# Patient Record
Sex: FEMALE | Race: WHITE | NOT HISPANIC OR LATINO | Employment: OTHER | URBAN - METROPOLITAN AREA
[De-identification: names, ages, dates, MRNs, and addresses within clinical notes are randomized per-mention and may not be internally consistent; named-entity substitution may affect disease eponyms.]

---

## 2017-04-11 ENCOUNTER — ALLSCRIPTS OFFICE VISIT (OUTPATIENT)
Dept: OTHER | Facility: OTHER | Age: 62
End: 2017-04-11

## 2017-04-11 DIAGNOSIS — R94.6 ABNORMAL RESULTS OF THYROID FUNCTION STUDIES: ICD-10-CM

## 2017-06-19 ENCOUNTER — DOCTOR'S OFFICE (OUTPATIENT)
Dept: URBAN - METROPOLITAN AREA CLINIC 136 | Facility: CLINIC | Age: 62
Setting detail: OPHTHALMOLOGY
End: 2017-06-19

## 2017-06-19 DIAGNOSIS — H43.812: ICD-10-CM

## 2017-06-19 DIAGNOSIS — H25.13: ICD-10-CM

## 2017-06-19 DIAGNOSIS — H52.223: ICD-10-CM

## 2017-06-19 DIAGNOSIS — H35.3132: ICD-10-CM

## 2017-06-19 DIAGNOSIS — H52.4: ICD-10-CM

## 2017-06-19 DIAGNOSIS — H52.13: ICD-10-CM

## 2017-06-19 PROCEDURE — SELFPAYVIS VISION VISIT SELF PAY: Performed by: OPTOMETRIST

## 2017-06-19 ASSESSMENT — REFRACTION_AUTOREFRACTION
OD_AXIS: 167
OS_AXIS: 001
OD_CYLINDER: -0.75
OS_SPHERE: -2.50
OS_CYLINDER: -1.75
OD_SPHERE: -2.50

## 2017-06-19 ASSESSMENT — KERATOMETRY
OD_AXISANGLE_DEGREES: 97
OS_AXISANGLE_DEGREES: 79
OS_K1POWER_DIOPTERS: 41.75
OD_K2POWER_DIOPTERS: 44.75
OS_K2POWER_DIOPTERS: 44.00
OD_K1POWER_DIOPTERS: 42.50

## 2017-06-19 ASSESSMENT — REFRACTION_OUTSIDERX
OD_VA1: 20/
OS_ADD: +2.50
OD_SPHERE: +2.50
OS_SPHERE: +2.50
OS_SPHERE: -1.50
OD_VA2: 20/
OD_CYLINDER: -1.75
OU_VA: 20/
OS_VA1: 20/
OU_VA: 20/20
OD_VA2: 20/20
OD_VA1: 20/20
OD_SPHERE: -1.50
OS_VA3: 20/
OD_CYLINDER: SPH
OS_VA2: 20/
OD_VA3: 20/
OS_VA1: 20/20
OS_CYLINDER: -1.75
OS_AXIS: 170
OD_ADD: +2.50
OS_VA2: 20/20
OS_VA3: 20/
OS_CYLINDER: SPH
OD_VA3: 20/
OD_AXIS: 005

## 2017-06-19 ASSESSMENT — REFRACTION_CURRENTRX
OD_SPHERE: -2.00
OD_CYLINDER: -1.50
OD_VPRISM_DIRECTION: SV
OD_OVR_VA: 20/
OS_ADD: +2.50
OD_VPRISM_DIRECTION: BF
OS_OVR_VA: 20/
OS_VPRISM_DIRECTION: BF
OD_ADD: +2.50
OS_AXIS: 170
OS_CYLINDER: -2.25
OS_VPRISM_DIRECTION: SV
OD_OVR_VA: 20/
OD_OVR_VA: 20/
OS_SPHERE: +2.50
OS_OVR_VA: 20/
OD_SPHERE: +2.50
OS_OVR_VA: 20/
OS_SPHERE: -2.75
OD_AXIS: 180

## 2017-06-19 ASSESSMENT — REFRACTION_MANIFEST
OS_VA3: 20/
OD_VA2: 20/
OD_VA1: 20/
OS_VA2: 20/
OD_VA3: 20/
OS_VA1: 20/
OU_VA: 20/

## 2017-06-19 ASSESSMENT — CONFRONTATIONAL VISUAL FIELD TEST (CVF)
OS_FINDINGS: FULL
OD_FINDINGS: FULL

## 2017-06-19 ASSESSMENT — SPHEQUIV_DERIVED
OS_SPHEQUIV: -3.375
OD_SPHEQUIV: -2.875

## 2017-06-19 ASSESSMENT — VISUAL ACUITY
OD_BCVA: 20/30-2
OS_BCVA: 20/20

## 2017-06-19 ASSESSMENT — LID POSITION - DERMATOCHALASIS
OS_DERMATOCHALASIS: LUL T
OD_DERMATOCHALASIS: RUL T

## 2017-06-19 ASSESSMENT — AXIALLENGTH_DERIVED
OS_AL: 25.2446
OD_AL: 24.7173

## 2017-07-11 DIAGNOSIS — K21.9 GASTRO-ESOPHAGEAL REFLUX DISEASE WITHOUT ESOPHAGITIS: ICD-10-CM

## 2017-07-11 DIAGNOSIS — E78.5 HYPERLIPIDEMIA: ICD-10-CM

## 2017-07-11 DIAGNOSIS — E11.9 TYPE 2 DIABETES MELLITUS WITHOUT COMPLICATIONS (HCC): ICD-10-CM

## 2017-07-11 DIAGNOSIS — R94.6 ABNORMAL RESULTS OF THYROID FUNCTION STUDIES: ICD-10-CM

## 2017-07-11 DIAGNOSIS — I10 ESSENTIAL (PRIMARY) HYPERTENSION: ICD-10-CM

## 2017-07-11 DIAGNOSIS — R73.9 HYPERGLYCEMIA: ICD-10-CM

## 2017-08-14 ENCOUNTER — ALLSCRIPTS OFFICE VISIT (OUTPATIENT)
Dept: OTHER | Facility: OTHER | Age: 62
End: 2017-08-14

## 2018-01-12 ENCOUNTER — ALLSCRIPTS OFFICE VISIT (OUTPATIENT)
Dept: OTHER | Facility: OTHER | Age: 63
End: 2018-01-12

## 2018-01-13 VITALS
BODY MASS INDEX: 31.47 KG/M2 | HEART RATE: 64 BPM | HEIGHT: 67 IN | DIASTOLIC BLOOD PRESSURE: 80 MMHG | SYSTOLIC BLOOD PRESSURE: 130 MMHG | WEIGHT: 200.5 LBS

## 2018-01-13 VITALS
BODY MASS INDEX: 31.11 KG/M2 | HEIGHT: 67 IN | DIASTOLIC BLOOD PRESSURE: 64 MMHG | SYSTOLIC BLOOD PRESSURE: 126 MMHG | WEIGHT: 198.25 LBS | HEART RATE: 60 BPM

## 2018-01-15 ENCOUNTER — OPTICAL OFFICE (OUTPATIENT)
Dept: URBAN - METROPOLITAN AREA CLINIC 143 | Facility: CLINIC | Age: 63
Setting detail: OPHTHALMOLOGY
End: 2018-01-15

## 2018-01-15 ENCOUNTER — DOCTOR'S OFFICE (OUTPATIENT)
Dept: URBAN - METROPOLITAN AREA CLINIC 136 | Facility: CLINIC | Age: 63
Setting detail: OPHTHALMOLOGY
End: 2018-01-15
Payer: COMMERCIAL

## 2018-01-15 DIAGNOSIS — H35.3132: ICD-10-CM

## 2018-01-15 DIAGNOSIS — H52.223: ICD-10-CM

## 2018-01-15 PROCEDURE — 92134 CPTRZ OPH DX IMG PST SGM RTA: CPT | Performed by: OPTOMETRIST

## 2018-01-15 PROCEDURE — 99213 OFFICE O/P EST LOW 20 MIN: CPT | Performed by: OPTOMETRIST

## 2018-01-15 PROCEDURE — S0500 DISPOS CONT LENS: HCPCS | Performed by: OPTOMETRIST

## 2018-01-15 ASSESSMENT — REFRACTION_CURRENTRX
OS_VPRISM_DIRECTION: SV
OD_ADD: +2.50
OD_SPHERE: -2.00
OD_OVR_VA: 20/
OS_OVR_VA: 20/
OD_OVR_VA: 20/
OD_VPRISM_DIRECTION: BF
OD_SPHERE: +2.50
OS_SPHERE: -2.75
OD_CYLINDER: -1.50
OS_SPHERE: +2.50
OS_OVR_VA: 20/
OS_AXIS: 170
OS_OVR_VA: 20/
OD_AXIS: 180
OS_ADD: +2.50
OS_CYLINDER: -2.25
OD_VPRISM_DIRECTION: SV
OS_VPRISM_DIRECTION: BF
OD_OVR_VA: 20/

## 2018-01-15 ASSESSMENT — REFRACTION_OUTSIDERX
OU_VA: 20/
OD_SPHERE: -1.50
OS_VA2: 20/20
OD_VA2: 20/
OS_AXIS: 170
OD_SPHERE: +2.50
OS_CYLINDER: -1.75
OS_SPHERE: -1.50
OD_AXIS: 005
OD_CYLINDER: SPH
OU_VA: 20/20
OD_ADD: +2.50
OS_CYLINDER: SPH
OS_VA1: 20/
OS_VA1: 20/20
OD_VA1: 20/20
OD_CYLINDER: -1.75
OS_VA3: 20/
OD_VA1: 20/
OS_SPHERE: +2.50
OS_VA2: 20/
OS_ADD: +2.50
OD_VA3: 20/
OD_VA3: 20/
OD_VA2: 20/20
OS_VA3: 20/

## 2018-01-15 ASSESSMENT — REFRACTION_AUTOREFRACTION
OD_AXIS: 148
OS_CYLINDER: -2.25
OS_SPHERE: +0.50
OD_CYLINDER: -1.75
OD_SPHERE: +0.75

## 2018-01-15 ASSESSMENT — CONFRONTATIONAL VISUAL FIELD TEST (CVF)
OS_FINDINGS: FULL
OD_FINDINGS: FULL

## 2018-01-15 ASSESSMENT — REFRACTION_MANIFEST
OS_VA3: 20/
OS_VA1: 20/
OU_VA: 20/
OS_VA2: 20/
OD_VA3: 20/
OD_VA1: 20/
OD_VA2: 20/

## 2018-01-15 ASSESSMENT — VISUAL ACUITY
OS_BCVA: 20/25-2
OD_BCVA: 20/25-1

## 2018-01-15 ASSESSMENT — AXIALLENGTH_DERIVED
OS_AL: 24.0747
OD_AL: 23.5947

## 2018-01-15 ASSESSMENT — KERATOMETRY
OD_K1POWER_DIOPTERS: 42.50
OD_K2POWER_DIOPTERS: 44.75
OS_K2POWER_DIOPTERS: 44.00
OS_AXISANGLE_DEGREES: 79
OS_K1POWER_DIOPTERS: 41.75
OD_AXISANGLE_DEGREES: 97

## 2018-01-15 ASSESSMENT — SPHEQUIV_DERIVED
OD_SPHEQUIV: -0.125
OS_SPHEQUIV: -0.625

## 2018-01-15 ASSESSMENT — LID POSITION - DERMATOCHALASIS
OS_DERMATOCHALASIS: LUL T
OD_DERMATOCHALASIS: RUL T

## 2018-01-15 NOTE — PROGRESS NOTES
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  She has been in the practice for the past 16 years  She has not had any recent health concerns or signs of infection  She denies cough, fevers, or persistent diarrhea  Review of Systems    Constitutional: no fever, not feeling poorly, no chills and not feeling tired  Eyes: no eyesight problems and no purulent discharge from the eyes  ENT: no nosebleeds and no nasal discharge  Cardiovascular: no chest pain and no palpitations  Respiratory: no shortness of breath, no cough, no wheezing and no shortness of breath during exertion  Gastrointestinal: no abdominal pain, no nausea, no constipation and no diarrhea  Musculoskeletal: no arthralgias and no myalgias  Neurological: no headache  Psychiatric: no anxiety and no depression  Hematologic/Lymphatic: no swollen glands and no swollen glands in the neck  Active Problems    1  Allergic rhinitis (477 9) (J30 9)   2  Colon cancer screening (V76 51) (Z12 11)   3  Diverticulitis of colon (562 11) (K57 32)   4  Diverticulosis (562 10) (K57 90)   5  Encounter for other preprocedural examination (V72 83) (Z01 818)   6  Function kidney decreased (593 9) (N28 9)   7  GERD (gastroesophageal reflux disease) (530 81) (K21 9)   8  Hyperglycemia (790 29) (R73 9)   9  Hyperlipidemia (272 4) (E78 5)   10  Hypertension (401 9) (I10)   11   Melanoma in situ (172 9) (D03 9)   12  Menopause (627 2) (Z78 0)   13  Multiple nevi (216 9) (D22 9)   14  Need for prophylactic vaccination and inoculation against influenza (V04 81) (Z23)   15  Need for Zostavax administration (V04 89) (Z23)   16  Screening for osteoporosis (V82 81) (Z13 820)   17  Tinea corporis (110 5) (B35 4)   18  Visit for pre-operative examination (V72 84) (Z01 818)    Family History  Mother    · Family history of Alzheimer Disease   · Family history of Anxiety (Symptom)   · Family history of Diabetes Mellitus (V18 0)  Father    · Family history of Cancer  Sister    · Family history of Diabetes Mellitus (V18 0)   · Family history of Hypertension (V17 49)   · Family history of Stroke Syndrome (V17 1)    Social History    · Never a smoker    Current Meds   1  Allegra Allergy TABS Recorded   2  Aspirin 81 MG TABS; Take 1 tablet daily Recorded   3  Clotrimazole-Betamethasone 1-0 05 % External Cream; APPLY  AND RUB  IN A THIN   FILM TO AFFECTED AREAS TWICE DAILY  (AM AND PM); Therapy: 27MNR0923 to (Last Rx:02Tkx1186)  Requested for: 26Cai9502 Ordered   4  DiaSense Multivitamin TABS Recorded   5  Fenofibrate 145 MG Oral Tablet; Take 1 tablet daily; Therapy: 55XKT8595 to (Evaluate:25Jun2017)  Requested for: 68WTQ5198; Last   Rx:76Hpe2163 Ordered   6  Fiber (Guar Gum) Oral Tablet Chewable; Take as directed Recorded   7  Fluticasone Propionate 50 MCG/ACT Nasal Suspension; USE 2 SPRAYS IN EACH   NOSTRIL DAILY; Therapy: 20Qxg6881 to (Last Rx:15Gaq1416)  Requested for: 93CEH3294 Ordered   8  Lisinopril 10 MG Oral Tablet; Take 1 tablet daily; Therapy: 28ODD5447 to (Evaluate:27Dvl1398)  Requested for: 02Mzp4248; Last   Rx:00Eyf5132 Ordered   9  Omeprazole 20 MG Oral Capsule Delayed Release; TAKE 1 CAPSULE Daily; Therapy: 13UFO7126 to (Last Rx:96Mso2631)  Requested for: 57FZS8689 Ordered    Allergies    1   No Known Drug Allergies    Vitals   Recorded: 88JHL3562 49:12JC   Systolic 026, LUE, Sitting   Diastolic 60, LUE, Sitting   Heart Rate 72, L Radial   Pulse Quality Bnd   Height 5 ft 7 in   Weight 207 lb 12 96 oz   BMI Calculated 32 55   BSA Calculated 2 05     Physical Exam    Constitutional   General appearance: No acute distress, well appearing and well nourished  Head and Face   Head and face: Normal     Palpation of the face and sinuses: No sinus tenderness  Eyes   Conjunctiva and lids: No swelling, erythema or discharge  Pupils and irises: Equal, round, reactive to light  Ophthalmoscopic examination: Normal fundi and optic discs  Ears, Nose, Mouth, and Throat   Otoscopic examination: Tympanic membranes translucent with normal light reflex  Canals patent without erythema  Hearing: Normal     Nasal mucosa, septum, and turbinates: Normal without edema or erythema  Lips, teeth, and gums: Normal, good dentition  Neck   Neck: Supple, symmetric, trachea midline, no masses  Thyroid: Normal, no thyromegaly  Pulmonary   Respiratory effort: No increased work of breathing or signs of respiratory distress  Auscultation of lungs: Clear to auscultation  Cardiovascular   Auscultation of heart: Normal rate and rhythm, normal S1 and S2, no murmurs  Carotid pulses: 2+ bilaterally  Examination of extremities for edema and/or varicosities: Normal     Abdomen   Abdomen: Non-tender, no masses  Liver and spleen: No hepatomegaly or splenomegaly  Lymphatic   Palpation of lymph nodes in neck: No lymphadenopathy  Palpation of lymph nodes in axillae: No lymphadenopathy  Musculoskeletal   Gait and station: Normal     Digits and nails: Normal without clubbing or cyanosis  Joints, bones, and muscles: Normal     Range of motion: Normal     Stability: Normal     Muscle strength/tone: Normal     Neurologic   Reflexes: 2+ and symmetric  Sensation: No sensory loss      Psychiatric   Judgment and insight: Normal     Orientation to person, place, and time: Normal     Recent and remote memory: Intact      Mood and affect: Normal        Signatures   Electronically signed by : Marilin Snow, [facility]; Sep 26 2016  7:28PM EST                       (Author)    Electronically signed by : Sina Dennis DO; Sep 27 2016  7:03AM EST

## 2018-01-16 NOTE — PROGRESS NOTES
Assessment   1  Sinusitis, acute (461 9) (J01 90)   2  Diabetes (250 00) (E11 9)    Plan   Sinusitis, acute    · Azithromycin 250 MG Oral Tablet; TAKE 2 TABLETS ON DAY 1 THEN TAKE 1    TABLET A DAY FOR 4 DAYS   · Promethazine-Codeine 6 25-10 MG/5ML Oral Syrup; TAKE 10 ML Bedtime    Discussion/Summary      1  Acute sinusitis-recommended Zithromax Z-Tomás as directed  Patient also given promethazine with codeine cough syrup, 2 tsp at bedtime as needed for cough  Patient was cautioned of drowsiness with this medication  4 oz with no refills given  The patient was also advised to use Flonase, 2 sprays in each nostril daily  They may use over-the-counter NSAIDs such as ibuprofen as necessary for pressure  Follow-up in the next 4-5 days if not improved  Type 2 diabetes-stable with last hemoglobin A1c of 6 0 on metformin therapy  Continue regular follow-up  Chief Complaint   C/o-congestion, cough, bodyaches x 5 days  [facility] [place]      History of Present Illness   HPI: This is a 77-year-old female who presents to the office with 5 days of worsening sinus congestion, head pressure in her left maxillary area, and increasing cough  Her cough has been mostly dry and she does not feel that she has much congestion in her chest but has been feeling very fatigued and run down  She has not had any measurable fevers  She is concerned because she wants to go to the hospital to visit her  grandson in the next few days and needs to get better  Review of Systems        Constitutional: feeling poorly-- and-- feeling tired, but-- no fever-- and-- no chills  ENT: sore throat-- and-- nasal discharge, but-- no earache  Cardiovascular: the heart rate was not slow,-- no chest pain-- and-- no palpitations  Respiratory: cough, but-- no shortness of breath  Gastrointestinal: no abdominal pain,-- no nausea-- and-- no diarrhea  Musculoskeletal: no arthralgias-- and-- no myalgias  Neurological: headache  Active Problems   1  Allergic rhinitis (477 9) (J30 9)   2  Colon cancer screening (V76 51) (Z12 11)   3  Cough (786 2) (R05)   4  Diabetes (250 00) (E11 9)   5  Diverticulitis of colon (562 11) (K57 32)   6  Diverticulosis (562 10) (K57 90)   7  Elevated TSH (794 5) (R94 6)   8  Encounter for other preprocedural examination (V72 83) (Z01 818)   9  GERD without esophagitis (530 81) (K21 9)   10  Hyperglycemia (790 29) (R73 9)   11  Hyperlipidemia (272 4) (E78 5)   12  Hypertension (401 9) (I10)   13  Melanoma in situ (172 9) (D03 9)   14  Menopause (627 2) (Z78 0)   15  Multiple nevi (216 9) (D22 9)   16  Need for pneumococcal vaccination (V03 82) (Z23)   17  Need for prophylactic vaccination and inoculation against influenza (V04 81) (Z23)   18  Need for Zostavax administration (V04 89) (Z23)   19  Screening for osteoporosis (V82 81) (Z13 820)   20  Tinea corporis (110 5) (B35 4)   21  Upper respiratory infection (465 9) (J06 9)   22  Visit for pre-operative examination (V72 84) (D89 189)    Past Medical History   1  History of malignant neoplasm of skin (V10 83) (K98 604)  Active Problems And Past Medical History Reviewed: The active problems and past medical history were reviewed and updated today  Family History   Mother    1  Family history of Alzheimer Disease   2  Family history of Anxiety (Symptom)   3  Family history of Diabetes Mellitus (V18 0)  Father    4  Family history of Cancer  Sister    5  Family history of Diabetes Mellitus (V18 0)   6  Family history of Hypertension (V17 49)   7  Family history of Stroke Syndrome (V17 1)    Social History    · Employed   ·    · Never a smoker   · No secondhand smoke exposure (V49 89) (Z78 9)    Current Meds    1  Allegra Allergy TABS Recorded   2  Aspirin 81 MG TABS; Take 1 tablet daily Recorded   3  Clotrimazole-Betamethasone 1-0 05 % External Cream; APPLY  AND RUB  IN A THIN     FILM TO AFFECTED AREAS TWICE DAILY  (AM AND PM);      Therapy: 79CBN9778 to (Last Rx:44Rih1845)  Requested for: 78Psa6546 Ordered   4  DiaSense Multivitamin TABS Recorded   5  Fenofibrate 145 MG Oral Tablet; Take 1 tablet daily; Therapy: 18BNM8290 to (Evaluate:15Jun2018)  Requested for: 20Jun2017; Last     Rx:20Jun2017 Ordered   6  Fiber (Guar Gum) CHEW; Take as directed Recorded   7  Fluticasone Propionate 50 MCG/ACT Nasal Suspension; USE 2 SPRAYS IN EACH     NOSTRIL DAILY; Therapy: 05Zix9022 to (Last Rx:43Byc7666)  Requested for: 21EWE6682 Ordered   8  Lisinopril 10 MG Oral Tablet; Take 1 tablet daily; Therapy: 03TLM2931 to (Evaluate:06Jun2018)  Requested for: 69GTA5729; Last     Rx:62Dzy4421 Ordered   9  MetFORMIN HCl - 500 MG Oral Tablet; TAKE 1 TABLET DAILY AS DIRECTED; Therapy: 48Cki9220 to (Evaluate:06Apr2018)  Requested for: 09Yxn3171; Last     Rx:88Vtv1748 Ordered   10  Omeprazole 20 MG Oral Capsule Delayed Release; TAKE 1 CAPSULE Daily; Therapy: 38OLA2924 to (Last Jewels Hernández)  Requested for: 20Jun2017 Ordered   11  PreserVision AREDS Oral Capsule; Therapy: 58EDA1592 to Recorded     The medication list was reviewed and updated today  Allergies   1  No Known Drug Allergies    Vitals    Recorded: 57SRO6732 01:35PM   Heart Rate 68, L Radial   Pulse Quality Normal, L Radial   Respiration Quality Normal   Respiration 16   Systolic 872, LUE, Sitting   Diastolic 74, LUE, Sitting   Height 5 ft 7 in   Weight 202 lb    BMI Calculated 31 64   BSA Calculated 2 03     Physical Exam        Constitutional      General appearance: No acute distress, well appearing and well nourished  Eyes      Conjunctiva and lids: No swelling, erythema or discharge  Pupils and irises: Equal, round and reactive to light  Ears, Nose, Mouth, and Throat      External inspection of ears and nose: Normal        Otoscopic examination: Abnormal  -- Left tympanic membrane is dull with erythema        Nasal mucosa, septum, and turbinates: Abnormal  -- Turbinates are erythematous and edematous bilaterally  Oropharynx: Abnormal  -- Postnasal drip in the posterior pharynx present  Pulmonary      Respiratory effort: No increased work of breathing or signs of respiratory distress  Auscultation of lungs: Clear to auscultation  Cardiovascular      Auscultation of heart: Normal rate and rhythm, normal S1 and S2, without murmurs  Examination of extremities for edema and/or varicosities: Normal        Abdomen      Abdomen: Non-tender, no masses  Liver and spleen: No hepatomegaly or splenomegaly  Musculoskeletal      Digits and nails: Normal without clubbing or cyanosis  Inspection/palpation of joints, bones, and muscles: Normal        Neurologic      Reflexes: 2+ and symmetric  Sensation: No sensory loss  Psychiatric      Orientation to person, place, and time: Normal        Mood and affect: Normal           Signatures    Electronically signed by : GAVIOTA Mancera; Jan 12 2018  1:49PM EST                       (Author)     Electronically signed by :  SHAWN Gonzales ; Chicho 15 2018  8:34AM EST

## 2018-01-22 VITALS
DIASTOLIC BLOOD PRESSURE: 74 MMHG | BODY MASS INDEX: 31.71 KG/M2 | HEART RATE: 68 BPM | HEIGHT: 67 IN | SYSTOLIC BLOOD PRESSURE: 130 MMHG | RESPIRATION RATE: 16 BRPM | WEIGHT: 202 LBS

## 2018-02-14 DIAGNOSIS — J30.9 ALLERGIC RHINITIS: ICD-10-CM

## 2018-02-14 DIAGNOSIS — E78.5 HYPERLIPIDEMIA: ICD-10-CM

## 2018-02-14 DIAGNOSIS — D03.9 MELANOMA IN SITU (HCC): ICD-10-CM

## 2018-02-14 DIAGNOSIS — E11.9 TYPE 2 DIABETES MELLITUS WITHOUT COMPLICATIONS (HCC): ICD-10-CM

## 2018-02-14 DIAGNOSIS — R94.6 ABNORMAL RESULTS OF THYROID FUNCTION STUDIES: ICD-10-CM

## 2018-02-14 DIAGNOSIS — I10 ESSENTIAL (PRIMARY) HYPERTENSION: ICD-10-CM

## 2018-02-14 DIAGNOSIS — K21.9 GASTRO-ESOPHAGEAL REFLUX DISEASE WITHOUT ESOPHAGITIS: ICD-10-CM

## 2018-03-22 PROBLEM — E11.9 DIABETES (HCC): Status: ACTIVE | Noted: 2017-04-11

## 2018-03-22 RX ORDER — LISINOPRIL 10 MG/1
1 TABLET ORAL DAILY
COMMUNITY
Start: 2012-01-18 | End: 2018-06-04 | Stop reason: SDUPTHER

## 2018-03-22 RX ORDER — OMEPRAZOLE 20 MG/1
1 CAPSULE, DELAYED RELEASE ORAL DAILY
COMMUNITY
Start: 2013-12-26 | End: 2018-06-04 | Stop reason: SDUPTHER

## 2018-03-22 RX ORDER — FENOFIBRATE 145 MG/1
1 TABLET, COATED ORAL DAILY
COMMUNITY
Start: 2011-02-08 | End: 2018-06-04 | Stop reason: SDUPTHER

## 2018-03-26 DIAGNOSIS — E11.9 TYPE 2 DIABETES MELLITUS WITHOUT COMPLICATION, WITHOUT LONG-TERM CURRENT USE OF INSULIN (HCC): Primary | ICD-10-CM

## 2018-03-29 LAB — HBA1C MFR BLD HPLC: 6.1 %

## 2018-03-30 ENCOUNTER — OFFICE VISIT (OUTPATIENT)
Dept: FAMILY MEDICINE CLINIC | Facility: CLINIC | Age: 63
End: 2018-03-30
Payer: COMMERCIAL

## 2018-03-30 VITALS
HEART RATE: 80 BPM | HEIGHT: 67 IN | WEIGHT: 195 LBS | BODY MASS INDEX: 30.61 KG/M2 | DIASTOLIC BLOOD PRESSURE: 76 MMHG | SYSTOLIC BLOOD PRESSURE: 120 MMHG

## 2018-03-30 DIAGNOSIS — Z12.11 SCREEN FOR COLON CANCER: ICD-10-CM

## 2018-03-30 DIAGNOSIS — I10 ESSENTIAL HYPERTENSION: Primary | ICD-10-CM

## 2018-03-30 DIAGNOSIS — E78.2 MIXED HYPERLIPIDEMIA: ICD-10-CM

## 2018-03-30 DIAGNOSIS — R73.9 HYPERGLYCEMIA: ICD-10-CM

## 2018-03-30 DIAGNOSIS — E11.8 TYPE 2 DIABETES MELLITUS WITH COMPLICATION, WITHOUT LONG-TERM CURRENT USE OF INSULIN (HCC): ICD-10-CM

## 2018-03-30 PROCEDURE — 99214 OFFICE O/P EST MOD 30 MIN: CPT | Performed by: FAMILY MEDICINE

## 2018-03-30 RX ORDER — FLUTICASONE PROPIONATE 50 MCG
1 SPRAY, SUSPENSION (ML) NASAL 2 TIMES DAILY PRN
COMMUNITY
Start: 2010-04-06

## 2018-03-30 NOTE — PROGRESS NOTES
Assessment/Plan:    Diabetes ([facility] [address] )  Diabetes is under good control with the hemoglobin A1c 6 1% and her glucose 116  She is on metformin 500 mg 1 daily  Hypertension  Her blood pressure is stable at 120/76 and she takes lisinopril 10 mg 1 daily  Hyperlipidemia  Her triglycerides are elevated at 222 and she does take fenofibrate 145 mg daily  Diagnoses and all orders for this visit:    Essential hypertension  -     CBC and differential; Future  -     Comprehensive metabolic panel; Future  -     HEMOGLOBIN A1C W/ EAG ESTIMATION; Future  -     Lipid Panel with Direct LDL reflex; Future  -     Microalbumin / creatinine urine ratio; Future  -     TSH, 3rd generation; Future    Hyperglycemia  -     CBC and differential; Future  -     Comprehensive metabolic panel; Future  -     HEMOGLOBIN A1C W/ EAG ESTIMATION; Future  -     Lipid Panel with Direct LDL reflex; Future  -     Microalbumin / creatinine urine ratio; Future  -     TSH, 3rd generation; Future    Mixed hyperlipidemia  -     CBC and differential; Future  -     Comprehensive metabolic panel; Future  -     HEMOGLOBIN A1C W/ EAG ESTIMATION; Future  -     Lipid Panel with Direct LDL reflex; Future  -     Microalbumin / creatinine urine ratio; Future  -     TSH, 3rd generation; Future    Type 2 diabetes mellitus with complication, without long-term current use of insulin (HCC)  -     CBC and differential; Future  -     Comprehensive metabolic panel; Future  -     HEMOGLOBIN A1C W/ EAG ESTIMATION; Future  -     Lipid Panel with Direct LDL reflex; Future  -     Microalbumin / creatinine urine ratio; Future  -     TSH, 3rd generation; Future    Screen for colon cancer  -     Occult Bloood,Fecal Immunochemical; Future    Other orders  -     fluticasone (FLONASE) 50 mcg/act nasal spray;   -     Calcium Carb-Ergocalciferol 250-125 MG-UNIT TABS; Take 1 tablet by mouth          Subjective:      Patient ID: Shefali Humphrey is a 58 y o  female      CC:  Follow up DM 2, TSH, GERD, hyperlipidemia, HTN  Review BW results  -[facility]    HPI:  This is a 42-year-old female who comes in for her regular 6 month appointment  However, she has not been seen for a while and she did get her blood work done before she came in  Her blood pressure today was 120/76 and her weight is 195 lb  Reviewing her labs her hemoglobin A1c was 6 1%, glucose was 116, triglycerides were mildly elevated at 222 because she is not watching her diet and is eating more pasta than previously  Her HDL is low at 34 but her LDL is 67  Procedures    The following portions of the patient's history were reviewed and updated as appropriate: allergies, current medications, past family history, past medical history, past social history, past surgical history and problem list     Review of Systems   Constitutional: Negative  HENT: Negative  Eyes: Negative  Respiratory: Negative  Cardiovascular: Negative  Gastrointestinal: Negative  Endocrine: Negative  Genitourinary: Negative  Musculoskeletal: Negative  Skin: Negative  Allergic/Immunologic: Negative  Neurological: Negative  Hematological: Negative  Psychiatric/Behavioral: Negative  Vitals:    03/30/18 0746   BP: 120/76   BP Location: Left arm   Patient Position: Sitting   Cuff Size: Large   Pulse: 80   Weight: 88 5 kg (195 lb)   Height: 5' 7" (1 702 m)   Objective:      /76 (BP Location: Left arm, Patient Position: Sitting, Cuff Size: Large)   Pulse 80   Ht 5' 7" (1 702 m)   Wt 88 5 kg (195 lb)   BMI 30 54 kg/m²          Physical Exam   Constitutional: She is oriented to person, place, and time  She appears well-developed and well-nourished  HENT:   Head: Normocephalic  Right Ear: External ear normal    Left Ear: External ear normal    Mouth/Throat: Oropharynx is clear and moist    Eyes: Conjunctivae and EOM are normal  Pupils are equal, round, and reactive to light  Neck: Normal range of motion   Neck supple  Cardiovascular: Normal rate, regular rhythm and normal heart sounds  Pulses are no weak pulses  Pulses:       Dorsalis pedis pulses are 2+ on the right side, and 2+ on the left side  Posterior tibial pulses are 2+ on the right side, and 2+ on the left side  Pulmonary/Chest: Effort normal and breath sounds normal    Abdominal: Soft  Bowel sounds are normal    Musculoskeletal: Normal range of motion  Feet:   Right Foot:   Skin Integrity: Negative for ulcer, skin breakdown, erythema, warmth, callus or dry skin  Left Foot:   Skin Integrity: Negative for ulcer, skin breakdown, erythema, warmth, callus or dry skin  Neurological: She is alert and oriented to person, place, and time  Skin: Skin is warm  Psychiatric: She has a normal mood and affect  Her behavior is normal  Judgment and thought content normal    Nursing note and vitals reviewed  Patient's shoes and socks removed  Right Foot/Ankle   Right Foot Inspection  Skin Exam: skin normal and skin intact no dry skin, no warmth, no callus, no erythema, no maceration, no abnormal color, no pre-ulcer, no ulcer and no callus                          Toe Exam: ROM and strength within normal limits  Sensory   Vibration: intact  Proprioception: intact   Monofilament testing: intact  Vascular    The right DP pulse is 2+  The right PT pulse is 2+  Left Foot/Ankle  Left Foot Inspection  Skin Exam: skin normal and skin intactno dry skin, no warmth, no erythema, no maceration, normal color, no pre-ulcer, no ulcer and no callus                         Toe Exam: ROM and strength within normal limits                   Sensory   Vibration: intact  Proprioception: intact  Monofilament: intact  Vascular    The left DP pulse is 2+  The left PT pulse is 2+  Assign Risk Category:  No deformity present; No loss of protective sensation;  No weak pulses       Risk: 0

## 2018-03-30 NOTE — ASSESSMENT & PLAN NOTE
Diabetes is under good control with the hemoglobin A1c 6 1% and her glucose 116  She is on metformin 500 mg 1 daily

## 2018-04-26 ENCOUNTER — OFFICE VISIT (OUTPATIENT)
Dept: FAMILY MEDICINE CLINIC | Facility: CLINIC | Age: 63
End: 2018-04-26
Payer: COMMERCIAL

## 2018-04-26 VITALS
BODY MASS INDEX: 30.92 KG/M2 | HEIGHT: 67 IN | DIASTOLIC BLOOD PRESSURE: 74 MMHG | SYSTOLIC BLOOD PRESSURE: 132 MMHG | TEMPERATURE: 101.3 F | WEIGHT: 197 LBS

## 2018-04-26 DIAGNOSIS — J11.1 INFLUENZA: Primary | ICD-10-CM

## 2018-04-26 PROCEDURE — 99214 OFFICE O/P EST MOD 30 MIN: CPT | Performed by: PHYSICIAN ASSISTANT

## 2018-04-26 RX ORDER — OSELTAMIVIR PHOSPHATE 75 MG/1
75 CAPSULE ORAL EVERY 12 HOURS SCHEDULED
Qty: 10 CAPSULE | Refills: 0 | Status: SHIPPED | OUTPATIENT
Start: 2018-04-26 | End: 2018-05-01

## 2018-04-26 NOTE — PROGRESS NOTES
Assessment/Plan:  Patient Instructions   1  Influenza-patient with high fever, chills, body aches, cough-and flu exposure  Recommend starting Tamiflu 75 mg twice daily for 5 days since she is still within the 1st 48 hr window of symptoms  Advised her that she is contagious and should avoid contact with others  Encourage fluids and NSAIDs as necessary  Follow-up in the next 4-5 days if not better or sooner if symptoms would worsen  No problem-specific Assessment & Plan notes found for this encounter  Diagnoses and all orders for this visit:    Influenza  -     oseltamivir (TAMIFLU) 75 mg capsule; Take 1 capsule (75 mg total) by mouth every 12 (twelve) hours for 5 days          Subjective:   c/o eyes burn, left ear ache, coughing, blowing nose, ribs and back hurt, achy, chilled  Onset Tues  Grandchildren have the flu   mjs     Patient ID: Maurilio Lobo is a 58 y o  female  HPI:  This is a 72-year-old female who presents to the office with onset of significant fevers, chills, body aches, cough, and generalized fatigue for the past 48 hr   Symptoms did come on rather abruptly  She has been around 2 sick contacts with influenza  She did not have a vaccine this year  She has been using some NSAIDs as needed for fever with little benefit  The following portions of the patient's history were reviewed and updated as appropriate: allergies, current medications, past family history, past medical history, past social history, past surgical history and problem list     Review of Systems   Constitutional: Positive for activity change, fatigue and fever  HENT: Positive for congestion, postnasal drip, rhinorrhea and sore throat  Negative for ear pain and sinus pressure  Respiratory: Positive for cough  Negative for chest tightness, shortness of breath and wheezing  Cardiovascular: Negative for palpitations  Gastrointestinal: Negative for diarrhea and nausea     Musculoskeletal: Positive for arthralgias and myalgias  Skin: Negative for rash  Neurological: Negative for dizziness and numbness  All other systems reviewed and are negative  Objective:      /74   Temp (!) 101 3 °F (38 5 °C)   Ht 5' 7" (1 702 m)   Wt 89 4 kg (197 lb)   BMI 30 85 kg/m²          Physical Exam   Constitutional: She is oriented to person, place, and time  She appears well-developed and well-nourished  No distress  HENT:   Head: Normocephalic and atraumatic  Mouth/Throat: No oropharyngeal exudate  Turbinates are erythematous and edematous bilaterally  Post nasal drip of the posterior pharynx noted  Eyes: Pupils are equal, round, and reactive to light  Right eye exhibits no discharge  Left eye exhibits no discharge  Neck: Neck supple  Cardiovascular: Normal rate, regular rhythm and normal heart sounds  Exam reveals no friction rub  No murmur heard  Pulmonary/Chest: Effort normal and breath sounds normal  No respiratory distress  She has no wheezes  She has no rales  Musculoskeletal: Normal range of motion  She exhibits no edema  Lymphadenopathy:     She has cervical adenopathy  Neurological: She is alert and oriented to person, place, and time  Skin: Skin is warm and dry  No erythema  Psychiatric: She has a normal mood and affect  Her behavior is normal    Nursing note and vitals reviewed

## 2018-04-26 NOTE — PATIENT INSTRUCTIONS
1   Influenza-patient with high fever, chills, body aches, cough-and flu exposure  Recommend starting Tamiflu 75 mg twice daily for 5 days since she is still within the 1st 48 hr window of symptoms  Advised her that she is contagious and should avoid contact with others  Encourage fluids and NSAIDs as necessary  Follow-up in the next 4-5 days if not better or sooner if symptoms would worsen

## 2018-06-04 DIAGNOSIS — K21.9 GASTROESOPHAGEAL REFLUX DISEASE, ESOPHAGITIS PRESENCE NOT SPECIFIED: Primary | ICD-10-CM

## 2018-06-04 DIAGNOSIS — I10 HYPERTENSION, UNSPECIFIED TYPE: ICD-10-CM

## 2018-06-04 DIAGNOSIS — E78.5 HYPERLIPIDEMIA, UNSPECIFIED HYPERLIPIDEMIA TYPE: ICD-10-CM

## 2018-06-04 RX ORDER — OMEPRAZOLE 20 MG/1
20 CAPSULE, DELAYED RELEASE ORAL DAILY
Qty: 90 CAPSULE | Refills: 1 | Status: SHIPPED | OUTPATIENT
Start: 2018-06-04 | End: 2018-06-20 | Stop reason: CLARIF

## 2018-06-04 RX ORDER — FENOFIBRATE 145 MG/1
145 TABLET, COATED ORAL DAILY
Qty: 90 TABLET | Refills: 1 | Status: SHIPPED | OUTPATIENT
Start: 2018-06-04 | End: 2018-06-20 | Stop reason: CLARIF

## 2018-06-04 RX ORDER — LISINOPRIL 10 MG/1
10 TABLET ORAL DAILY
Qty: 90 TABLET | Refills: 1 | Status: SHIPPED | OUTPATIENT
Start: 2018-06-04 | End: 2018-08-17 | Stop reason: SDUPTHER

## 2018-06-14 ENCOUNTER — TELEPHONE (OUTPATIENT)
Dept: FAMILY MEDICINE CLINIC | Facility: CLINIC | Age: 63
End: 2018-06-14

## 2018-06-14 NOTE — TELEPHONE ENCOUNTER
Libra Giordano for her Omeprazole 20 mg and Rx for Fenofibrate need to have P A  As per [facility] the alternative for the Omeprazole 20 mg would be Pantoprazole and for the Fenofibrate it would be Pravachol if you would choose any of these Please let me know what you decide

## 2018-06-18 NOTE — TELEPHONE ENCOUNTER
Pt called back but there was no one available at the time to speak to her  Please call back   Thank you

## 2018-06-20 ENCOUNTER — TRANSCRIBE ORDERS (OUTPATIENT)
Dept: FAMILY MEDICINE CLINIC | Facility: CLINIC | Age: 63
End: 2018-06-20

## 2018-06-20 DIAGNOSIS — B07.8 OTHER VIRAL WARTS: ICD-10-CM

## 2018-06-20 DIAGNOSIS — K21.9 GASTROESOPHAGEAL REFLUX DISEASE WITHOUT ESOPHAGITIS: Primary | ICD-10-CM

## 2018-06-20 DIAGNOSIS — Z12.11 ENCOUNTER FOR SCREENING FOR MALIGNANT NEOPLASM OF COLON: Primary | ICD-10-CM

## 2018-06-20 DIAGNOSIS — Z85.820 PERSONAL HISTORY OF MALIGNANT MELANOMA OF SKIN: Primary | ICD-10-CM

## 2018-06-20 DIAGNOSIS — E78.2 MIXED HYPERLIPIDEMIA: ICD-10-CM

## 2018-06-20 DIAGNOSIS — L57.8 OTHER SKIN CHANGES DUE TO CHRONIC EXPOSURE TO NONIONIZING RADIATION: ICD-10-CM

## 2018-06-20 RX ORDER — PANTOPRAZOLE SODIUM 20 MG/1
20 TABLET, DELAYED RELEASE ORAL DAILY
Qty: 90 TABLET | Refills: 3 | Status: SHIPPED | OUTPATIENT
Start: 2018-06-20 | End: 2019-06-26 | Stop reason: SDUPTHER

## 2018-06-20 RX ORDER — PRAVASTATIN SODIUM 20 MG
20 TABLET ORAL DAILY
Qty: 90 TABLET | Refills: 3 | Status: SHIPPED | OUTPATIENT
Start: 2018-06-20 | End: 2019-06-26 | Stop reason: SDUPTHER

## 2018-06-25 ENCOUNTER — OFFICE VISIT (OUTPATIENT)
Dept: URGENT CARE | Facility: MEDICAL CENTER | Age: 63
End: 2018-06-25
Payer: COMMERCIAL

## 2018-06-25 VITALS
BODY MASS INDEX: 30.92 KG/M2 | TEMPERATURE: 97 F | HEART RATE: 69 BPM | HEIGHT: 67 IN | RESPIRATION RATE: 16 BRPM | WEIGHT: 197 LBS | OXYGEN SATURATION: 97 % | DIASTOLIC BLOOD PRESSURE: 74 MMHG | SYSTOLIC BLOOD PRESSURE: 138 MMHG

## 2018-06-25 DIAGNOSIS — N30.00 ACUTE CYSTITIS WITHOUT HEMATURIA: ICD-10-CM

## 2018-06-25 DIAGNOSIS — M54.9 ACUTE BACK PAIN, UNSPECIFIED BACK LOCATION, UNSPECIFIED BACK PAIN LATERALITY: Primary | ICD-10-CM

## 2018-06-25 DIAGNOSIS — E11.9 TYPE 2 DIABETES MELLITUS WITHOUT COMPLICATION, WITHOUT LONG-TERM CURRENT USE OF INSULIN (HCC): ICD-10-CM

## 2018-06-25 LAB
SL AMB  POCT GLUCOSE, UA: NEGATIVE
SL AMB LEUKOCYTE ESTERASE,UA: ABNORMAL
SL AMB POCT BILIRUBIN,UA: NEGATIVE
SL AMB POCT BLOOD,UA: ABNORMAL
SL AMB POCT CLARITY,UA: CLEAR
SL AMB POCT COLOR,UA: YELLOW
SL AMB POCT KETONES,UA: NEGATIVE
SL AMB POCT NITRITE,UA: NEGATIVE
SL AMB POCT PH,UA: 6
SL AMB POCT SPECIFIC GRAVITY,UA: 1.03
SL AMB POCT URINE PROTEIN: ABNORMAL
SL AMB POCT UROBILINOGEN: 0.2

## 2018-06-25 PROCEDURE — 81002 URINALYSIS NONAUTO W/O SCOPE: CPT | Performed by: NURSE PRACTITIONER

## 2018-06-25 PROCEDURE — 87086 URINE CULTURE/COLONY COUNT: CPT | Performed by: NURSE PRACTITIONER

## 2018-06-25 PROCEDURE — 99214 OFFICE O/P EST MOD 30 MIN: CPT | Performed by: NURSE PRACTITIONER

## 2018-06-25 RX ORDER — PHENAZOPYRIDINE HYDROCHLORIDE 100 MG/1
100 TABLET, FILM COATED ORAL 3 TIMES DAILY PRN
Qty: 10 TABLET | Refills: 0 | Status: SHIPPED | OUTPATIENT
Start: 2018-06-25 | End: 2018-10-03 | Stop reason: ALTCHOICE

## 2018-06-25 RX ORDER — NITROFURANTOIN 25; 75 MG/1; MG/1
100 CAPSULE ORAL 2 TIMES DAILY
Qty: 14 CAPSULE | Refills: 0 | Status: SHIPPED | OUTPATIENT
Start: 2018-06-25 | End: 2018-06-27 | Stop reason: SINTOL

## 2018-06-25 NOTE — PATIENT INSTRUCTIONS
Maintain good hygiene  Encourage fluids and rest    May utilize Tylenol and Ibuprofen for discomfort  Complete course of antibiotics as prescribed  Be cautious pyridium will stain clothing and may change color of urine to orange  Follow up with PCP if symptoms persist      Dysuria, Ambulatory Care   GENERAL INFORMATION:   Dysuria  is trouble urinating, or pain, burning, or discomfort when you urinate  Dysuria is usually a symptom of another problem, such as a blockage or urinary tract infection  Common symptoms include the following:   · Fever     · Cloudy, bad smelling urine     · Urge to urinate often but urinating little     · Back, side, or abdominal pain     · Blood in your urine     · Discharge that smells bad     · Itching  Seek immediate care for the following symptoms:   · Severe back, side, or abdominal pain    · A fever and shaking chills    · Vomiting several times in a row  Treatment for dysuria  may include medicines to treat a bacterial infection or help decrease bladder spasms  Manage your dysuria:   · Drink liquids as directed  Ask how much liquid to drink each day and which liquids are best for you  You may need to drink more liquid than usual to flush bacteria out of your body  · A sitz bath  may help decrease your pain  Healthcare providers may give you a portable sitz bath  This is a small tub that fits in the toilet  Fill the sitz bath or a bathtub with 4 to 6 inches of warm water  Sit in the warm water for 20 minutes 2 to 3 times a day  Follow up with your healthcare provider as directed:  Write down your questions so you remember to ask them during your visits  CARE AGREEMENT:   You have the right to help plan your care  Learn about your health condition and how it may be treated  Discuss treatment options with your caregivers to decide what care you want to receive  You always have the right to refuse treatment  The above information is an  only   It is not intended as medical advice for individual conditions or treatments  Talk to your doctor, nurse or pharmacist before following any medical regimen to see if it is safe and effective for you  © 2014 [address] is for End User's use only and may not be sold, redistributed or otherwise used for commercial purposes  All illustrations and images included in CareNotes® are the copyrighted property of A D [employer] M [employer]  or Alexys Whittington

## 2018-06-25 NOTE — PROGRESS NOTES
[facility]        NAME: Adin Rodríguez is a 58 y o  female  : 1955    MRN: [id number]  DATE: 2018  TIME: 8:09 AM    Assessment and Plan   Acute back pain, unspecified back location, unspecified back pain laterality [M54 9]  1  Acute back pain, unspecified back location, unspecified back pain laterality  POCT urine dip   2  Acute cystitis without hematuria  nitrofurantoin (MACROBID) 100 mg capsule    phenazopyridine (PYRIDIUM) 100 mg tablet         Patient Instructions   In office urinalysis + trace leuks  Unable to send urine for culture as patient was not able to supply enough  Maintain good hygiene  Encourage fluids and rest    May utilize Tylenol and Ibuprofen for discomfort  Complete course of antibiotics as prescribed  Be cautious pyridium will stain clothing and may change color of urine to orange  Follow up with PCP if symptoms persist    Proceed to  ER if symptoms worsen  Chief Complaint     Chief Complaint   Patient presents with    Back Pain     Patient reports back pain that started at 0500 this morning  History of Present Illness       Patient presents with flank pain and lower back pain which started around 0500 this am  Also noted to radiate to the front  + nausea, + chills, has felt warm  Denies vomiting, diarrhea, constipation  Denies urinary symptoms  Has taken advil 600 mg with no improvement  No ice or application  Denies numbness/tingling  Applied tiger balm with no improvement  Review of Systems   Review of Systems   Constitutional: Positive for chills  Negative for fever  Respiratory: Negative  Cardiovascular: Negative  Gastrointestinal: Positive for abdominal pain and nausea  Negative for blood in stool, constipation and diarrhea  Genitourinary: Positive for flank pain and pelvic pain   Negative for decreased urine volume, difficulty urinating, dysuria, frequency, hematuria, urgency, vaginal bleeding, vaginal discharge and vaginal pain  Musculoskeletal: Positive for back pain  Skin: Negative for rash  Current Medications       Current Outpatient Prescriptions:     aspirin 81 MG tablet, Take 1 tablet by mouth daily, Disp: , Rfl:     Calcium Carb-Ergocalciferol 250-125 MG-UNIT TABS, Take 1 tablet by mouth, Disp: , Rfl:     fluticasone (FLONASE) 50 mcg/act nasal spray, , Disp: , Rfl:     lisinopril (ZESTRIL) 10 mg tablet, Take 1 tablet (10 mg total) by mouth daily, Disp: 90 tablet, Rfl: 1    metFORMIN (GLUCOPHAGE) 500 mg tablet, TAKE 1 TABLET DAILY AS     DIRECTED, Disp: 90 tablet, Rfl: 0    pantoprazole (PROTONIX) 20 mg tablet, Take 1 tablet (20 mg total) by mouth daily, Disp: 90 tablet, Rfl: 3    pravastatin (PRAVACHOL) 20 mg tablet, Take 1 tablet (20 mg total) by mouth daily, Disp: 90 tablet, Rfl: 3    nitrofurantoin (MACROBID) 100 mg capsule, Take 1 capsule (100 mg total) by mouth 2 (two) times a day for 7 days, Disp: 14 capsule, Rfl: 0    phenazopyridine (PYRIDIUM) 100 mg tablet, Take 1 tablet (100 mg total) by mouth 3 (three) times a day as needed for bladder spasms, Disp: 10 tablet, Rfl: 0    Current Allergies     Allergies as of 06/25/2018    (No Known Allergies)            The following portions of the patient's history were reviewed and updated as appropriate: allergies, current medications, past family history, past medical history, past social history, past surgical history and problem list      Past Medical History:   Diagnosis Date    Malignant neoplasm of skin     last assessed 04/11/2017       No past surgical history on file  Family History   Problem Relation Age of Onset    Alzheimer's disease Mother     Anxiety disorder Mother     Diabetes Mother     Cancer Father     Diabetes Sister     Hypertension Sister     Stroke Sister          Medications have been verified          Objective   /74   Pulse 69   Temp (!) 97 °F (36 1 °C)   Resp 16   Ht 5' 7" (1 702 m)   Wt 89 4 kg (197 lb)   SpO2 97%   BMI 30 85 kg/m²        Physical Exam     Physical Exam   Constitutional: She is oriented to person, place, and time  She appears well-developed and well-nourished  No distress  HENT:   Head: Normocephalic and atraumatic  Eyes: Conjunctivae are normal  Pupils are equal, round, and reactive to light  Cardiovascular: Normal rate, regular rhythm, normal heart sounds and intact distal pulses  No murmur heard  Pulmonary/Chest: Effort normal and breath sounds normal    Abdominal: Soft  Normal appearance and bowel sounds are normal  She exhibits no distension  There is no hepatosplenomegaly  There is tenderness in the suprapubic area  There is CVA tenderness (Slight B/L CVA tenderness as well as general lumbar spine tenderness  )  There is no rebound and no guarding  Musculoskeletal: Normal range of motion  She exhibits no edema  Neurological: She is alert and oriented to person, place, and time  Skin: Skin is warm and dry  No rash noted  She is not diaphoretic  Psychiatric: She has a normal mood and affect  Nursing note and vitals reviewed

## 2018-06-26 LAB — BACTERIA UR CULT: NORMAL

## 2018-06-27 ENCOUNTER — OFFICE VISIT (OUTPATIENT)
Dept: FAMILY MEDICINE CLINIC | Facility: CLINIC | Age: 63
End: 2018-06-27
Payer: COMMERCIAL

## 2018-06-27 VITALS
WEIGHT: 196 LBS | DIASTOLIC BLOOD PRESSURE: 62 MMHG | HEIGHT: 67 IN | BODY MASS INDEX: 30.76 KG/M2 | SYSTOLIC BLOOD PRESSURE: 130 MMHG | HEART RATE: 88 BPM | TEMPERATURE: 99.3 F

## 2018-06-27 DIAGNOSIS — I10 ESSENTIAL HYPERTENSION: ICD-10-CM

## 2018-06-27 DIAGNOSIS — E11.8 TYPE 2 DIABETES MELLITUS WITH COMPLICATION, WITHOUT LONG-TERM CURRENT USE OF INSULIN (HCC): ICD-10-CM

## 2018-06-27 DIAGNOSIS — R10.10 PAIN OF UPPER ABDOMEN: Primary | ICD-10-CM

## 2018-06-27 DIAGNOSIS — E78.2 MIXED HYPERLIPIDEMIA: ICD-10-CM

## 2018-06-27 PROCEDURE — 3078F DIAST BP <80 MM HG: CPT | Performed by: PHYSICIAN ASSISTANT

## 2018-06-27 PROCEDURE — 3075F SYST BP GE 130 - 139MM HG: CPT | Performed by: PHYSICIAN ASSISTANT

## 2018-06-27 PROCEDURE — 99214 OFFICE O/P EST MOD 30 MIN: CPT | Performed by: PHYSICIAN ASSISTANT

## 2018-06-27 RX ORDER — CEFUROXIME AXETIL 250 MG/1
250 TABLET ORAL EVERY 12 HOURS SCHEDULED
Qty: 14 TABLET | Refills: 0 | Status: SHIPPED | OUTPATIENT
Start: 2018-06-27 | End: 2018-07-04

## 2018-06-27 NOTE — PROGRESS NOTES
Assessment/Plan:  Patient Instructions   1  Epigastric abdominal discomfort-patient also with pain in the right upper quadrant  She does have a history of known gallstones as a 5 years ago  Would recommend ultrasound of the abdomen to rule out choledocholithiasis or common bile duct stone  Also rule out pancreatic inflammation  Pain at this point is moderate 5/10 and decreased oral intake is advised  There is a possibility this is secondary to her Macrobid as well which will be discontinued and she will be placed on low-dose Ceftin 250 mg twice daily which should cover urinary tract infection as well  2   UTI-patient recently treated with Macrobid at urgent care center  We will stop this medication because she does not seem to be improving with urinary symptoms and may be having some abdominal discomfort associated with it  Urinalysis was unable to be obtained  3   Type 2 diabetes-patient advised to continue monitoring glucose closely  4   Hypertension-presently stable with lisinopril  5   Hyperlipidemia-stable with of GERD-patient stable with Protonix  Continues follow-up with GI  Diagnoses and all orders for this visit:    Pain of upper abdomen  -     US abdomen complete; Future  -     cefuroxime (CEFTIN) 250 mg tablet; Take 1 tablet (250 mg total) by mouth every 12 (twelve) hours for 7 days    Type 2 diabetes mellitus with complication, without long-term current use of insulin (HCC)    Essential hypertension    Mixed hyperlipidemia          Subjective:   C/o upper abdominal pain since yesterday  She is wondering if it is from the antibiotic she started Monday  Also questioned if it could be from gallstones  She also has some confusion with meds  She brought in bottles  mjs     Patient ID: Mika Contreras is a 58 y o  female      HPI:  This is a 41-year-old female who presents to the office with concerns over epigastric abdominal despite comfort as well as right upper quadrant discomfort which has been persistent since yesterday  She states the pain is more bothersome when she is pressing on the area or bending forward and putting increased pressure on her abdomen otherwise it does not seem to bother her at rest   She states she has not had much appetite  She was seen in the urgent care setting 2 days ago and prescribed Macrobid for a urinary tract infection  She states she is still having some urinary discomfort and is wondering if the Macrobid could be upsetting her stomach  She does have a history of GERD and continues to follow up with GI as well as take Protonix regularly for this  She states that she does have a known history of gallstones about 5 years ago but they have not been causing any problem previously  The following portions of the patient's history were reviewed and updated as appropriate: allergies, current medications, past family history, past medical history, past social history, past surgical history and problem list     Review of Systems   Constitutional: Negative for chills and fever  HENT: Negative for congestion  Respiratory: Negative for chest tightness and shortness of breath  Cardiovascular: Negative for chest pain and palpitations  Gastrointestinal: Positive for abdominal pain  Negative for blood in stool, diarrhea, nausea and vomiting  Skin: Negative for rash  Neurological: Negative for dizziness  Objective:      /62   Pulse 88   Temp 99 3 °F (37 4 °C)   Ht 5' 7" (1 702 m)   Wt 88 9 kg (196 lb)   BMI 30 70 kg/m²          Physical Exam   Constitutional: She is oriented to person, place, and time  She appears well-developed and well-nourished  No distress  HENT:   Head: Normocephalic and atraumatic  Eyes: Conjunctivae are normal  Pupils are equal, round, and reactive to light  Cardiovascular: Normal rate, normal heart sounds and intact distal pulses  No murmur heard    Pulmonary/Chest: Effort normal  No respiratory distress  She has no wheezes  Abdominal:   Epigastric tenderness to deep palpation present  No rebound, guarding, or rigidity  There is positive Mancilla sign in the right upper quadrant  Bowel sounds are normoactive x4  Musculoskeletal: Normal range of motion  Neurological: She is alert and oriented to person, place, and time

## 2018-06-27 NOTE — PATIENT INSTRUCTIONS
1   Epigastric abdominal discomfort-patient also with pain in the right upper quadrant  She does have a history of known gallstones as a 5 years ago  Would recommend ultrasound of the abdomen to rule out choledocholithiasis or common bile duct stone  Also rule out pancreatic inflammation  Pain at this point is moderate 5/10 and decreased oral intake is advised  There is a possibility this is secondary to her Macrobid as well which will be discontinued and she will be placed on low-dose Ceftin 250 mg twice daily which should cover urinary tract infection as well  2   UTI-patient recently treated with Macrobid at urgent care center  We will stop this medication because she does not seem to be improving with urinary symptoms and may be having some abdominal discomfort associated with it  Urinalysis was unable to be obtained  3   Type 2 diabetes-patient advised to continue monitoring glucose closely  4   Hypertension-presently stable with lisinopril  5   Hyperlipidemia-stable with of GERD-patient stable with Protonix    Continues follow-up with GI

## 2018-06-29 ENCOUNTER — TRANSCRIBE ORDERS (OUTPATIENT)
Dept: FAMILY MEDICINE CLINIC | Facility: CLINIC | Age: 63
End: 2018-06-29

## 2018-06-29 DIAGNOSIS — H25.13 AGE-RELATED NUCLEAR CATARACT OF BOTH EYES: Primary | ICD-10-CM

## 2018-07-01 ENCOUNTER — OFFICE VISIT (OUTPATIENT)
Dept: URGENT CARE | Facility: MEDICAL CENTER | Age: 63
End: 2018-07-01
Payer: COMMERCIAL

## 2018-07-01 ENCOUNTER — APPOINTMENT (EMERGENCY)
Dept: CT IMAGING | Facility: HOSPITAL | Age: 63
DRG: 419 | End: 2018-07-01
Payer: COMMERCIAL

## 2018-07-01 ENCOUNTER — HOSPITAL ENCOUNTER (INPATIENT)
Facility: HOSPITAL | Age: 63
LOS: 2 days | Discharge: HOME | DRG: 419 | End: 2018-07-03
Attending: EMERGENCY MEDICINE | Admitting: SURGERY
Payer: COMMERCIAL

## 2018-07-01 ENCOUNTER — APPOINTMENT (INPATIENT)
Dept: RADIOLOGY | Facility: HOSPITAL | Age: 63
DRG: 419 | End: 2018-07-01
Payer: COMMERCIAL

## 2018-07-01 VITALS
RESPIRATION RATE: 20 BRPM | DIASTOLIC BLOOD PRESSURE: 70 MMHG | HEART RATE: 76 BPM | TEMPERATURE: 97 F | SYSTOLIC BLOOD PRESSURE: 122 MMHG | OXYGEN SATURATION: 98 %

## 2018-07-01 DIAGNOSIS — R10.84 GENERALIZED ABDOMINAL PAIN: Primary | ICD-10-CM

## 2018-07-01 DIAGNOSIS — K80.63 CALCULUS OF GALLBLADDER AND BILE DUCT WITH ACUTE CHOLECYSTITIS, WITH OBSTRUCTION: ICD-10-CM

## 2018-07-01 DIAGNOSIS — K81.0 ACUTE CHOLECYSTITIS: Primary | ICD-10-CM

## 2018-07-01 LAB
ALBUMIN SERPL BCP-MCNC: 2.7 G/DL (ref 3.5–5)
ALP SERPL-CCNC: 271 U/L (ref 46–116)
ALT SERPL W P-5'-P-CCNC: 140 U/L (ref 12–78)
ANION GAP SERPL CALCULATED.3IONS-SCNC: 8 MMOL/L (ref 4–13)
AST SERPL W P-5'-P-CCNC: 181 U/L (ref 5–45)
BACTERIA UR QL AUTO: ABNORMAL /HPF
BASOPHILS # BLD MANUAL: 0 THOUSAND/UL (ref 0–0.1)
BASOPHILS NFR MAR MANUAL: 0 % (ref 0–1)
BILIRUB SERPL-MCNC: 2.03 MG/DL (ref 0.2–1)
BILIRUB UR QL STRIP: ABNORMAL
BUN SERPL-MCNC: 24 MG/DL (ref 5–25)
CALCIUM SERPL-MCNC: 10.1 MG/DL (ref 8.3–10.1)
CAOX CRY URNS QL MICRO: ABNORMAL /HPF
CHLORIDE SERPL-SCNC: 100 MMOL/L (ref 100–108)
CLARITY UR: ABNORMAL
CO2 SERPL-SCNC: 29 MMOL/L (ref 21–32)
COLOR UR: ABNORMAL
COLOR, POC: NORMAL
CREAT SERPL-MCNC: 1.07 MG/DL (ref 0.6–1.3)
EOSINOPHIL # BLD MANUAL: 0.21 THOUSAND/UL (ref 0–0.4)
EOSINOPHIL NFR BLD MANUAL: 2 % (ref 0–6)
ERYTHROCYTE [DISTWIDTH] IN BLOOD BY AUTOMATED COUNT: 14.2 % (ref 11.6–15.1)
GFR SERPL CREATININE-BSD FRML MDRD: 56 ML/MIN/1.73SQ M
GLUCOSE SERPL-MCNC: 165 MG/DL (ref 65–140)
GLUCOSE SERPL-MCNC: 171 MG/DL (ref 65–140)
GLUCOSE UR STRIP-MCNC: NEGATIVE MG/DL
HCT VFR BLD AUTO: 35.3 % (ref 34.8–46.1)
HGB BLD-MCNC: 11.9 G/DL (ref 11.5–15.4)
HGB UR QL STRIP.AUTO: ABNORMAL
KETONES UR STRIP-MCNC: ABNORMAL MG/DL
LEUKOCYTE ESTERASE UR QL STRIP: NEGATIVE
LIPASE SERPL-CCNC: 205 U/L (ref 73–393)
LYMPHOCYTES # BLD AUTO: 1.15 THOUSAND/UL (ref 0.6–4.47)
LYMPHOCYTES # BLD AUTO: 11 % (ref 14–44)
MACROCYTES BLD QL AUTO: PRESENT
MCH RBC QN AUTO: 29.8 PG (ref 26.8–34.3)
MCHC RBC AUTO-ENTMCNC: 33.7 G/DL (ref 31.4–37.4)
MCV RBC AUTO: 88 FL (ref 82–98)
MONOCYTES # BLD AUTO: 1.04 THOUSAND/UL (ref 0–1.22)
MONOCYTES NFR BLD: 10 % (ref 4–12)
MUCOUS THREADS UR QL AUTO: ABNORMAL
NEUTROPHILS # BLD MANUAL: 8.02 THOUSAND/UL (ref 1.85–7.62)
NEUTS BAND NFR BLD MANUAL: 4 % (ref 0–8)
NEUTS SEG NFR BLD AUTO: 73 % (ref 43–75)
NITRITE UR QL STRIP: NEGATIVE
NON-SQ EPI CELLS URNS QL MICRO: ABNORMAL /HPF
NRBC BLD AUTO-RTO: 0 /100 WBCS
PH UR STRIP.AUTO: 5.5 [PH] (ref 4.5–8)
PLATELET # BLD AUTO: 334 THOUSANDS/UL (ref 149–390)
PLATELET BLD QL SMEAR: ADEQUATE
PMV BLD AUTO: 10.3 FL (ref 8.9–12.7)
POTASSIUM SERPL-SCNC: 3.8 MMOL/L (ref 3.5–5.3)
PROT SERPL-MCNC: 7.8 G/DL (ref 6.4–8.2)
PROT UR STRIP-MCNC: ABNORMAL MG/DL
RBC # BLD AUTO: 4 MILLION/UL (ref 3.81–5.12)
RBC #/AREA URNS AUTO: ABNORMAL /HPF
SODIUM SERPL-SCNC: 137 MMOL/L (ref 136–145)
SP GR UR STRIP.AUTO: 1.02 (ref 1–1.03)
TOTAL CELLS COUNTED SPEC: 100
UROBILINOGEN UR QL STRIP.AUTO: 2 E.U./DL
WBC # BLD AUTO: 10.42 THOUSAND/UL (ref 4.31–10.16)
WBC #/AREA URNS AUTO: ABNORMAL /HPF

## 2018-07-01 PROCEDURE — 99213 OFFICE O/P EST LOW 20 MIN: CPT | Performed by: PHYSICIAN ASSISTANT

## 2018-07-01 PROCEDURE — 85007 BL SMEAR W/DIFF WBC COUNT: CPT | Performed by: EMERGENCY MEDICINE

## 2018-07-01 PROCEDURE — 80053 COMPREHEN METABOLIC PANEL: CPT | Performed by: EMERGENCY MEDICINE

## 2018-07-01 PROCEDURE — 74177 CT ABD & PELVIS W/CONTRAST: CPT

## 2018-07-01 PROCEDURE — 83690 ASSAY OF LIPASE: CPT | Performed by: EMERGENCY MEDICINE

## 2018-07-01 PROCEDURE — 82948 REAGENT STRIP/BLOOD GLUCOSE: CPT

## 2018-07-01 PROCEDURE — 96361 HYDRATE IV INFUSION ADD-ON: CPT

## 2018-07-01 PROCEDURE — 85027 COMPLETE CBC AUTOMATED: CPT | Performed by: EMERGENCY MEDICINE

## 2018-07-01 PROCEDURE — 99285 EMERGENCY DEPT VISIT HI MDM: CPT

## 2018-07-01 PROCEDURE — 81001 URINALYSIS AUTO W/SCOPE: CPT

## 2018-07-01 PROCEDURE — 96374 THER/PROPH/DIAG INJ IV PUSH: CPT

## 2018-07-01 PROCEDURE — 36415 COLL VENOUS BLD VENIPUNCTURE: CPT | Performed by: EMERGENCY MEDICINE

## 2018-07-01 RX ORDER — ONDANSETRON 2 MG/ML
4 INJECTION INTRAMUSCULAR; INTRAVENOUS EVERY 6 HOURS PRN
Status: DISCONTINUED | OUTPATIENT
Start: 2018-07-01 | End: 2018-07-03 | Stop reason: HOSPADM

## 2018-07-01 RX ORDER — MORPHINE SULFATE 2 MG/ML
2 INJECTION, SOLUTION INTRAMUSCULAR; INTRAVENOUS
Status: DISCONTINUED | OUTPATIENT
Start: 2018-07-01 | End: 2018-07-03 | Stop reason: HOSPADM

## 2018-07-01 RX ORDER — HEPARIN SODIUM 5000 [USP'U]/ML
5000 INJECTION, SOLUTION INTRAVENOUS; SUBCUTANEOUS EVERY 12 HOURS SCHEDULED
Status: DISCONTINUED | OUTPATIENT
Start: 2018-07-01 | End: 2018-07-01 | Stop reason: CLARIF

## 2018-07-01 RX ORDER — ALBUTEROL SULFATE 2.5 MG/3ML
2.5 SOLUTION RESPIRATORY (INHALATION) EVERY 6 HOURS PRN
Status: DISCONTINUED | OUTPATIENT
Start: 2018-07-01 | End: 2018-07-03 | Stop reason: HOSPADM

## 2018-07-01 RX ORDER — KETOROLAC TROMETHAMINE 30 MG/ML
15 INJECTION, SOLUTION INTRAMUSCULAR; INTRAVENOUS ONCE
Status: COMPLETED | OUTPATIENT
Start: 2018-07-01 | End: 2018-07-01

## 2018-07-01 RX ORDER — ACETAMINOPHEN 160 MG/5ML
650 SUSPENSION, ORAL (FINAL DOSE FORM) ORAL EVERY 4 HOURS PRN
Status: DISCONTINUED | OUTPATIENT
Start: 2018-07-01 | End: 2018-07-03 | Stop reason: HOSPADM

## 2018-07-01 RX ORDER — DEXTROSE, SODIUM CHLORIDE, AND POTASSIUM CHLORIDE 5; .45; .15 G/100ML; G/100ML; G/100ML
150 INJECTION INTRAVENOUS CONTINUOUS
Status: DISCONTINUED | OUTPATIENT
Start: 2018-07-01 | End: 2018-07-03

## 2018-07-01 RX ORDER — OXYCODONE HCL 5 MG/5 ML
5 SOLUTION, ORAL ORAL EVERY 4 HOURS PRN
Status: DISCONTINUED | OUTPATIENT
Start: 2018-07-01 | End: 2018-07-03 | Stop reason: HOSPADM

## 2018-07-01 RX ORDER — DOCUSATE SODIUM 100 MG/1
100 CAPSULE, LIQUID FILLED ORAL 2 TIMES DAILY
Status: DISCONTINUED | OUTPATIENT
Start: 2018-07-02 | End: 2018-07-03 | Stop reason: HOSPADM

## 2018-07-01 RX ORDER — PANTOPRAZOLE SODIUM 40 MG/1
40 INJECTION, POWDER, FOR SOLUTION INTRAVENOUS
Status: DISCONTINUED | OUTPATIENT
Start: 2018-07-02 | End: 2018-07-03 | Stop reason: HOSPADM

## 2018-07-01 RX ADMIN — CEFAZOLIN SODIUM 2000 MG: 10 INJECTION, POWDER, FOR SOLUTION INTRAVENOUS at 20:46

## 2018-07-01 RX ADMIN — POTASSIUM CHLORIDE, DEXTROSE MONOHYDRATE AND SODIUM CHLORIDE 150 ML/HR: 150; 5; 450 INJECTION, SOLUTION INTRAVENOUS at 20:46

## 2018-07-01 RX ADMIN — PIPERACILLIN SODIUM,TAZOBACTAM SODIUM 3.38 G: 3; .375 INJECTION, POWDER, FOR SOLUTION INTRAVENOUS at 21:20

## 2018-07-01 RX ADMIN — OXYCODONE HYDROCHLORIDE 5 MG: 5 SOLUTION ORAL at 20:10

## 2018-07-01 RX ADMIN — SODIUM CHLORIDE 1000 ML: 0.9 INJECTION, SOLUTION INTRAVENOUS at 16:52

## 2018-07-01 RX ADMIN — KETOROLAC TROMETHAMINE 15 MG: 30 INJECTION, SOLUTION INTRAMUSCULAR at 16:50

## 2018-07-01 RX ADMIN — IOHEXOL 100 ML: 350 INJECTION, SOLUTION INTRAVENOUS at 17:33

## 2018-07-01 NOTE — ED PROVIDER NOTES
History  Chief Complaint   Patient presents with    Flank Pain     Flank pain and abdominal pain that comes and goes  Was told that she has gallstones  Denies n/v/d  Having b/l flank pain x 6 days  Pain is intermittent and changes locations  Has appt for RUQ US in 2 days  History provided by:  Patient   used: No    Flank Pain   Pain location:  L flank and R flank  Pain quality: sharp    Duration:  6 days  Timing:  Intermittent  Chronicity:  New  Context: previous surgery ("Reconstructive colon surgery")    Relieved by:  Nothing  Worsened by:  Coughing  Ineffective treatments:  None tried  Associated symptoms: no chills, no constipation, no cough, no diarrhea, no dysuria, no fever, no hematuria, no nausea, no vaginal bleeding, no vaginal discharge and no vomiting        Prior to Admission Medications   Prescriptions Last Dose Informant Patient Reported? Taking?    Calcium Carb-Ergocalciferol 250-125 MG-UNIT TABS  Self Yes Yes   Sig: Take 1 tablet by mouth   aspirin 81 MG tablet  Self Yes Yes   Sig: Take 1 tablet by mouth daily   cefuroxime (CEFTIN) 250 mg tablet   No Yes   Sig: Take 1 tablet (250 mg total) by mouth every 12 (twelve) hours for 7 days   fluticasone (FLONASE) 50 mcg/act nasal spray  Self Yes Yes   lisinopril (ZESTRIL) 10 mg tablet  Self No Yes   Sig: Take 1 tablet (10 mg total) by mouth daily   metFORMIN (GLUCOPHAGE) 500 mg tablet  Self No Yes   Sig: Take 1 tablet (500 mg total) by mouth daily   pantoprazole (PROTONIX) 20 mg tablet  Self No Yes   Sig: Take 1 tablet (20 mg total) by mouth daily   phenazopyridine (PYRIDIUM) 100 mg tablet  Self No Yes   Sig: Take 1 tablet (100 mg total) by mouth 3 (three) times a day as needed for bladder spasms   pravastatin (PRAVACHOL) 20 mg tablet  Self No Yes   Sig: Take 1 tablet (20 mg total) by mouth daily      Facility-Administered Medications: None       Past Medical History:   Diagnosis Date    [facility]     Hypertension     Malignant neoplasm of skin     last assessed 04/11/2017       History reviewed  No pertinent surgical history  Family History   Problem Relation Age of Onset    Alzheimer's disease Mother     Anxiety disorder Mother     Diabetes Mother     Cancer Father     Diabetes Sister     Hypertension Sister     Stroke Sister      I have reviewed and agree with the history as documented  Social History   Substance Use Topics    Smoking status: Never Smoker    Smokeless tobacco: Never Used    Alcohol use Yes      Comment: occasional        Review of Systems   Constitutional: Negative for appetite change, chills and fever  HENT: Negative for congestion and rhinorrhea  Respiratory: Negative for cough  Gastrointestinal: Negative for abdominal distention, abdominal pain, blood in stool, constipation, diarrhea, nausea and vomiting  Genitourinary: Positive for flank pain  Negative for dysuria, frequency, hematuria, urgency, vaginal bleeding and vaginal discharge  Musculoskeletal: Negative for back pain  All other systems reviewed and are negative  Physical Exam  Physical Exam   Constitutional: She is oriented to person, place, and time  She appears well-developed and well-nourished  No distress  HENT:   Head: Normocephalic  Mouth/Throat: Oropharynx is clear and moist and mucous membranes are normal    Neck: Normal range of motion  Neck supple  Cardiovascular: Normal rate, regular rhythm, normal heart sounds and intact distal pulses  Exam reveals no gallop and no friction rub  No murmur heard  Pulmonary/Chest: Effort normal and breath sounds normal  No respiratory distress  She has no wheezes  She has no rales  Abdominal: Soft  Normal appearance and bowel sounds are normal  She exhibits no distension and no mass  There is no hepatosplenomegaly  There is tenderness in the right upper quadrant and left upper quadrant   There is no rigidity, no rebound, no guarding, no CVA tenderness, no tenderness at McBurney's point and negative Mancilla's sign  Lymphadenopathy:     She has no cervical adenopathy  Neurological: She is alert and oriented to person, place, and time  Skin: Skin is warm, dry and intact  No rash noted  No pallor  Nursing note and vitals reviewed        Vital Signs  ED Triage Vitals [07/01/18 1600]   Temperature Pulse Respirations Blood Pressure SpO2   98 °F (36 7 °C) 73 16 150/69 97 %      Temp Source Heart Rate Source Patient Position - Orthostatic VS BP Location FiO2 (%)   Temporal Monitor Sitting Right arm --      Pain Score       Worst Possible Pain           Vitals:    07/01/18 1600 07/01/18 1803   BP: 150/69 137/58   Pulse: 73 69   Patient Position - Orthostatic VS: Sitting Lying       Visual Acuity      ED Medications  Medications   piperacillin-tazobactam (ZOSYN) 3 375 g in sodium chloride 0 9 % 50 mL IVPB (not administered)   dextrose 5 % and sodium chloride 0 45 % with KCl 20 mEq/L infusion (not administered)   ondansetron (ZOFRAN) injection 4 mg (not administered)   albuterol inhalation solution 2 5 mg (not administered)   naloxone (NARCAN) 0 04 mg/mL syringe 0 04 mg (not administered)   docusate sodium (COLACE) capsule 100 mg (not administered)   oxyCODONE (ROXICODONE) oral solution 5 mg (not administered)   HYDROmorphone (DILAUDID) injection 0 5 mg (not administered)   acetaminophen (TYLENOL) oral suspension 650 mg (not administered)   morphine injection 2 mg (not administered)   pantoprazole (PROTONIX) injection 40 mg (not administered)   ceFAZolin (ANCEF) 2,000 mg in sodium chloride 0 9 % 50 mL IVPB (not administered)   sodium chloride 0 9 % bolus 1,000 mL (0 mL Intravenous Stopped 7/1/18 1755)   ketorolac (TORADOL) injection 15 mg (15 mg Intravenous Given 7/1/18 1650)   iohexol (OMNIPAQUE) 350 MG/ML injection (MULTI-DOSE) 100 mL (100 mL Intravenous Given 7/1/18 1733)       Diagnostic Studies  Results Reviewed     Procedure Component Value Units Date/Time    Platelet count [[id number]]     Lab Status:  No result Specimen:  Blood     Urine Microscopic [[id number]]  (Abnormal) Collected:  07/01/18 1641    Lab Status:  Final result Specimen:  Urine from Urine, Clean Catch Updated:  07/01/18 1741     RBC, UA 1-2 (A) /hpf      WBC, UA 2-4 (A) /hpf      Epithelial Cells Occasional /hpf      Bacteria, UA Occasional /hpf      Ca Oxalate Ailyn, UA Moderate (A) /hpf      MUCOUS THREADS Occasional (A)    CBC and differential [[id number]]  (Abnormal) Collected:  07/01/18 1653    Lab Status:  Final result Specimen:  Blood from Arm, Right Updated:  07/01/18 1726     WBC 10 42 (H) Thousand/uL      RBC 4 00 Million/uL      Hemoglobin 11 9 g/dL      Hematocrit 35 3 %      MCV 88 fL      MCH 29 8 pg      MCHC 33 7 g/dL      RDW 14 2 %      MPV 10 3 fL      Platelets 391 Thousands/uL      nRBC 0 /100 WBCs     Narrative: This is an appended report  These results have been appended to a previously verified report  Comprehensive metabolic panel [[id number]]  (Abnormal) Collected:  07/01/18 1653    Lab Status:  Final result Specimen:  Blood from Arm, Right Updated:  07/01/18 1710     Sodium 137 mmol/L      Potassium 3 8 mmol/L      Chloride 100 mmol/L      CO2 29 mmol/L      Anion Gap 8 mmol/L      BUN 24 mg/dL      Creatinine 1 07 mg/dL      Glucose 165 (H) mg/dL      Calcium 10 1 mg/dL       (H) U/L       (H) U/L      Alkaline Phosphatase 271 (H) U/L      Total Protein 7 8 g/dL      Albumin 2 7 (L) g/dL      Total Bilirubin 2 03 (H) mg/dL      eGFR 56 ml/min/1 73sq m     Narrative:         National Kidney Disease Education Program recommendations are as follows:  GFR calculation is accurate only with a steady state creatinine  Chronic Kidney disease less than 60 ml/min/1 73 sq  meters  Kidney failure less than 15 ml/min/1 73 sq  meters      Lipase [[id number]]  (Normal) Collected:  07/01/18 1653    Lab Status:  Final result Specimen:  Blood from Arm, Right Updated: 07/01/18 1710     Lipase 205 u/L     POCT urinalysis dipstick [[id number]]  (Normal) Resulted:  07/01/18 1639    Lab Status:  Final result Specimen:  Urine Updated:  07/01/18 1639     Color, UA Beatrice    ED Urine Macroscopic [[id number]]  (Abnormal) Collected:  07/01/18 1641    Lab Status:  Final result Specimen:  Urine Updated:  07/01/18 1637     Color, UA Beatrice     Clarity, UA Slightly Cloudy     pH, UA 5 5     Leukocytes, UA Negative     Nitrite, UA Negative     Protein, UA 30 (1+) (A) mg/dl      Glucose, UA Negative mg/dl      Ketones, UA Trace (A) mg/dl      Urobilinogen, UA 2 0 (A) E U /dl      Bilirubin, UA Interference- unable to analyze (A)     Blood, UA Moderate (A)     Specific Pitman, UA 1 025    Narrative:       CLINITEK RESULT                 CT abdomen pelvis with contrast   Final Result by Claudine Berry MD (07/01 1743)   1  Cholelithiasis and choledocholithiasis with associated inflammatory change, gallbladder wall thickening and intrahepatic biliary ductal dilatation all consistent with acute cholecystitis  No abscess  2   Diverticular disease without diverticulitis  3   Fatty liver               I personally discussed this study with [id number] Solorzano on 7/1/2018 at 5:40 PM                Workstation performed: [id number]         FL < 1 hour    (Results Pending)              Procedures  CriticalCare Time  Performed by: Nelly Belle by: Emani Sanchez     Critical care provider statement:     Critical care time (minutes):  30    Critical care time was exclusive of:  Separately billable procedures and treating other patients and teaching time    Critical care was time spent personally by me on the following activities:  Blood draw for specimens, obtaining history from patient or surrogate, development of treatment plan with patient or surrogate, discussions with consultants, evaluation of patient's response to treatment, examination of patient, ordering and performing treatments and interventions, ordering and review of laboratory studies, ordering and review of radiographic studies and re-evaluation of patient's condition    I assumed direction of critical care for this patient from another provider in my specialty: no    Comments:      Acute cholecystitis           Phone Contacts  ED Phone Contact    ED Course                               MDM  Number of Diagnoses or Management Options  Diagnosis management comments: Abd/back pain - Will check CBC as marker of infection, CMP to r/o hepatitis/biliary disease, lipase to r/o pancreatitis, urine to r/o UTI, CT A/P to r/o intra-abdominal pathology  Amount and/or Complexity of Data Reviewed  Clinical lab tests: ordered and reviewed  Tests in the radiology section of CPT®: ordered and reviewed  Tests in the medicine section of CPT®: reviewed and ordered      CritCare Time    Disposition  Final diagnoses:   Acute cholecystitis     Time reflects when diagnosis was documented in both MDM as applicable and the Disposition within this note     Time User Action Codes Description Comment    7/1/2018  5:59 PM Viktoriya Ornelas [K81 0] Acute cholecystitis     7/1/2018  6:50 PM Satnam Mckeon [K80 63] Calculus of gallbladder and bile duct with acute cholecystitis, with obstruction       ED Disposition     ED Disposition Condition Comment    Admit  Case was discussed with Dr Johny Parker and the patient's admission status was agreed to be Admission Status: inpatient status to the service of Dr Mcclain Rater          Follow-up Information    None         Current Discharge Medication List      CONTINUE these medications which have NOT CHANGED    Details   aspirin 81 MG tablet Take 1 tablet by mouth daily      Calcium Carb-Ergocalciferol 250-125 MG-UNIT TABS Take 1 tablet by mouth      cefuroxime (CEFTIN) 250 mg tablet Take 1 tablet (250 mg total) by mouth every 12 (twelve) hours for 7 days  Qty: 14 tablet, Refills: 0    Associated Diagnoses: Pain of upper abdomen      fluticasone (FLONASE) 50 mcg/act nasal spray       lisinopril (ZESTRIL) 10 mg tablet Take 1 tablet (10 mg total) by mouth daily  Qty: 90 tablet, Refills: 1    Associated Diagnoses: Hypertension, unspecified type      metFORMIN (GLUCOPHAGE) 500 mg tablet Take 1 tablet (500 mg total) by mouth daily  Qty: 90 tablet, Refills: 3    Comments: Pt needs OV with labs before next refill  Associated Diagnoses: Type 2 diabetes mellitus without complication, without long-term current use of insulin (HCC)      pantoprazole (PROTONIX) 20 mg tablet Take 1 tablet (20 mg total) by mouth daily  Qty: 90 tablet, Refills: 3    Associated Diagnoses: Gastroesophageal reflux disease without esophagitis      phenazopyridine (PYRIDIUM) 100 mg tablet Take 1 tablet (100 mg total) by mouth 3 (three) times a day as needed for bladder spasms  Qty: 10 tablet, Refills: 0    Associated Diagnoses: Acute cystitis without hematuria      pravastatin (PRAVACHOL) 20 mg tablet Take 1 tablet (20 mg total) by mouth daily  Qty: 90 tablet, Refills: 3    Associated Diagnoses: Mixed hyperlipidemia           No discharge procedures on file      ED Provider  Electronically Signed by           Chandni Mejia, DO  07/01/18 1854

## 2018-07-01 NOTE — PROGRESS NOTES
[facility]        NAME: Rob Cyr is a 58 y o  female  : 1955    MRN: [id number]  DATE: 2018  TIME: 3:39 PM    Assessment and Plan   Generalized abdominal pain [R10 84]  1  Generalized abdominal pain           Patient Instructions   I explained to the patient that we do not do ultrasounds or scans here and we are unable to do any blood work  To emergency room now for further evaluation  Chief Complaint     Chief Complaint   Patient presents with    Abdominal Pain     Pt with complaints of mid back pain that radiates around to upper abdominal area for 6 days  Seen here  and by PCP   Has Ultrasound scheduled for 7/3 of abdomen  Pt states pain improved yesterday but worsened again this am after eating breakfast  Denies nausea or vomiting  Describes pain as constant and rates 9/10   Back Pain         History of Present Illness       Patient who has been seen twice over the last several days for flank and upper abdominal pain  She is scheduled for an ultrasound, but states she has had increasing pain in the right upper quadrant towards the back a 9/10  She states she can't take the pain  Abdominal Pain     Back Pain   Associated symptoms include abdominal pain  Review of Systems   Review of Systems   Gastrointestinal: Positive for abdominal pain  Musculoskeletal: Positive for back pain  All other systems reviewed and are negative          Current Medications       Current Outpatient Prescriptions:     aspirin 81 MG tablet, Take 1 tablet by mouth daily, Disp: , Rfl:     Calcium Carb-Ergocalciferol 250-125 MG-UNIT TABS, Take 1 tablet by mouth, Disp: , Rfl:     cefuroxime (CEFTIN) 250 mg tablet, Take 1 tablet (250 mg total) by mouth every 12 (twelve) hours for 7 days, Disp: 14 tablet, Rfl: 0    fluticasone (FLONASE) 50 mcg/act nasal spray, , Disp: , Rfl:     lisinopril (ZESTRIL) 10 mg tablet, Take 1 tablet (10 mg total) by mouth daily, Disp: 90 tablet, Rfl: 1    metFORMIN (GLUCOPHAGE) 500 mg tablet, Take 1 tablet (500 mg total) by mouth daily, Disp: 90 tablet, Rfl: 3    pantoprazole (PROTONIX) 20 mg tablet, Take 1 tablet (20 mg total) by mouth daily, Disp: 90 tablet, Rfl: 3    phenazopyridine (PYRIDIUM) 100 mg tablet, Take 1 tablet (100 mg total) by mouth 3 (three) times a day as needed for bladder spasms, Disp: 10 tablet, Rfl: 0    pravastatin (PRAVACHOL) 20 mg tablet, Take 1 tablet (20 mg total) by mouth daily, Disp: 90 tablet, Rfl: 3    Current Allergies     Allergies as of 07/01/2018    (No Known Allergies)            The following portions of the patient's history were reviewed and updated as appropriate: allergies, current medications, past family history, past medical history, past social history, past surgical history and problem list      Past Medical History:   Diagnosis Date    Malignant neoplasm of skin     last assessed 04/11/2017       No past surgical history on file  Family History   Problem Relation Age of Onset    Alzheimer's disease Mother     Anxiety disorder Mother     Diabetes Mother     Cancer Father     Diabetes Sister     Hypertension Sister     Stroke Sister          Medications have been verified  Objective   /70 (BP Location: Left arm, Patient Position: Sitting, Cuff Size: Standard)   Pulse 76   Temp (!) 97 °F (36 1 °C) (Tympanic)   Resp 20   SpO2 98%        Physical Exam     Physical Exam   Constitutional: She appears well-developed and well-nourished  Cardiovascular: Normal rate  Pulmonary/Chest: Effort normal    Abdominal: Soft  Nursing note and vitals reviewed

## 2018-07-01 NOTE — H&P
H&P Exam - General Surgery   Tor Chua 58 y o  female MRN: [id number]  Unit/Bed#: ED 29 Encounter: [id number]    Assessment/Plan     Assessment:  Pleasant 31-year-old female past medical history hypertension, diabetes diverticulitis, stage I melanoma presenting to the [place] [address] ER signs and symptoms of acute cholecystitis confirmed radiographically by CT scan  1   Acute calculous cholecystitis with choledocholithiasis and obstruction, present on admission   -CT from 07/01/2018 reviewed   -last oral intake 07/01/2018 14:00   -admission, IV antibiotics, laparoscopic cholecystectomy tomorrow 07/02/2018   -GI consulted for possible ERCP postop pending cholangiogram findings and repeat blood work    2  Choledocholithiasis with obstruction, present on admission   -elevated LFTs with bilirubin of 2   -patient clinically stable without signs or symptoms of acute cholangitis   -GI consulted, intraoperative cholangiogram planned for tomorrow morning    Plan:  The nature of the patient's condition was explained unclear simple terms  Options for management were discussed including definitive treatment with laparoscopic cholecystectomy and cholangiogram   Details of surgery and risks related to anesthesia, bleeding, infection, postoperative bile leak, common bile duct injury were discussed  Questions answered  Patient agreeable to surgery  In light of the patient's last oral intake concerns from anesthesia the procedure will be planned for the morning of July 2, 2018  Gastroenterology consulted in light of the choledocholithiasis  History of Present Illness   HPI:  Tor Chua is a 58 y o  female who presents with acute abdominal pain  Initial onset of a abdominal pain approximately 1 week ago  Was evaluated previously and treated as urinary tract infection  With recurrence symptoms patient was seen and evaluated urgent care earlier today and was sent to the ER for further evaluation    Pain currently described as a moderate aching in the upper abdomen and back  No associated nausea, vomiting, bloating, cramping, diarrhea, constipation  Mild associated indigestion  No fever, chest pain, difficulty breathing  Associated chills and night sweats last night  Last oral intake about 2 o'clock this afternoon  Patient with known history of gallstones for about 5 years with no prior symptoms  Review of Systems   Constitutional: Positive for chills  Negative for fever  Respiratory: Negative for shortness of breath  Cardiovascular: Negative for chest pain  Gastrointestinal: Positive for abdominal pain  Negative for abdominal distention, constipation, diarrhea, nausea and vomiting  Genitourinary: Negative for dysuria  Neurological: Negative for dizziness  Psychiatric/Behavioral: Negative for confusion  Historical Information   Past Medical History:   Diagnosis Date    Diabetes mellitus ([facility] [address] )     Hypertension     Malignant neoplasm of skin     last assessed 04/11/2017     History reviewed  No pertinent surgical history  Social History   History   Alcohol Use    Yes     Comment: occasional     History   Drug Use No     History   Smoking Status    Never Smoker   Smokeless Tobacco    Never Used     Family History:   Family History   Problem Relation Age of Onset    Alzheimer's disease Mother     Anxiety disorder Mother     Diabetes Mother     Cancer Father     Diabetes Sister     Hypertension Sister     Stroke Sister        Meds/Allergies   PTA meds:   Prior to Admission Medications   Prescriptions Last Dose Informant Patient Reported? Taking?    Calcium Carb-Ergocalciferol 250-125 MG-UNIT TABS  Self Yes Yes   Sig: Take 1 tablet by mouth   aspirin 81 MG tablet  Self Yes Yes   Sig: Take 1 tablet by mouth daily   cefuroxime (CEFTIN) 250 mg tablet   No Yes   Sig: Take 1 tablet (250 mg total) by mouth every 12 (twelve) hours for 7 days   fluticasone (FLONASE) 50 mcg/act nasal spray Self Yes Yes   lisinopril (ZESTRIL) 10 mg tablet  Self No Yes   Sig: Take 1 tablet (10 mg total) by mouth daily   metFORMIN (GLUCOPHAGE) 500 mg tablet  Self No Yes   Sig: Take 1 tablet (500 mg total) by mouth daily   pantoprazole (PROTONIX) 20 mg tablet  Self No Yes   Sig: Take 1 tablet (20 mg total) by mouth daily   phenazopyridine (PYRIDIUM) 100 mg tablet  Self No Yes   Sig: Take 1 tablet (100 mg total) by mouth 3 (three) times a day as needed for bladder spasms   pravastatin (PRAVACHOL) 20 mg tablet  Self No Yes   Sig: Take 1 tablet (20 mg total) by mouth daily      Facility-Administered Medications: None     No Known Allergies    Objective   First Vitals:   Blood Pressure: 150/69 (07/01/18 1600)  Pulse: 73 (07/01/18 1600)  Temperature: 98 °F (36 7 °C) (07/01/18 1600)  Temp Source: Temporal (07/01/18 1600)  Respirations: 16 (07/01/18 1600)  Weight - Scale: 88 kg (194 lb 0 1 oz) (07/01/18 1600)  SpO2: 97 % (07/01/18 1600)    Current Vitals:   Blood Pressure: 137/58 (07/01/18 1803)  Pulse: 69 (07/01/18 1803)  Temperature: 98 °F (36 7 °C) (07/01/18 1600)  Temp Source: Temporal (07/01/18 1600)  Respirations: 16 (07/01/18 1803)  Weight - Scale: 88 kg (194 lb 0 1 oz) (07/01/18 1600)  SpO2: 97 % (07/01/18 1803)      Intake/Output Summary (Last 24 hours) at 07/01/18 1901  Last data filed at 07/01/18 1755   Gross per 24 hour   Intake             1000 ml   Output                0 ml   Net             1000 ml       Invasive Devices     Peripheral Intravenous Line            Peripheral IV 07/01/18 Right Antecubital less than 1 day                Physical Exam   Constitutional: She is oriented to person, place, and time  She appears well-developed and well-nourished  No distress  HENT:   Head: Normocephalic and atraumatic  Eyes: Conjunctivae are normal  Pupils are equal, round, and reactive to light  Neck: Normal range of motion  Neck supple  Cardiovascular: Normal rate, regular rhythm and normal heart sounds      No murmur heard  Pulmonary/Chest: Effort normal and breath sounds normal  No respiratory distress  Abdominal: Soft  Bowel sounds are normal  She exhibits no distension and no mass  There is tenderness in the right upper quadrant and epigastric area  There is no rebound, no guarding and no CVA tenderness  No hernia  Mild to moderate TTP  No peritoneal signs  Musculoskeletal: Normal range of motion  Neurological: She is alert and oriented to person, place, and time  Skin: Skin is warm and dry  She is not diaphoretic  No pallor  Psychiatric: She has a normal mood and affect  Her behavior is normal        Lab Results:   I have personally reviewed pertinent lab results  , CBC:   Lab Results   Component Value Date    WBC 10 42 (H) 07/01/2018    HGB 11 9 07/01/2018    HCT 35 3 07/01/2018    MCV 88 07/01/2018     07/01/2018    MCH 29 8 07/01/2018    MCHC 33 7 07/01/2018    RDW 14 2 07/01/2018    MPV 10 3 07/01/2018    NRBC 0 07/01/2018   , CMP:   Lab Results   Component Value Date     07/01/2018    K 3 8 07/01/2018     07/01/2018    CO2 29 07/01/2018    ANIONGAP 8 07/01/2018    BUN 24 07/01/2018    CREATININE 1 07 07/01/2018    GLUCOSE 165 (H) 07/01/2018    CALCIUM 10 1 07/01/2018     (H) 07/01/2018     (H) 07/01/2018    ALKPHOS 271 (H) 07/01/2018    PROT 7 8 07/01/2018    BILITOT 2 03 (H) 07/01/2018    EGFR 56 07/01/2018   , Coagulation: No results found for: PT, INR, APTT, Urinalysis:   Lab Results   Component Value Date    COLORU Beatrice 07/01/2018    CLARITYU Slightly Cloudy 07/01/2018    SPECGRAV 1 025 07/01/2018    PHUR 5 5 07/01/2018    LEUKOCYTESUR Negative 07/01/2018    NITRITE Negative 07/01/2018    PROTEINUA 30 (1+) (A) 07/01/2018    GLUCOSEU Negative 07/01/2018    KETONESU Trace (A) 07/01/2018    BILIRUBINUR Interference- unable to analyze (A) 07/01/2018    BLOODU Moderate (A) 07/01/2018     Imaging: I have personally reviewed pertinent reports     and I have personally reviewed pertinent films in PACS  EKG, Pathology, and Other Studies: I have personally reviewed pertinent reports  Code Status: Level 1 - Full Code  Advance Directive and Living Will:      Power of :    POLST:      Counseling / Coordination of Care  Total floor / unit time spent today 25 minutes  Greater than 50% of total time was spent with the patient and / or family counseling and / or coordination of care  A description of the counseling / coordination of care: patient education, treatment planning

## 2018-07-01 NOTE — ED NOTES
Pt with pain off and on states alternates between lower abd b/l and flank b/l      Milton Braxton RN  07/01/18 7142

## 2018-07-02 ENCOUNTER — ANESTHESIA EVENT (INPATIENT)
Dept: PERIOP | Facility: HOSPITAL | Age: 63
DRG: 419 | End: 2018-07-02
Payer: COMMERCIAL

## 2018-07-02 ENCOUNTER — ANESTHESIA (INPATIENT)
Dept: PERIOP | Facility: HOSPITAL | Age: 63
DRG: 419 | End: 2018-07-02
Payer: COMMERCIAL

## 2018-07-02 ENCOUNTER — APPOINTMENT (INPATIENT)
Dept: RADIOLOGY | Facility: HOSPITAL | Age: 63
DRG: 419 | End: 2018-07-02
Payer: COMMERCIAL

## 2018-07-02 ENCOUNTER — TELEPHONE (OUTPATIENT)
Dept: FAMILY MEDICINE CLINIC | Facility: CLINIC | Age: 63
End: 2018-07-02

## 2018-07-02 LAB
ALBUMIN SERPL BCP-MCNC: 2.2 G/DL (ref 3.5–5)
ALBUMIN SERPL BCP-MCNC: 2.4 G/DL (ref 3.5–5)
ALP SERPL-CCNC: 264 U/L (ref 46–116)
ALP SERPL-CCNC: 300 U/L (ref 46–116)
ALT SERPL W P-5'-P-CCNC: 184 U/L (ref 12–78)
ALT SERPL W P-5'-P-CCNC: 262 U/L (ref 12–78)
ANION GAP SERPL CALCULATED.3IONS-SCNC: 10 MMOL/L (ref 4–13)
ANION GAP SERPL CALCULATED.3IONS-SCNC: 8 MMOL/L (ref 4–13)
AST SERPL W P-5'-P-CCNC: 194 U/L (ref 5–45)
AST SERPL W P-5'-P-CCNC: 255 U/L (ref 5–45)
BASOPHILS # BLD MANUAL: 0.08 THOUSAND/UL (ref 0–0.1)
BASOPHILS NFR MAR MANUAL: 1 % (ref 0–1)
BILIRUB DIRECT SERPL-MCNC: 3.48 MG/DL (ref 0–0.2)
BILIRUB SERPL-MCNC: 2.79 MG/DL (ref 0.2–1)
BILIRUB SERPL-MCNC: 3.89 MG/DL (ref 0.2–1)
BUN SERPL-MCNC: 16 MG/DL (ref 5–25)
BUN SERPL-MCNC: 18 MG/DL (ref 5–25)
CALCIUM SERPL-MCNC: 8.8 MG/DL (ref 8.3–10.1)
CALCIUM SERPL-MCNC: 9.3 MG/DL (ref 8.3–10.1)
CHLORIDE SERPL-SCNC: 104 MMOL/L (ref 100–108)
CHLORIDE SERPL-SCNC: 105 MMOL/L (ref 100–108)
CO2 SERPL-SCNC: 24 MMOL/L (ref 21–32)
CO2 SERPL-SCNC: 24 MMOL/L (ref 21–32)
CREAT SERPL-MCNC: 0.85 MG/DL (ref 0.6–1.3)
CREAT SERPL-MCNC: 0.93 MG/DL (ref 0.6–1.3)
EOSINOPHIL # BLD MANUAL: 0.08 THOUSAND/UL (ref 0–0.4)
EOSINOPHIL NFR BLD MANUAL: 1 % (ref 0–6)
ERYTHROCYTE [DISTWIDTH] IN BLOOD BY AUTOMATED COUNT: 14.3 % (ref 11.6–15.1)
GFR SERPL CREATININE-BSD FRML MDRD: 66 ML/MIN/1.73SQ M
GFR SERPL CREATININE-BSD FRML MDRD: 74 ML/MIN/1.73SQ M
GLUCOSE SERPL-MCNC: 145 MG/DL (ref 65–140)
GLUCOSE SERPL-MCNC: 147 MG/DL (ref 65–140)
GLUCOSE SERPL-MCNC: 149 MG/DL (ref 65–140)
GLUCOSE SERPL-MCNC: 192 MG/DL (ref 65–140)
GLUCOSE SERPL-MCNC: 203 MG/DL (ref 65–140)
HCT VFR BLD AUTO: 32.1 % (ref 34.8–46.1)
HGB BLD-MCNC: 10.4 G/DL (ref 11.5–15.4)
LYMPHOCYTES # BLD AUTO: 1.58 THOUSAND/UL (ref 0.6–4.47)
LYMPHOCYTES # BLD AUTO: 19 % (ref 14–44)
MCH RBC QN AUTO: 28.8 PG (ref 26.8–34.3)
MCHC RBC AUTO-ENTMCNC: 32.4 G/DL (ref 31.4–37.4)
MCV RBC AUTO: 89 FL (ref 82–98)
METAMYELOCYTES NFR BLD MANUAL: 2 % (ref 0–1)
MONOCYTES # BLD AUTO: 0.33 THOUSAND/UL (ref 0–1.22)
MONOCYTES NFR BLD: 4 % (ref 4–12)
NEUTROPHILS # BLD MANUAL: 6.09 THOUSAND/UL (ref 1.85–7.62)
NEUTS SEG NFR BLD AUTO: 73 % (ref 43–75)
NRBC BLD AUTO-RTO: 0 /100 WBCS
PLATELET # BLD AUTO: 294 THOUSANDS/UL (ref 149–390)
PLATELET BLD QL SMEAR: ADEQUATE
PMV BLD AUTO: 10.7 FL (ref 8.9–12.7)
POLYCHROMASIA BLD QL SMEAR: PRESENT
POTASSIUM SERPL-SCNC: 3.9 MMOL/L (ref 3.5–5.3)
POTASSIUM SERPL-SCNC: 4.5 MMOL/L (ref 3.5–5.3)
PROT SERPL-MCNC: 6.3 G/DL (ref 6.4–8.2)
PROT SERPL-MCNC: 6.8 G/DL (ref 6.4–8.2)
RBC # BLD AUTO: 3.61 MILLION/UL (ref 3.81–5.12)
RBC MORPH BLD: PRESENT
SODIUM SERPL-SCNC: 136 MMOL/L (ref 136–145)
SODIUM SERPL-SCNC: 139 MMOL/L (ref 136–145)
TOTAL CELLS COUNTED SPEC: 100
WBC # BLD AUTO: 8.34 THOUSAND/UL (ref 4.31–10.16)

## 2018-07-02 PROCEDURE — 74300 X-RAY BILE DUCTS/PANCREAS: CPT

## 2018-07-02 PROCEDURE — 82948 REAGENT STRIP/BLOOD GLUCOSE: CPT

## 2018-07-02 PROCEDURE — 80053 COMPREHEN METABOLIC PANEL: CPT | Performed by: PHYSICIAN ASSISTANT

## 2018-07-02 PROCEDURE — 85007 BL SMEAR W/DIFF WBC COUNT: CPT | Performed by: PHYSICIAN ASSISTANT

## 2018-07-02 PROCEDURE — 0FT44ZZ RESECTION OF GALLBLADDER, PERCUTANEOUS ENDOSCOPIC APPROACH: ICD-10-PCS | Performed by: SURGERY

## 2018-07-02 PROCEDURE — BF13YZZ FLUOROSCOPY OF GALLBLADDER AND BILE DUCTS USING OTHER CONTRAST: ICD-10-PCS | Performed by: SURGERY

## 2018-07-02 PROCEDURE — 47563 LAPARO CHOLECYSTECTOMY/GRAPH: CPT | Performed by: PHYSICIAN ASSISTANT

## 2018-07-02 PROCEDURE — C9113 INJ PANTOPRAZOLE SODIUM, VIA: HCPCS | Performed by: PHYSICIAN ASSISTANT

## 2018-07-02 PROCEDURE — 80076 HEPATIC FUNCTION PANEL: CPT | Performed by: PHYSICIAN ASSISTANT

## 2018-07-02 PROCEDURE — 99232 SBSQ HOSP IP/OBS MODERATE 35: CPT | Performed by: SURGERY

## 2018-07-02 PROCEDURE — 80048 BASIC METABOLIC PNL TOTAL CA: CPT | Performed by: PHYSICIAN ASSISTANT

## 2018-07-02 PROCEDURE — 47563 LAPARO CHOLECYSTECTOMY/GRAPH: CPT | Performed by: SURGERY

## 2018-07-02 PROCEDURE — 85027 COMPLETE CBC AUTOMATED: CPT | Performed by: PHYSICIAN ASSISTANT

## 2018-07-02 PROCEDURE — 88304 TISSUE EXAM BY PATHOLOGIST: CPT | Performed by: PATHOLOGY

## 2018-07-02 RX ORDER — FENTANYL CITRATE/PF 50 MCG/ML
50 SYRINGE (ML) INJECTION
Status: DISCONTINUED | OUTPATIENT
Start: 2018-07-02 | End: 2018-07-02 | Stop reason: HOSPADM

## 2018-07-02 RX ORDER — SODIUM CHLORIDE 9 MG/ML
125 INJECTION, SOLUTION INTRAVENOUS CONTINUOUS
Status: DISCONTINUED | OUTPATIENT
Start: 2018-07-02 | End: 2018-07-03

## 2018-07-02 RX ORDER — HYDROMORPHONE HYDROCHLORIDE 2 MG/ML
INJECTION, SOLUTION INTRAMUSCULAR; INTRAVENOUS; SUBCUTANEOUS AS NEEDED
Status: DISCONTINUED | OUTPATIENT
Start: 2018-07-02 | End: 2018-07-02 | Stop reason: SURG

## 2018-07-02 RX ORDER — PROPOFOL 10 MG/ML
INJECTION, EMULSION INTRAVENOUS AS NEEDED
Status: DISCONTINUED | OUTPATIENT
Start: 2018-07-02 | End: 2018-07-02 | Stop reason: SURG

## 2018-07-02 RX ORDER — ONDANSETRON 2 MG/ML
4 INJECTION INTRAMUSCULAR; INTRAVENOUS ONCE AS NEEDED
Status: DISCONTINUED | OUTPATIENT
Start: 2018-07-02 | End: 2018-07-02 | Stop reason: HOSPADM

## 2018-07-02 RX ORDER — MAGNESIUM HYDROXIDE 1200 MG/15ML
LIQUID ORAL AS NEEDED
Status: DISCONTINUED | OUTPATIENT
Start: 2018-07-02 | End: 2018-07-02 | Stop reason: HOSPADM

## 2018-07-02 RX ORDER — ONDANSETRON 2 MG/ML
INJECTION INTRAMUSCULAR; INTRAVENOUS AS NEEDED
Status: DISCONTINUED | OUTPATIENT
Start: 2018-07-02 | End: 2018-07-02 | Stop reason: SURG

## 2018-07-02 RX ORDER — FENTANYL CITRATE 50 UG/ML
INJECTION, SOLUTION INTRAMUSCULAR; INTRAVENOUS AS NEEDED
Status: DISCONTINUED | OUTPATIENT
Start: 2018-07-02 | End: 2018-07-02 | Stop reason: SURG

## 2018-07-02 RX ORDER — MIDAZOLAM HYDROCHLORIDE 1 MG/ML
INJECTION INTRAMUSCULAR; INTRAVENOUS AS NEEDED
Status: DISCONTINUED | OUTPATIENT
Start: 2018-07-02 | End: 2018-07-02 | Stop reason: SURG

## 2018-07-02 RX ORDER — DEXAMETHASONE SODIUM PHOSPHATE 4 MG/ML
INJECTION, SOLUTION INTRA-ARTICULAR; INTRALESIONAL; INTRAMUSCULAR; INTRAVENOUS; SOFT TISSUE AS NEEDED
Status: DISCONTINUED | OUTPATIENT
Start: 2018-07-02 | End: 2018-07-02 | Stop reason: SURG

## 2018-07-02 RX ORDER — MEPERIDINE HYDROCHLORIDE 50 MG/ML
12.5 INJECTION INTRAMUSCULAR; INTRAVENOUS; SUBCUTANEOUS ONCE AS NEEDED
Status: DISCONTINUED | OUTPATIENT
Start: 2018-07-02 | End: 2018-07-02 | Stop reason: HOSPADM

## 2018-07-02 RX ORDER — GLYCOPYRROLATE 0.2 MG/ML
INJECTION INTRAMUSCULAR; INTRAVENOUS AS NEEDED
Status: DISCONTINUED | OUTPATIENT
Start: 2018-07-02 | End: 2018-07-02 | Stop reason: SURG

## 2018-07-02 RX ORDER — ROCURONIUM BROMIDE 10 MG/ML
INJECTION, SOLUTION INTRAVENOUS AS NEEDED
Status: DISCONTINUED | OUTPATIENT
Start: 2018-07-02 | End: 2018-07-02 | Stop reason: SURG

## 2018-07-02 RX ORDER — BUPIVACAINE HYDROCHLORIDE AND EPINEPHRINE 5; 5 MG/ML; UG/ML
INJECTION, SOLUTION EPIDURAL; INTRACAUDAL; PERINEURAL AS NEEDED
Status: DISCONTINUED | OUTPATIENT
Start: 2018-07-02 | End: 2018-07-02 | Stop reason: HOSPADM

## 2018-07-02 RX ADMIN — ROCURONIUM BROMIDE 40 MG: 10 INJECTION INTRAVENOUS at 09:14

## 2018-07-02 RX ADMIN — FENTANYL CITRATE 50 MCG: 50 INJECTION, SOLUTION INTRAMUSCULAR; INTRAVENOUS at 09:58

## 2018-07-02 RX ADMIN — SODIUM CHLORIDE 125 ML/HR: 0.9 INJECTION, SOLUTION INTRAVENOUS at 09:00

## 2018-07-02 RX ADMIN — FENTANYL CITRATE 50 MCG: 50 INJECTION, SOLUTION INTRAMUSCULAR; INTRAVENOUS at 09:49

## 2018-07-02 RX ADMIN — SODIUM CHLORIDE: 0.9 INJECTION, SOLUTION INTRAVENOUS at 10:32

## 2018-07-02 RX ADMIN — ROCURONIUM BROMIDE 10 MG: 10 INJECTION INTRAVENOUS at 09:41

## 2018-07-02 RX ADMIN — NEOSTIGMINE METHYLSULFATE 2.5 MG: 1 INJECTION, SOLUTION INTRAMUSCULAR; INTRAVENOUS; SUBCUTANEOUS at 11:17

## 2018-07-02 RX ADMIN — MORPHINE SULFATE 2 MG: 2 INJECTION, SOLUTION INTRAMUSCULAR; INTRAVENOUS at 19:27

## 2018-07-02 RX ADMIN — PIPERACILLIN SODIUM,TAZOBACTAM SODIUM 3.38 G: 3; .375 INJECTION, POWDER, FOR SOLUTION INTRAVENOUS at 03:34

## 2018-07-02 RX ADMIN — DEXAMETHASONE SODIUM PHOSPHATE 4 MG: 4 INJECTION, SOLUTION INTRAMUSCULAR; INTRAVENOUS at 09:19

## 2018-07-02 RX ADMIN — INSULIN LISPRO 1 UNITS: 100 INJECTION, SOLUTION INTRAVENOUS; SUBCUTANEOUS at 17:19

## 2018-07-02 RX ADMIN — PROPOFOL 150 MG: 10 INJECTION, EMULSION INTRAVENOUS at 09:14

## 2018-07-02 RX ADMIN — PANTOPRAZOLE SODIUM 40 MG: 40 INJECTION, POWDER, FOR SOLUTION INTRAVENOUS at 08:16

## 2018-07-02 RX ADMIN — PIPERACILLIN SODIUM,TAZOBACTAM SODIUM 3.38 G: 3; .375 INJECTION, POWDER, FOR SOLUTION INTRAVENOUS at 21:20

## 2018-07-02 RX ADMIN — INSULIN LISPRO 1 UNITS: 100 INJECTION, SOLUTION INTRAVENOUS; SUBCUTANEOUS at 14:29

## 2018-07-02 RX ADMIN — GLYCOPYRROLATE 0.4 MG: 0.2 INJECTION, SOLUTION INTRAMUSCULAR; INTRAVENOUS at 11:17

## 2018-07-02 RX ADMIN — PIPERACILLIN SODIUM,TAZOBACTAM SODIUM 3.38 G: 3; .375 INJECTION, POWDER, FOR SOLUTION INTRAVENOUS at 16:27

## 2018-07-02 RX ADMIN — LIDOCAINE HYDROCHLORIDE 50 MG: 20 INJECTION, SOLUTION INTRAVENOUS at 09:14

## 2018-07-02 RX ADMIN — MORPHINE SULFATE 2 MG: 2 INJECTION, SOLUTION INTRAMUSCULAR; INTRAVENOUS at 12:53

## 2018-07-02 RX ADMIN — PIPERACILLIN SODIUM,TAZOBACTAM SODIUM 3.38 G: 3; .375 INJECTION, POWDER, FOR SOLUTION INTRAVENOUS at 09:20

## 2018-07-02 RX ADMIN — PIPERACILLIN SODIUM,TAZOBACTAM SODIUM 3.38 G: 3; .375 INJECTION, POWDER, FOR SOLUTION INTRAVENOUS at 08:33

## 2018-07-02 RX ADMIN — ONDANSETRON HYDROCHLORIDE 4 MG: 2 INJECTION, SOLUTION INTRAVENOUS at 09:04

## 2018-07-02 RX ADMIN — HYDROMORPHONE HYDROCHLORIDE 0.5 MG: 2 INJECTION, SOLUTION INTRAMUSCULAR; INTRAVENOUS; SUBCUTANEOUS at 10:38

## 2018-07-02 RX ADMIN — POTASSIUM CHLORIDE, DEXTROSE MONOHYDRATE AND SODIUM CHLORIDE 150 ML/HR: 150; 5; 450 INJECTION, SOLUTION INTRAVENOUS at 07:08

## 2018-07-02 RX ADMIN — FENTANYL CITRATE 100 MCG: 50 INJECTION, SOLUTION INTRAMUSCULAR; INTRAVENOUS at 09:14

## 2018-07-02 RX ADMIN — MIDAZOLAM 2 MG: 1 INJECTION INTRAMUSCULAR; INTRAVENOUS at 09:04

## 2018-07-02 RX ADMIN — ROCURONIUM BROMIDE 5 MG: 10 INJECTION INTRAVENOUS at 10:39

## 2018-07-02 RX ADMIN — DOCUSATE SODIUM 100 MG: 100 CAPSULE, LIQUID FILLED ORAL at 17:19

## 2018-07-02 RX ADMIN — SODIUM CHLORIDE 125 ML/HR: 0.9 INJECTION, SOLUTION INTRAVENOUS at 17:43

## 2018-07-02 NOTE — OP NOTE
OPERATIVE REPORT  PATIENT NAME: Leida Stephen    :  1955  MRN: [id number]  Pt Location: [facility] OR ROOM 02    SURGERY DATE: 2018    Surgeon(s) and Role:     * Colten Bass PA-C - Assisting     * Ebonie Yoon MD - Primary    Preop Diagnosis:  Acute cholecystitis  Post-Op Diagnosis Codes:     * Acute cholecystitis [K81 0]    Procedure(s) (LRB):  CHOLECYSTECTOMY LAPAROSCOPIC W/IOC (N/A)  CHOLANGIOGRAM (N/A)    Specimen(s):  ID Type Source Tests Collected by Time Destination   1 : GALLBLADDER with stones Tissue Gallbladder TISSUE EXAM Ebonie Yoon MD 2018 [id number]        Estimated Blood Loss:   50 mL    Drains:  [REMOVED] NG/OG/Enteral Tube Orogastric Center mouth (Removed)   Number of days: 0       Anesthesia Type:   General    Operative Indications:  Acute cholecystitis  Elevated LFTs    Operative Findings:  Significant inflammation and edema of the gallbladder wall with early necrosis of the posterior wall with multiple stones  Cholangiogram no obvious filling defect    Complications:   None    Procedure and Technique:    Procedure Details   The patient was seen again in the Holding Room  The risks, benefits, complications, treatment options, and expected outcomes were discussed with the patient  The possibilities of reaction to medication, pulmonary aspiration, perforation of viscus, bleeding, recurrent infection, finding a normal gallbladder, the need for additional procedures, failure to diagnose a condition, the possible need to convert to an open procedure, and creating a complication requiring transfusion or operation were discussed with the patient  The patient and/or family concurred with the proposed plan, giving informed consent  The site of surgery properly noted/marked   The patient was taken to Operating Room, identified as Leida Stephen and the procedure verified as Laparoscopic Cholecystectomy with Possible Intraoperative Cholangiogram  A Time Out was held and the above information confirmed  Prior to the induction of general anesthesia, antibiotic prophylaxis was administered  General endotracheal anesthesia was then administered and tolerated well  After the induction, the abdomen was prepped in the usual sterile fashion  The patient was positioned in the supine position  With the patient in a head-up position, an 11 mm trocar was placed in the umbilical area and three 5 mm trocars placed in the right abdomen, under direct vision  There was no evidence of intra-abdominal injury or bleeding  All skin incisions were infiltrated with a local anesthetic agent before making the incision and placing the trocars  The gallbladder was identified, the fundus grasped and retracted cephalad  Adhesions were lysed bluntly and with the electrocautery where indicated, taking care not to injure any adjacent organs or viscus  The infundibulum was grasped and retracted laterally, exposing the peritoneum overlying the triangle of Calot  The peritoneum was removed anteriorly and posteriorly to the gallbladder, with special attention to the backside of the gallbladder dissection  This allowed for freeing up the gallbladder  The critical view of the triangle Calot was carried out, dissecting out the cystic duct and cystic artery as the only two tubular structures leading to the gallbladder  Once these were clearly identified, the back wall of the gallbladder was lifted away from the cystic plate to expose the posterior aspect of this dissection, ensuring that there were no posterior structures leading into the liver  An incision was made in the cystic duct and the cholangiogram catheter introduced  The catheter was secured using an endoclip  The study showed no stones in the common bile duct and free flow of dye into the duodenum, and good visualization of the distal and proximal biliary tree  The catheter was then removed       The cystic duct was then doubly ligated with surgical clips and/or Endoloop suture on the patient side and singly clipped on the gallbladder side and divided  The cystic artery was re-identified and ligated with clips and divided as well  The gallbladder was dissected from the liver bed in retrograde fashion with the electrocautery or harmonic scalpel where appropriate  The gallbladder was removed, via an Endo pouch, through the epigastric port    The liver bed was irrigated and inspected  Hemostasis was achieved  Copious irrigation was utilized and was repeatedly aspirated until clear  Pneumoperitoneum was completely reduced after viewing removal of the trocars under direct vision  The wounds were thoroughly irrigated and if needed, fascia was then closed with a figure of eight suture; the skin was then closed with 4-0 Monocryl sutures and a sterile dressing was applied  Instrument, sponge, and needle counts were correct at closure and at the conclusion of the case  The patient tolerated the procedure well  This text is generated with voice recognition software  There may be translation, syntax,  or grammatical errors  If you have any questions, please contact the dictating provider                I was present for the entire procedure and A qualified resident physician was not available    Patient Disposition:  PACU     SIGNATURE: Tiffani Medrano MD  DATE: July 2, 2018  TIME: 11:18 AM

## 2018-07-02 NOTE — CASE MANAGEMENT
UNABLE TO SEND THRU PORTAL: MSG I GET    An error has occurred  The page you requested cannot be displayed  Please try again later      NEED AUTH #'S - (Veverly Cathy- [phone])

## 2018-07-02 NOTE — CONSULTS
Patient MRN: [id number]  Date of Service: 7/2/2018  Referring Provider: Lea Toscano PA-C  Provider Creating Note: Gerri Martin PA-C  PCP: Melanie Torres    Reason for Consult: CBD stone    HISTORY OF PRESENT ILLNESS:  Mika Contreras is a 58 y o  female presenting with epigastric abdominal pain over the past week which radiates into the mid back  She was seen here 6/25/18 with follow up by her PCP 06/27/2018, then by urgent care yesterday sent directly to ER for further evaluation  She stated her pain had been off/on but recurred after eating breakfast yesterday  Her pain is reported as constant, 9/10 on pain scale  She denied nausea, vomiting, dyspepsia, change in bowel habits  She has a known history of gallstones, diagnosed 5 years ago with no prior symptoms  CT A/P 7/1/18 showed cholelithiasis and choledocholithiasis with an associated inflammatory change, gallbladder wall thickening and intrahepatic biliary ductal dilation consistent with acute cholecystitis  Her LFTs are elevated with a bilirubin of 2 7  She was admitted started on IV antibiotics with laparoscopic cholecystectomy with intraoperative cholangiogram today  We are being asked to evaluate the patient for possible postoperative ERCP  Note: she has been in the OR all morning and not physically seen  Her information was obtained through her chart/record  Review of Systems: PT in OR, unable to obtain                    Past Medical History:   Diagnosis Date    Diabetes mellitus ([facility] [address] )     Hypertension     Malignant neoplasm of skin     last assessed 04/11/2017     History reviewed  No pertinent surgical history  No Known Allergies    Medications:  Home Medications  Prior to Admission medications    Medication Sig Start Date End Date Taking?  Authorizing Provider   aspirin 81 MG tablet Take 1 tablet by mouth daily   Yes Historical Provider, MD   Calcium Carb-Ergocalciferol 250-125 MG-UNIT TABS Take 1 tablet by mouth Yes Historical Provider, MD   cefuroxime (CEFTIN) 250 mg tablet Take 1 tablet (250 mg total) by mouth every 12 (twelve) hours for 7 days 6/27/18 7/4/18 Yes Sommer Nuñez PA-C   fluticasone Mary Buggy) 50 mcg/act nasal spray  4/6/10  Yes Historical Provider, MD   lisinopril (ZESTRIL) 10 mg tablet Take 1 tablet (10 mg total) by mouth daily 6/4/18  Yes Arthur Garcia PA-C   metFORMIN (GLUCOPHAGE) 500 mg tablet Take 1 tablet (500 mg total) by mouth daily 6/25/18  Yes Yaa Ospina PA-C   pantoprazole (PROTONIX) 20 mg tablet Take 1 tablet (20 mg total) by mouth daily 6/20/18  Yes Arthur Garcia PA-C   phenazopyridine (PYRIDIUM) 100 mg tablet Take 1 tablet (100 mg total) by mouth 3 (three) times a day as needed for bladder spasms 6/25/18  Yes Heriberto DailyFLOWER   pravastatin (PRAVACHOL) 20 mg tablet Take 1 tablet (20 mg total) by mouth daily 6/20/18  Yes Arthur Garcia PA-C       Inhouse Medications    Current Facility-Administered Medications:     [MAR Hold] acetaminophen (TYLENOL) oral suspension 650 mg, 650 mg, Oral, Q4H PRN    [MAR Hold] albuterol inhalation solution 2 5 mg, 2 5 mg, Nebulization, Q6H PRN    dextrose 5 % and sodium chloride 0 45 % with KCl 20 mEq/L infusion, 150 mL/hr, Intravenous, Continuous, Stopped at 07/02/18 0902    [MAR Hold] docusate sodium (COLACE) capsule 100 mg, 100 mg, Oral, BID    [MAR Hold] HYDROmorphone (DILAUDID) injection 0 5 mg, 0 5 mg, Intravenous, Q1H PRN    insulin lispro (HumaLOG) 100 units/mL subcutaneous injection 1-5 Units, 1-5 Units, Subcutaneous, TID AC **AND** Fingerstick Glucose (POCT), , , TID AC    [START ON 7/3/2018] insulin lispro (HumaLOG) 100 units/mL subcutaneous injection 1-5 Units, 1-5 Units, Subcutaneous, 0200    [MAR Hold] morphine injection 2 mg, 2 mg, Intravenous, Q3H PRN    [MAR Hold] naloxone (NARCAN) 0 04 mg/mL syringe 0 04 mg, 0 04 mg, Intravenous, Q1MIN PRN    [MAR Hold] ondansetron (ZOFRAN) injection 4 mg, 4 mg, Intravenous, Q6H PRN  [facility] Hold] oxyCODONE (ROXICODONE) oral solution 5 mg, 5 mg, Oral, Q4H PRN, 5 mg at 07/01/18 2010    [MAR Hold] pantoprazole (PROTONIX) injection 40 mg, 40 mg, Intravenous, Q24H ETHEL, 40 mg at 07/02/18 0816    [MAR Hold] piperacillin-tazobactam (ZOSYN) 3 375 g in sodium chloride 0 9 % 50 mL IVPB, 3 375 g, Intravenous, Q6H, 3 375 g at 07/02/18 3255    sodium chloride 0 9 % infusion, 125 mL/hr, Intravenous, Continuous, 125 mL/hr at 07/02/18 0900      Social History   reports that she has never smoked  She has never used smokeless tobacco  She reports that she drinks alcohol  She reports that she does not use drugs  Family History  Family History   Problem Relation Age of Onset    Alzheimer's disease Mother     Anxiety disorder Mother     Diabetes Mother     Cancer Father     Diabetes Sister     Hypertension Sister     Stroke Sister          OBJECTIVE:    /59 (BP Location: Right arm)   Pulse 59   Temp 98 5 °F (36 9 °C) (Temporal)   Resp 19   Wt 91 4 kg (201 lb 8 oz)   SpO2 96%   BMI 31 56 kg/m²   Physical Exam:   Pt in OR at the time of consultation, therefore PE not performed  Laboratory Studies:    Results from last 7 days  Lab Units 07/02/18  0508 07/01/18  1653   WBC Thousand/uL 8 34 10 42*   HEMOGLOBIN g/dL 10 4* 11 9   HEMATOCRIT % 32 1* 35 3   MCV fL 89 88   PLATELETS Thousands/uL 294 334       Results from last 7 days  Lab Units 07/02/18  0508 07/01/18  1653   SODIUM mmol/L 139 137   POTASSIUM mmol/L 3 9 3 8   CHLORIDE mmol/L 105 100   CO2 mmol/L 24 29   BUN mg/dL 18 24   CREATININE mg/dL 0 85 1 07   CALCIUM mg/dL 9 3 10 1   TOTAL PROTEIN g/dL 6 3* 7 8   BILIRUBIN TOTAL mg/dL 2 79* 2 03*   ALK PHOS U/L 264* 271*   ALT U/L 184* 140*   AST U/L 194* 181*   GLUCOSE RANDOM mg/dL 145* 165*           Imaging and Other Studies:  Ct Abdomen Pelvis With Contrast    Result Date: 7/1/2018  Narrative: CT ABDOMEN AND PELVIS WITH IV CONTRAST INDICATION:   abd pain, back pain   COMPARISON: 11/26/2012 TECHNIQUE:  CT examination of the abdomen and pelvis was performed  Axial, sagittal, and coronal 2D reformatted images were created from the source data and submitted for interpretation  Radiation dose length product (DLP) for this visit:  485 mGy-cm   This examination, like all CT scans performed in the [facility], was performed utilizing techniques to minimize radiation dose exposure, including the use of iterative reconstruction and automated exposure control  IV Contrast:  100 mL of iohexol (OMNIPAQUE) Enteric Contrast:  Enteric contrast was not administered  FINDINGS: ABDOMEN LOWER CHEST:  No clinically significant abnormality identified in the visualized lower chest  LIVER/BILIARY TREE:  Fatty liver  GALLBLADDER:  Gallstones noted with wall thickening and mild intrahepatic biliary ductal dilatation  Additionally, there is prominence of the common bile duct with a calculus noted distally measuring up to 6 mm on 601/75  Findings are concerning for an  obstructing stone and acute cholecystitis  Inflammatory change about the gallbladder and extrahepatic biliary tree also noted  SPLEEN:  Unremarkable  PANCREAS:  Unremarkable  ADRENAL GLANDS:  Unremarkable  KIDNEYS/URETERS:  One or more sharply circumscribed subcentimeter renal hypodensities are noted  These lesions are too small to accurately characterize, but are statistically most likely to represent benign cortical renal cyst(s)  According to the guidelines published in the [facility] Paper of the ACR Incidental Findings Committee (Radiology 2010), no further workup of these lesions is recommended  STOMACH AND BOWEL:  There is colonic diverticulosis without evidence of acute diverticulitis  APPENDIX:  No findings to suggest appendicitis  ABDOMINOPELVIC CAVITY:  No ascites or free intraperitoneal air  No lymphadenopathy  VESSELS:  Unremarkable for patient's age  PELVIS REPRODUCTIVE ORGANS:  Unremarkable for patient's age   URINARY BLADDER: Unremarkable  ABDOMINAL WALL/INGUINAL REGIONS:  Unremarkable  OSSEOUS STRUCTURES:  No acute fracture or destructive osseous lesion  Impression: 1  Cholelithiasis and choledocholithiasis with associated inflammatory change, gallbladder wall thickening and intrahepatic biliary ductal dilatation all consistent with acute cholecystitis  No abscess  2   Diverticular disease without diverticulitis  3   Fatty liver  I personally discussed this study with Yasmine S Carmelina Solorzano on 7/1/2018 at 5:40 PM  Workstation performed: [id number]         ASSESSMENT AND PLAN:  1  Acute cholecystitis with choledocholithiasis in OR currently for laparoscopic cholecystectomy with intraoperative cholangiogram   We will standby for results of intraoperative cholangiogram for possible postoperative ERCP  Continue to trend liver enzymes  Postop management per surgical team  2  Diabetes mellitus  Metformin on hold due to IV contrast dye  On SSI as needed    3  Malignant melanoma history        Dipti Lee PA-C

## 2018-07-02 NOTE — CASE MANAGEMENT
Initial Clinical Review    Admission: Date/Time/Statement: 7/1/18 @ 1803     Orders Placed This Encounter   Procedures    Inpatient Admission (expected length of stay for this patient is greater than two midnights)     Standing Status:   Standing     Number of Occurrences:   1     Order Specific Question:   Admitting Physician     Answer:   Sonali Headley [[id number]]     Order Specific Question:   Level of Care     Answer:   Med Surg [[id number]]     Order Specific Question:   Estimated length of stay     Answer:   More than 2 Midnights     Order Specific Question:   Certification     Answer:   I certify that inpatient services are medically necessary for this patient for a duration of greater than two midnights  See H&P and MD Progress Notes for additional information about the patient's course of treatment  ED: Date/Time/Mode of Arrival:   ED Arrival Information     Expected Arrival Acuity Means of Arrival Escorted By Service Admission Type    7/1/2018 15:36 7/1/2018 15:49 Urgent Walk-In [facility] Surgery-General Urgent    Arrival Complaint    abdominal pain        Chief Complaint:   Chief Complaint   Patient presents with    Flank Pain     Flank pain and abdominal pain that comes and goes  Was told that she has gallstones  Denies n/v/d  History of Illness: Having b/l flank pain x 6 days  Pain is intermittent and changes locations  58 y o  female who presents with acute abdominal pain  Initial onset of a abdominal pain approximately 1 week ago  Was evaluated previously and treated as urinary tract infection  With recurrence symptoms patient was seen and evaluated urgent care earlier today and was sent to the ER for further evaluation  Pain currently described as a moderate aching in the upper abdomen and back  No associated nausea, vomiting, bloating, cramping, diarrhea, constipation  Mild associated indigestion  No fever, chest pain, difficulty breathing    Associated chills and night sweats last night  Last oral intake about 2 o'clock this afternoon  Patient with known history of gallstones for about 5 years with no prior symptoms      tenderness in the right upper quadrant and left upper quadrant    ED Vital Signs:   ED Triage Vitals [07/01/18 1600]   Temperature Pulse Respirations Blood Pressure SpO2   98 °F (36 7 °C) 73 16 150/69 97 %      Temp Source Heart Rate Source Patient Position - Orthostatic VS BP Location FiO2 (%)   Temporal Monitor Sitting Right arm --      Pain Score       Worst Possible Pain        Wt Readings from Last 1 Encounters:   07/01/18 91 4 kg (201 lb 8 oz)     Abnormal Labs/Diagnostic Test Results:  Wbc's 10 42,   ANC 8 02,   Glu 165,   ,   ,  Alk phos 271,   T Bili 2 03  Urine:  1+ protein,   Trace ketones,  Mod blood,   Urobilinogen 2 0    CT A&P: 1   Cholelithiasis and choledocholithiasis with associated inflammatory change, gallbladder wall thickening and intrahepatic biliary ductal dilatation all consistent with acute cholecystitis   No abscess  2   Diverticular disease without diverticulitis  3   Fatty liver    ED Treatment:   Medication Administration from 07/01/2018 1536 to 07/01/2018 1926       Date/Time Order Dose Route Action Action by Comments     07/01/2018 1755 sodium chloride 0 9 % bolus 1,000 mL 0 mL Intravenous Stopped Sowmya Hendrickson RN      07/01/2018 1652 sodium chloride 0 9 % bolus 1,000 mL 1,000 mL Intravenous New Bag Myra Morales RN      07/01/2018 1650 ketorolac (TORADOL) injection 15 mg 15 mg Intravenous Given Myra Morales RN      07/01/2018 1733 iohexol (OMNIPAQUE) 350 MG/ML injection (MULTI-DOSE) 100 mL 100 mL Intravenous Given Edith Rdz           Past Medical/Surgical History:    Active Ambulatory Problems     Diagnosis Date Noted    Allergic rhinitis 11/26/2012    Diabetes ([address] ) 04/11/2017    GERD without esophagitis 12/26/2013    Hyperglycemia 07/22/2014    Hyperlipidemia 11/26/2012    Hypertension 11/26/2012    Melanoma in situ ([facility] ) 08/15/2016     Resolved Ambulatory Problems     Diagnosis Date Noted    No Resolved Ambulatory Problems     Past Medical History:   Diagnosis Date    Diabetes mellitus ([facility] )     Hypertension     Malignant neoplasm of skin        Admitting Diagnosis: Calculus of gallbladder and bile duct with acute cholecystitis, with obstruction [K80 63]  Acute cholecystitis [K81 0]  Flank pain [R10 9]    Age/Sex: 58 y o  female    Assessment/Plan:   1  Acute calculous cholecystitis with choledocholithiasis and obstruction, present on admission               -CT from 07/01/2018 reviewed               -last oral intake 07/01/2018 14:00               -admission, IV antibiotics, laparoscopic cholecystectomy tomorrow 07/02/2018               -GI consulted for possible ERCP postop pending cholangiogram findings and repeat blood work     2    Choledocholithiasis with obstruction, present on admission               -elevated LFTs with bilirubin of 2               -patient clinically stable without signs or symptoms of acute cholangitis               -GI consulted, intraoperative cholangiogram planned for tomorrow morning       Admission Orders: IP  NPO  SCD's  Consult GI    Scheduled Meds:   Current Facility-Administered Medications:                          docusate sodium 100 mg Oral BID Satnam Mckeon PA-C            insulin lispro 1-5 Units Subcutaneous TID AC Sridevi Aldana PA-C    [START ON 7/3/2018] insulin lispro 1-5 Units Subcutaneous 0200 Sridevi Aldana PA-C                                    pantoprazole 40 mg Intravenous Q24H [address] Satnam S Rupertoitriafab, PA-C    piperacillin-tazobactam 3 375 g Intravenous Q6H Satnam S Linda PA-C Last Rate: 3 375 g (07/02/18 0833)   sodium chloride 125 mL/hr Intravenous Continuous Les Pines, DO Last Rate: 125 mL/hr (07/02/18 0900)   trimethobenzamide 200 mg Intramuscular Once PRN Les Pines, DO      Continuous Infusions:   dextrose 5 % and sodium chloride 0 45 % with KCl 20 mEq/L 150 mL/hr Last Rate: Stopped (07/02/18 0902)   sodium chloride 125 mL/hr Last Rate: 125 mL/hr (07/02/18 0900)     PRN Meds:   acetaminophen    albuterol    HYDROmorphone    morphine injection    naloxone    ondansetron    oxyCODONE x 1 7/1    Trimethobenzamide    7/2: 98 5 - 59 - 19   125/59  AM Labs:  H&H 10 4 / 32 1,    ,   ,   Alk phos 164,   Alb 2 2,   T Bili 2 79    SURGERY DATE: 7/2/2018  Procedure(s) (LRB):  CHOLECYSTECTOMY LAPAROSCOPIC W/IOC (N/A)  CHOLANGIOGRAM (N/A    Operative Findings:  Significant inflammation and edema of the gallbladder wall with early necrosis of the posterior wall with multiple stones  Cholangiogram no obvious filling defect    Clear liquids  Cont IVF's Meds as above  Ambulate tid  CBC,  BMP in am    Thank you,  [facility] [place] [id number] Utilization Review Department  Phone: [phone]; Fax [phone]  ATTENTION: The Network Utilization Review Department is now centralized for our 9 Facilities  Make a note that we have a new phone and fax numbers for our Department  Please call with any questions or concerns to [phone] and carefully follow the prompts so that you are directed to the right person  All voicemails are confidential  Fax any determinations, approvals, denials, and requests for initial or continue stay review clinical to [phone]  Due to HIGH CALL volume, it would be easier if you could please send faxed requests to expedite your requests and in part, help us provide discharge notifications faster

## 2018-07-02 NOTE — TELEPHONE ENCOUNTER
Pt's spouse called asking that we cancel US appt for 7/3/18, as Pt has been admitted into the hospital   She is scheduled to have gallbladder surgery today  Called central scheduling and spoke with Jimmy Vaca  She cancelled US appt

## 2018-07-02 NOTE — PROGRESS NOTES
Progress Note - General Surgery   Katie Day 58 y o  female MRN: [id number]  Unit/Bed#: OR [place] Encounter: [id number]    Assessment/Plan:  Acute cholecystitis with choledocholithiasis and obstruction, present on admission  -patient remained stable without signs of acute cholangitis  -plan for OR today for laparoscopic cholecystectomy with intraoperative cholangiogram  -procedure discussed in detail with the patient as well as possible risks and complications and she has given her consent written form   -continue NPO  -continue IV fluids and IV antibiotics  -continue pain control as needed    Choledocholithiasis with obstruction, present on admission  -intraoperative cholangiogram planned for today  -patient continues with elevated bilirubin and transaminases  -GI consulted for possible postoperative ERCP    Hypertension-controlled  -restart home meds postoperatively    Diabetes mellitus-metformin at home  -will hold metformin due to IV contrast dye  -will initiate blood glucose monitoring and sliding scale insulin coverage as needed      Subjective/Objective   Chief Complaint:  Abdominal pain    Subjective:  Abdominal pain better this morning  No pain medication since last evening  Out of bed to bathroom  Denies any nausea or vomiting  For OR today  Objective:     Blood pressure 125/59, pulse 59, temperature 98 5 °F (36 9 °C), temperature source Temporal, resp  rate 19, weight 91 4 kg (201 lb 8 oz), SpO2 96 %  ,Body mass index is 31 56 kg/m²  Intake/Output Summary (Last 24 hours) at 07/02/18 0855  Last data filed at 07/02/18 2801   Gross per 24 hour   Intake           2552 5 ml   Output              525 ml   Net           2027 5 ml       Invasive Devices     Peripheral Intravenous Line            Peripheral IV 07/01/18 Right Antecubital less than 1 day                Physical Exam   Constitutional: She is oriented to person, place, and time  She appears well-developed and well-nourished  No distress  HEENT:  Sclerae anicteric, mucous membranes moist  Neck: Normal range of motion  Neck supple  Trachea midline  Cardiovascular: Normal rate, regular rhythm and normal heart sounds  No murmur heard  Pulmonary/Chest: Effort normal and breath sounds normal  No respiratory distress  Abdominal: Soft  Bowel sounds are normal  She exhibits no distension  Mild tenderness right upper quadrant without rebound or guarding  No Mancilla sign  Active bowel sounds  Musculoskeletal: Normal range of motion  No tenderness in calves or thighs  Skin: Skin is warm and dry  She is not diaphoretic  No pallor  Lab, Imaging and other studies:  I have personally reviewed pertinent lab results    , CBC:   Lab Results   Component Value Date    WBC 8 34 07/02/2018    HGB 10 4 (L) 07/02/2018    HCT 32 1 (L) 07/02/2018    MCV 89 07/02/2018     07/02/2018    MCH 28 8 07/02/2018    MCHC 32 4 07/02/2018    RDW 14 3 07/02/2018    MPV 10 7 07/02/2018    NRBC 0 07/02/2018   , CMP:   Lab Results   Component Value Date     07/02/2018    K 3 9 07/02/2018     07/02/2018    CO2 24 07/02/2018    ANIONGAP 10 07/02/2018    BUN 18 07/02/2018    CREATININE 0 85 07/02/2018    GLUCOSE 145 (H) 07/02/2018    CALCIUM 9 3 07/02/2018     (H) 07/02/2018     (H) 07/02/2018    ALKPHOS 264 (H) 07/02/2018    PROT 6 3 (L) 07/02/2018    BILITOT 2 79 (H) 07/02/2018    EGFR 74 07/02/2018     VTE Pharmacologic Prophylaxis: Heparin  VTE Mechanical Prophylaxis: sequential compression device     Dipika Aldana PA-C

## 2018-07-02 NOTE — ANESTHESIA POSTPROCEDURE EVALUATION
Post-Op Assessment Note      CV Status:  Stable    Mental Status:  Alert and awake    Hydration Status:  Euvolemic    PONV Controlled:  Controlled    Airway Patency:  Patent    Post Op Vitals Reviewed:  Yes              /65 (07/02/18 1149)    Temp     Pulse 80 (07/02/18 1149)   Resp 17 (07/02/18 1149)    SpO2 93 % (07/02/18 1149)

## 2018-07-02 NOTE — ANESTHESIA PREPROCEDURE EVALUATION
Review of Systems/Medical History  Patient summary reviewed  Chart reviewed      Cardiovascular  Exercise tolerance (METS): >4,  Hyperlipidemia, Hypertension ,    Pulmonary  Negative pulmonary ROS        GI/Hepatic    GERD well controlled,        Negative  ROS        Endo/Other  Diabetes type 2 Oral agent,      GYN  Negative gynecology ROS          Hematology  Negative hematology ROS      Musculoskeletal  Negative musculoskeletal ROS        Neurology  Negative neurology ROS      Psychology   Negative psychology ROS              Physical Exam    Airway    Mallampati score: II  TM Distance: <3 FB  Neck ROM: full     Dental       Cardiovascular  Rhythm: regular, Rate: normal,     Pulmonary  Breath sounds clear to auscultation,     Other Findings        Anesthesia Plan  ASA Score- 2     Anesthesia Type- general with ASA Monitors  Additional Monitors:   Airway Plan:         Plan Factors- Patient instructed to abstain from smoking on day of procedure  Patient did not smoke on day of surgery  Induction- intravenous  Postoperative Plan-     Informed Consent- Anesthetic plan and risks discussed with patient

## 2018-07-03 VITALS
RESPIRATION RATE: 18 BRPM | TEMPERATURE: 98.8 F | SYSTOLIC BLOOD PRESSURE: 111 MMHG | BODY MASS INDEX: 31.56 KG/M2 | DIASTOLIC BLOOD PRESSURE: 54 MMHG | HEART RATE: 55 BPM | OXYGEN SATURATION: 92 % | WEIGHT: 201.5 LBS

## 2018-07-03 PROBLEM — K81.0 ACUTE CHOLECYSTITIS: Status: RESOLVED | Noted: 2018-07-01 | Resolved: 2018-07-03

## 2018-07-03 PROBLEM — K80.63 CALCULUS OF GALLBLADDER AND BILE DUCT WITH ACUTE CHOLECYSTITIS, WITH OBSTRUCTION: Status: RESOLVED | Noted: 2018-07-01 | Resolved: 2018-07-03

## 2018-07-03 LAB
ALBUMIN SERPL BCP-MCNC: 2.1 G/DL (ref 3.5–5)
ALP SERPL-CCNC: 244 U/L (ref 46–116)
ALT SERPL W P-5'-P-CCNC: 213 U/L (ref 12–78)
ANION GAP SERPL CALCULATED.3IONS-SCNC: 12 MMOL/L (ref 4–13)
ANISOCYTOSIS BLD QL SMEAR: PRESENT
AST SERPL W P-5'-P-CCNC: 136 U/L (ref 5–45)
BASOPHILS # BLD MANUAL: 0 THOUSAND/UL (ref 0–0.1)
BASOPHILS NFR MAR MANUAL: 0 % (ref 0–1)
BILIRUB DIRECT SERPL-MCNC: 1.41 MG/DL (ref 0–0.2)
BILIRUB SERPL-MCNC: 2.04 MG/DL (ref 0.2–1)
BUN SERPL-MCNC: 12 MG/DL (ref 5–25)
CALCIUM SERPL-MCNC: 9 MG/DL (ref 8.3–10.1)
CHLORIDE SERPL-SCNC: 105 MMOL/L (ref 100–108)
CO2 SERPL-SCNC: 22 MMOL/L (ref 21–32)
CREAT SERPL-MCNC: 0.87 MG/DL (ref 0.6–1.3)
EOSINOPHIL # BLD MANUAL: 0 THOUSAND/UL (ref 0–0.4)
EOSINOPHIL NFR BLD MANUAL: 0 % (ref 0–6)
ERYTHROCYTE [DISTWIDTH] IN BLOOD BY AUTOMATED COUNT: 14.4 % (ref 11.6–15.1)
GFR SERPL CREATININE-BSD FRML MDRD: 72 ML/MIN/1.73SQ M
GLUCOSE SERPL-MCNC: 108 MG/DL (ref 65–140)
GLUCOSE SERPL-MCNC: 115 MG/DL (ref 65–140)
GLUCOSE SERPL-MCNC: 121 MG/DL (ref 65–140)
GLUCOSE SERPL-MCNC: 154 MG/DL (ref 65–140)
HCT VFR BLD AUTO: 29.3 % (ref 34.8–46.1)
HGB BLD-MCNC: 9.5 G/DL (ref 11.5–15.4)
LYMPHOCYTES # BLD AUTO: 1.17 THOUSAND/UL (ref 0.6–4.47)
LYMPHOCYTES # BLD AUTO: 10 % (ref 14–44)
MCH RBC QN AUTO: 28.9 PG (ref 26.8–34.3)
MCHC RBC AUTO-ENTMCNC: 32.4 G/DL (ref 31.4–37.4)
MCV RBC AUTO: 89 FL (ref 82–98)
MONOCYTES # BLD AUTO: 0.82 THOUSAND/UL (ref 0–1.22)
MONOCYTES NFR BLD: 7 % (ref 4–12)
NEUTROPHILS # BLD MANUAL: 9.69 THOUSAND/UL (ref 1.85–7.62)
NEUTS BAND NFR BLD MANUAL: 8 % (ref 0–8)
NEUTS SEG NFR BLD AUTO: 75 % (ref 43–75)
NRBC BLD AUTO-RTO: 0 /100 WBCS
PLATELET # BLD AUTO: 324 THOUSANDS/UL (ref 149–390)
PLATELET BLD QL SMEAR: ADEQUATE
PMV BLD AUTO: 10.5 FL (ref 8.9–12.7)
POTASSIUM SERPL-SCNC: 3.7 MMOL/L (ref 3.5–5.3)
PROT SERPL-MCNC: 5.9 G/DL (ref 6.4–8.2)
RBC # BLD AUTO: 3.29 MILLION/UL (ref 3.81–5.12)
SODIUM SERPL-SCNC: 139 MMOL/L (ref 136–145)
TOTAL CELLS COUNTED SPEC: 100
WBC # BLD AUTO: 11.67 THOUSAND/UL (ref 4.31–10.16)

## 2018-07-03 PROCEDURE — 80048 BASIC METABOLIC PNL TOTAL CA: CPT | Performed by: SURGERY

## 2018-07-03 PROCEDURE — 85027 COMPLETE CBC AUTOMATED: CPT | Performed by: PHYSICIAN ASSISTANT

## 2018-07-03 PROCEDURE — C9113 INJ PANTOPRAZOLE SODIUM, VIA: HCPCS | Performed by: PHYSICIAN ASSISTANT

## 2018-07-03 PROCEDURE — 99024 POSTOP FOLLOW-UP VISIT: CPT | Performed by: PHYSICIAN ASSISTANT

## 2018-07-03 PROCEDURE — 82948 REAGENT STRIP/BLOOD GLUCOSE: CPT

## 2018-07-03 PROCEDURE — 80076 HEPATIC FUNCTION PANEL: CPT | Performed by: SURGERY

## 2018-07-03 PROCEDURE — 85007 BL SMEAR W/DIFF WBC COUNT: CPT | Performed by: PHYSICIAN ASSISTANT

## 2018-07-03 RX ORDER — HYDROCODONE BITARTRATE AND ACETAMINOPHEN 5; 325 MG/1; MG/1
1-2 TABLET ORAL EVERY 4 HOURS PRN
Qty: 30 TABLET | Refills: 0 | Status: SHIPPED | OUTPATIENT
Start: 2018-07-03 | End: 2018-07-13

## 2018-07-03 RX ADMIN — MORPHINE SULFATE 2 MG: 2 INJECTION, SOLUTION INTRAMUSCULAR; INTRAVENOUS at 01:05

## 2018-07-03 RX ADMIN — INSULIN LISPRO 1 UNITS: 100 INJECTION, SOLUTION INTRAVENOUS; SUBCUTANEOUS at 11:55

## 2018-07-03 RX ADMIN — PIPERACILLIN SODIUM,TAZOBACTAM SODIUM 3.38 G: 3; .375 INJECTION, POWDER, FOR SOLUTION INTRAVENOUS at 08:32

## 2018-07-03 RX ADMIN — PANTOPRAZOLE SODIUM 40 MG: 40 INJECTION, POWDER, FOR SOLUTION INTRAVENOUS at 08:32

## 2018-07-03 RX ADMIN — DOCUSATE SODIUM 100 MG: 100 CAPSULE, LIQUID FILLED ORAL at 08:32

## 2018-07-03 RX ADMIN — OXYCODONE HYDROCHLORIDE 5 MG: 5 SOLUTION ORAL at 08:32

## 2018-07-03 RX ADMIN — SODIUM CHLORIDE 125 ML/HR: 0.9 INJECTION, SOLUTION INTRAVENOUS at 02:56

## 2018-07-03 RX ADMIN — PIPERACILLIN SODIUM,TAZOBACTAM SODIUM 3.38 G: 3; .375 INJECTION, POWDER, FOR SOLUTION INTRAVENOUS at 02:57

## 2018-07-03 NOTE — DISCHARGE INSTRUCTIONS
[facility] Surgical  Post-Operative Care Instructions  Dr Mauricio Begum MD, [facility]  [phone]    1  General: You will feel pulling sensations around the wound or funny aches and pains around the incisions  This is normal  Even minor surgery is a change in your body and this is your bodys way of reaction to it  If you have had abdominal surgery, it may help to support the incision with a small pillow or blanket for comfort when moving or coughing  2  Wound care:  Okay to shower  The glue will fall off over the next week or 2     3  Water: You may shower over the wound, unless there are drain tubes left in place  Do not bathe or use a pool or hot tub until cleared by the physician  4  Activity: You may go up and down stairs, walk as much as you are comfortable, but walk at least 3 times each day  If you have had abdominal surgery, do not lift anything heavier than 15 pounds for at least 2-4 weeks, unless cleared by the doctor  5  Diet: You may resume a regular diet  If you had a same-day surgery or overnight stay surgery, he may wish to eat lightly for a few days: soups, crackers, and sandwiches  You may resume a regular diet when ready  6  Medications: Resume all of your previous medications, unless told otherwise by the doctor  Avoid aspirin or ibuprofen (Advil, Motrin, etc ) products for 2-3 days after the date of surgery  You may, at that time, began to take them again  Tylenol is always fine, unless you are taking any narcotic pain medication containing Tylenol (such as Percocet, Darvocet, Vicodin, or anything containing acetaminophen)  Do not take Tylenol if you're taking these medications  You do not need to take the narcotic pain medications unless you are having significant pain and discomfort  7  Driving: You will need someone to drive you home on the day of surgery   Do not drive or make any important decisions while on narcotic pain medication or 24 hours and after anesthesia or sedation for surgery  Generally, you may drive when your off all narcotic pain medications  8  Upset Stomach: You may take Maalox, Tums, or similar items for an upset stomach  If your narcotic pain medication causes an upset stomach, do not take it on an empty stomach  Try taking it with at least some crackers or toast      9  Constipation: Patients often experienced constipation after surgery  You may take over-the-counter medication for this, such as Metamucil, Senokot, Dulcolax, milk of magnesia, etc  You may take a suppository unless you have had anorectal surgery such as a procedure on your hemorrhoids  If you experience significant nausea or vomiting after abdominal surgery, call the office before trying any of these medications  10  Call the office: If you are experiencing any of the following, fevers above 101 5°, significant nausea or vomiting, if the wound develops drainage and/or is excessive redness around the wound, or if you have significant diarrhea or other worsening symptoms  11  Pain: You may be given a prescription for pain  This will be given to the hospital, the day of surgery  12  Sexual Activity: You may resume sexual activity when you feel ready and comfortable and your incision is sealed and healed without apparent infection risk  13  Urination: If you haven't urinated in 6 hours, go directly to the ER for evaluation for urinary retention  14  Follow-up in 2 weeks  15   Have repeat lab work done in 1 week  16    You may restart her Metoformin  Wednesday July 4th

## 2018-07-03 NOTE — PROGRESS NOTES
Progress Note - General Surgery   Leida Stephen 58 y o  female MRN: [id number]  Unit/Bed#: [place] [facility] 2 -01 Encounter: [id number]    Assessment/Plan:  Acute cholecystitis with choledocholithiasis and obstruction, present on admission  -POD#1 s/p lap darlyn with intra-op cholangiogram  -Cholangiogram without findings of choledocholithiasis and improving bilirubin and transaminases this morning  - will advanced to regular diet today  - continue pain control as needed-  use oral Norco  - encourage out of bed,  ambulation, incentive spirometer     Choledocholithiasis with obstruction, present on admission  -intraoperative cholangiogram without choledocholithiasis  -patient bilirubin and transaminases trending downward     Hypertension-controlled  -restart home meds      Diabetes mellitus-metformin at home  -will hold metformin due to IV contrast dye  - Continue blood glucose monitoring and sliding scale insulin coverage  - may restart metformin July 4th    DVT prophylaxis- on heparin    Discharge plan-  Plan for discharge to home today if patient tolerates diet and pain well controlled  Will plan for repeat LFTs in 1 week  Will plan for follow-up with Dr Dary Antonio in 2 weeks  Discharge instructions reviewed with patient  She will call the office with any questions or concerns  Subjective/Objective   Chief Complaint: abdominal pain    Subjective:   Patient with some incisional pain but tolerable  Worse with activity  Tolerating clear liquids  Urinating well  Out of bed to bathroom  Using incentive spirometer  Objective:     Blood pressure 111/54, pulse 55, temperature 98 8 °F (37 1 °C), temperature source Temporal, resp  rate 18, weight 91 4 kg (201 lb 8 oz), SpO2 92 %  ,Body mass index is 31 56 kg/m²        Intake/Output Summary (Last 24 hours) at 07/03/18 0769  Last data filed at 07/03/18 0641   Gross per 24 hour   Intake             3400 ml   Output             3050 ml   Net              350 ml Invasive Devices     Peripheral Intravenous Line            Peripheral IV 07/01/18 Right Antecubital 1 day                Physical Exam   Constitutional: She is oriented to person, place, and time  She appears well-developed and well-nourished  No distress  HEENT:  Sclerae anicteric, mucous membranes moist  Neck: Normal range of motion  Neck supple  Trachea midline  Cardiovascular: Normal rate, regular rhythm and normal heart sounds     No murmur heard  Pulmonary/Chest: Effort normal and breath sounds normal  No respiratory distress  Abdominal: Soft  Bowel sounds are normal  She exhibits no distension  Mild tenderness at incision sites  Active bowel sounds  Musculoskeletal: Normal range of motion  No tenderness in calves or thighs  Skin: Skin is warm and dry  She is not diaphoretic  No pallor   incision sites are clean, dry, intact    Lab, Imaging and other studies:  I have personally reviewed pertinent lab results    , CBC:   Lab Results   Component Value Date    WBC 11 67 (H) 07/03/2018    HGB 9 5 (L) 07/03/2018    HCT 29 3 (L) 07/03/2018    MCV 89 07/03/2018     07/03/2018    MCH 28 9 07/03/2018    MCHC 32 4 07/03/2018    RDW 14 4 07/03/2018    MPV 10 5 07/03/2018   , CMP:   Lab Results   Component Value Date     07/03/2018    K 3 7 07/03/2018     07/03/2018    CO2 22 07/03/2018    ANIONGAP 12 07/03/2018    BUN 12 07/03/2018    CREATININE 0 87 07/03/2018    GLUCOSE 115 07/03/2018    CALCIUM 9 0 07/03/2018     (H) 07/03/2018     (H) 07/03/2018    ALKPHOS 244 (H) 07/03/2018    PROT 5 9 (L) 07/03/2018    BILITOT 2 04 (H) 07/03/2018    EGFR 72 07/03/2018     VTE Pharmacologic Prophylaxis: Heparin  VTE Mechanical Prophylaxis: sequential compression device     Mary Aldana PA-C

## 2018-07-03 NOTE — PROGRESS NOTES
Patient Name: Abiola Clemons  Patient MRN: [id number]  Date: 07/03/18  Service: [facility]    Subjective   Patient doing well this morning  Was comfortably talking on the phone with her daughter when I arrived  She complains of minimal postoperative pain, especially with movement  Denies nausea, vomiting, fevers  No bowel movements or flatus reported  Reviewed incentive spirometry  Vitals  Blood pressure 111/54, pulse 55, temperature 98 8 °F (37 1 °C), temperature source Temporal, resp  rate 18, weight 91 4 kg (201 lb 8 oz), SpO2 92 %  Physical Exam:     General Appearance:    Awake, alert, oriented x3, no distress, well developed, well    nourished   Head:    Normocephalic without obvious abnormality   Eyes:    PERRL, conjunctiva/corneas clear, EOM's intact        Neck:   Supple, no adenopathy   Throat:   Mucous membranes moist   Lungs:     Clear to auscultation bilaterally, no wheezing or rhonchi   Heart:    Regular rate and rhythm, S1 and S2 normal, no murmur   Abdomen:     Soft, +mild post-operative tenderness, non-distended  bowel sounds active  No  masses, rebound or guarding  Extremities:   Extremities normal  No clubbing, cyanosis or edema   Psych  Derm:   Normal affect    No jaundice   Neurologic:   CNII-XII grossly intact   Speech intact         Laboratory Studies    Results from last 7 days  Lab Units 07/03/18  0612 07/02/18  0508 07/01/18  1653   WBC Thousand/uL 11 67* 8 34 10 42*   HEMOGLOBIN g/dL 9 5* 10 4* 11 9   HEMATOCRIT % 29 3* 32 1* 35 3   PLATELETS Thousands/uL 324 294 334       Results from last 7 days  Lab Units 07/03/18  0612 07/02/18  1321 07/02/18  0508 07/01/18  1653   SODIUM mmol/L 139 136 139 137   POTASSIUM mmol/L 3 7 4 5 3 9 3 8   CHLORIDE mmol/L 105 104 105 100   CO2 mmol/L 22 24 24 29   BUN mg/dL 12 16 18 24   CREATININE mg/dL 0 87 0 93 0 85 1 07   CALCIUM mg/dL 9 0 8 8 9 3 10 1   TOTAL PROTEIN g/dL 5 9* 6 8 6 3* 7 8   BILIRUBIN TOTAL mg/dL 2 04* 3 89* 2 79* 2 03*   ALK PHOS U/L 244* 300* 264* 271*   ALT U/L 213* 262* 184* 140*   AST U/L 136* 255* 194* 181*   GLUCOSE RANDOM mg/dL 115 203* 145* 165*       Imaging and Other Studies      Inhouse Medications     Current Facility-Administered Medications:     acetaminophen (TYLENOL) oral suspension 650 mg, 650 mg, Oral, Q4H PRN    albuterol inhalation solution 2 5 mg, 2 5 mg, Nebulization, Q6H PRN    docusate sodium (COLACE) capsule 100 mg, 100 mg, Oral, BID, 100 mg at 07/02/18 1719    HYDROmorphone (DILAUDID) injection 0 5 mg, 0 5 mg, Intravenous, Q1H PRN    insulin lispro (HumaLOG) 100 units/mL subcutaneous injection 1-5 Units, 1-5 Units, Subcutaneous, TID AC, 1 Units at 07/02/18 1719 **AND** Fingerstick Glucose (POCT), , , TID AC    insulin lispro (HumaLOG) 100 units/mL subcutaneous injection 1-5 Units, 1-5 Units, Subcutaneous, 0200    morphine injection 2 mg, 2 mg, Intravenous, Q3H PRN, 2 mg at 07/03/18 0105    naloxone (NARCAN) 0 04 mg/mL syringe 0 04 mg, 0 04 mg, Intravenous, Q1MIN PRN    ondansetron (ZOFRAN) injection 4 mg, 4 mg, Intravenous, Q6H PRN    oxyCODONE (ROXICODONE) oral solution 5 mg, 5 mg, Oral, Q4H PRN, 5 mg at 07/01/18 2010    pantoprazole (PROTONIX) injection 40 mg, 40 mg, Intravenous, Q24H ETHEL, 40 mg at 07/02/18 0816    piperacillin-tazobactam (ZOSYN) 3 375 g in sodium chloride 0 9 % 50 mL IVPB, 3 375 g, Intravenous, Q6H, 3 375 g at 07/03/18 0257    trimethobenzamide (TIGAN) IM injection 200 mg, 200 mg, Intramuscular, Once PRN      Assessment/Plan:  1  Acute cholecystitis with choledocholithiasis status post lap darlyn with intraoperative cholangiogram, POD#1  Cholangiogram showed no obvious filling defect  Liver enzymes appear to be trending down  Agree with surgical plans to advance diet today  No plans for ERCP at this time  Postop plans per surgery  Will follow peripherally, please call            Gerri Martin PA-C

## 2018-07-03 NOTE — CASE MANAGEMENT
Continued Stay Review    Date:  7/3/2018    Vital Signs: /54 (BP Location: Left arm)   Pulse 55   Temp 98 8 °F (37 1 °C) (Temporal)   Resp 18   Wt 91 4 kg (201 lb 8 oz)   SpO2 92%   BMI 31 56 kg/m²     Medications:   Scheduled Meds:   Current Facility-Administered Medications:                  docusate sodium 100 mg Oral BID Satnam S Dimitriadis, PA-C            insulin lispro 1-5 Units Subcutaneous TID AC Sridevi Astl-Maureen, PA-C    insulin lispro 1-5 Units Subcutaneous 0200 Sridevi Astl-Maureen, PA-C                            oxyCODONE 5 mg Oral Q4H PRN Satnam S Dimitriadis, PA-C    pantoprazole 40 mg Intravenous Q24H [address] Satnam S Dimitriadis, PA-C    piperacillin-tazobactam 3 375 g Intravenous Q6H Satnam S Dimitriadis, PA-C Last Rate: 3 375 g (07/03/18 0832)             Continuous Infusions:  IVF @ 125 / hr  PRN Meds:   acetaminophen    albuterol    HYDROmorphone    morphine injection IV X 2  7/3  And x 1 thus far today    naloxone    ondansetron    oxyCODONE x 1 7/3    trimethobenzamide    Abnormal Labs/Diagnostic Results:   Wbc's 11 67,     ANC 9 69,      H&H 9 5 / 29 3,      T Pro 5 9,      T Bili 2 04,   Alk phos 244,   ,  ,   ALB 2 1,   T Pro 5 9    Age/Sex: 58 y o  female      Assessment/Plan:   POD#1 s/p lap darlyn with intra-op cholangiogram   No Flatus or BM  Advance Diet today  Encourage OOB,  Ambulation,  IS  Pain control    Discharge Plan: Home once medically cleared    Thank you,  Thomas Velarde  [facility] Utilization Review Department  Phone: [phone]; Fax [phone]  ATTENTION: The Network Utilization Review Department is now centralized for our 9 Facilities  Make a note that we have a new phone and fax numbers for our Department  Please call with any questions or concerns to [phone] and carefully follow the prompts so that you are directed to the right person   All voicemails are confidential  Fax any determinations, approvals, denials, and requests for initial or continue stay review clinical to [phone]  Due to HIGH CALL volume, it would be easier if you could please send faxed requests to expedite your requests and in part, help us provide discharge notifications faster

## 2018-07-03 NOTE — SOCIAL WORK
CM met with pt at bedside to discuss discharge needs  Pt lives in a house with her   ADL's are completed independently with no DME use  PCP identified as Dr [facility]  Ph[facility]acy identified as [facility] [place]  Pt denies VNA/STR hx  Pt does drive and reports she was advised to not drive for the next few days  Her  will be providing transportation home at D/C today  Pt has a POA appointed but is unsure if it is her spouse or her children  No needs expressed or identified

## 2018-07-03 NOTE — DISCHARGE SUMMARY
Discharge Summary - Rocio River 58 y o  female MRN: [id number]    Unit/Bed#: [facility] 2 -01 Encounter: [id number]    Admission Date: 7/1/2018   Discharge Date: 07/03/18    Admitting Diagnosis:   Calculus of gallbladder and bile duct with acute cholecystitis, with obstruction [K80 63]  Acute cholecystitis [K81 0]  Flank pain [R10 9]    Discharge Diagnoses: Active Problems:    * No active hospital problems  *    Past Medical History:   Diagnosis Date    Diabetes mellitus ([facility] [address] )     Hypertension     Malignant neoplasm of skin     last assessed 04/11/2017         Consultations:  Patient was seen in consultation by the Gastroenterology service    Procedures Performed:  Laparoscopic cholecystectomy with intraoperative cholangiogram      History of Present Veda Browning is a 58 y o  female who presented to the emergency department complaining of a one-week history of abdominal pain  More recently pain had been located upper abdomen  Patient with known history of gallstones  Underwent CT scan evaluation with findings of cholelithiasis with choledocholithiasis and associated inflammatory changes of the gallbladder consistent with acute cholecystitis  Patient was also found to have intrahepatic biliary ductal dilatation  Hospital Course: Rocio River is a 58 y o  female admitted for definitive treatment  She was monitored and treated for abdominal pain and nausea overnight  By the morning she was feeling slightly better however she continued with elevated bilirubin and transaminases on blood work  Plan was discussed to take patient to the operating room for laparoscopic cholecystectomy with intraoperative cholangiogram   If cholangiogram showed findings of choledocholithiasis gastroenterology would follow up with an ERCP postoperatively  Patient was taken to the operating room on the morning of July 2nd and the above procedure was completed without complications    Intraoperatively the cholangiogram did not show any findings of choledocholithiasis  Postoperatively patient was transferred back to the medical-surgical unit where she remained stable  She recovered well overnight  Her pain was controlled with oral pain medication  She was able to be restarted on a regular diet without issues  She had no nausea or vomiting  She was urinating well  She was out of bed and ambulating  She had no leukocytosis or fevers  Her bilirubin and transaminases were trending downward  Her upper abdominal pain had resolved  She was able to be discharged home  The patient was given a prescription for pain medication as well as an order for repeat LFTs in 1 week to assure resolution of elevated bilirubin and transaminases  She was given a full set of written discharge instructions which were reviewed with her prior to discharge  She was instructed on activity and lifting restriction of no more than 20 lb for the next 4 weeks  She will follow up with Dr Jeremi Taylor in his office in 2 weeks  She will call with any questions or concerns prior to that time  Condition at Discharge: stable     Discharge instructions/Information to patient and family:   See after visit summary for information provided to patient and family  Provisions for Follow-Up Care:  See after visit summary for information related to follow-up care and any pertinent home health orders  Disposition: Home    Planned Readmission: No    Discharge Statement   I spent 20 minutes discharging the patient  This time was spent on the day of discharge  I had direct contact with the patient on the day of discharge  Additional documentation is required if more than 30 minutes were spent on discharge  Discharge Medications:  See after visit summary for reconciled discharge medications provided to patient and family        Heath Steele PA-C

## 2018-07-05 ENCOUNTER — TRANSITIONAL CARE MANAGEMENT (OUTPATIENT)
Dept: FAMILY MEDICINE CLINIC | Facility: CLINIC | Age: 63
End: 2018-07-05

## 2018-07-17 ENCOUNTER — OFFICE VISIT (OUTPATIENT)
Dept: SURGERY | Facility: MEDICAL CENTER | Age: 63
End: 2018-07-17

## 2018-07-17 ENCOUNTER — DOCTOR'S OFFICE (OUTPATIENT)
Dept: URBAN - METROPOLITAN AREA CLINIC 136 | Facility: CLINIC | Age: 63
Setting detail: OPHTHALMOLOGY
End: 2018-07-17
Payer: COMMERCIAL

## 2018-07-17 VITALS — TEMPERATURE: 97.6 F | HEIGHT: 67 IN | BODY MASS INDEX: 29.95 KG/M2 | WEIGHT: 190.8 LBS

## 2018-07-17 DIAGNOSIS — H25.13: ICD-10-CM

## 2018-07-17 DIAGNOSIS — H52.13: ICD-10-CM

## 2018-07-17 DIAGNOSIS — H35.3132: ICD-10-CM

## 2018-07-17 DIAGNOSIS — Z90.49 STATUS POST LAPAROSCOPIC CHOLECYSTECTOMY: Primary | ICD-10-CM

## 2018-07-17 DIAGNOSIS — H43.812: ICD-10-CM

## 2018-07-17 DIAGNOSIS — H52.4: ICD-10-CM

## 2018-07-17 DIAGNOSIS — H52.223: ICD-10-CM

## 2018-07-17 PROCEDURE — 92134 CPTRZ OPH DX IMG PST SGM RTA: CPT | Performed by: OPTOMETRIST

## 2018-07-17 PROCEDURE — 99024 POSTOP FOLLOW-UP VISIT: CPT | Performed by: SURGERY

## 2018-07-17 PROCEDURE — 92015 DETERMINE REFRACTIVE STATE: CPT | Performed by: OPTOMETRIST

## 2018-07-17 PROCEDURE — 92014 COMPRE OPH EXAM EST PT 1/>: CPT | Performed by: OPTOMETRIST

## 2018-07-17 ASSESSMENT — REFRACTION_OUTSIDERX
OU_VA: 20/20
OS_VA2: 20/20
OD_AXIS: 180
OD_VA3: 20/
OD_SPHERE: +2.50
OS_SPHERE: +2.50
OS_AXIS: 175
OD_VA1: 20/
OD_VA1: 20/20-2
OS_CYLINDER: -1.75
OD_SPHERE: -1.50
OD_VA2: 20/
OU_VA: 20/
OS_ADD: +2.50
OD_ADD: +2.50
OD_VA2: 20/20
OS_VA3: 20/
OS_VA2: 20/
OD_VA3: 20/
OD_CYLINDER: SPH
OS_SPHERE: -1.50
OS_CYLINDER: SPH
OS_VA3: 20/
OS_VA1: 20/20-2
OS_VA1: 20/
OD_CYLINDER: -1.25

## 2018-07-17 ASSESSMENT — REFRACTION_CURRENTRX
OS_OVR_VA: 20/
OD_AXIS: 180
OS_VPRISM_DIRECTION: BF
OD_CYLINDER: -1.50
OD_VPRISM_DIRECTION: SV
OS_CYLINDER: -2.25
OD_OVR_VA: 20/
OD_OVR_VA: 20/
OS_OVR_VA: 20/
OD_SPHERE: -2.00
OS_SPHERE: +2.50
OS_OVR_VA: 20/
OS_VPRISM_DIRECTION: SV
OS_ADD: +2.50
OD_OVR_VA: 20/
OS_SPHERE: -2.75
OD_ADD: +2.50
OD_SPHERE: +2.50
OS_AXIS: 170
OD_VPRISM_DIRECTION: BF

## 2018-07-17 ASSESSMENT — REFRACTION_MANIFEST
OS_VA1: 20/
OS_VA2: 20/
OS_VA3: 20/
OD_VA2: 20/
OD_VA3: 20/
OU_VA: 20/
OD_VA1: 20/

## 2018-07-17 ASSESSMENT — LID POSITION - DERMATOCHALASIS
OS_DERMATOCHALASIS: LUL T
OD_DERMATOCHALASIS: RUL T

## 2018-07-17 ASSESSMENT — VISUAL ACUITY
OD_BCVA: 20/30+2
OS_BCVA: 20/40-2

## 2018-07-17 ASSESSMENT — REFRACTION_AUTOREFRACTION
OD_CYLINDER: -1.75
OS_CYLINDER: -2.25
OS_SPHERE: +0.50
OD_AXIS: 148
OD_SPHERE: +0.75

## 2018-07-17 ASSESSMENT — CONFRONTATIONAL VISUAL FIELD TEST (CVF)
OD_FINDINGS: FULL
OS_FINDINGS: FULL

## 2018-07-17 ASSESSMENT — SPHEQUIV_DERIVED
OS_SPHEQUIV: -0.625
OD_SPHEQUIV: -0.125

## 2018-07-17 NOTE — LETTER
July 18, 2018     MD [phone]  [place] [address] [email or web address]    Patient: Rob Cyr   YOB: 1955   Date of Visit: 7/17/2018       Dear Dr Minda Rojo: Thank you for referring Rob Cyr to me for evaluation  Below are my notes for this consultation  If you have questions, please do not hesitate to call me  I look forward to following your patient along with you  Sincerely,        Ludwin Castillo MD        CC: No Recipients  Davi KyleeFrancoismelody  7/17/2018  9:29 AM  Sign at close encounter  Assessment/Plan: Rob Cyr is a 58year old female who presents today in post-operative state status post laparoscopic cholecystectomy done on 7/2/18  Physical exam revealed her incisions are healing well  She may begin swimming  No heavy lifting greater than 20 pounds until a full month after surgery  Will call her to discuss recent blood work if there are any issues  She may follow up as needed  She knows to call if any questions or concerns arise  Colonoscopy- She will schedule a colonoscopy for her convenience  No problem-specific Assessment & Plan notes found for this encounter  There are no diagnoses linked to this encounter  Subjective:      Patient ID: Rob Cyr is a 58 y o  female  Rob Cyr is a 58year old female who presents today in post-operative state status post laparoscopic cholecystectomy done on 7/2/18  She reports she is doing very well  She is moving her bowels normally  The following portions of the patient's history were reviewed and updated as appropriate: allergies, current medications, past family history, past medical history, past social history, past surgical history and problem list     Review of Systems   Constitutional: Negative  HENT: Negative  Eyes: Negative  Respiratory: Negative  Cardiovascular: Negative  Gastrointestinal: Negative  Endocrine: Negative  Genitourinary: Negative  Musculoskeletal: Negative  Skin: Negative  Allergic/Immunologic: Negative  Neurological: Negative  Hematological: Negative  Psychiatric/Behavioral: Negative  All other systems reviewed and are negative  Objective:      Temp 97 6 °F (36 4 °C) (Tympanic)   Ht 5' 7" (1 702 m)   Wt 86 5 kg (190 lb 12 8 oz)   BMI 29 88 kg/m²           Physical Exam   Constitutional: She is oriented to person, place, and time  She appears well-developed and well-nourished  No distress  HENT:   Head: Normocephalic and atraumatic  Right Ear: External ear normal    Left Ear: External ear normal    Nose: Nose normal    Mouth/Throat: Oropharynx is clear and moist  No oropharyngeal exudate  Eyes: Conjunctivae and EOM are normal  Pupils are equal, round, and reactive to light  Right eye exhibits no discharge  Left eye exhibits no discharge  No scleral icterus  Neck: Normal range of motion  Neck supple  No JVD present  No tracheal deviation present  No thyromegaly present  Cardiovascular: Normal rate, normal heart sounds and intact distal pulses  Exam reveals no gallop and no friction rub  No murmur heard  Pulmonary/Chest: Effort normal  No stridor  No respiratory distress  She has no wheezes  She has no rales  She exhibits no tenderness  Abdominal: Soft  Bowel sounds are normal  She exhibits no distension and no mass  There is no tenderness  There is no rebound and no guarding  Incisions are healing well  Musculoskeletal: Normal range of motion  She exhibits no edema, tenderness or deformity  Lymphadenopathy:     She has no cervical adenopathy  Neurological: She is alert and oriented to person, place, and time  No cranial nerve deficit  Coordination normal    Skin: Skin is warm and dry  No rash noted  She is not diaphoretic  No erythema  No pallor  Psychiatric: She has a normal mood and affect   Her behavior is normal  Judgment and thought content normal    Nursing note and vitals reviewed  By signing my name below, Loki Margareth, attest that this documentation has been prepared under the direction and in the presence of Ebonie Yoon MD  Electronically Signed: Payal Knott 7/17/2018  Irina Martinez, personally performed the services described in this documentation  All medical record entries made by the scribe were at my direction and in my presence  I have reviewed the chart and discharge instructions (if applicable) and agree that the record reflects my personal performance and is accurate and complete  Ebonie Yoon MD  7/17/2018

## 2018-07-17 NOTE — PROGRESS NOTES
Assessment/Plan: Cassie Mcguire is a 58year old female who presents today in post-operative state status post laparoscopic cholecystectomy done on 7/2/18  Physical exam revealed her incisions are healing well  She may begin swimming  No heavy lifting greater than 20 pounds until a full month after surgery  Will call her to discuss recent blood work if there are any issues  She may follow up as needed  She knows to call if any questions or concerns arise  Colonoscopy- She will schedule a colonoscopy for her convenience  No problem-specific Assessment & Plan notes found for this encounter  Diagnoses and all orders for this visit:    Status post laparoscopic cholecystectomy          Subjective:      Patient ID: Cassie Mcguire is a 58 y o  female  Cassie Mcguire is a 58year old female who presents today in post-operative state status post laparoscopic cholecystectomy done on 7/2/18  She reports she is doing very well  She is moving her bowels normally  The following portions of the patient's history were reviewed and updated as appropriate: allergies, current medications, past family history, past medical history, past social history, past surgical history and problem list     Review of Systems   Constitutional: Negative  HENT: Negative  Eyes: Negative  Respiratory: Negative  Cardiovascular: Negative  Gastrointestinal: Negative  Endocrine: Negative  Genitourinary: Negative  Musculoskeletal: Negative  Skin: Negative  Allergic/Immunologic: Negative  Neurological: Negative  Hematological: Negative  Psychiatric/Behavioral: Negative  All other systems reviewed and are negative  Objective:      Temp 97 6 °F (36 4 °C) (Tympanic)   Ht 5' 7" (1 702 m)   Wt 86 5 kg (190 lb 12 8 oz)   BMI 29 88 kg/m²          Physical Exam   Constitutional: She is oriented to person, place, and time  She appears well-developed and well-nourished  No distress     HENT: Head: Normocephalic and atraumatic  Right Ear: External ear normal    Left Ear: External ear normal    Nose: Nose normal    Mouth/Throat: Oropharynx is clear and moist  No oropharyngeal exudate  Eyes: Conjunctivae and EOM are normal  Pupils are equal, round, and reactive to light  Right eye exhibits no discharge  Left eye exhibits no discharge  No scleral icterus  Neck: Normal range of motion  Neck supple  No JVD present  No tracheal deviation present  No thyromegaly present  Cardiovascular: Normal rate, normal heart sounds and intact distal pulses  Exam reveals no gallop and no friction rub  No murmur heard  Pulmonary/Chest: Effort normal  No stridor  No respiratory distress  She has no wheezes  She has no rales  She exhibits no tenderness  Abdominal: Soft  Bowel sounds are normal  She exhibits no distension and no mass  There is no tenderness  There is no rebound and no guarding  Incisions are healing well  Musculoskeletal: Normal range of motion  She exhibits no edema, tenderness or deformity  Lymphadenopathy:     She has no cervical adenopathy  Neurological: She is alert and oriented to person, place, and time  No cranial nerve deficit  Coordination normal    Skin: Skin is warm and dry  No rash noted  She is not diaphoretic  No erythema  No pallor  Psychiatric: She has a normal mood and affect  Her behavior is normal  Judgment and thought content normal    Nursing note and vitals reviewed  By signing my name below, Bernice Mata, attest that this documentation has been prepared under the direction and in the presence of Forrest Montague MD  Electronically Signed: Payal Louis 7/17/2018  [facility], personally performed the services described in this documentation  All medical record entries made by the tawanaibmelody were at my direction and in my presence   I have reviewed the chart and discharge instructions (if applicable) and agree that the record reflects my personal performance and is accurate and complete  Emilio Thorne MD  7/17/2018

## 2018-08-15 ENCOUNTER — OPTICAL OFFICE (OUTPATIENT)
Dept: URBAN - METROPOLITAN AREA CLINIC 143 | Facility: CLINIC | Age: 63
Setting detail: OPHTHALMOLOGY
End: 2018-08-15

## 2018-08-15 DIAGNOSIS — H52.223: ICD-10-CM

## 2018-08-15 PROCEDURE — S0500 DISPOS CONT LENS: HCPCS | Performed by: OPTOMETRIST

## 2018-08-17 DIAGNOSIS — I10 HYPERTENSION, UNSPECIFIED TYPE: ICD-10-CM

## 2018-08-21 PROCEDURE — 4010F ACE/ARB THERAPY RXD/TAKEN: CPT | Performed by: FAMILY MEDICINE

## 2018-08-21 RX ORDER — LISINOPRIL 10 MG/1
10 TABLET ORAL DAILY
Qty: 90 TABLET | Refills: 1 | Status: SHIPPED | OUTPATIENT
Start: 2018-08-21 | End: 2018-12-30 | Stop reason: SDUPTHER

## 2018-09-08 ENCOUNTER — OFFICE VISIT (OUTPATIENT)
Dept: URGENT CARE | Facility: MEDICAL CENTER | Age: 63
End: 2018-09-08
Payer: COMMERCIAL

## 2018-09-08 VITALS
SYSTOLIC BLOOD PRESSURE: 134 MMHG | TEMPERATURE: 97.4 F | BODY MASS INDEX: 29.7 KG/M2 | WEIGHT: 196 LBS | DIASTOLIC BLOOD PRESSURE: 70 MMHG | OXYGEN SATURATION: 100 % | HEART RATE: 96 BPM | HEIGHT: 68 IN | RESPIRATION RATE: 16 BRPM

## 2018-09-08 DIAGNOSIS — L08.9 LOCAL INFECTION OF THE SKIN AND SUBCUTANEOUS TISSUE, UNSPECIFIED: Primary | ICD-10-CM

## 2018-09-08 PROCEDURE — 99213 OFFICE O/P EST LOW 20 MIN: CPT | Performed by: FAMILY MEDICINE

## 2018-09-08 RX ORDER — CEPHALEXIN 500 MG/1
500 CAPSULE ORAL EVERY 6 HOURS SCHEDULED
Qty: 20 CAPSULE | Refills: 0 | Status: SHIPPED | OUTPATIENT
Start: 2018-09-08 | End: 2018-09-13

## 2018-09-08 NOTE — PROGRESS NOTES
[facility]        NAME: Jeanette Aceves is a 58 y o  female  : 1955    MRN: [id number]  DATE: 2018  TIME: 8:21 AM    Assessment and Plan   Local infection of the skin and subcutaneous tissue, unspecified [L08 9]  1  Local infection of the skin and subcutaneous tissue, unspecified  cephalexin (KEFLEX) 500 mg capsule         Patient Instructions       Follow up with PCP in 3-5 days  Proceed to  ER if symptoms worsen  Chief Complaint     Chief Complaint   Patient presents with    Hand Pain     Pt with redness, pain outer aspect right thumb nail for 3 days  History of Present Illness       Patient states that for the past 3 days she has noticed redness and swelling on her right thumb  No injury to the area  she is right-handed  Does bite her nails   the swelling is primarily under and around her nail  Denies any weakness numbness or tingling in the hand  Hand Pain    Pertinent negatives include no chest pain  Review of Systems   Review of Systems   Constitutional: Negative for fatigue  HENT: Negative for facial swelling and voice change  Eyes: Negative for visual disturbance  Respiratory: Negative for shortness of breath  Cardiovascular: Negative for chest pain and palpitations  Gastrointestinal: Negative for abdominal distention, diarrhea, nausea and vomiting  Musculoskeletal: Negative for gait problem  Skin: Positive for color change  Negative for rash and wound  Neurological: Negative for facial asymmetry and speech difficulty  Psychiatric/Behavioral: Negative for confusion and dysphoric mood  The patient is not nervous/anxious            Current Medications       Current Outpatient Prescriptions:     aspirin 81 MG tablet, Take 1 tablet by mouth daily, Disp: , Rfl:     Calcium Carb-Ergocalciferol 250-125 MG-UNIT TABS, Take 1 tablet by mouth, Disp: , Rfl:     fluticasone (FLONASE) 50 mcg/act nasal spray, , Disp: , Rfl:    lisinopril (ZESTRIL) 10 mg tablet, Take 1 tablet (10 mg total) by mouth daily, Disp: 90 tablet, Rfl: 1    pantoprazole (PROTONIX) 20 mg tablet, Take 1 tablet (20 mg total) by mouth daily, Disp: 90 tablet, Rfl: 3    phenazopyridine (PYRIDIUM) 100 mg tablet, Take 1 tablet (100 mg total) by mouth 3 (three) times a day as needed for bladder spasms, Disp: 10 tablet, Rfl: 0    pravastatin (PRAVACHOL) 20 mg tablet, Take 1 tablet (20 mg total) by mouth daily, Disp: 90 tablet, Rfl: 3    cephalexin (KEFLEX) 500 mg capsule, Take 1 capsule (500 mg total) by mouth every 6 (six) hours for 5 days, Disp: 20 capsule, Rfl: 0    Current Allergies     Allergies as of 09/08/2018    (No Known Allergies)            The following portions of the patient's history were reviewed and updated as appropriate: allergies, current medications, past family history, past medical history, past social history, past surgical history and problem list      Past Medical History:   Diagnosis Date    Diabetes mellitus ([facility] [address] )     Hypertension     Malignant neoplasm of skin     last assessed 04/11/2017       Past Surgical History:   Procedure Laterality Date    CHOLANGIOGRAM N/A 7/2/2018    Procedure: CHOLANGIOGRAM;  Surgeon: Fadumo Barnett MD;  Location: [facility];  Service: General    CHOLECYSTECTOMY LAPAROSCOPIC N/A 7/2/2018    Procedure: CHOLECYSTECTOMY LAPAROSCOPIC W/IOC;  Surgeon: Fadumo Barnett MD;  Location: [facility];  Service: General       Family History   Problem Relation Age of Onset    Alzheimer's disease Mother     Anxiety disorder Mother     Diabetes Mother     Cancer Father     Diabetes Sister     Hypertension Sister     Stroke Sister          Medications have been verified          Objective   /70 (BP Location: Left arm, Patient Position: Sitting, Cuff Size: Standard)   Pulse 96   Temp (!) 97 4 °F (36 3 °C) (Tympanic)   Resp 16   Ht 5' 8" (1 727 m)   Wt 88 9 kg (196 lb)   SpO2 100%   BMI 29 80 kg/m² Physical Exam     Physical Exam   Constitutional: She is oriented to person, place, and time  She appears well-developed and well-nourished  HENT:   Head: Normocephalic and atraumatic  Eyes: Conjunctivae are normal    Neck: Neck supple  Cardiovascular: Normal rate  Pulmonary/Chest: Effort normal  No respiratory distress  Abdominal: Soft  Musculoskeletal: Normal range of motion  Neurological: She is alert and oriented to person, place, and time  Skin: Skin is warm and dry  Right thumb red-hot tender around the nail bed   Psychiatric: She has a normal mood and affect  Her behavior is normal    Nursing note and vitals reviewed

## 2018-09-08 NOTE — PATIENT INSTRUCTIONS
Cellulitis   WHAT YOU NEED TO KNOW:   Cellulitis is a skin infection caused by bacteria  Cellulitis may go away on its own or you may need treatment  Your healthcare provider may draw a [place] around the outside edges of your cellulitis  If your cellulitis spreads, your healthcare provider will see it outside of the [place]  DISCHARGE INSTRUCTIONS:   Call 911 if:   · You have sudden trouble breathing or chest pain  Return to the emergency department if:   · Your wound gets larger and more painful  · You feel a crackling under your skin when you touch it  · You have purple dots or bumps on your skin, or you see bleeding under your skin  · You have new swelling and pain in your legs  · The red, warm, swollen area gets larger  · You see red streaks coming from the infected area  Contact your healthcare provider if:   · You have a fever  · Your fever or pain does not go away or gets worse  · The area does not get smaller after 2 days of antibiotics  · Your skin is flaking or peeling off  · You have questions or concerns about your condition or care  Medicines:   · Antibiotics  help treat the bacterial infection  · NSAIDs , such as ibuprofen, help decrease swelling, pain, and fever  NSAIDs can cause stomach bleeding or kidney problems in certain people  If you take blood thinner medicine, always ask if NSAIDs are safe for you  Always read the medicine label and follow directions  Do not give these medicines to children under 10months of age without direction from your child's healthcare provider  · Acetaminophen  decreases pain and fever  It is available without a doctor's order  Ask how much to take and how often to take it  Follow directions  Read the labels of all other medicines you are using to see if they also contain acetaminophen, or ask your doctor or pharmacist  Acetaminophen can cause liver damage if not taken correctly   Do not use more than 4 grams (4,000 milligrams) total of acetaminophen in one day  · Take your medicine as directed  Contact your healthcare provider if you think your medicine is not helping or if you have side effects  Tell him or her if you are allergic to any medicine  Keep a list of the medicines, vitamins, and herbs you take  Include the amounts, and when and why you take them  Bring the list or the pill bottles to follow-up visits  Carry your medicine list with you in case of an emergency  Self-care:   · Elevate the area above the level of your heart  as often as you can  This will help decrease swelling and pain  Prop the area on pillows or blankets to keep it elevated comfortably  · Clean the area daily until the wound scabs over  Gently wash the area with soap and water  Pat dry  Use dressings as directed  · Place cool or warm, wet cloths on the area as directed  Use clean cloths and clean water  Leave it on the area until the cloth is room temperature  Pat the area dry with a clean, dry cloth  The cloths may help decrease pain  Prevent cellulitis:   · Do not scratch bug bites or areas of injury  You increase your risk for cellulitis by scratching these areas  · Do not share personal items, such as towels, clothing, and razors  · Clean exercise equipment  with germ-killing  before and after you use it  · Wash your hands often  Use soap and water  Wash your hands after you use the bathroom, change a child's diapers, or sneeze  Wash your hands before you prepare or eat food  Use lotion to prevent dry, cracked skin  · Wear pressure stockings as directed  You may be told to wear the stockings if you have peripheral edema  The stockings improve blood flow and decrease swelling  · Treat athlete's foot  This can help prevent the spread of a bacterial skin infection  Follow up with your healthcare provider within 3 days, or as directed:   Your healthcare provider will check if your cellulitis is getting better  You may need different medicine  Write down your questions so you remember to ask them during your visits  © 2017 [employer] Information is for End User's use only and may not be sold, redistributed or otherwise used for commercial purposes  All illustrations and images included in CareNotes® are the copyrighted property of A D A M [employer] Inc  or [employer] Whitting[employer]  The above information is an  only  It is not intended as medical advice for individual conditions or treatments  Talk to your doctor, nurse or pharmacist before following any medical regimen to see if it is safe and effective for you

## 2018-09-18 ENCOUNTER — OFFICE VISIT (OUTPATIENT)
Dept: FAMILY MEDICINE CLINIC | Facility: CLINIC | Age: 63
End: 2018-09-18
Payer: COMMERCIAL

## 2018-09-18 VITALS
BODY MASS INDEX: 29.77 KG/M2 | WEIGHT: 195.8 LBS | HEART RATE: 68 BPM | SYSTOLIC BLOOD PRESSURE: 126 MMHG | DIASTOLIC BLOOD PRESSURE: 62 MMHG

## 2018-09-18 DIAGNOSIS — Z23 NEED FOR INFLUENZA VACCINATION: Primary | ICD-10-CM

## 2018-09-18 DIAGNOSIS — Z02.89 ENCOUNTER FOR PHYSICAL EXAMINATION RELATED TO EMPLOYMENT: ICD-10-CM

## 2018-09-18 PROCEDURE — 90682 RIV4 VACC RECOMBINANT DNA IM: CPT

## 2018-09-18 PROCEDURE — 90471 IMMUNIZATION ADMIN: CPT

## 2018-09-18 PROCEDURE — 99396 PREV VISIT EST AGE 40-64: CPT | Performed by: PHYSICIAN ASSISTANT

## 2018-09-18 NOTE — PROGRESS NOTES
Assessment/Plan:    Encounter for physical examination related to employment  Form completed  Pt does not need PPD  Flu shot given  Diagnoses and all orders for this visit:    Need for influenza vaccination  -     influenza vaccine, [id number], quadrivalent, recombinant, PF, 0 5 mL, for patients 18 yr+ (FLUBLOK)    Encounter for physical examination related to employment          Subjective:     CC: Pt  here for routine yearly physical  Pt  has physical form to be completed for employment  Patient ID: Maurilio Lobo is a 58 y o  female  Here for PE for  employee  Has a form for completion but does not need a PPD  Patient states she has blood work upcoming and will see me afterward for review of her chronic conditions  She sees a dentist regularly eye doctor twice a year and is considering cataract surgery  She wears contacts daily  Up-to-date with colonoscopy as will be having this in December and screening mammogram up-to-date  No new complaints at this time will accept a flu shot today  The following portions of the patient's history were reviewed and updated as appropriate: allergies, current medications, past family history, past medical history, past social history, past surgical history and problem list     Review of Systems   Constitutional: Negative  HENT: Negative  Eyes: Negative  Respiratory: Negative  Cardiovascular: Negative  Gastrointestinal: Negative  Endocrine: Negative  Genitourinary: Negative  Musculoskeletal: Negative  Skin: Negative  Allergic/Immunologic: Negative  Neurological: Negative  Hematological: Negative  Psychiatric/Behavioral: Negative  Objective:      Vitals:    09/18/18 1641   BP: 126/62   BP Location: Left arm   Patient Position: Sitting   Pulse: 68   Weight: 88 8 kg (195 lb 12 8 oz)            Physical Exam   Constitutional: She is oriented to person, place, and time   She appears well-developed and well-nourished  No distress  HENT:   Head: Normocephalic and atraumatic  Right Ear: Hearing, tympanic membrane, external ear and ear canal normal    Left Ear: Hearing, tympanic membrane, external ear and ear canal normal    Nose: Nose normal    Mouth/Throat: Uvula is midline, oropharynx is clear and moist and mucous membranes are normal  No oropharyngeal exudate  Eyes: Conjunctivae, EOM and lids are normal  Pupils are equal, round, and reactive to light  No scleral icterus  Neck: Normal range of motion  Carotid bruit is not present  No thyroid mass and no thyromegaly present  Cardiovascular: Regular rhythm, normal heart sounds and normal pulses  Exam reveals no gallop and no friction rub  No murmur heard  Pulmonary/Chest: Effort normal and breath sounds normal  No respiratory distress  She has no wheezes  She has no rhonchi  She has no rales  Abdominal: Soft  Normal appearance and bowel sounds are normal  She exhibits no distension, no abdominal bruit and no mass  There is no hepatosplenomegaly  There is no tenderness  There is no rebound and no guarding  No hernia  Musculoskeletal: Normal range of motion  Lymphadenopathy:     She has no cervical adenopathy  Neurological: She is alert and oriented to person, place, and time  She has normal strength and normal reflexes  She is not disoriented  No sensory deficit  She exhibits normal muscle tone  Coordination and gait normal    Skin: Skin is warm, dry and intact  She is not diaphoretic  Psychiatric: She has a normal mood and affect  Her speech is normal and behavior is normal  Judgment and thought content normal  Cognition and memory are normal    Nursing note and vitals reviewed

## 2018-09-18 NOTE — PATIENT INSTRUCTIONS
Encounter for physical examination related to employment  Form completed  Pt does not need PPD  Flu shot given

## 2018-10-03 ENCOUNTER — OFFICE VISIT (OUTPATIENT)
Dept: FAMILY MEDICINE CLINIC | Facility: CLINIC | Age: 63
End: 2018-10-03
Payer: COMMERCIAL

## 2018-10-03 VITALS
BODY MASS INDEX: 29.86 KG/M2 | TEMPERATURE: 98 F | SYSTOLIC BLOOD PRESSURE: 120 MMHG | HEART RATE: 64 BPM | WEIGHT: 197 LBS | HEIGHT: 68 IN | DIASTOLIC BLOOD PRESSURE: 70 MMHG

## 2018-10-03 DIAGNOSIS — Z12.39 BREAST CANCER SCREENING: Primary | ICD-10-CM

## 2018-10-03 DIAGNOSIS — E11.9 TYPE 2 DIABETES MELLITUS WITHOUT COMPLICATION, WITHOUT LONG-TERM CURRENT USE OF INSULIN (HCC): ICD-10-CM

## 2018-10-03 DIAGNOSIS — H10.33 ACUTE BACTERIAL CONJUNCTIVITIS OF BOTH EYES: ICD-10-CM

## 2018-10-03 DIAGNOSIS — L23.9 ALLERGIC DERMATITIS: ICD-10-CM

## 2018-10-03 PROCEDURE — 1036F TOBACCO NON-USER: CPT | Performed by: PHYSICIAN ASSISTANT

## 2018-10-03 PROCEDURE — 99214 OFFICE O/P EST MOD 30 MIN: CPT | Performed by: PHYSICIAN ASSISTANT

## 2018-10-03 RX ORDER — SULFACETAMIDE SODIUM 100 MG/ML
2 SOLUTION/ DROPS OPHTHALMIC 3 TIMES DAILY
Qty: 10 ML | Refills: 0 | Status: SHIPPED | OUTPATIENT
Start: 2018-10-03 | End: 2018-10-10

## 2018-10-03 NOTE — PROGRESS NOTES
Assessment/Plan:    Acute bacterial conjunctivitis of both eyes  Antibiotic eyedrops bilaterally for 1 week and contacts not to be worn for 1 week  Good hygiene  Allergic dermatitis  Continue daily antihistamine  Given antibiotic/steroid to topically apply to upper and lower lids bilaterally as needed  Initially twice daily then to wean to once daily and then as needed  Diagnoses and all orders for this visit:    Breast cancer screening  -     Mammo screening bilateral w 3d & cad; Future    Type 2 diabetes mellitus without complication, without long-term current use of insulin (HCC)  -     metFORMIN (GLUCOPHAGE) 500 mg tablet; Take 1 tablet (500 mg total) by mouth daily    Acute bacterial conjunctivitis of both eyes  -     sulfacetamide (BLEPH-10) 10 % ophthalmic solution; Administer 2 drops to both eyes 3 (three) times a day for 7 days    Allergic dermatitis  -     sfpj-qsfsmiz-xax-hydrocort (CORTISPORIN) 1 % ointment; Administer to both eyes every 4 (four) hours          Subjective:   Left eye red and itchy for the past few days  No discharge  -  [facility]     Patient ID: Dalila Cortez is a 58 y o  female  Patient here today because early in the week she started having really itchy upper lower eyelids mostly in her left side  She normally does wear contacts  As the week has gone by her eyeball itself is becoming itchy she is having some redness some irritation and some discharge  She started wearing her glasses as of yesterday  She normally does take Allegra out 24 hour daily for seasonal allergies and has had eczema in the past   She does watch her grand children and works for  and is wondering if she actually has pinkeye or other and is concerned about treatment and being able to work          The following portions of the patient's history were reviewed and updated as appropriate: allergies, current medications, past family history, past medical history, past social history, past surgical history and problem list     Review of Systems   Constitutional: Negative  HENT: Negative  Eyes: Positive for discharge, redness and itching  Respiratory: Negative  Cardiovascular: Negative  Gastrointestinal: Negative  Endocrine: Negative  Genitourinary: Negative  Musculoskeletal: Negative  Skin: Negative  Allergic/Immunologic: Negative  Neurological: Negative  Hematological: Negative  Psychiatric/Behavioral: Negative  Objective:      /70 (BP Location: Left arm, Patient Position: Sitting, Cuff Size: Large)   Pulse 64   Temp 98 °F (36 7 °C) (Oral)   Ht 5' 8" (1 727 m)   Wt 89 4 kg (197 lb)   BMI 29 95 kg/m²          Physical Exam   Constitutional: She is oriented to person, place, and time  She appears well-developed and well-nourished  No distress  HENT:   Head: Normocephalic and atraumatic  Eyes: Conjunctivae are normal  Right eye exhibits discharge  Left eye exhibits discharge  Right conjunctiva is not injected  Left conjunctiva is not injected  Neck: Neck supple  Carotid bruit is not present  Cardiovascular: Normal rate, regular rhythm and normal heart sounds  Exam reveals no gallop and no friction rub  No murmur heard  Pulmonary/Chest: Effort normal and breath sounds normal  No respiratory distress  She has no wheezes  She has no rales  Neurological: She is alert and oriented to person, place, and time  Skin: Skin is warm and dry  She is not diaphoretic  Left upper and lower eyelid with erythema and dryness and mild edema  Psychiatric: She has a normal mood and affect  Judgment normal    Nursing note and vitals reviewed

## 2018-10-03 NOTE — ASSESSMENT & PLAN NOTE
Continue daily antihistamine  Given antibiotic/steroid to topically apply to upper and lower lids bilaterally as needed  Initially twice daily then to wean to once daily and then as needed

## 2018-10-03 NOTE — PATIENT INSTRUCTIONS
Problem List Items Addressed This Visit        Endocrine    Diabetes ([facility] [address] )    Relevant Medications    metFORMIN (GLUCOPHAGE) 500 mg tablet       Musculoskeletal and Integument    Allergic dermatitis     Continue daily antihistamine  Given antibiotic/steroid to topically apply to upper and lower lids bilaterally as needed  Initially twice daily then to wean to once daily and then as needed  Relevant Medications    fwyw-hfisjmy-wvh-hydrocort (CORTISPORIN) 1 % ointment       Other    Acute bacterial conjunctivitis of both eyes     Antibiotic eyedrops bilaterally for 1 week and contacts not to be worn for 1 week  Good hygiene           Relevant Medications    sulfacetamide (BLEPH-10) 10 % ophthalmic solution    amdi-qooaaxp-apu-hydrocort (CORTISPORIN) 1 % ointment      Other Visit Diagnoses     Breast cancer screening    -  Primary    Relevant Orders    Mammo screening bilateral w 3d & cad

## 2018-11-26 LAB
ALBUMIN SERPL-MCNC: 4.4 G/DL (ref 3.6–5.1)
ALBUMIN/CREAT UR: 4 MCG/MG CREAT
ALBUMIN/GLOB SERPL: 1.8 (CALC) (ref 1–2.5)
ALP SERPL-CCNC: 68 U/L (ref 33–130)
ALT SERPL-CCNC: 19 U/L (ref 6–29)
AST SERPL-CCNC: 18 U/L (ref 10–35)
BASOPHILS # BLD AUTO: 38 CELLS/UL (ref 0–200)
BASOPHILS NFR BLD AUTO: 0.8 %
BILIRUB SERPL-MCNC: 1.1 MG/DL (ref 0.2–1.2)
BUN SERPL-MCNC: 21 MG/DL (ref 7–25)
BUN/CREAT SERPL: ABNORMAL (CALC) (ref 6–22)
CALCIUM SERPL-MCNC: 9.6 MG/DL (ref 8.6–10.4)
CHLORIDE SERPL-SCNC: 105 MMOL/L (ref 98–110)
CHOLEST SERPL-MCNC: 169 MG/DL
CHOLEST/HDLC SERPL: 4.8 (CALC)
CO2 SERPL-SCNC: 26 MMOL/L (ref 20–32)
CREAT SERPL-MCNC: 0.79 MG/DL (ref 0.5–0.99)
CREAT UR-MCNC: 140 MG/DL (ref 20–275)
EOSINOPHIL # BLD AUTO: 130 CELLS/UL (ref 15–500)
EOSINOPHIL NFR BLD AUTO: 2.7 %
ERYTHROCYTE [DISTWIDTH] IN BLOOD BY AUTOMATED COUNT: 13.1 % (ref 11–15)
EST. AVERAGE GLUCOSE BLD GHB EST-MCNC: 126 (CALC)
EST. AVERAGE GLUCOSE BLD GHB EST-SCNC: 7 (CALC)
GLOBULIN SER CALC-MCNC: 2.4 G/DL (CALC) (ref 1.9–3.7)
GLUCOSE SERPL-MCNC: 111 MG/DL (ref 65–99)
HBA1C MFR BLD: 6 % OF TOTAL HGB
HCT VFR BLD AUTO: 41.7 % (ref 35–45)
HDLC SERPL-MCNC: 35 MG/DL
HGB BLD-MCNC: 14.2 G/DL (ref 11.7–15.5)
LDLC SERPL CALC-MCNC: 95 MG/DL (CALC)
LYMPHOCYTES # BLD AUTO: 1243 CELLS/UL (ref 850–3900)
LYMPHOCYTES NFR BLD AUTO: 25.9 %
MCH RBC QN AUTO: 29.4 PG (ref 27–33)
MCHC RBC AUTO-ENTMCNC: 34.1 G/DL (ref 32–36)
MCV RBC AUTO: 86.3 FL (ref 80–100)
MICROALBUMIN UR-MCNC: 0.5 MG/DL
MONOCYTES # BLD AUTO: 547 CELLS/UL (ref 200–950)
MONOCYTES NFR BLD AUTO: 11.4 %
NEUTROPHILS # BLD AUTO: 2842 CELLS/UL (ref 1500–7800)
NEUTROPHILS NFR BLD AUTO: 59.2 %
NONHDLC SERPL-MCNC: 134 MG/DL (CALC)
PLATELET # BLD AUTO: 233 THOUSAND/UL (ref 140–400)
PMV BLD REES-ECKER: 10.9 FL (ref 7.5–12.5)
POTASSIUM SERPL-SCNC: 4 MMOL/L (ref 3.5–5.3)
PROT SERPL-MCNC: 6.8 G/DL (ref 6.1–8.1)
RBC # BLD AUTO: 4.83 MILLION/UL (ref 3.8–5.1)
SL AMB EGFR AFRICAN AMERICAN: 93 ML/MIN/1.73M2
SL AMB EGFR NON AFRICAN AMERICAN: 80 ML/MIN/1.73M2
SODIUM SERPL-SCNC: 138 MMOL/L (ref 135–146)
TRIGL SERPL-MCNC: 272 MG/DL
TSH SERPL-ACNC: 2.33 MIU/L (ref 0.4–4.5)
WBC # BLD AUTO: 4.8 THOUSAND/UL (ref 3.8–10.8)

## 2018-11-28 ENCOUNTER — TRANSCRIBE ORDERS (OUTPATIENT)
Dept: FAMILY MEDICINE CLINIC | Facility: CLINIC | Age: 63
End: 2018-11-28

## 2018-11-28 DIAGNOSIS — Z12.11 ENCOUNTER FOR SCREENING FOR MALIGNANT NEOPLASM OF COLON: Primary | ICD-10-CM

## 2018-12-04 ENCOUNTER — OFFICE VISIT (OUTPATIENT)
Dept: FAMILY MEDICINE CLINIC | Facility: CLINIC | Age: 63
End: 2018-12-04
Payer: COMMERCIAL

## 2018-12-04 VITALS
SYSTOLIC BLOOD PRESSURE: 140 MMHG | TEMPERATURE: 98 F | HEIGHT: 68 IN | BODY MASS INDEX: 30.37 KG/M2 | WEIGHT: 200.4 LBS | DIASTOLIC BLOOD PRESSURE: 70 MMHG | HEART RATE: 72 BPM

## 2018-12-04 DIAGNOSIS — E11.9 TYPE 2 DIABETES MELLITUS WITHOUT COMPLICATION, WITHOUT LONG-TERM CURRENT USE OF INSULIN (HCC): Primary | ICD-10-CM

## 2018-12-04 DIAGNOSIS — R05.9 COUGH: ICD-10-CM

## 2018-12-04 DIAGNOSIS — E78.2 MIXED HYPERLIPIDEMIA: ICD-10-CM

## 2018-12-04 DIAGNOSIS — I10 ESSENTIAL HYPERTENSION: ICD-10-CM

## 2018-12-04 DIAGNOSIS — E66.9 OBESITY (BMI 30.0-34.9): ICD-10-CM

## 2018-12-04 PROBLEM — H10.33 ACUTE BACTERIAL CONJUNCTIVITIS OF BOTH EYES: Status: RESOLVED | Noted: 2018-10-03 | Resolved: 2018-12-04

## 2018-12-04 PROCEDURE — 3008F BODY MASS INDEX DOCD: CPT | Performed by: PHYSICIAN ASSISTANT

## 2018-12-04 PROCEDURE — 99214 OFFICE O/P EST MOD 30 MIN: CPT | Performed by: PHYSICIAN ASSISTANT

## 2018-12-04 RX ORDER — PROMETHAZINE HYDROCHLORIDE AND CODEINE PHOSPHATE 6.25; 1 MG/5ML; MG/5ML
5 SYRUP ORAL EVERY 4 HOURS PRN
Qty: 118 ML | Refills: 0 | Status: SHIPPED | OUTPATIENT
Start: 2018-12-04 | End: 2018-12-14

## 2018-12-04 NOTE — PROGRESS NOTES
Assessment/Plan:    Type 2 diabetes mellitus without complication, without long-term current use of insulin (HCC)  Very stable with a hemoglobin A1c of 6 and fasting glucose of 111  Continue current medication recheck 6 months  Microalbumin within normal limits  Essential hypertension  Stable continue current medication  Mixed hyperlipidemia  LDL stable at 95 at goal for being diabetic however HDL is too low at 35 so patient should increase exercise  Triglycerides also elevated at 272  Patient needs to decrease carbohydrate intake  Continue Pravachol and recheck in 6 months  Diagnoses and all orders for this visit:    Type 2 diabetes mellitus without complication, without long-term current use of insulin ([facility])  -     Hemoglobin A1C; Future  -     Comprehensive metabolic panel; Future    Essential hypertension    Mixed hyperlipidemia  -     Lipid Panel with Direct LDL reflex; Future    Obesity (BMI 30 0-34  9)    Cough  -     promethazine-codeine (PHENERGAN WITH CODEINE) 6 25-10 mg/5 mL syrup; Take 5 mL by mouth every 4 (four) hours as needed for cough for up to 10 days          Subjective:   CC: c/o cough and sinus issues and needing refill of codeine cough medication  Review blood work  [facility]     Patient ID: Bill Loco is a 58 y o  female  Bill Loco is here for chronic conditions f/u including the diagnosis of Type 2 diabetes mellitus without complication, without long-term current use of insulin ([facility])  (primary encounter diagnosis)  Essential hypertension  Mixed hyperlipidemia   Pt  states they are taking all medications as directed without complaints or side effects   Pt  had labs done prior to today's visit which included Recent Results (from the past 672 hour(s))  -Lipid Panel with Direct LDL reflex  Collection Time: 11/24/18  8:40 AM       Result                      Value             Ref Range           Total Cholesterol           169               <200 mg/dL          HDL 35 (L)            >50 mg/dL           Triglycerides               272 (H)           <150 mg/dL          LDL Direct                  95                mg/dL (calc)        Chol HDLC Ratio             4 8               <5 0 (calc)         Non-HDL Cholesterol         134 (H)           <130 mg/dL (*  -Comprehensive metabolic panel  Collection Time: 11/24/18  8:40 AM       Result                      Value             Ref Range           Glucose, Random             111 (H)           65 - 99 mg/dL       BUN                         21                7 - 25 mg/dL        Creatinine                  0 79              0 50 - 0 99 *       eGFR Non  Ameri*     80                > OR = 60 mL*       SL AMB EGFR  AM*     93                > OR = 60 mL*       SL AMB BUN/CREATININE *                       6 - 22 (calc)   NOT APPLICABLE       Sodium                      138               135 - 146 mm*       Potassium                   4 0               3 5 - 5 3 mm*       Chloride                    105               98 - 110 mmo*       CO2                         26                20 - 32 mmol*       SL AMB CALCIUM              9 6               8 6 - 10 4 m*       SL AMB PROTEIN, TOTAL       6 8               6 1 - 8 1 g/*       Albumin                     4 4               3 6 - 5 1 g/*       Globulin                    2 4               1 9 - 3 7 g/*       Albumin/Globulin Ratio      1 8               1 0 - 2 5 (c*       TOTAL BILIRUBIN             1 1               0 2 - 1 2 mg*       Alkaline Phosphatase        68                33 - 130 U/L        SL AMB AST                  18                10 - 35 U/L         SL AMB ALT                  19                6 - 29 U/L     -Microalbumin, Random Urine (W/Creatinine)  Collection Time: 11/24/18  8:40 AM       Result                      Value             Ref Range           Creatinine, Urine           140               20 - 275 mg/* Microalbum  ,U,Random        0 5               See Note: mg*       Microalb/Creat Ratio        4                 <30 mcg/mg c*  -CBC and differential  Collection Time: 11/24/18  8:40 AM       Result                      Value             Ref Range           White Blood Cell Count      4 8               3 8 - 10 8 T*       Red Blood Cell Count        4 83              3 80 - 5 10 *       Hemoglobin                  14 2              11 7 - 15 5 *       HCT                         41 7              35 0 - 45 0 %       MCV                         86 3              80 0 - 100 0*       MCH                         29 4              27 0 - 33 0 *       MCHC                        34 1              32 0 - 36 0 *       RDW                         13 1              11 0 - 15 0 %       Platelet Count              233               140 - 400 Th*       SL AMB MPV                  10 9              7 5 - 12 5 fL       Neutrophils (Absolute)      2,842             1,500 - 7,80*       Lymphocytes (Absolute)      1,243             850 - 3,900 *       Monocytes (Absolute)        547               200 - 950 ce*       Eosinophils (Absolute)      130               15 - 500 leif*       Basophils ABS               38                0 - 200 cell*       Neutrophils                 59 2              %                   Lymphocytes                 25 9              %                   Monocytes                   11 4              %                   Eosinophils                 2 7               %                   Basophils PCT               0 8               %              -TSH, 3rd generation  Collection Time: 11/24/18  8:40 AM       Result                      Value             Ref Range           TSH                         2 33              0 40 - 4 50 *  -Hemoglobin A1C With EAG  Collection Time: 11/24/18  8:40 AM       Result                      Value             Ref Range           Hemoglobin A1C              6 0 (H)           <5 7 % of to*       Estimated Average Gluc*     126               (calc)              Estimated Average Gluc*     7 0               (calc)             Patient was scheduled for sick appointment for sinus infection however she has an appointment tomorrow which she would like to cancel to go over her routine chronic conditions and blood work in this short sick appointment today  Patient was told that we were able to accommodate this request however in the future she needs to make sure she is longer appointment in order to do this again  Symptoms started Sunday night with head congestion and sinus pressure and coughs when she lye's down   not sick  She uses cough med with codiene to help her sleep but needs more  Nasal mucus is clear  The following portions of the patient's history were reviewed and updated as appropriate: allergies, current medications, past family history, past medical history, past social history, past surgical history and problem list     Review of Systems   Constitutional: Negative  HENT: Positive for congestion, postnasal drip, rhinorrhea, sinus pain and sinus pressure  Eyes: Negative  Respiratory: Positive for cough  Cardiovascular: Negative  Gastrointestinal: Negative  Endocrine: Negative  Genitourinary: Negative  Musculoskeletal: Negative  Skin: Negative  Allergic/Immunologic: Negative  Neurological: Negative  Hematological: Negative  Psychiatric/Behavioral: Negative  Objective:      Vitals:    12/04/18 1809   BP: 140/70   BP Location: Left arm   Patient Position: Sitting   Pulse: 72   Temp: 98 °F (36 7 °C)   TempSrc: Tympanic   Weight: 90 9 kg (200 lb 6 4 oz)   Height: 5' 8" (1 727 m)            Physical Exam   Constitutional: She is oriented to person, place, and time  She appears well-developed and well-nourished  No distress  HENT:   Head: Normocephalic and atraumatic     Right Ear: Hearing, tympanic membrane and external ear normal    Left Ear: Hearing, tympanic membrane, external ear and ear canal normal    Nose: Nose normal    Mouth/Throat: Posterior oropharyngeal edema and posterior oropharyngeal erythema present  Eyes: Conjunctivae are normal  Right eye exhibits no discharge  Left eye exhibits no discharge  Neck: Neck supple  Carotid bruit is not present  Cardiovascular: Normal rate, regular rhythm and normal heart sounds  Exam reveals no gallop and no friction rub  No murmur heard  Pulmonary/Chest: Effort normal and breath sounds normal  No respiratory distress  She has no wheezes  She has no rales  Neurological: She is alert and oriented to person, place, and time  Skin: Skin is warm and dry  She is not diaphoretic  Psychiatric: She has a normal mood and affect  Judgment normal    Nursing note and vitals reviewed  BMI Counseling: Body mass index is 30 47 kg/m²  Discussed the patient's BMI with her  The BMI is above average  BMI counseling and education was provided to the patient  Nutrition recommendations include reducing portion sizes, decreasing overall calorie intake, decreasing soda and/or juice intake and moderation in carbohydrate intake  Exercise recommendations include exercising 3-5 times per week

## 2018-12-04 NOTE — ASSESSMENT & PLAN NOTE
Very stable with a hemoglobin A1c of 6 and fasting glucose of 111  Continue current medication recheck 6 months  Microalbumin within normal limits

## 2018-12-04 NOTE — ASSESSMENT & PLAN NOTE
LDL stable at 95 at goal for being diabetic however HDL is too low at 35 so patient should increase exercise  Triglycerides also elevated at 272  Patient needs to decrease carbohydrate intake  Continue Pravachol and recheck in 6 months

## 2018-12-04 NOTE — PATIENT INSTRUCTIONS
Problem List Items Addressed This Visit        Endocrine    Type 2 diabetes mellitus without complication, without long-term current use of insulin ([facility] [address] ) - Primary     Very stable with a hemoglobin A1c of 6 and fasting glucose of 111  Continue current medication recheck 6 months  Microalbumin within normal limits  Relevant Orders    Hemoglobin A1C    Comprehensive metabolic panel       Cardiovascular and Mediastinum    Essential hypertension     Stable continue current medication  Other    Mixed hyperlipidemia     LDL stable at 95 at goal for being diabetic however HDL is too low at 35 so patient should increase exercise  Triglycerides also elevated at 272  Patient needs to decrease carbohydrate intake  Continue Pravachol and recheck in 6 months  Relevant Orders    Lipid Panel with Direct LDL reflex    Cough    Relevant Medications    promethazine-codeine (PHENERGAN WITH CODEINE) 6 25-10 mg/5 mL syrup      Other Visit Diagnoses     Obesity (BMI 30 0-34  9)                Weight Management   AMBULATORY CARE:   Why it is important to manage your weight:  Being overweight increases your risk of health conditions such as heart disease, high blood pressure, type 2 diabetes, and certain types of cancer  It can also increase your risk for osteoarthritis, sleep apnea, and other respiratory problems  Aim for a slow, steady weight loss  Even a small amount of weight loss can lower your risk of health problems  How to lose weight safely:  A safe and healthy way to lose weight is to eat fewer calories and get regular exercise  You can lose up about 1 pound a week by decreasing the number of calories you eat by 500 calories each day  You can decrease calories by eating smaller portion sizes or by cutting out high-calorie foods  Read labels to find out how many calories are in the foods you eat  You can also burn calories with exercise such as walking, swimming, or biking   You will be more likely to keep weight off if you make these changes part of your lifestyle  Healthy meal plan for weight management:  A healthy meal plan includes a variety of foods, contains fewer calories, and helps you stay healthy  A healthy meal plan includes the following:  · Eat whole-grain foods more often  A healthy meal plan should contain fiber  Fiber is the part of grains, fruits, and vegetables that is not broken down by your body  Whole-grain foods are healthy and provide extra fiber in your diet  Some examples of whole-grain foods are whole-wheat breads and pastas, oatmeal, brown rice, and bulgur  · Eat a variety of vegetables every day  Include dark, leafy greens such as spinach, kale, charlie greens, and mustard greens  Eat yellow and orange vegetables such as carrots, sweet potatoes, and winter squash  · Eat a variety of fruits every day  Choose fresh or canned fruit (canned in its own juice or light syrup) instead of juice  Fruit juice has very little or no fiber  · Eat low-fat dairy foods  Drink fat-free (skim) milk or 1% milk  Eat fat-free yogurt and low-fat cottage cheese  Try low-fat cheeses such as mozzarella and other reduced-fat cheeses  · Choose meat and other protein foods that are low in fat  Choose beans or other legumes such as split peas or lentils  Choose fish, skinless poultry (chicken or turkey), or lean cuts of red meat (beef or pork)  Before you cook meat or poultry, cut off any visible fat  · Use less fat and oil  Try baking foods instead of frying them  Add less fat, such as margarine, sour cream, regular salad dressing and mayonnaise to foods  Eat fewer high-fat foods  Some examples of high-fat foods include french fries, doughnuts, ice cream, and cakes  · Eat fewer sweets  Limit foods and drinks that are high in sugar  This includes candy, cookies, regular soda, and sweetened drinks  Ways to decrease calories:   · Eat smaller portions       ¨ Use a small plate with smaller servings  ¨ Do not eat second helpings  ¨ When you eat at a restaurant, ask for a box and place half of your meal in the box before you eat  ¨ Share an entrée with someone else  · Replace high-calorie snacks with healthy, low-calorie snacks  ¨ Choose fresh fruit, vegetables, fat-free rice cakes, or air-popped popcorn instead of potato chips, nuts, or chocolate  ¨ Choose water or calorie-free drinks instead of soda or sweetened drinks  · Eat regular meals  Skipping meals can lead to overeating later in the day  Eat a healthy snack in place of a meal if you do not have time to eat a regular meal      · Do not shop for groceries when you are hungry  You may be more likely to make unhealthy food choices  Take a grocery list of healthy foods and shop after you have eaten  Exercise:  Exercise at least 30 minutes per day on most days of the week  Some examples of exercise include walking, biking, dancing, and swimming  You can also fit in more physical activity by taking the stairs instead of the elevator or parking farther away from stores  Ask your healthcare provider about the best exercise plan for you  Other things to consider as you try to lose weight:   · Be aware of situations that may give you the urge to overeat, such as eating while watching television  Find ways to avoid these situations  For example, read a book, go for a walk, or do crafts  · Meet with a weight loss support group or friends who are also trying to lose weight  This may help you stay motivated to continue working on your weight loss goals  © 2017 [address] [employer] [employer] Information is for End User's use only and may not be sold, redistributed or otherwise used for commercial purposes  All illustrations and images included in CareNotes® are the copyrighted property of [employer]  or Alexys Whittington  The above information is an  only   It is not intended as medical advice for individual conditions or treatments  Talk to your doctor, nurse or pharmacist before following any medical regimen to see if it is safe and effective for you  Calorie Counting Diet   WHAT YOU NEED TO KNOW:   What is a calorie counting diet? It is a meal plan based on counting calories each day to reach a healthy body weight  You will need to eat fewer calories if you are trying to lose weight  Weight loss may decrease your risk for certain health problems or improve your health if you have health problems  Some of these health problems include heart disease, high blood pressure, and diabetes  What foods should I avoid? Your dietitian will tell you if you need to avoid certain foods based on your body weight and health condition  You may need to avoid high-fat foods if you are at risk for or have heart disease  You may need to eat fewer foods from the breads and starches food group if you have diabetes  How many calories are in foods? The following is a list of foods and drinks with the approximate number of calories in each  Check the food label to find the exact number of calories  A dietitian can tell you how many calories you should have from each food group each day    · Carbohydrate:      ¨ ½ of a 3-inch bagel, 1 slice of bread, or ½ of a hamburger bun or hot dog bun (80)    ¨ 1 (8-inch) flour tortilla or ½ cup of cooked rice (100)    ¨ 1 (6-inch) corn tortilla (80)    ¨ 1 (6-inch) pancake or 1 cup of bran flakes cereal (110)    ¨ ½ cup of cooked cereal (80)    ¨ ½ cup of cooked pasta (85)    ¨ 1 ounce of pretzels (100)    ¨ 3 cups of air-popped popcorn without butter or oil (80)    · Dairy:      ¨ 1 cup of skim or 1% milk (90)    ¨ 1 cup of 2% milk (120)    ¨ 1 cup of whole milk (160)    ¨ 1 cup of 2% chocolate milk (220)    ¨ 1 ounce of low-fat cheese with 3 grams of fat per ounce (70)    ¨ 1 ounce of cheddar cheese (114)    ¨ ½ cup of 1% fat cottage cheese (80)    ¨ 1 cup of plain or sugar-free, fat-free yogurt (90)    · Protein foods:      ¨ 3 ounces of fish (not breaded or fried) (95)    ¨ 3 ounces of breaded, fried fish (195)    ¨ ¾ cup of tuna canned in water (105)    ¨ 3 ounces of chicken breast without skin (105)    ¨ 1 fried chicken breast with skin (350)    ¨ ¼ cup of fat free egg substitute (40)    ¨ 1 large egg (75)    ¨ 3 ounces of lean beef or pork (165)    ¨ 3 ounces of fried pork chop or ham (185)    ¨ ½ cup of cooked dried beans, such as kidney, chicas, lentils, or navy (115)    ¨ 3 ounces of bologna or lunch meat (225)    ¨ 2 links of breakfast sausage (140)    · Vegetables:      ¨ ½ cup of sliced mushrooms (10)    ¨ 1 cup of salad greens, such as lettuce, spinach, or amanda (15)    ¨ ½ cup of steamed asparagus (20)    ¨ ½ cup of cooked summer squash, zucchini squash, or green or wax beans (25)    ¨ 1 cup of broccoli or cauliflower florets, or 1 medium tomato (25)    ¨ 1 large raw carrot or ½ cup of cooked carrots (40)    ¨ ? of a medium cucumber or 1 stalk of celery (5)    ¨ 1 small baked potato (160)    ¨ 1 cup of breaded, fried vegetables (230)    · Fruit:      ¨ 1 (6-inch) banana (55)     ¨ ½ of a 4-inch grapefruit (55)    ¨ 15 grapes (60)    ¨ 1 medium orange or apple (70)    ¨ 1 large peach (65)    ¨ 1 cup of fresh pineapple chunks (75)    ¨ 1 cup of melon cubes (50)    ¨ 1¼ cups of whole strawberries (45)    ¨ ½ cup of fruit canned in juice (55)    ¨ ½ cup of fruit canned in heavy syrup (110)    ¨ ?  cup of raisins (130)    ¨ ½ cup of unsweetened fruit juice (60)    ¨ ½ cup of grape, cranberry, or prune juice (90)    · Fat:      ¨ 10 peanuts or 2 teaspoons of peanut butter (55)    ¨ 2 tablespoons of avocado or 1 tablespoon of regular salad dressing (45)    ¨ 2 slices of gurrola (90)    ¨ 1 teaspoon of oil, such as safflower, canola, corn, or olive oil (45)    ¨ 2 teaspoons of low-fat margarine, or 1 tablespoon of low-fat mayonnaise (50)    ¨ 1 teaspoon of regular margarine (40)    ¨ 1 tablespoon of regular mayonnaise (135)    ¨ 1 tablespoon of cream cheese or 2 tablespoons of low-fat cream cheese (45)    ¨ 2 tablespoons of vegetable shortening (215)    · Dessert and sweets:      ¨ 8 animal crackers or 5 vanilla wafers (80)    ¨ 1 frozen fruit juice bar (80)    ¨ ½ cup of ice milk or low-fat frozen yogurt (90)    ¨ ½ cup of sherbet or sorbet (125)    ¨ ½ cup of sugar-free pudding or custard (60)    ¨ ½ cup of ice cream (140)    ¨ ½ cup of pudding or custard (175)    ¨ 1 (2-inch) square chocolate brownie (185)    · Combination foods:      ¨ Bean burrito made with an 8-inch tortilla, without cheese (275)    ¨ Chicken breast sandwich with lettuce and tomato (325)    ¨ 1 cup of chicken noodle soup (60)    ¨ 1 beef taco (175)    ¨ Regular hamburger with lettuce and tomato (310)    ¨ Regular cheeseburger with lettuce and tomato (410)     ¨ ¼ of a 12-inch cheese pizza (280)    ¨ Fried fish sandwich with lettuce and tomato (425)    ¨ Hot dog and bun (275)    ¨ 1½ cups of macaroni and cheese (310)    ¨ Taco salad with a fried tortilla shell (870)    · Low-calorie foods:      ¨ 1 tablespoon of ketchup or 1 tablespoon of fat free sour cream (15)    ¨ 1 teaspoon of mustard (5)    ¨ ¼ cup of salsa (20)    ¨ 1 large dill pickle (15)    ¨ 1 tablespoon of fat free salad dressing (10)    ¨ 2 teaspoons of low-sugar, light jam or jelly, or 1 tablespoon of sugar-free syrup (15)    ¨ 1 sugar-free popsicle (15)    ¨ 1 cup of club soda, seltzer water, or diet soda (0)  CARE AGREEMENT:   You have the right to help plan your care  Discuss treatment options with your caregivers to decide what care you want to receive  You always have the right to refuse treatment  The above information is an  only  It is not intended as medical advice for individual conditions or treatments  Talk to your doctor, nurse or pharmacist before following any medical regimen to see if it is safe and effective for you    © 2017 [facility] Information is for End User's use only and may not be sold, redistributed or otherwise used for commercial purposes  All illustrations and images included in CareNotes® are the copyrighted property of [employer]  or Alexys Whittington

## 2018-12-12 ENCOUNTER — DOCTOR'S OFFICE (OUTPATIENT)
Dept: URBAN - METROPOLITAN AREA CLINIC 136 | Facility: CLINIC | Age: 63
Setting detail: OPHTHALMOLOGY
End: 2018-12-12
Payer: COMMERCIAL

## 2018-12-12 ENCOUNTER — RX ONLY (RX ONLY)
Age: 63
End: 2018-12-12

## 2018-12-12 DIAGNOSIS — H43.812: ICD-10-CM

## 2018-12-12 DIAGNOSIS — H25.043: ICD-10-CM

## 2018-12-12 DIAGNOSIS — H35.3132: ICD-10-CM

## 2018-12-12 PROCEDURE — 92014 COMPRE OPH EXAM EST PT 1/>: CPT | Performed by: OPHTHALMOLOGY

## 2018-12-12 ASSESSMENT — REFRACTION_CURRENTRX
OD_CYLINDER: -2.00
OD_ADD: +2.50
OD_OVR_VA: 20/
OS_CYLINDER: -2.00
OD_OVR_VA: 20/
OD_SPHERE: -1.25
OS_ADD: +2.50
OS_OVR_VA: 20/
OS_SPHERE: -1.25
OS_VPRISM_DIRECTION: SV
OS_VPRISM_DIRECTION: BF
OD_VPRISM_DIRECTION: BF
OS_OVR_VA: 20/
OD_SPHERE: +2.50
OS_OVR_VA: 20/
OS_SPHERE: +2.50
OS_AXIS: 166
OD_OVR_VA: 20/
OD_VPRISM_DIRECTION: SV
OD_AXIS: 012

## 2018-12-12 ASSESSMENT — AXIALLENGTH_DERIVED
OS_AL: 24.4067
OS_AL: 23.9464
OD_AL: 23.9378
OD_AL: 24.4499

## 2018-12-12 ASSESSMENT — REFRACTION_AUTOREFRACTION
OD_SPHERE: -0.25
OS_SPHERE: -0.25
OD_CYLINDER: -1.25
OS_CYLINDER: -2.00
OS_AXIS: 165
OD_AXIS: 12

## 2018-12-12 ASSESSMENT — REFRACTION_MANIFEST
OS_CYLINDER: -1.75
OD_VA3: 20/
OD_VA1: 20/
OS_VA3: 20/
OS_SPHERE: -1.50
OS_SPHERE: +2.50
OD_VA1: 20/20-2
OD_VA2: 20/
OS_VA2: 20/20
OD_CYLINDER: SPH
OD_AXIS: 180
OS_VA3: 20/
OS_VA1: 20/
OD_VA3: 20/
OS_ADD: +2.50
OD_ADD: +2.50
OD_SPHERE: -1.50
OD_CYLINDER: -1.25
OS_AXIS: 175
OD_VA2: 20/20
OU_VA: 20/
OD_SPHERE: +2.50
OS_VA2: 20/
OU_VA: 20/20
OS_CYLINDER: SPH
OS_VA1: 20/20-2

## 2018-12-12 ASSESSMENT — KERATOMETRY
OS_AXISANGLE_DEGREES: 139
OD_K1POWER_DIOPTERS: 42.75
OS_K2POWER_DIOPTERS: 44.75
OD_K2POWER_DIOPTERS: 44.25
OD_AXISANGLE_DEGREES: 59
OS_K1POWER_DIOPTERS: 43.00

## 2018-12-12 ASSESSMENT — LID POSITION - DERMATOCHALASIS
OD_DERMATOCHALASIS: RUL T
OS_DERMATOCHALASIS: LUL T

## 2018-12-12 ASSESSMENT — SPHEQUIV_DERIVED
OS_SPHEQUIV: -2.375
OS_SPHEQUIV: -1.25
OD_SPHEQUIV: -0.875
OD_SPHEQUIV: -2.125

## 2018-12-12 ASSESSMENT — VISUAL ACUITY
OS_BCVA: 20/60+2
OD_BCVA: 20/25-1

## 2018-12-12 ASSESSMENT — CONFRONTATIONAL VISUAL FIELD TEST (CVF)
OD_FINDINGS: FULL
OS_FINDINGS: FULL

## 2018-12-30 DIAGNOSIS — I10 HYPERTENSION, UNSPECIFIED TYPE: ICD-10-CM

## 2018-12-31 PROCEDURE — 4010F ACE/ARB THERAPY RXD/TAKEN: CPT | Performed by: PHYSICIAN ASSISTANT

## 2018-12-31 RX ORDER — LISINOPRIL 10 MG/1
TABLET ORAL
Qty: 90 TABLET | Refills: 1 | Status: SHIPPED | OUTPATIENT
Start: 2018-12-31 | End: 2019-08-28 | Stop reason: ALTCHOICE

## 2019-01-08 ENCOUNTER — TRANSCRIBE ORDERS (OUTPATIENT)
Dept: FAMILY MEDICINE CLINIC | Facility: CLINIC | Age: 64
End: 2019-01-08

## 2019-01-08 DIAGNOSIS — Z85.820 PERSONAL HISTORY OF MALIGNANT MELANOMA OF SKIN: Primary | ICD-10-CM

## 2019-01-08 DIAGNOSIS — B07.8 OTHER VIRAL WARTS: ICD-10-CM

## 2019-01-08 DIAGNOSIS — L57.8 OTHER SKIN CHANGES DUE TO CHRONIC EXPOSURE TO NONIONIZING RADIATION: ICD-10-CM

## 2019-01-14 PROBLEM — Z01.818 PRE-OP EXAMINATION: Status: ACTIVE | Noted: 2019-01-14

## 2019-01-15 ENCOUNTER — TRANSCRIBE ORDERS (OUTPATIENT)
Dept: FAMILY MEDICINE CLINIC | Facility: CLINIC | Age: 64
End: 2019-01-15

## 2019-01-15 ENCOUNTER — OFFICE VISIT (OUTPATIENT)
Dept: FAMILY MEDICINE CLINIC | Facility: CLINIC | Age: 64
End: 2019-01-15
Payer: COMMERCIAL

## 2019-01-15 VITALS
SYSTOLIC BLOOD PRESSURE: 130 MMHG | DIASTOLIC BLOOD PRESSURE: 70 MMHG | WEIGHT: 197.8 LBS | HEIGHT: 68 IN | BODY MASS INDEX: 29.98 KG/M2 | HEART RATE: 68 BPM

## 2019-01-15 DIAGNOSIS — Z01.818 PRE-OP EXAMINATION: Primary | ICD-10-CM

## 2019-01-15 DIAGNOSIS — H25.13 AGE-RELATED NUCLEAR CATARACT OF BOTH EYES: Primary | ICD-10-CM

## 2019-01-15 DIAGNOSIS — E11.9 TYPE 2 DIABETES MELLITUS WITHOUT COMPLICATION, WITHOUT LONG-TERM CURRENT USE OF INSULIN (HCC): ICD-10-CM

## 2019-01-15 DIAGNOSIS — H25.093 AGE-RELATED INCIPIENT CATARACT OF BOTH EYES: ICD-10-CM

## 2019-01-15 PROCEDURE — 93000 ELECTROCARDIOGRAM COMPLETE: CPT | Performed by: PHYSICIAN ASSISTANT

## 2019-01-15 PROCEDURE — 99244 OFF/OP CNSLTJ NEW/EST MOD 40: CPT | Performed by: PHYSICIAN ASSISTANT

## 2019-01-15 RX ORDER — OFLOXACIN 3 MG/ML
SOLUTION/ DROPS OPHTHALMIC
COMMUNITY
Start: 2018-12-14 | End: 2019-05-22

## 2019-01-15 RX ORDER — PREDNISOLONE ACETATE 10 MG/ML
SUSPENSION/ DROPS OPHTHALMIC
COMMUNITY
Start: 2018-12-13 | End: 2019-05-22

## 2019-01-15 RX ORDER — KETOROLAC TROMETHAMINE 5 MG/ML
SOLUTION OPHTHALMIC
COMMUNITY
Start: 2018-12-13 | End: 2019-05-22

## 2019-01-15 NOTE — PROGRESS NOTES
Assessment/Plan:    No problem-specific Assessment & Plan notes found for this encounter  Diagnoses and all orders for this visit:    Pre-op examination  -     POCT ECG    Type 2 diabetes mellitus without complication, without long-term current use of insulin (HCC)    Age-related incipient cataract of both eyes    Other orders  -     Cancel: IRIS Diabetc eye exam  -     ketorolac (ACULAR) 0 5 % ophthalmic solution  -     ofloxacin (OCUFLOX) 0 3 % ophthalmic solution  -     prednisoLONE acetate (PRED FORTE) 1 % ophthalmic suspension          Subjective:   CC: Pt  Here for pre op clearance  Pt  Is scheduled for cataract surgery, right eye is scheduled for 1/24/19 and left eye 2/11/19 with Dr Sergio Atwood  Patient ID: Ronan Manjarrez is a 61 y o  female  Subjective:     Patient ID: Ronan Manjarrez is a 61 y o  female  No chief complaint on file        [unfilled]    The following portions of the patient's history were reviewed and updated as appropriate: allergies, current medications, past family history, past medical history, past social history, past surgical history and problem list     Procedure date: R on 1/24/19 and L on 2/11/19    Surgeon:  Sergio Atwood    Planned procedure:  Lois cataract extraction and lois lasix    Diagnosis for procedure:  Lois cataracts    Prior anesthesia: yes   Type: general     Problem with anesthesia: no    CAD History: no    EDEN history:no      [unfilled]     Current Outpatient Prescriptions:  aspirin 81 MG tablet, Take 1 tablet by mouth daily, Disp: , Rfl:   caxh-newwyhv-mrz-hydrocort (CORTISPORIN) 1 % ointment, Administer to both eyes every 4 (four) hours, Disp: 3 5 g, Rfl: 5  Calcium Carb-Ergocalciferol 250-125 MG-UNIT TABS, Take 1 tablet by mouth, Disp: , Rfl:   fluticasone (FLONASE) 50 mcg/act nasal spray, , Disp: , Rfl:   ketorolac (ACULAR) 0 5 % ophthalmic solution, , Disp: , Rfl:   lisinopril (ZESTRIL) 10 mg tablet, TAKE 1 TABLET DAILY, Disp: 90 tablet, Rfl: 1  metFORMIN (GLUCOPHAGE) 500 mg tablet, Take 1 tablet (500 mg total) by mouth daily, Disp: 90 tablet, Rfl: 0  ofloxacin (OCUFLOX) 0 3 % ophthalmic solution, , Disp: , Rfl:   pantoprazole (PROTONIX) 20 mg tablet, Take 1 tablet (20 mg total) by mouth daily, Disp: 90 tablet, Rfl: 3  pravastatin (PRAVACHOL) 20 mg tablet, Take 1 tablet (20 mg total) by mouth daily, Disp: 90 tablet, Rfl: 3  prednisoLONE acetate (PRED FORTE) 1 % ophthalmic suspension, , Disp: , Rfl:      No current facility-administered medications for this visit  Patient has no known allergies  Past Medical History:  No date: Diabetes mellitus ([facility] [address] )  No date: Hypertension  No date: Malignant neoplasm of skin      Comment:  last assessed 04/11/2017    Past Surgical History:  7/2/2018: CHOLANGIOGRAM; N/A      Comment:  Procedure: CHOLANGIOGRAM;  Surgeon: Uzma Sheth MD;                Location: [facility];  Service: General  7/2/2018: CHOLECYSTECTOMY LAPAROSCOPIC; N/A      Comment:  Procedure: CHOLECYSTECTOMY LAPAROSCOPIC W/IOC;  Surgeon:               Uzma Sheth MD;  Location: [facility];  Service:                General    Review of patient's family history indicates:  Problem: Alzheimer's disease      Relation: Mother       Age of Onset: (Not Specified)   Problem: Anxiety disorder      Relation: Mother       Age of Onset: (Not Specified)   Problem: Diabetes      Relation: Mother       Age of Onset: (Not Specified)   Problem: Cancer      Relation: Father       Age of Onset: (Not Specified)   Problem: Diabetes      Relation: Sister       Age of Onset: (Not Specified)   Problem: Hypertension      Relation: Sister       Age of Onset: (Not Specified)   Problem: Stroke      Relation: Sister       Age of Onset: (Not Specified)       Smoking status: Never Smoker                                                              Smokeless tobacco: Never Used                      Alcohol use:  Yes              Comment: occasional      Objective:    ----------------------------------                   01/15/19                             1632            ----------------------------------   BP:              130/70            BP Location:        Left arm           Patient Position:        Sitting            Pulse:             68              Weight: 89 7 kg (197 lb 12 8 oz)   Height:    5' 7 5" (1 715 m)      ----------------------------------    [unfilled]     Preop labs/testing available and reviewed: yes        Assessment/Plan:    Patient is cleared for planned procedure  Further testing/evaluation is not required  Postop concerns: no    Problem List Items Addressed This Visit        Endocrine    Type 2 diabetes mellitus without complication, without long-term current   use of insulin ([facility] [address] )       Other    Pre-op examination - Primary    Relevant Orders    POCT ECG (Completed)        Diagnoses and associated orders for this visit:     Pre-op examination  POCT ECG     Type 2 diabetes mellitus without complication, without long-term current use of insulin ([facility])     Age-related incipient cataract of both eyes     Other orders  Cancel: IRIS Diabetc eye exam  ketorolac (ACULAR) 0 5 % ophthalmic solution;   ofloxacin (OCUFLOX) 0 3 % ophthalmic solution;   prednisoLONE acetate (PRED FORTE) 1 % ophthalmic suspension; The following portions of the patient's history were reviewed and updated as appropriate: allergies, current medications, past family history, past medical history, past social history, past surgical history and problem list     Review of Systems   Constitutional: Negative  HENT: Negative  Eyes: Positive for visual disturbance  Respiratory: Negative  Cardiovascular: Negative  Gastrointestinal: Negative  Endocrine: Negative  Genitourinary: Negative  Musculoskeletal: Negative  Skin: Negative  Allergic/Immunologic: Negative      Neurological: Negative  Hematological: Negative  Psychiatric/Behavioral: Negative  Objective:      Vitals:    01/15/19 1632   BP: 130/70   BP Location: Left arm   Patient Position: Sitting   Pulse: 68   Weight: 89 7 kg (197 lb 12 8 oz)   Height: 5' 7 5" (1 715 m)          Physical Exam   Constitutional: She is oriented to person, place, and time  She appears well-developed and well-nourished  No distress  HENT:   Head: Normocephalic and atraumatic  Right Ear: Hearing, tympanic membrane, external ear and ear canal normal    Left Ear: Hearing, tympanic membrane, external ear and ear canal normal    Nose: Nose normal    Mouth/Throat: Uvula is midline, oropharynx is clear and moist and mucous membranes are normal  No oropharyngeal exudate  Eyes: Pupils are equal, round, and reactive to light  Conjunctivae, EOM and lids are normal  No scleral icterus  Neck: Normal range of motion  Carotid bruit is not present  No thyroid mass and no thyromegaly present  Cardiovascular: Regular rhythm, normal heart sounds and normal pulses  Exam reveals no gallop and no friction rub  No murmur heard  Pulmonary/Chest: Effort normal and breath sounds normal  No respiratory distress  She has no wheezes  She has no rhonchi  She has no rales  Abdominal: Soft  Normal appearance and bowel sounds are normal  She exhibits no distension, no abdominal bruit and no mass  There is no hepatosplenomegaly  There is no tenderness  There is no rebound and no guarding  No hernia  Musculoskeletal: Normal range of motion  Lymphadenopathy:     She has no cervical adenopathy  Neurological: She is alert and oriented to person, place, and time  She has normal strength and normal reflexes  She is not disoriented  No sensory deficit  She exhibits normal muscle tone  Coordination and gait normal    Skin: Skin is warm, dry and intact  She is not diaphoretic  Psychiatric: She has a normal mood and affect   Her speech is normal and behavior is normal  Judgment and thought content normal  Cognition and memory are normal    Nursing note and vitals reviewed

## 2019-01-15 NOTE — PATIENT INSTRUCTIONS
Problem List Items Addressed This Visit        Endocrine    Type 2 diabetes mellitus without complication, without long-term current use of insulin ([facility] [address] )       Other    Pre-op examination - Primary    Relevant Orders    POCT ECG (Completed)    Age-related incipient cataract of both eyes    Relevant Medications    ketorolac (ACULAR) 0 5 % ophthalmic solution    ofloxacin (OCUFLOX) 0 3 % ophthalmic solution    prednisoLONE acetate (PRED FORTE) 1 % ophthalmic suspension

## 2019-01-18 ENCOUNTER — AMBUL SURGICAL CARE (OUTPATIENT)
Dept: URBAN - METROPOLITAN AREA SURGERY 32 | Facility: SURGERY | Age: 64
Setting detail: OPHTHALMOLOGY
End: 2019-01-18

## 2019-01-18 ENCOUNTER — DOCTOR'S OFFICE (OUTPATIENT)
Dept: URBAN - METROPOLITAN AREA CLINIC 136 | Facility: CLINIC | Age: 64
Setting detail: OPHTHALMOLOGY
End: 2019-01-18
Payer: COMMERCIAL

## 2019-01-18 DIAGNOSIS — H25.011: ICD-10-CM

## 2019-01-18 DIAGNOSIS — H25.11: ICD-10-CM

## 2019-01-18 PROCEDURE — 92136 OPHTHALMIC BIOMETRY: CPT | Performed by: OPHTHALMOLOGY

## 2019-01-18 PROCEDURE — S9986 NOT MEDICALLY NECESSARY SVC: HCPCS | Performed by: OPHTHALMOLOGY

## 2019-01-18 PROCEDURE — V2787 ASTIGMATISM-CORRECT FUNCTION: HCPCS | Performed by: OPHTHALMOLOGY

## 2019-01-24 ENCOUNTER — AMBUL SURGICAL CARE (OUTPATIENT)
Dept: URBAN - METROPOLITAN AREA SURGERY 32 | Facility: SURGERY | Age: 64
Setting detail: OPHTHALMOLOGY
End: 2019-01-24
Payer: COMMERCIAL

## 2019-01-24 DIAGNOSIS — H25.041: ICD-10-CM

## 2019-01-24 DIAGNOSIS — H25.11: ICD-10-CM

## 2019-01-24 DIAGNOSIS — H25.011: ICD-10-CM

## 2019-01-24 PROCEDURE — G8907 PT DOC NO EVENTS ON DISCHARG: HCPCS | Performed by: OPHTHALMOLOGY

## 2019-01-24 PROCEDURE — LENSX LASER ASSISTED CATARACT EXTRACTION W/POSSIBLE IOL IMPLANT: Performed by: OPHTHALMOLOGY

## 2019-01-24 PROCEDURE — RESTOR TOR PREMIUM UPGRADE: Performed by: OPHTHALMOLOGY

## 2019-01-24 PROCEDURE — G8918 PT W/O PREOP ORDER IV AB PRO: HCPCS | Performed by: OPHTHALMOLOGY

## 2019-01-24 PROCEDURE — 66984 XCAPSL CTRC RMVL W/O ECP: CPT | Performed by: OPHTHALMOLOGY

## 2019-01-25 ENCOUNTER — DOCTOR'S OFFICE (OUTPATIENT)
Dept: URBAN - METROPOLITAN AREA CLINIC 136 | Facility: CLINIC | Age: 64
Setting detail: OPHTHALMOLOGY
End: 2019-01-25
Payer: COMMERCIAL

## 2019-01-25 ENCOUNTER — RX ONLY (RX ONLY)
Age: 64
End: 2019-01-25

## 2019-01-25 ENCOUNTER — AMBUL SURGICAL CARE (OUTPATIENT)
Dept: URBAN - METROPOLITAN AREA SURGERY 32 | Facility: SURGERY | Age: 64
Setting detail: OPHTHALMOLOGY
End: 2019-01-25

## 2019-01-25 DIAGNOSIS — H25.042: ICD-10-CM

## 2019-01-25 DIAGNOSIS — H25.012: ICD-10-CM

## 2019-01-25 DIAGNOSIS — H52.12: ICD-10-CM

## 2019-01-25 DIAGNOSIS — H27.8: ICD-10-CM

## 2019-01-25 DIAGNOSIS — H27.9: ICD-10-CM

## 2019-01-25 PROCEDURE — 92136 OPHTHALMIC BIOMETRY: CPT | Performed by: OPHTHALMOLOGY

## 2019-01-25 PROCEDURE — V2788 PRESBYOPIA-CORRECT FUNCTION: HCPCS | Performed by: OPHTHALMOLOGY

## 2019-01-25 PROCEDURE — 3072F LOW RISK FOR RETINOPATHY: CPT | Performed by: FAMILY MEDICINE

## 2019-01-25 PROCEDURE — 99024 POSTOP FOLLOW-UP VISIT: CPT | Performed by: OPHTHALMOLOGY

## 2019-01-25 PROCEDURE — S9986 NOT MEDICALLY NECESSARY SVC: HCPCS | Performed by: OPHTHALMOLOGY

## 2019-01-25 ASSESSMENT — LID POSITION - DERMATOCHALASIS
OD_DERMATOCHALASIS: RUL T
OS_DERMATOCHALASIS: LUL T

## 2019-01-28 ASSESSMENT — REFRACTION_MANIFEST
OD_VA2: 20/
OS_VA2: 20/
OD_VA1: 20/20-2
OS_VA2: 20/20
OD_CYLINDER: -1.25
OS_VA3: 20/
OU_VA: 20/
OS_VA1: 20/20-2
OU_VA: 20/20
OD_SPHERE: -1.50
OD_SPHERE: +2.50
OD_VA3: 20/
OD_AXIS: 180
OS_AXIS: 175
OD_VA3: 20/
OS_CYLINDER: SPH
OD_VA1: 20/
OS_SPHERE: +2.50
OS_VA3: 20/
OS_CYLINDER: -1.75
OS_VA1: 20/
OD_VA2: 20/20
OD_CYLINDER: SPH
OD_ADD: +2.50
OS_ADD: +2.50
OS_SPHERE: -1.50

## 2019-01-28 ASSESSMENT — REFRACTION_CURRENTRX
OS_AXIS: 166
OD_OVR_VA: 20/
OD_SPHERE: -1.25
OD_AXIS: 012
OD_OVR_VA: 20/
OD_ADD: +2.50
OS_SPHERE: +2.50
OD_OVR_VA: 20/
OD_VPRISM_DIRECTION: BF
OS_CYLINDER: -2.00
OD_VPRISM_DIRECTION: SV
OS_OVR_VA: 20/
OS_ADD: +2.50
OS_SPHERE: -1.25
OD_SPHERE: +2.50
OD_CYLINDER: -2.00
OS_VPRISM_DIRECTION: BF
OS_OVR_VA: 20/
OS_OVR_VA: 20/
OS_VPRISM_DIRECTION: SV

## 2019-01-28 ASSESSMENT — REFRACTION_AUTOREFRACTION
OS_CYLINDER: -2.00
OS_SPHERE: -0.25
OS_AXIS: 165
OD_SPHERE: -0.25

## 2019-01-28 ASSESSMENT — SPHEQUIV_DERIVED
OS_SPHEQUIV: -1.25
OD_SPHEQUIV: -2.125
OS_SPHEQUIV: -2.375

## 2019-01-28 ASSESSMENT — KERATOMETRY
OS_K2POWER_DIOPTERS: 44.75
OD_AXISANGLE_DEGREES: 180
OD_K2POWER_DIOPTERS: 44.25
OD_K1POWER_DIOPTERS: 42.25
OS_AXISANGLE_DEGREES: 139

## 2019-01-28 ASSESSMENT — AXIALLENGTH_DERIVED: OD_AL: 24.549

## 2019-01-28 ASSESSMENT — VISUAL ACUITY
OS_BCVA: 20/20-1
OD_BCVA: 20/25-1

## 2019-01-30 ENCOUNTER — TRANSCRIBE ORDERS (OUTPATIENT)
Dept: FAMILY MEDICINE CLINIC | Facility: CLINIC | Age: 64
End: 2019-01-30

## 2019-01-30 DIAGNOSIS — M25.561 PAIN IN RIGHT KNEE: Primary | ICD-10-CM

## 2019-01-31 ENCOUNTER — OFFICE VISIT (OUTPATIENT)
Dept: FAMILY MEDICINE CLINIC | Facility: CLINIC | Age: 64
End: 2019-01-31
Payer: COMMERCIAL

## 2019-01-31 VITALS
BODY MASS INDEX: 30.46 KG/M2 | SYSTOLIC BLOOD PRESSURE: 110 MMHG | WEIGHT: 201 LBS | TEMPERATURE: 98.3 F | DIASTOLIC BLOOD PRESSURE: 66 MMHG | HEIGHT: 68 IN | HEART RATE: 68 BPM | RESPIRATION RATE: 16 BRPM

## 2019-01-31 DIAGNOSIS — H69.80 DYSFUNCTION OF EUSTACHIAN TUBE, UNSPECIFIED LATERALITY: ICD-10-CM

## 2019-01-31 DIAGNOSIS — H66.001 ACUTE SUPPURATIVE OTITIS MEDIA OF RIGHT EAR WITHOUT SPONTANEOUS RUPTURE OF TYMPANIC MEMBRANE, RECURRENCE NOT SPECIFIED: ICD-10-CM

## 2019-01-31 DIAGNOSIS — J01.40 ACUTE NON-RECURRENT PANSINUSITIS: Primary | ICD-10-CM

## 2019-01-31 DIAGNOSIS — R05.9 COUGH: ICD-10-CM

## 2019-01-31 DIAGNOSIS — J30.9 ALLERGIC RHINITIS, UNSPECIFIED SEASONALITY, UNSPECIFIED TRIGGER: ICD-10-CM

## 2019-01-31 DIAGNOSIS — E66.9 OBESITY (BMI 30.0-34.9): ICD-10-CM

## 2019-01-31 DIAGNOSIS — I10 ESSENTIAL HYPERTENSION: ICD-10-CM

## 2019-01-31 PROCEDURE — 3074F SYST BP LT 130 MM HG: CPT | Performed by: FAMILY MEDICINE

## 2019-01-31 PROCEDURE — 3008F BODY MASS INDEX DOCD: CPT | Performed by: FAMILY MEDICINE

## 2019-01-31 PROCEDURE — 3078F DIAST BP <80 MM HG: CPT | Performed by: FAMILY MEDICINE

## 2019-01-31 PROCEDURE — 1036F TOBACCO NON-USER: CPT | Performed by: FAMILY MEDICINE

## 2019-01-31 PROCEDURE — 99214 OFFICE O/P EST MOD 30 MIN: CPT | Performed by: FAMILY MEDICINE

## 2019-01-31 RX ORDER — FEXOFENADINE HCL 180 MG/1
180 TABLET ORAL DAILY PRN
COMMUNITY

## 2019-01-31 RX ORDER — BENZONATATE 200 MG/1
200 CAPSULE ORAL 3 TIMES DAILY PRN
Qty: 30 CAPSULE | Refills: 1 | Status: SHIPPED | OUTPATIENT
Start: 2019-01-31 | End: 2019-02-10

## 2019-01-31 RX ORDER — AZITHROMYCIN 250 MG/1
TABLET, FILM COATED ORAL
Qty: 6 TABLET | Refills: 0 | Status: SHIPPED | OUTPATIENT
Start: 2019-01-31 | End: 2019-02-05

## 2019-01-31 NOTE — PROGRESS NOTES
Assessment/Plan:  1  Acute sinusitis 2  Per otitis media, Z-Tomás prescribed  3  Per allergic rhinitis 4  Eustachian tube dysfunction, continue Flonase and Allegra  Patient increase Flonase to 2 sprays both nostrils twice a day for 1 week  5  Hypertension, stable continue present therapy  6  Cough  Tessalon was prescribed  7  Return in 1 week if still symptoms      Allergic rhinitis  May use Flonase twice a day for 1 week  May continue Allegra    Essential hypertension  Stable continue present therapy       Diagnoses and all orders for this visit:    Acute non-recurrent pansinusitis  -     azithromycin (ZITHROMAX) 250 mg tablet; Take 2 tablets today then 1 tablet daily till finished    Allergic rhinitis, unspecified seasonality, unspecified trigger    Cough  -     benzonatate (TESSALON) 200 MG capsule; Take 1 capsule (200 mg total) by mouth 3 (three) times a day as needed for cough for up to 10 days    Essential hypertension    Dysfunction of Eustachian tube, unspecified laterality    Acute suppurative otitis media of right ear without spontaneous rupture of tympanic membrane, recurrence not specified  -     azithromycin (ZITHROMAX) 250 mg tablet; Take 2 tablets today then 1 tablet daily till finished    Obesity (BMI 30 0-34 9)    Other orders  -     fexofenadine (ALLEGRA) 180 MG tablet; Take 180 mg by mouth daily          Subjective: Pt here with complains of post nasal drip, cough, headache x 2 days  BARBARA Mooney     Patient ID: Annie Patterson is a 61 y o  female  For the past 2 days patient has had increasing sinus pain and pressure sneezing PRA postnasal drip mild otalgia mild dry cough  No fever chills no chest pain shortness with wheeze or hemoptysis    Patient is using her Flonase and Allegra with little relief        The following portions of the patient's history were reviewed and updated as appropriate: allergies, current medications, past family history, past medical history, past social history, past surgical history and problem list     Review of Systems   Constitutional:        HPI   HENT:        HPI   Eyes: Negative  Respiratory:        HPI   Cardiovascular: Negative for chest pain  Gastrointestinal: Negative  Endocrine: Negative  Genitourinary: Negative  Musculoskeletal: Negative  Skin: Negative  Allergic/Immunologic: Positive for environmental allergies  Neurological: Negative  Hematological: Negative  Psychiatric/Behavioral: Negative  Objective:      Vitals:    01/31/19 1737   BP: 110/66   BP Location: Left arm   Patient Position: Sitting   Cuff Size: Large   Pulse: 68   Resp: 16   Temp: 98 3 °F (36 8 °C)   TempSrc: Tympanic   Weight: 91 2 kg (201 lb)   Height: 5' 7 5" (1 715 m)          Physical Exam   Constitutional: She is oriented to person, place, and time  She appears well-developed and well-nourished  HENT:   Head: Normocephalic  Mouth/Throat: No oropharyngeal exudate  Both tympanic membranes are dull right with mild injection  Positive allergic turbinates positive pansinus tenderness to percussion positive purulent postnasal drip minimal pharyngeal injection negative exudate   Eyes: Pupils are equal, round, and reactive to light  Conjunctivae are normal  No scleral icterus  Neck: Neck supple  No thyromegaly present  Cardiovascular: Normal rate, regular rhythm and normal heart sounds  Pulmonary/Chest: Effort normal and breath sounds normal    Musculoskeletal: She exhibits no edema  Lymphadenopathy:     She has cervical adenopathy  Neurological: She is alert and oriented to person, place, and time  No cranial nerve deficit  Skin: Skin is warm and dry  BMI Counseling: Body mass index is 31 02 kg/m²  Discussed the patient's BMI with her  The BMI is above average  BMI counseling and education was provided to the patient  Nutrition recommendations include decreasing overall calorie intake   Exercise recommendations include exercising 3-5 times per week

## 2019-02-05 ENCOUNTER — DOCTOR'S OFFICE (OUTPATIENT)
Dept: URBAN - METROPOLITAN AREA CLINIC 136 | Facility: CLINIC | Age: 64
Setting detail: OPHTHALMOLOGY
End: 2019-02-05
Payer: COMMERCIAL

## 2019-02-05 DIAGNOSIS — H27.9: ICD-10-CM

## 2019-02-05 DIAGNOSIS — H27.8: ICD-10-CM

## 2019-02-05 PROCEDURE — 99024 POSTOP FOLLOW-UP VISIT: CPT | Performed by: OPTOMETRIST

## 2019-02-05 ASSESSMENT — REFRACTION_MANIFEST
OD_VA3: 20/
OS_VA1: 20/
OD_VA3: 20/
OD_VA1: 20/25
OS_SPHERE: +2.50
OS_VA3: 20/
OS_VA1: 20/
OS_VA2: 20/
OD_VA2: 20/
OS_VA3: 20/
OD_VA1: 20/
OS_CYLINDER: SPH
OD_SPHERE: -0.25
OU_VA: 20/
OD_CYLINDER: SPH
OD_SPHERE: +2.50
OD_VA2: 20/30
OU_VA: 20/
OD_CYLINDER: SPH
OS_VA2: 20/

## 2019-02-05 ASSESSMENT — REFRACTION_CURRENTRX
OS_OVR_VA: 20/
OS_OVR_VA: 20/
OS_VPRISM_DIRECTION: BF
OD_CYLINDER: -2.00
OD_VPRISM_DIRECTION: SV
OD_OVR_VA: 20/
OS_OVR_VA: 20/
OS_ADD: +2.50
OS_VPRISM_DIRECTION: SV
OD_SPHERE: +2.50
OS_CYLINDER: -2.00
OS_SPHERE: +2.50
OS_SPHERE: -1.25
OS_AXIS: 166
OD_SPHERE: -1.25
OD_AXIS: 012
OD_VPRISM_DIRECTION: BF
OD_OVR_VA: 20/
OD_OVR_VA: 20/
OD_ADD: +2.50

## 2019-02-05 ASSESSMENT — VISUAL ACUITY
OD_BCVA: 20/25-1
OS_BCVA: 20/40-1

## 2019-02-05 ASSESSMENT — REFRACTION_AUTOREFRACTION
OD_CYLINDER: -1.50
OD_SPHERE: +1.25
OS_CYLINDER: -2.00
OS_SPHERE: -0.25
OS_AXIS: 165
OD_AXIS: 058

## 2019-02-05 ASSESSMENT — LID POSITION - DERMATOCHALASIS
OD_DERMATOCHALASIS: RUL T
OS_DERMATOCHALASIS: LUL T

## 2019-02-05 ASSESSMENT — SPHEQUIV_DERIVED
OS_SPHEQUIV: -1.25
OD_SPHEQUIV: 0.5

## 2019-02-05 ASSESSMENT — KERATOMETRY
OD_K2POWER_DIOPTERS: 44.25
OD_AXISANGLE_DEGREES: 180
OS_K2POWER_DIOPTERS: 44.75
OD_K1POWER_DIOPTERS: 42.25
OS_AXISANGLE_DEGREES: 139

## 2019-02-05 ASSESSMENT — AXIALLENGTH_DERIVED: OD_AL: 23.4894

## 2019-02-11 ENCOUNTER — AMBUL SURGICAL CARE (OUTPATIENT)
Dept: URBAN - METROPOLITAN AREA SURGERY 32 | Facility: SURGERY | Age: 64
Setting detail: OPHTHALMOLOGY
End: 2019-02-11
Payer: COMMERCIAL

## 2019-02-11 DIAGNOSIS — H25.042: ICD-10-CM

## 2019-02-11 DIAGNOSIS — H25.012: ICD-10-CM

## 2019-02-11 DIAGNOSIS — H25.12: ICD-10-CM

## 2019-02-11 PROCEDURE — G8918 PT W/O PREOP ORDER IV AB PRO: HCPCS | Performed by: OPHTHALMOLOGY

## 2019-02-11 PROCEDURE — RESTOR TOR PREMIUM UPGRADE: Performed by: OPHTHALMOLOGY

## 2019-02-11 PROCEDURE — G8907 PT DOC NO EVENTS ON DISCHARG: HCPCS | Performed by: OPHTHALMOLOGY

## 2019-02-11 PROCEDURE — 66984 XCAPSL CTRC RMVL W/O ECP: CPT | Performed by: OPHTHALMOLOGY

## 2019-02-11 PROCEDURE — LENSX LASER ASSISTED CATARACT EXTRACTION W/POSSIBLE IOL IMPLANT: Performed by: OPHTHALMOLOGY

## 2019-02-12 ENCOUNTER — DOCTOR'S OFFICE (OUTPATIENT)
Dept: URBAN - METROPOLITAN AREA CLINIC 136 | Facility: CLINIC | Age: 64
Setting detail: OPHTHALMOLOGY
End: 2019-02-12

## 2019-02-12 DIAGNOSIS — H27.9: ICD-10-CM

## 2019-02-12 DIAGNOSIS — H27.8: ICD-10-CM

## 2019-02-12 PROCEDURE — 99024 POSTOP FOLLOW-UP VISIT: CPT | Performed by: OPTOMETRIST

## 2019-02-12 ASSESSMENT — REFRACTION_MANIFEST
OU_VA: 20/
OS_VA2: 20/
OD_VA2: 20/30
OD_CYLINDER: SPH
OD_SPHERE: +2.50
OS_VA1: 20/
OD_VA2: 20/
OS_VA1: 20/
OD_VA3: 20/
OS_SPHERE: +2.50
OD_VA1: 20/
OS_VA2: 20/
OS_VA3: 20/
OD_VA1: 20/25
OS_CYLINDER: SPH
OD_CYLINDER: SPH
OD_SPHERE: -0.25
OS_VA3: 20/
OD_VA3: 20/
OU_VA: 20/

## 2019-02-12 ASSESSMENT — REFRACTION_CURRENTRX
OS_AXIS: 166
OS_ADD: +2.50
OD_OVR_VA: 20/
OD_VPRISM_DIRECTION: SV
OS_CYLINDER: -2.00
OD_OVR_VA: 20/
OS_SPHERE: +2.50
OS_OVR_VA: 20/
OS_VPRISM_DIRECTION: SV
OS_SPHERE: -1.25
OS_OVR_VA: 20/
OS_VPRISM_DIRECTION: BF
OD_SPHERE: +2.50
OS_OVR_VA: 20/
OD_CYLINDER: -2.00
OD_VPRISM_DIRECTION: BF
OD_AXIS: 012
OD_SPHERE: -1.25
OD_OVR_VA: 20/
OD_ADD: +2.50

## 2019-02-12 ASSESSMENT — SPHEQUIV_DERIVED
OS_SPHEQUIV: -0.375
OD_SPHEQUIV: 0

## 2019-02-12 ASSESSMENT — KERATOMETRY
OS_AXISANGLE_DEGREES: 139
OS_K2POWER_DIOPTERS: 44.75
OD_AXISANGLE_DEGREES: 180
OD_K2POWER_DIOPTERS: 44.25
OD_K1POWER_DIOPTERS: 42.25

## 2019-02-12 ASSESSMENT — AXIALLENGTH_DERIVED: OD_AL: 23.6841

## 2019-02-12 ASSESSMENT — VISUAL ACUITY
OS_BCVA: 20/25-2
OD_BCVA: 20/30-2

## 2019-02-12 ASSESSMENT — REFRACTION_AUTOREFRACTION
OD_CYLINDER: -0.50
OD_SPHERE: +0.25
OS_AXIS: 023
OD_AXIS: 167
OS_SPHERE: +0.50
OS_CYLINDER: -1.75

## 2019-02-12 ASSESSMENT — CORNEAL EDEMA CLINICAL DESCRIPTION: OS_CORNEALEDEMA: AT INCISION SITE

## 2019-02-26 ENCOUNTER — DOCTOR'S OFFICE (OUTPATIENT)
Dept: URBAN - METROPOLITAN AREA CLINIC 136 | Facility: CLINIC | Age: 64
Setting detail: OPHTHALMOLOGY
End: 2019-02-26

## 2019-02-26 DIAGNOSIS — H27.9: ICD-10-CM

## 2019-02-26 DIAGNOSIS — H27.8: ICD-10-CM

## 2019-02-26 PROCEDURE — 99024 POSTOP FOLLOW-UP VISIT: CPT | Performed by: OPTOMETRIST

## 2019-02-26 ASSESSMENT — REFRACTION_AUTOREFRACTION
OD_AXIS: 074
OS_SPHERE: 0.00
OD_SPHERE: -0.25
OS_AXIS: 049
OS_CYLINDER: -1.50
OD_CYLINDER: -0.75

## 2019-02-26 ASSESSMENT — REFRACTION_CURRENTRX
OD_ADD: +2.50
OS_SPHERE: -1.25
OS_OVR_VA: 20/
OS_ADD: +2.50
OD_OVR_VA: 20/
OD_CYLINDER: -2.00
OS_VPRISM_DIRECTION: SV
OD_AXIS: 012
OD_VPRISM_DIRECTION: SV
OD_OVR_VA: 20/
OS_CYLINDER: -2.00
OS_OVR_VA: 20/
OS_SPHERE: +2.50
OD_VPRISM_DIRECTION: BF
OS_VPRISM_DIRECTION: BF
OD_OVR_VA: 20/
OD_SPHERE: -1.25
OS_AXIS: 166
OD_SPHERE: +2.50
OS_OVR_VA: 20/

## 2019-02-26 ASSESSMENT — REFRACTION_MANIFEST
OU_VA: 20/
OD_VA1: 20/25
OD_CYLINDER: SPH
OS_CYLINDER: SPH
OD_VA1: 20/
OS_VA2: 20/
OD_VA2: 20/30
OS_VA1: 20/
OD_SPHERE: -0.25
OS_VA2: 20/
OD_SPHERE: +2.50
OS_VA1: 20/
OU_VA: 20/
OS_VA3: 20/
OD_VA2: 20/
OD_VA3: 20/
OS_VA3: 20/
OD_VA3: 20/
OS_SPHERE: +2.50
OD_CYLINDER: SPH

## 2019-02-26 ASSESSMENT — VISUAL ACUITY
OS_BCVA: 20/25-2
OD_BCVA: 20/30-2

## 2019-02-26 ASSESSMENT — AXIALLENGTH_DERIVED: OD_AL: 23.9321

## 2019-02-26 ASSESSMENT — CORNEAL EDEMA CLINICAL DESCRIPTION: OS_CORNEALEDEMA: AT INCISION SITE

## 2019-02-26 ASSESSMENT — SPHEQUIV_DERIVED
OD_SPHEQUIV: -0.625
OS_SPHEQUIV: -0.75

## 2019-02-26 ASSESSMENT — KERATOMETRY
OD_K1POWER_DIOPTERS: 42.25
OD_AXISANGLE_DEGREES: 180
OD_K2POWER_DIOPTERS: 44.25
OS_AXISANGLE_DEGREES: 139
OS_K2POWER_DIOPTERS: 44.75

## 2019-03-20 ENCOUNTER — DOCTOR'S OFFICE (OUTPATIENT)
Dept: URBAN - METROPOLITAN AREA CLINIC 136 | Facility: CLINIC | Age: 64
Setting detail: OPHTHALMOLOGY
End: 2019-03-20
Payer: COMMERCIAL

## 2019-03-20 ENCOUNTER — TRANSCRIBE ORDERS (OUTPATIENT)
Dept: FAMILY MEDICINE CLINIC | Facility: CLINIC | Age: 64
End: 2019-03-20

## 2019-03-20 DIAGNOSIS — H27.8: ICD-10-CM

## 2019-03-20 DIAGNOSIS — H27.9: ICD-10-CM

## 2019-03-20 DIAGNOSIS — H52.13: ICD-10-CM

## 2019-03-20 DIAGNOSIS — H25.13 AGE-RELATED NUCLEAR CATARACT OF BOTH EYES: Primary | ICD-10-CM

## 2019-03-20 PROCEDURE — 99024 POSTOP FOLLOW-UP VISIT: CPT | Performed by: OPTOMETRIST

## 2019-03-20 ASSESSMENT — REFRACTION_MANIFEST
OS_VA2: 20/
OS_SPHERE: -0.50
OS_VA1: 20/
OD_VA2: 20/
OD_SPHERE: -0.75
OD_VA1: 20/30+2
OD_VA3: 20/
OS_VA1: 20/20-2
OS_VA3: 20/
OS_VA3: 20/
OS_SPHERE: +2.50
OS_CYLINDER: SPH
OD_VA1: 20/
OD_VA3: 20/
OD_SPHERE: +2.50
OD_VA2: 20/30
OD_CYLINDER: SPH
OU_VA: 20/20-2
OD_CYLINDER: SPH
OS_VA2: 20/20
OS_CYLINDER: SPH
OU_VA: 20/

## 2019-03-20 ASSESSMENT — REFRACTION_CURRENTRX
OS_OVR_VA: 20/
OS_OVR_VA: 20/
OS_VPRISM_DIRECTION: SV
OS_CYLINDER: -2.00
OD_OVR_VA: 20/
OD_OVR_VA: 20/
OD_VPRISM_DIRECTION: BF
OS_SPHERE: -1.25
OS_SPHERE: +2.50
OD_SPHERE: +2.50
OD_AXIS: 012
OD_OVR_VA: 20/
OD_CYLINDER: -2.00
OS_ADD: +2.50
OD_SPHERE: -1.25
OS_VPRISM_DIRECTION: BF
OD_VPRISM_DIRECTION: SV
OS_OVR_VA: 20/
OS_AXIS: 166
OD_ADD: +2.50

## 2019-03-20 ASSESSMENT — REFRACTION_AUTOREFRACTION
OD_CYLINDER: -2.25
OS_CYLINDER: -0.50
OS_AXIS: 067
OD_SPHERE: +0.75
OS_SPHERE: -0.50
OD_AXIS: 044

## 2019-03-20 ASSESSMENT — CORNEAL EDEMA CLINICAL DESCRIPTION: OS_CORNEALEDEMA: AT INCISION SITE

## 2019-03-20 ASSESSMENT — SPHEQUIV_DERIVED
OS_SPHEQUIV: -0.75
OD_SPHEQUIV: -0.375

## 2019-03-20 ASSESSMENT — AXIALLENGTH_DERIVED: OD_AL: 23.8323

## 2019-03-20 ASSESSMENT — VISUAL ACUITY
OS_BCVA: 20/40-2
OD_BCVA: 20/20-2

## 2019-03-20 ASSESSMENT — KERATOMETRY
OD_K2POWER_DIOPTERS: 44.25
OD_AXISANGLE_DEGREES: 180
OS_AXISANGLE_DEGREES: 139
OS_K2POWER_DIOPTERS: 44.75
OD_K1POWER_DIOPTERS: 42.25

## 2019-03-20 ASSESSMENT — LID POSITION - DERMATOCHALASIS
OS_DERMATOCHALASIS: LUL T
OD_DERMATOCHALASIS: RUL T

## 2019-04-11 ENCOUNTER — HOSPITAL ENCOUNTER (EMERGENCY)
Facility: HOSPITAL | Age: 64
Discharge: HOME | End: 2019-04-11
Attending: EMERGENCY MEDICINE
Payer: COMMERCIAL

## 2019-04-11 VITALS
RESPIRATION RATE: 16 BRPM | OXYGEN SATURATION: 96 % | DIASTOLIC BLOOD PRESSURE: 79 MMHG | SYSTOLIC BLOOD PRESSURE: 165 MMHG | TEMPERATURE: 98.6 F | HEART RATE: 73 BPM

## 2019-04-11 DIAGNOSIS — K43.9 VENTRAL HERNIA: Primary | ICD-10-CM

## 2019-04-11 PROCEDURE — 99283 EMERGENCY DEPT VISIT LOW MDM: CPT | Performed by: EMERGENCY MEDICINE

## 2019-04-11 PROCEDURE — 99283 EMERGENCY DEPT VISIT LOW MDM: CPT

## 2019-04-12 ENCOUNTER — TRANSCRIBE ORDERS (OUTPATIENT)
Dept: FAMILY MEDICINE CLINIC | Facility: CLINIC | Age: 64
End: 2019-04-12

## 2019-04-12 DIAGNOSIS — K46.9 ABDOMINAL HERNIA WITHOUT OBSTRUCTION OR GANGRENE: Primary | ICD-10-CM

## 2019-04-17 ENCOUNTER — OFFICE VISIT (OUTPATIENT)
Dept: SURGERY | Facility: MEDICAL CENTER | Age: 64
End: 2019-04-17
Payer: COMMERCIAL

## 2019-04-17 VITALS
TEMPERATURE: 97.7 F | BODY MASS INDEX: 30.43 KG/M2 | HEIGHT: 68 IN | SYSTOLIC BLOOD PRESSURE: 130 MMHG | HEART RATE: 76 BPM | WEIGHT: 200.8 LBS | RESPIRATION RATE: 16 BRPM | DIASTOLIC BLOOD PRESSURE: 82 MMHG

## 2019-04-17 DIAGNOSIS — K43.2 INCISIONAL HERNIA, WITHOUT OBSTRUCTION OR GANGRENE: Primary | ICD-10-CM

## 2019-04-17 DIAGNOSIS — K46.9 ABDOMINAL HERNIA WITHOUT OBSTRUCTION OR GANGRENE: ICD-10-CM

## 2019-04-17 PROCEDURE — 99213 OFFICE O/P EST LOW 20 MIN: CPT | Performed by: SURGERY

## 2019-04-19 RX ORDER — SODIUM CHLORIDE, SODIUM LACTATE, POTASSIUM CHLORIDE, CALCIUM CHLORIDE 600; 310; 30; 20 MG/100ML; MG/100ML; MG/100ML; MG/100ML
125 INJECTION, SOLUTION INTRAVENOUS CONTINUOUS
Status: CANCELLED | OUTPATIENT
Start: 2019-05-30

## 2019-04-19 RX ORDER — CEFAZOLIN SODIUM 2 G/50ML
2000 SOLUTION INTRAVENOUS ONCE
Status: CANCELLED | OUTPATIENT
Start: 2019-05-30 | End: 2019-05-30

## 2019-05-21 ENCOUNTER — OFFICE VISIT (OUTPATIENT)
Dept: FAMILY MEDICINE CLINIC | Facility: CLINIC | Age: 64
End: 2019-05-21
Payer: COMMERCIAL

## 2019-05-21 VITALS
TEMPERATURE: 99.3 F | HEIGHT: 68 IN | HEART RATE: 80 BPM | DIASTOLIC BLOOD PRESSURE: 60 MMHG | SYSTOLIC BLOOD PRESSURE: 124 MMHG | BODY MASS INDEX: 30.58 KG/M2 | WEIGHT: 201.8 LBS

## 2019-05-21 DIAGNOSIS — J06.9 ACUTE UPPER RESPIRATORY INFECTION: Primary | ICD-10-CM

## 2019-05-21 PROCEDURE — 99214 OFFICE O/P EST MOD 30 MIN: CPT | Performed by: PHYSICIAN ASSISTANT

## 2019-05-21 RX ORDER — AZITHROMYCIN 250 MG/1
TABLET, FILM COATED ORAL
Qty: 6 TABLET | Refills: 0 | Status: SHIPPED | OUTPATIENT
Start: 2019-05-21 | End: 2019-05-25

## 2019-05-29 ENCOUNTER — ANESTHESIA EVENT (OUTPATIENT)
Dept: PERIOP | Facility: HOSPITAL | Age: 64
End: 2019-05-29
Payer: COMMERCIAL

## 2019-05-30 ENCOUNTER — HOSPITAL ENCOUNTER (OUTPATIENT)
Facility: HOSPITAL | Age: 64
Setting detail: OUTPATIENT SURGERY
Discharge: HOME | End: 2019-05-30
Attending: SURGERY | Admitting: SURGERY
Payer: COMMERCIAL

## 2019-05-30 ENCOUNTER — ANESTHESIA (OUTPATIENT)
Dept: PERIOP | Facility: HOSPITAL | Age: 64
End: 2019-05-30
Payer: COMMERCIAL

## 2019-05-30 VITALS
HEART RATE: 85 BPM | RESPIRATION RATE: 16 BRPM | HEIGHT: 67 IN | DIASTOLIC BLOOD PRESSURE: 65 MMHG | OXYGEN SATURATION: 92 % | TEMPERATURE: 98.1 F | BODY MASS INDEX: 30.63 KG/M2 | SYSTOLIC BLOOD PRESSURE: 138 MMHG | WEIGHT: 195.13 LBS

## 2019-05-30 DIAGNOSIS — K43.2 INCISIONAL HERNIA, WITHOUT OBSTRUCTION OR GANGRENE: ICD-10-CM

## 2019-05-30 LAB
GLUCOSE SERPL-MCNC: 113 MG/DL (ref 65–140)
GLUCOSE SERPL-MCNC: 139 MG/DL (ref 65–140)

## 2019-05-30 PROCEDURE — 82948 REAGENT STRIP/BLOOD GLUCOSE: CPT

## 2019-05-30 PROCEDURE — C1781 MESH (IMPLANTABLE): HCPCS | Performed by: SURGERY

## 2019-05-30 PROCEDURE — 49654 PR LAP, INCISIONAL HERNIA REPAIR,REDUCIBLE: CPT | Performed by: PHYSICIAN ASSISTANT

## 2019-05-30 PROCEDURE — 49654 PR LAP, INCISIONAL HERNIA REPAIR,REDUCIBLE: CPT | Performed by: SURGERY

## 2019-05-30 PROCEDURE — NC001 PR NO CHARGE: Performed by: PHYSICIAN ASSISTANT

## 2019-05-30 PROCEDURE — 88302 TISSUE EXAM BY PATHOLOGIST: CPT | Performed by: PATHOLOGY

## 2019-05-30 DEVICE — VENTRALIGHT ST MESH WITH ECHO PS POSITONING SYSTEM
Type: IMPLANTABLE DEVICE | Status: FUNCTIONAL
Brand: VENTRALIGHT ST MESH WITH ECHO PS POSITONING SYSTEM

## 2019-05-30 RX ORDER — LIDOCAINE HYDROCHLORIDE 10 MG/ML
INJECTION, SOLUTION INFILTRATION; PERINEURAL AS NEEDED
Status: DISCONTINUED | OUTPATIENT
Start: 2019-05-30 | End: 2019-05-30 | Stop reason: SURG

## 2019-05-30 RX ORDER — CEFAZOLIN SODIUM 2 G/50ML
2000 SOLUTION INTRAVENOUS ONCE
Status: DISCONTINUED | OUTPATIENT
Start: 2019-05-30 | End: 2019-05-30 | Stop reason: HOSPADM

## 2019-05-30 RX ORDER — MIDAZOLAM HYDROCHLORIDE 1 MG/ML
INJECTION INTRAMUSCULAR; INTRAVENOUS AS NEEDED
Status: DISCONTINUED | OUTPATIENT
Start: 2019-05-30 | End: 2019-05-30 | Stop reason: SURG

## 2019-05-30 RX ORDER — ONDANSETRON 2 MG/ML
INJECTION INTRAMUSCULAR; INTRAVENOUS AS NEEDED
Status: DISCONTINUED | OUTPATIENT
Start: 2019-05-30 | End: 2019-05-30 | Stop reason: SURG

## 2019-05-30 RX ORDER — NEOSTIGMINE METHYLSULFATE 1 MG/ML
INJECTION INTRAVENOUS AS NEEDED
Status: DISCONTINUED | OUTPATIENT
Start: 2019-05-30 | End: 2019-05-30 | Stop reason: SURG

## 2019-05-30 RX ORDER — HYDROCODONE BITARTRATE AND ACETAMINOPHEN 5; 325 MG/1; MG/1
1 TABLET ORAL EVERY 4 HOURS PRN
Qty: 20 TABLET | Refills: 0 | Status: SHIPPED | OUTPATIENT
Start: 2019-05-30 | End: 2019-06-09

## 2019-05-30 RX ORDER — ONDANSETRON 2 MG/ML
4 INJECTION INTRAMUSCULAR; INTRAVENOUS ONCE AS NEEDED
Status: DISCONTINUED | OUTPATIENT
Start: 2019-05-30 | End: 2019-05-30 | Stop reason: HOSPADM

## 2019-05-30 RX ORDER — SUCCINYLCHOLINE/SOD CL,ISO/PF 100 MG/5ML
SYRINGE (ML) INTRAVENOUS AS NEEDED
Status: DISCONTINUED | OUTPATIENT
Start: 2019-05-30 | End: 2019-05-30 | Stop reason: SURG

## 2019-05-30 RX ORDER — GLYCOPYRROLATE 0.2 MG/ML
INJECTION INTRAMUSCULAR; INTRAVENOUS AS NEEDED
Status: DISCONTINUED | OUTPATIENT
Start: 2019-05-30 | End: 2019-05-30 | Stop reason: SURG

## 2019-05-30 RX ORDER — PROPOFOL 10 MG/ML
INJECTION, EMULSION INTRAVENOUS AS NEEDED
Status: DISCONTINUED | OUTPATIENT
Start: 2019-05-30 | End: 2019-05-30 | Stop reason: SURG

## 2019-05-30 RX ORDER — CEFAZOLIN SODIUM 2 G/50ML
SOLUTION INTRAVENOUS AS NEEDED
Status: DISCONTINUED | OUTPATIENT
Start: 2019-05-30 | End: 2019-05-30 | Stop reason: SURG

## 2019-05-30 RX ORDER — HYDROCODONE BITARTRATE AND ACETAMINOPHEN 5; 325 MG/1; MG/1
2 TABLET ORAL EVERY 6 HOURS PRN
Status: DISCONTINUED | OUTPATIENT
Start: 2019-05-30 | End: 2019-05-30 | Stop reason: HOSPADM

## 2019-05-30 RX ORDER — SODIUM CHLORIDE 9 MG/ML
125 INJECTION, SOLUTION INTRAVENOUS CONTINUOUS
Status: DISCONTINUED | OUTPATIENT
Start: 2019-05-30 | End: 2019-05-30 | Stop reason: HOSPADM

## 2019-05-30 RX ORDER — DEXAMETHASONE SODIUM PHOSPHATE 10 MG/ML
INJECTION, SOLUTION INTRAMUSCULAR; INTRAVENOUS AS NEEDED
Status: DISCONTINUED | OUTPATIENT
Start: 2019-05-30 | End: 2019-05-30 | Stop reason: SURG

## 2019-05-30 RX ORDER — MORPHINE SULFATE 10 MG/ML
2 INJECTION, SOLUTION INTRAMUSCULAR; INTRAVENOUS
Status: DISCONTINUED | OUTPATIENT
Start: 2019-05-30 | End: 2019-05-30 | Stop reason: HOSPADM

## 2019-05-30 RX ORDER — SODIUM CHLORIDE, SODIUM LACTATE, POTASSIUM CHLORIDE, CALCIUM CHLORIDE 600; 310; 30; 20 MG/100ML; MG/100ML; MG/100ML; MG/100ML
125 INJECTION, SOLUTION INTRAVENOUS CONTINUOUS
Status: DISCONTINUED | OUTPATIENT
Start: 2019-05-30 | End: 2019-05-30 | Stop reason: HOSPADM

## 2019-05-30 RX ORDER — FENTANYL CITRATE/PF 50 MCG/ML
25 SYRINGE (ML) INJECTION
Status: COMPLETED | OUTPATIENT
Start: 2019-05-30 | End: 2019-05-30

## 2019-05-30 RX ORDER — FENTANYL CITRATE 50 UG/ML
INJECTION, SOLUTION INTRAMUSCULAR; INTRAVENOUS AS NEEDED
Status: DISCONTINUED | OUTPATIENT
Start: 2019-05-30 | End: 2019-05-30 | Stop reason: SURG

## 2019-05-30 RX ORDER — HYDROCODONE BITARTRATE AND ACETAMINOPHEN 5; 325 MG/1; MG/1
1 TABLET ORAL EVERY 4 HOURS PRN
Status: DISCONTINUED | OUTPATIENT
Start: 2019-05-30 | End: 2019-05-30 | Stop reason: HOSPADM

## 2019-05-30 RX ORDER — ROCURONIUM BROMIDE 10 MG/ML
INJECTION, SOLUTION INTRAVENOUS AS NEEDED
Status: DISCONTINUED | OUTPATIENT
Start: 2019-05-30 | End: 2019-05-30 | Stop reason: SURG

## 2019-05-30 RX ORDER — BUPIVACAINE HYDROCHLORIDE AND EPINEPHRINE 2.5; 5 MG/ML; UG/ML
INJECTION, SOLUTION EPIDURAL; INFILTRATION; INTRACAUDAL; PERINEURAL AS NEEDED
Status: DISCONTINUED | OUTPATIENT
Start: 2019-05-30 | End: 2019-05-30 | Stop reason: HOSPADM

## 2019-05-30 RX ADMIN — MIDAZOLAM 2 MG: 1 INJECTION INTRAMUSCULAR; INTRAVENOUS at 12:42

## 2019-05-30 RX ADMIN — SODIUM CHLORIDE: 0.9 INJECTION, SOLUTION INTRAVENOUS at 12:49

## 2019-05-30 RX ADMIN — LIDOCAINE HYDROCHLORIDE 50 MG: 10 INJECTION, SOLUTION INFILTRATION; PERINEURAL at 12:54

## 2019-05-30 RX ADMIN — ROCURONIUM BROMIDE 20 MG: 10 INJECTION, SOLUTION INTRAVENOUS at 13:42

## 2019-05-30 RX ADMIN — HYDROCODONE BITARTRATE AND ACETAMINOPHEN 1 TABLET: 5; 325 TABLET ORAL at 17:01

## 2019-05-30 RX ADMIN — ROCURONIUM BROMIDE 5 MG: 10 INJECTION, SOLUTION INTRAVENOUS at 12:54

## 2019-05-30 RX ADMIN — SODIUM CHLORIDE 125 ML/HR: 0.9 INJECTION, SOLUTION INTRAVENOUS at 10:54

## 2019-05-30 RX ADMIN — FENTANYL CITRATE 100 MCG: 50 INJECTION, SOLUTION INTRAMUSCULAR; INTRAVENOUS at 13:10

## 2019-05-30 RX ADMIN — GLYCOPYRROLATE 0.5 MG: 0.2 INJECTION INTRAMUSCULAR; INTRAVENOUS at 15:04

## 2019-05-30 RX ADMIN — Medication 100 MG: at 12:54

## 2019-05-30 RX ADMIN — FENTANYL CITRATE 100 MCG: 50 INJECTION, SOLUTION INTRAMUSCULAR; INTRAVENOUS at 14:24

## 2019-05-30 RX ADMIN — FENTANYL CITRATE 50 MCG: 50 INJECTION, SOLUTION INTRAMUSCULAR; INTRAVENOUS at 13:45

## 2019-05-30 RX ADMIN — DEXAMETHASONE SODIUM PHOSPHATE 6 MG: 10 INJECTION, SOLUTION INTRAMUSCULAR; INTRAVENOUS at 14:55

## 2019-05-30 RX ADMIN — FENTANYL CITRATE 25 MCG: 50 INJECTION, SOLUTION INTRAMUSCULAR; INTRAVENOUS at 15:42

## 2019-05-30 RX ADMIN — ROCURONIUM BROMIDE 25 MG: 10 INJECTION, SOLUTION INTRAVENOUS at 12:59

## 2019-05-30 RX ADMIN — PROPOFOL 200 MG: 10 INJECTION, EMULSION INTRAVENOUS at 12:54

## 2019-05-30 RX ADMIN — FENTANYL CITRATE 25 MCG: 50 INJECTION, SOLUTION INTRAMUSCULAR; INTRAVENOUS at 15:37

## 2019-05-30 RX ADMIN — CEFAZOLIN SODIUM 2000 MG: 2 SOLUTION INTRAVENOUS at 12:57

## 2019-05-30 RX ADMIN — SODIUM CHLORIDE: 0.9 INJECTION, SOLUTION INTRAVENOUS at 14:15

## 2019-05-30 RX ADMIN — FENTANYL CITRATE 25 MCG: 50 INJECTION, SOLUTION INTRAMUSCULAR; INTRAVENOUS at 15:54

## 2019-05-30 RX ADMIN — NEOSTIGMINE METHYLSULFATE 3 MG: 1 INJECTION, SOLUTION INTRAVENOUS at 15:04

## 2019-05-30 RX ADMIN — ONDANSETRON HYDROCHLORIDE 4 MG: 2 INJECTION, SOLUTION INTRAVENOUS at 14:53

## 2019-05-30 RX ADMIN — FENTANYL CITRATE 25 MCG: 50 INJECTION, SOLUTION INTRAMUSCULAR; INTRAVENOUS at 15:47

## 2019-05-30 RX ADMIN — FENTANYL CITRATE 50 MCG: 50 INJECTION, SOLUTION INTRAMUSCULAR; INTRAVENOUS at 12:52

## 2019-05-30 RX ADMIN — SODIUM CHLORIDE: 0.9 INJECTION, SOLUTION INTRAVENOUS at 14:06

## 2019-05-30 RX ADMIN — ROCURONIUM BROMIDE 10 MG: 10 INJECTION, SOLUTION INTRAVENOUS at 14:17

## 2019-06-03 ENCOUNTER — TELEPHONE (OUTPATIENT)
Dept: SURGERY | Facility: HOSPITAL | Age: 64
End: 2019-06-03

## 2019-06-09 DIAGNOSIS — E11.9 TYPE 2 DIABETES MELLITUS WITHOUT COMPLICATION, WITHOUT LONG-TERM CURRENT USE OF INSULIN (HCC): ICD-10-CM

## 2019-06-12 ENCOUNTER — OFFICE VISIT (OUTPATIENT)
Dept: SURGERY | Facility: MEDICAL CENTER | Age: 64
End: 2019-06-12

## 2019-06-12 VITALS — BODY MASS INDEX: 31.04 KG/M2 | TEMPERATURE: 96.7 F | WEIGHT: 197.8 LBS | HEIGHT: 67 IN

## 2019-06-12 DIAGNOSIS — Z98.890 STATUS POST LAPAROSCOPIC HERNIA REPAIR: Primary | ICD-10-CM

## 2019-06-12 DIAGNOSIS — Z87.19 STATUS POST LAPAROSCOPIC HERNIA REPAIR: Primary | ICD-10-CM

## 2019-06-12 PROCEDURE — 99024 POSTOP FOLLOW-UP VISIT: CPT | Performed by: SURGERY

## 2019-06-17 ENCOUNTER — OFFICE VISIT (OUTPATIENT)
Dept: FAMILY MEDICINE CLINIC | Facility: CLINIC | Age: 64
End: 2019-06-17
Payer: COMMERCIAL

## 2019-06-17 VITALS
HEIGHT: 67 IN | WEIGHT: 195 LBS | HEART RATE: 70 BPM | SYSTOLIC BLOOD PRESSURE: 128 MMHG | TEMPERATURE: 99.4 F | BODY MASS INDEX: 30.61 KG/M2 | DIASTOLIC BLOOD PRESSURE: 70 MMHG

## 2019-06-17 DIAGNOSIS — I10 ESSENTIAL HYPERTENSION: ICD-10-CM

## 2019-06-17 DIAGNOSIS — R05.9 COUGH: ICD-10-CM

## 2019-06-17 DIAGNOSIS — J01.90 ACUTE NON-RECURRENT SINUSITIS, UNSPECIFIED LOCATION: Primary | ICD-10-CM

## 2019-06-17 PROCEDURE — 99213 OFFICE O/P EST LOW 20 MIN: CPT | Performed by: FAMILY MEDICINE

## 2019-06-17 PROCEDURE — 3074F SYST BP LT 130 MM HG: CPT | Performed by: FAMILY MEDICINE

## 2019-06-17 PROCEDURE — 1036F TOBACCO NON-USER: CPT | Performed by: FAMILY MEDICINE

## 2019-06-17 PROCEDURE — 3078F DIAST BP <80 MM HG: CPT | Performed by: FAMILY MEDICINE

## 2019-06-17 PROCEDURE — 3008F BODY MASS INDEX DOCD: CPT | Performed by: FAMILY MEDICINE

## 2019-06-17 RX ORDER — PROMETHAZINE HYDROCHLORIDE AND CODEINE PHOSPHATE 6.25; 1 MG/5ML; MG/5ML
5 SYRUP ORAL EVERY 4 HOURS PRN
Qty: 120 ML | Refills: 0 | Status: SHIPPED | OUTPATIENT
Start: 2019-06-17 | End: 2019-08-28 | Stop reason: ALTCHOICE

## 2019-06-17 RX ORDER — AZITHROMYCIN 500 MG/1
500 TABLET, FILM COATED ORAL DAILY
Qty: 3 TABLET | Refills: 0 | Status: SHIPPED | OUTPATIENT
Start: 2019-06-17 | End: 2019-06-20

## 2019-06-26 DIAGNOSIS — E78.2 MIXED HYPERLIPIDEMIA: ICD-10-CM

## 2019-06-26 DIAGNOSIS — I10 HYPERTENSION, UNSPECIFIED TYPE: ICD-10-CM

## 2019-06-26 DIAGNOSIS — K21.9 GASTROESOPHAGEAL REFLUX DISEASE WITHOUT ESOPHAGITIS: ICD-10-CM

## 2019-06-26 PROCEDURE — 4010F ACE/ARB THERAPY RXD/TAKEN: CPT | Performed by: PHYSICIAN ASSISTANT

## 2019-06-26 RX ORDER — LISINOPRIL 10 MG/1
TABLET ORAL
Qty: 90 TABLET | Refills: 1 | Status: SHIPPED | OUTPATIENT
Start: 2019-06-26 | End: 2019-12-26

## 2019-06-26 RX ORDER — PRAVASTATIN SODIUM 20 MG
TABLET ORAL
Qty: 90 TABLET | Refills: 3 | Status: SHIPPED | OUTPATIENT
Start: 2019-06-26 | End: 2020-04-22 | Stop reason: SDUPTHER

## 2019-06-26 RX ORDER — PANTOPRAZOLE SODIUM 20 MG/1
TABLET, DELAYED RELEASE ORAL
Qty: 90 TABLET | Refills: 3 | Status: SHIPPED | OUTPATIENT
Start: 2019-06-26 | End: 2020-04-01 | Stop reason: SDUPTHER

## 2019-07-09 ENCOUNTER — TRANSCRIBE ORDERS (OUTPATIENT)
Dept: FAMILY MEDICINE CLINIC | Facility: CLINIC | Age: 64
End: 2019-07-09

## 2019-07-09 DIAGNOSIS — B07.8 OTHER VIRAL WARTS: ICD-10-CM

## 2019-07-09 DIAGNOSIS — L57.8 OTHER SKIN CHANGES DUE TO CHRONIC EXPOSURE TO NONIONIZING RADIATION: ICD-10-CM

## 2019-07-09 DIAGNOSIS — Z85.820 PERSONAL HISTORY OF MALIGNANT MELANOMA OF SKIN: Primary | ICD-10-CM

## 2019-08-17 LAB — HBA1C MFR BLD HPLC: 6.3 %

## 2019-08-27 PROBLEM — J01.90 ACUTE NON-RECURRENT SINUSITIS: Status: RESOLVED | Noted: 2019-06-17 | Resolved: 2019-08-27

## 2019-08-27 PROBLEM — J06.9 ACUTE UPPER RESPIRATORY INFECTION: Status: RESOLVED | Noted: 2019-05-21 | Resolved: 2019-08-27

## 2019-08-27 PROBLEM — R05.9 COUGH: Status: RESOLVED | Noted: 2018-12-04 | Resolved: 2019-08-27

## 2019-08-27 PROBLEM — H25.093 AGE-RELATED INCIPIENT CATARACT OF BOTH EYES: Status: RESOLVED | Noted: 2019-01-15 | Resolved: 2019-08-27

## 2019-08-27 PROBLEM — Z01.818 PRE-OP EXAMINATION: Status: RESOLVED | Noted: 2019-01-14 | Resolved: 2019-08-27

## 2019-08-28 ENCOUNTER — OFFICE VISIT (OUTPATIENT)
Dept: FAMILY MEDICINE CLINIC | Facility: CLINIC | Age: 64
End: 2019-08-28
Payer: COMMERCIAL

## 2019-08-28 VITALS
SYSTOLIC BLOOD PRESSURE: 122 MMHG | WEIGHT: 198 LBS | HEIGHT: 67 IN | BODY MASS INDEX: 31.08 KG/M2 | HEART RATE: 64 BPM | DIASTOLIC BLOOD PRESSURE: 70 MMHG

## 2019-08-28 DIAGNOSIS — D03.9 MELANOMA IN SITU, UNSPECIFIED SITE (HCC): ICD-10-CM

## 2019-08-28 DIAGNOSIS — M79.10 MYALGIA: ICD-10-CM

## 2019-08-28 DIAGNOSIS — E78.2 MIXED HYPERLIPIDEMIA: ICD-10-CM

## 2019-08-28 DIAGNOSIS — I10 ESSENTIAL HYPERTENSION: ICD-10-CM

## 2019-08-28 DIAGNOSIS — E11.9 TYPE 2 DIABETES MELLITUS WITHOUT COMPLICATION, WITHOUT LONG-TERM CURRENT USE OF INSULIN (HCC): Primary | ICD-10-CM

## 2019-08-28 DIAGNOSIS — Z12.39 BREAST CANCER SCREENING: ICD-10-CM

## 2019-08-28 DIAGNOSIS — K21.9 GERD WITHOUT ESOPHAGITIS: ICD-10-CM

## 2019-08-28 PROCEDURE — 1036F TOBACCO NON-USER: CPT | Performed by: PHYSICIAN ASSISTANT

## 2019-08-28 PROCEDURE — 3008F BODY MASS INDEX DOCD: CPT | Performed by: PHYSICIAN ASSISTANT

## 2019-08-28 PROCEDURE — 99214 OFFICE O/P EST MOD 30 MIN: CPT | Performed by: PHYSICIAN ASSISTANT

## 2019-08-28 PROCEDURE — 3074F SYST BP LT 130 MM HG: CPT | Performed by: PHYSICIAN ASSISTANT

## 2019-08-28 NOTE — ASSESSMENT & PLAN NOTE
LDL stable at 83 which is goal for diabetic however her triglycerides have yet increase to 337  Patient needs to bring down her simple carbohydrates as she is very close to needing a separate medication for triglyceride treatment such as a fenofibrate

## 2019-08-28 NOTE — PATIENT INSTRUCTIONS
Problem List Items Addressed This Visit        Digestive    GERD without esophagitis       Endocrine    Type 2 diabetes mellitus without complication, without long-term current use of insulin ([facility] [address] ) - Primary       Stable with a fasting sugar of 136 and hemoglobin A1c of 6 3  Continue Glucophage and recheck 6 months  Microalbumin with next labs  Relevant Orders    Microalbumin / creatinine urine ratio    Hemoglobin A1C    Comprehensive metabolic panel       Cardiovascular and Mediastinum    Essential hypertension       Stable continue current medication  Other    Mixed hyperlipidemia       LDL stable at 83 which is goal for diabetic however her triglycerides have yet increase to 337  Patient needs to bring down her simple carbohydrates as she is very close to needing a separate medication for triglyceride treatment such as a fenofibrate           Relevant Orders    Lipid Panel with Direct LDL reflex    Melanoma in situ ([facility] [address] )      Other Visit Diagnoses     Myalgia        Relevant Orders    Vitamin D 25 hydroxy    Breast cancer screening        Relevant Orders    Mammo screening bilateral w 3d & cad

## 2019-08-28 NOTE — ASSESSMENT & PLAN NOTE
Stable with a fasting sugar of 136 and hemoglobin A1c of 6 3  Continue Glucophage and recheck 6 months  Microalbumin with next labs

## 2019-08-28 NOTE — PROGRESS NOTES
Assessment and Plan:    Problem List Items Addressed This Visit        Digestive    GERD without esophagitis       Endocrine    Type 2 diabetes mellitus without complication, without long-term current use of insulin ([address] ) - Primary       Stable with a fasting sugar of 136 and hemoglobin A1c of 6 3  Continue Glucophage and recheck 6 months  Microalbumin with next labs  Relevant Orders    Microalbumin / creatinine urine ratio    Hemoglobin A1C    Comprehensive metabolic panel       Cardiovascular and Mediastinum    Essential hypertension       Stable continue current medication  Other    Mixed hyperlipidemia       LDL stable at 83 which is goal for diabetic however her triglycerides have yet increase to 337  Patient needs to bring down her simple carbohydrates as she is very close to needing a separate medication for triglyceride treatment such as a fenofibrate  Relevant Orders    Lipid Panel with Direct LDL reflex    Melanoma in situ ([facility] [address] )      Other Visit Diagnoses     Myalgia        Relevant Orders    Vitamin D 25 hydroxy    Breast cancer screening        Relevant Orders    Mammo screening bilateral w 3d & cad                 Diagnoses and all orders for this visit:    Type 2 diabetes mellitus without complication, without long-term current use of insulin ([address] )  -     Microalbumin / creatinine urine ratio; Future  -     Hemoglobin A1C; Future  -     Comprehensive metabolic panel; Future    Essential hypertension    Mixed hyperlipidemia  -     Lipid Panel with Direct LDL reflex; Future    Melanoma in situ, unspecified site [facility])    GERD without esophagitis    Myalgia  -     Vitamin D 25 hydroxy; Future    Breast cancer screening  -     Mammo screening bilateral w 3d & cad; Future              Subjective:      Patient ID: Chiquis Villatoro is a 61 y o  female      CC:    Chief Complaint   Patient presents with    GERD    Diabetes    Hypertension    Hyperlipidemia     Review BW results  Pt is due for diabetic foot exam  Pt states there are no other concerns at this time  -  [facility]       HPI:      Mayco Bennett is here for chronic conditions f/u including the diagnosis of Type 2 diabetes mellitus without complication, without long-term current use of insulin ([facility])  (primary encounter diagnosis)  Essential hypertension  Mixed hyperlipidemia  Melanoma in situ, unspecified site ([facility])  Gerd without esophagitis  Myalgia  Breast cancer screening   Pt  states they are taking all medications as directed without complaints or side effects  Pt  had labs done prior to today's visit which included Recent Results (from the past 672 hour(s))  -Hemoglobin A1C  Collection Time: 08/17/19 12:00 AM       Result                      Value             Ref Range           Hemoglobin A1C              6 3                                    The following portions of the patient's history were reviewed and updated as appropriate: allergies, current medications, past family history, past medical history, past social history, past surgical history and problem list       Review of Systems   Constitutional: Negative  HENT: Negative  Eyes: Negative  Respiratory: Negative  Cardiovascular: Negative  Gastrointestinal: Negative  Endocrine: Negative  Genitourinary: Negative  Musculoskeletal: Negative  Skin: Negative  Allergic/Immunologic: Negative  Neurological: Negative  Hematological: Negative  Psychiatric/Behavioral: Negative            Data to review:       Objective:    Vitals:    08/28/19 1512   BP: 122/70   BP Location: Left arm   Patient Position: Sitting   Cuff Size: Large   Pulse: 64   Weight: 89 8 kg (198 lb)   Height: 5' 7" (1 702 m)   Right Foot/Ankle   Right Foot Inspection  Skin Exam: skin normal skin not intact, no dry skin, no warmth, no callus, no erythema, no maceration, no abnormal color, no pre-ulcer, no ulcer and no callus                          Toe Exam: ROM and strength within normal limitsno swelling, no tenderness, erythema and  no right toe deformity  Sensory     Proprioception: intact   Monofilament testing: intact  Vascular  Capillary refills: < 3 seconds  The right DP pulse is 2+  The right PT pulse is 2+  Right Toe  - Comprehensive Exam  Ecchymosis: none  Arch: normal  Hammertoes: absent  Claw Toes: absent  Swelling: none   Tenderness: none         Left Foot/Ankle  Left Foot Inspection  Skin Exam: skin normalskin not intact, no dry skin, no warmth, no erythema, no maceration, normal color, no pre-ulcer, no ulcer and no callus                         Toe Exam: ROM and strength within normal limitsno swelling, no tenderness, no erythema and no left toe deformity                   Sensory     Proprioception: intact  Monofilament: intact  Vascular  Capillary refills: < 3 seconds  The left DP pulse is 2+  The left PT pulse is 2+  Left Toe  - Comprehensive Exam  Ecchymosis: none  Arch: normal  Hammertoes: absent  Claw toes: absent  Swelling: none   Tenderness: none       Assign Risk Category:  No deformity present; No loss of protective sensation; No weak pulses       Risk: 0         Physical Exam   Constitutional: She is oriented to person, place, and time  She appears well-developed and well-nourished  No distress  HENT:   Head: Normocephalic and atraumatic  Eyes: Conjunctivae are normal  Right eye exhibits no discharge  Left eye exhibits no discharge  Neck: Neck supple  Carotid bruit is not present  Cardiovascular: Normal rate, regular rhythm and normal heart sounds  Exam reveals no gallop and no friction rub  Pulses are no weak pulses  No murmur heard  Pulses:       Dorsalis pedis pulses are 2+ on the right side, and 2+ on the left side  Posterior tibial pulses are 2+ on the right side, and 2+ on the left side  Pulmonary/Chest: Effort normal and breath sounds normal  No respiratory distress  She has no wheezes  She has no rales     Feet:   Right Foot:   Skin Integrity: Negative for ulcer, skin breakdown, erythema, warmth, callus or dry skin  Left Foot:   Skin Integrity: Negative for ulcer, skin breakdown, erythema, warmth, callus or dry skin  Neurological: She is alert and oriented to person, place, and time  Skin: Skin is warm and dry  She is not diaphoretic  Psychiatric: She has a normal mood and affect  Judgment normal    Nursing note and vitals reviewed

## 2019-09-03 ENCOUNTER — TRANSCRIBE ORDERS (OUTPATIENT)
Dept: FAMILY MEDICINE CLINIC | Facility: CLINIC | Age: 64
End: 2019-09-03

## 2019-09-03 DIAGNOSIS — H25.13 AGE-RELATED NUCLEAR CATARACT OF BOTH EYES: Primary | ICD-10-CM

## 2019-09-09 ENCOUNTER — DOCTOR'S OFFICE (OUTPATIENT)
Dept: URBAN - METROPOLITAN AREA CLINIC 136 | Facility: CLINIC | Age: 64
Setting detail: OPHTHALMOLOGY
End: 2019-09-09
Payer: COMMERCIAL

## 2019-09-09 DIAGNOSIS — H27.9: ICD-10-CM

## 2019-09-09 DIAGNOSIS — H35.3132: ICD-10-CM

## 2019-09-09 DIAGNOSIS — H43.812: ICD-10-CM

## 2019-09-09 DIAGNOSIS — H27.8: ICD-10-CM

## 2019-09-09 PROCEDURE — 92014 COMPRE OPH EXAM EST PT 1/>: CPT | Performed by: OPTOMETRIST

## 2019-09-09 PROCEDURE — 92134 CPTRZ OPH DX IMG PST SGM RTA: CPT | Performed by: OPTOMETRIST

## 2019-09-09 ASSESSMENT — REFRACTION_CURRENTRX
OD_SPHERE: +2.50
OS_SPHERE: -1.25
OD_SPHERE: -1.25
OS_OVR_VA: 20/
OS_OVR_VA: 20/
OD_OVR_VA: 20/
OS_VPRISM_DIRECTION: BF
OD_ADD: +2.50
OD_VPRISM_DIRECTION: BF
OD_OVR_VA: 20/
OD_OVR_VA: 20/
OS_AXIS: 166
OS_CYLINDER: -2.00
OS_ADD: +2.50
OS_OVR_VA: 20/
OS_VPRISM_DIRECTION: SV
OD_AXIS: 012
OD_CYLINDER: -2.00
OD_VPRISM_DIRECTION: SV
OS_SPHERE: +2.50

## 2019-09-09 ASSESSMENT — REFRACTION_MANIFEST
OD_SPHERE: +2.50
OU_VA: 20/
OS_CYLINDER: SPH
OS_VA2: 20/20
OS_VA1: 20/20-2
OD_SPHERE: -0.75
OS_SPHERE: -0.50
OS_VA3: 20/
OD_CYLINDER: SPH
OD_VA3: 20/
OD_VA2: 20/
OS_VA1: 20/
OU_VA: 20/20-2
OS_SPHERE: +2.50
OD_VA1: 20/30+2
OD_VA3: 20/
OS_VA3: 20/
OD_CYLINDER: SPH
OD_VA1: 20/
OD_VA2: 20/30
OS_CYLINDER: SPH
OS_VA2: 20/

## 2019-09-09 ASSESSMENT — SPHEQUIV_DERIVED
OS_SPHEQUIV: -0.125
OD_SPHEQUIV: -0.125

## 2019-09-09 ASSESSMENT — LID POSITION - DERMATOCHALASIS
OD_DERMATOCHALASIS: RUL T
OS_DERMATOCHALASIS: LUL T

## 2019-09-09 ASSESSMENT — CONFRONTATIONAL VISUAL FIELD TEST (CVF)
OS_FINDINGS: FULL
OD_FINDINGS: FULL

## 2019-09-09 ASSESSMENT — KERATOMETRY
OD_K2POWER_DIOPTERS: 44.25
OS_AXISANGLE_DEGREES: 139
OD_AXISANGLE_DEGREES: 180
OD_K1POWER_DIOPTERS: 42.25
OS_K2POWER_DIOPTERS: 44.75

## 2019-09-09 ASSESSMENT — REFRACTION_AUTOREFRACTION
OS_CYLINDER: -1.25
OD_AXIS: 013
OS_SPHERE: +0.50
OD_SPHERE: +0.25
OD_CYLINDER: -0.75
OS_AXIS: 052

## 2019-09-09 ASSESSMENT — AXIALLENGTH_DERIVED: OD_AL: 23.7333

## 2019-09-09 ASSESSMENT — VISUAL ACUITY
OS_BCVA: 20/20-1
OD_BCVA: 20/25-1

## 2019-09-09 ASSESSMENT — CORNEAL EDEMA CLINICAL DESCRIPTION: OS_CORNEALEDEMA: AT INCISION SITE

## 2019-11-13 ENCOUNTER — TRANSCRIBE ORDERS (OUTPATIENT)
Dept: FAMILY MEDICINE CLINIC | Facility: CLINIC | Age: 64
End: 2019-11-13

## 2019-11-13 DIAGNOSIS — M25.561 PAIN IN RIGHT KNEE: Primary | ICD-10-CM

## 2019-11-19 ENCOUNTER — TRANSCRIBE ORDERS (OUTPATIENT)
Dept: FAMILY MEDICINE CLINIC | Facility: CLINIC | Age: 64
End: 2019-11-19

## 2019-11-19 DIAGNOSIS — M25.569 PAIN IN UNSPECIFIED KNEE: Primary | ICD-10-CM

## 2019-11-21 ENCOUNTER — EVALUATION (OUTPATIENT)
Dept: PHYSICAL THERAPY | Facility: CLINIC | Age: 64
End: 2019-11-21

## 2019-11-21 DIAGNOSIS — M25.561 CHRONIC PAIN OF RIGHT KNEE: Primary | ICD-10-CM

## 2019-11-21 DIAGNOSIS — G89.29 CHRONIC PAIN OF RIGHT KNEE: Primary | ICD-10-CM

## 2019-11-21 PROCEDURE — 97161 PT EVAL LOW COMPLEX 20 MIN: CPT

## 2019-11-21 NOTE — LETTER
2019    MD Jackie Mohan [address] [facility]    Patient: Bassam Moya   YOB: 1955   Date of Visit: 2019     Encounter Diagnosis     ICD-10-CM    1  Chronic pain of right knee M25 561     G89 29        Dear Dr Tania Ojeda: Thank you for your recent referral of Bassam Moya  Please review the attached evaluation summary from Shannon's recent visit  Please verify that you agree with the plan of care by signing the attached order  If you have any questions or concerns, please do not hesitate to call  I sincerely appreciate the opportunity to share in the care of one of your patients and hope to have another opportunity to work with you in the near future  Sincerely,    Radha Johansen, PT      Referring Provider:      I certify that I have read the below Plan of Care and certify the need for these services furnished under this plan of treatment while under my care  MD Jackie Mohan [address]: [phone]          PT Evaluation     Today's date: 2019  Patient name: Bassam Moya  : 1955  MRN: [id number]  Referring provider: Ariana Brand MD  Dx:   Encounter Diagnosis     ICD-10-CM    1  Chronic pain of right knee M25 561     G89 29        Start Time: 0815  Stop Time: 0845  Total time in clinic (min): 30 minutes    Assessment  Assessment details: Bassam Moya is a 61 y o  female presenting to PT with pain, decreased knee range of motion, decreased LE strength, balance deficits, and decreased tolerance to activity secondary to degenerative joint changes exacerbated by a fall 2 weeks ago  Patient would benefit from skilled PT services to address these impairments and to maximize function in order to improve quality of life   Thank you for the referral   Impairments: abnormal gait, abnormal or restricted ROM, activity intolerance, impaired balance, impaired physical strength, lacks appropriate home exercise program, pain with function and weight-bearing intolerance  Functional limitations: pain with ambulation and ascending/descending stairs, unable to participate in recreational activities due to pain, difficulty completing household chores and daily activitiesUnderstanding of Dx/Px/POC: good   Prognosis: good    Goals  STG  1) R knee ROM will improve 50% in 3 weeks  2) R knee strength will improve 1/2 grade in 3 weeks  3) B/L hip strength will improve 1/2 grade in 3 weeks  LTG  1) Patient is independent in HEP  2) Patient will ascend/descend one flight of stairs independently with no increased pain in 6 weeks  3) Ambulation will be improved to PLOF in 6 weeks  4) FOTO score will increase to 58 or greater in 6 weeks  Plan  Patient would benefit from: skilled physical therapy  Planned modality interventions: cryotherapy and thermotherapy: hydrocollator packs  Planned therapy interventions: joint mobilization, manual therapy, muscle pump exercises, neuromuscular re-education, patient education, strengthening, stretching, therapeutic activities, therapeutic exercise, home exercise program, gait training, flexibility, balance/weight bearing training, abdominal trunk stabilization and postural training  Frequency: 1-2x week  Duration in weeks: 6  Treatment plan discussed with: patient        Subjective Evaluation    History of Present Illness  Mechanism of injury: Patient presents with R knee pain beginning about 2 weeks ago after she tripped going down the stairs while carrying furniture  She says most of the pain is on the outside portion of her knee  She had x-rays, which showed arthritic/degenerative changes  She is walking with a SPC in R hand, tells me she has trouble walking up and down the steps, she can't carry laundry or get much done around the house  She did have a CSI one week ago    Quality of life: good    Pain  Current pain rating: 3  At best pain ratin  At worst pain ratin  Quality: discomfort and sharp  Relieving factors: medications (taking Advil tid, Aleve at night to sleep)  Aggravating factors: stair climbing, walking and lifting  Progression: no change    Hand dominance: right    Treatments  Previous treatment: injection treatment and medication  Current treatment: physical therapy  Patient Goals  Patient goals for therapy: decreased pain and independence with ADLs/IADLs  Patient goal: be able to get things done around the house        Objective     Observations     Right Knee   Positive for edema  Tenderness     Right Knee   Tenderness in the lateral joint line, quadriceps tendon and superior patella  Neurological Testing     Sensation     Knee     Right Knee   Intact: light touch     Additional Neurological Details  Normal LE neuro screen  Active Range of Motion   Left Knee   Flexion: 118 degrees   Extension: WFL    Right Knee   Flexion: 95 degrees with pain  Extension: -5 degrees with pain    Passive Range of Motion     Right Knee   Flexion: 100 degrees with pain  Extension: 0 degrees with pain    Mobility     Additional Mobility Details  Patellar mobility WFL in all directions    Strength/Myotome Testing     Right Hip   Planes of Motion   Flexion: 4-  Extension: 4-  Abduction: 4-  Adduction: 4    Isolated Muscles   Gluteus maximums: 4-  Gluteus medius: 4-    Right Knee   Flexion: 4  Extension: 3+  Quadriceps contraction: fair    Additional Strength Details  Pain limiting knee extension strength    Tests     Right Knee   Positive lateral Rex, medial Rex and patellar compression  Negative anterior drawer, posterior drawer, valgus stress test at 30 degrees and varus stress test at 30 degrees       FOTO score: 46  Expected at discharge: 58       Precautions: HTN, Type II DM    Daily Treatment Diary     Manuals             R knee PROM Exercise Diary              Bike             Heel slides             Calf/HS stretch with strap             Glute bridge             SLR flexion             Clam shells                          LAQ             TB HS curls                          Leg Press             Mini squats             Fwd step ups             CC TKE                                                                                                                     Modalities

## 2019-11-21 NOTE — PROGRESS NOTES
PT Evaluation     Today's date: 2019  Patient name: Cali Bell  : 1955  MRN: [id number]  Referring provider: Camilla Caro MD  Dx:   Encounter Diagnosis     ICD-10-CM    1  Chronic pain of right knee M25 561     G89 29        Start Time: 815  Stop Time: 845  Total time in clinic (min): 30 minutes    Assessment  Assessment details: Cali Bell is a 61 y o  female presenting to PT with pain, decreased knee range of motion, decreased LE strength, balance deficits, and decreased tolerance to activity secondary to degenerative joint changes exacerbated by a fall 2 weeks ago  Patient would benefit from skilled PT services to address these impairments and to maximize function in order to improve quality of life  Thank you for the referral   Impairments: abnormal gait, abnormal or restricted ROM, activity intolerance, impaired balance, impaired physical strength, lacks appropriate home exercise program, pain with function and weight-bearing intolerance  Functional limitations: pain with ambulation and ascending/descending stairs, unable to participate in recreational activities due to pain, difficulty completing household chores and daily activitiesUnderstanding of Dx/Px/POC: good   Prognosis: good    Goals  STG  1) R knee ROM will improve 50% in 3 weeks  2) R knee strength will improve 1/2 grade in 3 weeks  3) B/L hip strength will improve 1/2 grade in 3 weeks  LTG  1) Patient is independent in HEP  2) Patient will ascend/descend one flight of stairs independently with no increased pain in 6 weeks  3) Ambulation will be improved to PLOF in 6 weeks  4) FOTO score will increase to 58 or greater in 6 weeks      Plan  Patient would benefit from: skilled physical therapy  Planned modality interventions: cryotherapy and thermotherapy: hydrocollator packs  Planned therapy interventions: joint mobilization, manual therapy, muscle pump exercises, neuromuscular re-education, patient education, strengthening, stretching, therapeutic activities, therapeutic exercise, home exercise program, gait training, flexibility, balance/weight bearing training, abdominal trunk stabilization and postural training  Frequency: 1-2x week  Duration in weeks: 6  Treatment plan discussed with: patient        Subjective Evaluation    History of Present Illness  Mechanism of injury: Patient presents with R knee pain beginning about 2 weeks ago after she tripped going down the stairs while carrying furniture  She says most of the pain is on the outside portion of her knee  She had x-rays, which showed arthritic/degenerative changes  She is walking with a SPC in R hand, tells me she has trouble walking up and down the steps, she can't carry laundry or get much done around the house  She did have a CSI one week ago  Quality of life: good    Pain  Current pain rating: 3  At best pain ratin  At worst pain ratin  Quality: discomfort and sharp  Relieving factors: medications (taking Advil tid, Aleve at night to sleep)  Aggravating factors: stair climbing, walking and lifting  Progression: no change    Hand dominance: right    Treatments  Previous treatment: injection treatment and medication  Current treatment: physical therapy  Patient Goals  Patient goals for therapy: decreased pain and independence with ADLs/IADLs  Patient goal: be able to get things done around the house        Objective     Observations     Right Knee   Positive for edema  Tenderness     Right Knee   Tenderness in the lateral joint line, quadriceps tendon and superior patella  Neurological Testing     Sensation     Knee     Right Knee   Intact: light touch     Additional Neurological Details  Normal LE neuro screen      Active Range of Motion   Left Knee   Flexion: 118 degrees   Extension: WFL    Right Knee   Flexion: 95 degrees with pain  Extension: -5 degrees with pain    Passive Range of Motion     Right Knee   Flexion: 100 degrees with pain  Extension: 0 degrees with pain    Mobility     Additional Mobility Details  Patellar mobility WFL in all directions    Strength/Myotome Testing     Right Hip   Planes of Motion   Flexion: 4-  Extension: 4-  Abduction: 4-  Adduction: 4    Isolated Muscles   Gluteus maximums: 4-  Gluteus medius: 4-    Right Knee   Flexion: 4  Extension: 3+  Quadriceps contraction: fair    Additional Strength Details  Pain limiting knee extension strength    Tests     Right Knee   Positive lateral Rex, medial Rex and patellar compression  Negative anterior drawer, posterior drawer, valgus stress test at 30 degrees and varus stress test at 30 degrees       FOTO score: 46  Expected at discharge: 58       Precautions: HTN, Type II DM    Daily Treatment Diary     Manuals             R knee PROM                                                    Exercise Diary              Bike             Heel slides             Calf/HS stretch with strap             Glute bridge             SLR flexion             Clam shells                          LAQ             TB HS curls                          Leg Press             Mini squats             Fwd step ups             CC TKE                                                                                                                     Modalities

## 2019-11-27 ENCOUNTER — OFFICE VISIT (OUTPATIENT)
Dept: PHYSICAL THERAPY | Facility: CLINIC | Age: 64
End: 2019-11-27
Payer: COMMERCIAL

## 2019-11-27 DIAGNOSIS — G89.29 CHRONIC PAIN OF RIGHT KNEE: Primary | ICD-10-CM

## 2019-11-27 DIAGNOSIS — M25.561 CHRONIC PAIN OF RIGHT KNEE: Primary | ICD-10-CM

## 2019-11-27 PROCEDURE — 97112 NEUROMUSCULAR REEDUCATION: CPT

## 2019-11-27 PROCEDURE — 97110 THERAPEUTIC EXERCISES: CPT

## 2019-11-27 NOTE — PROGRESS NOTES
Daily Note     Today's date: 2019  Patient name: Evonne Dandy  : 1955  MRN: [id number]  Referring provider: Max Greene MD  Dx:   Encounter Diagnosis     ICD-10-CM    1  Chronic pain of right knee M25 561     G89 [id number]                   Subjective: Patient reports she took 3 Advil at 7:00 a m with her pain level being a 7/10  Pain level decreased to a 4-5/10 after taking the Advil  She indicates she is taking 9 Advil a day  Patient reports she is driving and is not using the [facility], but is still having difficulty with the stairs noting more pain ascending the steps  Objective: See treatment diary below      Assessment: Tolerated treatment well  Patient did c/o considerable increased knee pain at the 6th rep of the mini squats and therefore the exercise was stopped  Patient would benefit from continued therapy  Plan: Continue therapy as tolerated per Plan of Care      Precautions: HTN, Type II DM    Daily Treatment Diary     Manuals             R knee PROM [place]                                                   Exercise Diary              Bike 5'            Heel slides 10"x10            Calf/HS stretch with strap 30"x3            Glute bridge 2x10            SLR flexion 2x10            Clam shells 2x10                         LAQ 2x10            TB HS curls GR  2x10                         Leg Press 65#  2x10            Mini squats 6x PAIN            Fwd step ups L1 2x10            CC TKE NV                                                                                                                    Modalities

## 2019-12-26 DIAGNOSIS — I10 HYPERTENSION, UNSPECIFIED TYPE: ICD-10-CM

## 2019-12-26 PROCEDURE — 4010F ACE/ARB THERAPY RXD/TAKEN: CPT | Performed by: FAMILY MEDICINE

## 2019-12-26 RX ORDER — LISINOPRIL 10 MG/1
TABLET ORAL
Qty: 90 TABLET | Refills: 1 | Status: SHIPPED | OUTPATIENT
Start: 2019-12-26 | End: 2020-04-22 | Stop reason: SDUPTHER

## 2020-01-30 ENCOUNTER — TRANSCRIBE ORDERS (OUTPATIENT)
Dept: FAMILY MEDICINE CLINIC | Facility: CLINIC | Age: 65
End: 2020-01-30

## 2020-01-30 DIAGNOSIS — L57.8 OTHER SKIN CHANGES DUE TO CHRONIC EXPOSURE TO NONIONIZING RADIATION: ICD-10-CM

## 2020-01-30 DIAGNOSIS — Z85.820 PERSONAL HISTORY OF MALIGNANT MELANOMA OF SKIN: Primary | ICD-10-CM

## 2020-01-30 DIAGNOSIS — B07.8 OTHER VIRAL WARTS: ICD-10-CM

## 2020-02-11 ENCOUNTER — TRANSCRIBE ORDERS (OUTPATIENT)
Dept: FAMILY MEDICINE CLINIC | Facility: CLINIC | Age: 65
End: 2020-02-11

## 2020-02-11 DIAGNOSIS — G89.29 OTHER CHRONIC PAIN: ICD-10-CM

## 2020-02-11 DIAGNOSIS — M25.561 PAIN IN RIGHT KNEE: Primary | ICD-10-CM

## 2020-03-12 ENCOUNTER — TRANSCRIBE ORDERS (OUTPATIENT)
Dept: FAMILY MEDICINE CLINIC | Facility: CLINIC | Age: 65
End: 2020-03-12

## 2020-03-12 DIAGNOSIS — H25.13 AGE-RELATED NUCLEAR CATARACT, BILATERAL: Primary | ICD-10-CM

## 2020-03-18 ENCOUNTER — DOCTOR'S OFFICE (OUTPATIENT)
Dept: URBAN - METROPOLITAN AREA CLINIC 136 | Facility: CLINIC | Age: 65
Setting detail: OPHTHALMOLOGY
End: 2020-03-18
Payer: COMMERCIAL

## 2020-03-18 DIAGNOSIS — H43.812: ICD-10-CM

## 2020-03-18 DIAGNOSIS — H27.8: ICD-10-CM

## 2020-03-18 DIAGNOSIS — H35.3132: ICD-10-CM

## 2020-03-18 DIAGNOSIS — H27.9: ICD-10-CM

## 2020-03-18 PROCEDURE — 92014 COMPRE OPH EXAM EST PT 1/>: CPT | Performed by: OPTOMETRIST

## 2020-03-18 PROCEDURE — 92134 CPTRZ OPH DX IMG PST SGM RTA: CPT | Performed by: OPTOMETRIST

## 2020-03-18 ASSESSMENT — REFRACTION_MANIFEST
OS_CYLINDER: SPH
OD_VA2: 20/30
OD_VA1: 20/30+2
OD_CYLINDER: SPH
OD_SPHERE: -0.75
OS_VA1: 20/20-2
OD_SPHERE: +2.50
OS_SPHERE: +2.50
OD_CYLINDER: SPH
OS_CYLINDER: SPH
OS_VA2: 20/20
OU_VA: 20/20-2
OS_SPHERE: -0.50

## 2020-03-18 ASSESSMENT — REFRACTION_AUTOREFRACTION
OS_SPHERE: -0.25
OD_SPHERE: +0.25
OS_CYLINDER: -1.25
OS_AXIS: 41
OD_CYLINDER: -1.25
OD_AXIS: 172

## 2020-03-18 ASSESSMENT — LID POSITION - DERMATOCHALASIS
OS_DERMATOCHALASIS: LUL T
OD_DERMATOCHALASIS: RUL T

## 2020-03-18 ASSESSMENT — REFRACTION_CURRENTRX
OD_VPRISM_DIRECTION: SV
OD_AXIS: 012
OS_OVR_VA: 20/
OD_OVR_VA: 20/
OS_ADD: +2.50
OD_ADD: +2.50
OS_VPRISM_DIRECTION: SV
OS_OVR_VA: 20/
OD_VPRISM_DIRECTION: BF
OD_OVR_VA: 20/
OD_CYLINDER: -2.00
OD_SPHERE: +2.50
OS_SPHERE: -1.25
OS_VPRISM_DIRECTION: BF
OS_AXIS: 166
OS_CYLINDER: -2.00
OD_SPHERE: -1.25
OS_SPHERE: +2.50

## 2020-03-18 ASSESSMENT — VISUAL ACUITY
OS_BCVA: 20/30-1
OD_BCVA: 20/30-1

## 2020-03-18 ASSESSMENT — SPHEQUIV_DERIVED
OD_SPHEQUIV: -0.375
OS_SPHEQUIV: -0.875

## 2020-03-18 ASSESSMENT — CONFRONTATIONAL VISUAL FIELD TEST (CVF)
OD_FINDINGS: FULL
OS_FINDINGS: FULL

## 2020-03-18 ASSESSMENT — AXIALLENGTH_DERIVED: OD_AL: 23.8323

## 2020-03-18 ASSESSMENT — KERATOMETRY
OS_K2POWER_DIOPTERS: 44.75
OD_K2POWER_DIOPTERS: 44.25
OD_K1POWER_DIOPTERS: 42.25
OS_AXISANGLE_DEGREES: 139
OD_AXISANGLE_DEGREES: 180

## 2020-03-18 ASSESSMENT — CORNEAL EDEMA CLINICAL DESCRIPTION: OS_CORNEALEDEMA: AT INCISION SITE

## 2020-04-01 DIAGNOSIS — K21.9 GASTROESOPHAGEAL REFLUX DISEASE WITHOUT ESOPHAGITIS: ICD-10-CM

## 2020-04-01 RX ORDER — PANTOPRAZOLE SODIUM 20 MG/1
20 TABLET, DELAYED RELEASE ORAL DAILY
Qty: 90 TABLET | Refills: 1 | Status: SHIPPED | OUTPATIENT
Start: 2020-04-01 | End: 2020-07-06 | Stop reason: SDUPTHER

## 2020-04-22 DIAGNOSIS — I10 HYPERTENSION, UNSPECIFIED TYPE: ICD-10-CM

## 2020-04-22 DIAGNOSIS — E78.2 MIXED HYPERLIPIDEMIA: ICD-10-CM

## 2020-04-22 RX ORDER — LISINOPRIL 10 MG/1
10 TABLET ORAL DAILY
Qty: 90 TABLET | Refills: 0 | Status: SHIPPED | OUTPATIENT
Start: 2020-04-22 | End: 2020-07-06 | Stop reason: SDUPTHER

## 2020-04-22 RX ORDER — PRAVASTATIN SODIUM 20 MG
20 TABLET ORAL DAILY
Qty: 90 TABLET | Refills: 0 | Status: SHIPPED | OUTPATIENT
Start: 2020-04-22 | End: 2020-07-06 | Stop reason: SDUPTHER

## 2020-05-13 LAB
CREAT ?TM UR-SCNC: 204 UMOL/L
HBA1C MFR BLD HPLC: 6.2 %

## 2020-05-20 ENCOUNTER — OFFICE VISIT (OUTPATIENT)
Dept: FAMILY MEDICINE CLINIC | Facility: CLINIC | Age: 65
End: 2020-05-20
Payer: COMMERCIAL

## 2020-05-20 VITALS
HEIGHT: 67 IN | RESPIRATION RATE: 18 BRPM | DIASTOLIC BLOOD PRESSURE: 88 MMHG | TEMPERATURE: 96.9 F | HEART RATE: 72 BPM | SYSTOLIC BLOOD PRESSURE: 132 MMHG | BODY MASS INDEX: 30.95 KG/M2 | WEIGHT: 197.2 LBS

## 2020-05-20 DIAGNOSIS — E78.2 MIXED HYPERLIPIDEMIA: ICD-10-CM

## 2020-05-20 DIAGNOSIS — I10 ESSENTIAL HYPERTENSION: ICD-10-CM

## 2020-05-20 DIAGNOSIS — D03.9 MELANOMA IN SITU, UNSPECIFIED SITE (HCC): ICD-10-CM

## 2020-05-20 DIAGNOSIS — E11.9 TYPE 2 DIABETES MELLITUS WITHOUT COMPLICATION, WITHOUT LONG-TERM CURRENT USE OF INSULIN (HCC): Primary | ICD-10-CM

## 2020-05-20 DIAGNOSIS — K21.9 GERD WITHOUT ESOPHAGITIS: ICD-10-CM

## 2020-05-20 DIAGNOSIS — M17.11 ARTHRITIS OF RIGHT KNEE: ICD-10-CM

## 2020-05-20 DIAGNOSIS — J30.9 ALLERGIC RHINITIS, UNSPECIFIED SEASONALITY, UNSPECIFIED TRIGGER: ICD-10-CM

## 2020-05-20 PROCEDURE — 1036F TOBACCO NON-USER: CPT | Performed by: PHYSICIAN ASSISTANT

## 2020-05-20 PROCEDURE — 3008F BODY MASS INDEX DOCD: CPT | Performed by: PHYSICIAN ASSISTANT

## 2020-05-20 PROCEDURE — 3075F SYST BP GE 130 - 139MM HG: CPT | Performed by: PHYSICIAN ASSISTANT

## 2020-05-20 PROCEDURE — 3079F DIAST BP 80-89 MM HG: CPT | Performed by: PHYSICIAN ASSISTANT

## 2020-05-20 PROCEDURE — 99214 OFFICE O/P EST MOD 30 MIN: CPT | Performed by: PHYSICIAN ASSISTANT

## 2020-05-20 PROCEDURE — 3044F HG A1C LEVEL LT 7.0%: CPT | Performed by: PHYSICIAN ASSISTANT

## 2020-06-18 ENCOUNTER — OFFICE VISIT (OUTPATIENT)
Dept: FAMILY MEDICINE CLINIC | Facility: CLINIC | Age: 65
End: 2020-06-18
Payer: COMMERCIAL

## 2020-06-18 VITALS
HEART RATE: 72 BPM | HEIGHT: 67 IN | SYSTOLIC BLOOD PRESSURE: 126 MMHG | DIASTOLIC BLOOD PRESSURE: 76 MMHG | BODY MASS INDEX: 30.76 KG/M2 | WEIGHT: 196 LBS | RESPIRATION RATE: 16 BRPM | TEMPERATURE: 98.2 F

## 2020-06-18 DIAGNOSIS — I10 ESSENTIAL HYPERTENSION: ICD-10-CM

## 2020-06-18 DIAGNOSIS — L29.9 PRURITUS: ICD-10-CM

## 2020-06-18 DIAGNOSIS — J30.9 ALLERGIC RHINITIS, UNSPECIFIED SEASONALITY, UNSPECIFIED TRIGGER: ICD-10-CM

## 2020-06-18 DIAGNOSIS — L23.9 ALLERGIC DERMATITIS: Primary | ICD-10-CM

## 2020-06-18 DIAGNOSIS — E11.9 TYPE 2 DIABETES MELLITUS WITHOUT COMPLICATION, WITHOUT LONG-TERM CURRENT USE OF INSULIN (HCC): ICD-10-CM

## 2020-06-18 PROCEDURE — 3074F SYST BP LT 130 MM HG: CPT | Performed by: FAMILY MEDICINE

## 2020-06-18 PROCEDURE — 99214 OFFICE O/P EST MOD 30 MIN: CPT | Performed by: FAMILY MEDICINE

## 2020-06-18 PROCEDURE — 3044F HG A1C LEVEL LT 7.0%: CPT | Performed by: FAMILY MEDICINE

## 2020-06-18 PROCEDURE — 1036F TOBACCO NON-USER: CPT | Performed by: FAMILY MEDICINE

## 2020-06-18 PROCEDURE — 3078F DIAST BP <80 MM HG: CPT | Performed by: FAMILY MEDICINE

## 2020-06-18 PROCEDURE — 3008F BODY MASS INDEX DOCD: CPT | Performed by: FAMILY MEDICINE

## 2020-06-18 RX ORDER — METHYLPREDNISOLONE 4 MG/1
TABLET ORAL
Qty: 1 EACH | Refills: 0 | Status: SHIPPED | OUTPATIENT
Start: 2020-06-18 | End: 2020-06-24

## 2020-07-06 DIAGNOSIS — E78.2 MIXED HYPERLIPIDEMIA: ICD-10-CM

## 2020-07-06 DIAGNOSIS — I10 HYPERTENSION, UNSPECIFIED TYPE: ICD-10-CM

## 2020-07-06 DIAGNOSIS — K21.9 GASTROESOPHAGEAL REFLUX DISEASE WITHOUT ESOPHAGITIS: ICD-10-CM

## 2020-07-06 DIAGNOSIS — E11.9 TYPE 2 DIABETES MELLITUS WITHOUT COMPLICATION, WITHOUT LONG-TERM CURRENT USE OF INSULIN (HCC): ICD-10-CM

## 2020-07-06 PROCEDURE — 4010F ACE/ARB THERAPY RXD/TAKEN: CPT | Performed by: FAMILY MEDICINE

## 2020-07-06 RX ORDER — PRAVASTATIN SODIUM 20 MG
20 TABLET ORAL DAILY
Qty: 90 TABLET | Refills: 1 | Status: SHIPPED | OUTPATIENT
Start: 2020-07-06 | End: 2021-01-19 | Stop reason: SDUPTHER

## 2020-07-06 RX ORDER — PANTOPRAZOLE SODIUM 20 MG/1
20 TABLET, DELAYED RELEASE ORAL DAILY
Qty: 90 TABLET | Refills: 1 | Status: SHIPPED | OUTPATIENT
Start: 2020-07-06 | End: 2021-01-19 | Stop reason: SDUPTHER

## 2020-07-06 RX ORDER — LISINOPRIL 10 MG/1
10 TABLET ORAL DAILY
Qty: 90 TABLET | Refills: 1 | Status: SHIPPED | OUTPATIENT
Start: 2020-07-06 | End: 2021-01-19 | Stop reason: SDUPTHER

## 2020-09-08 ENCOUNTER — OFFICE VISIT (OUTPATIENT)
Dept: FAMILY MEDICINE CLINIC | Facility: CLINIC | Age: 65
End: 2020-09-08
Payer: COMMERCIAL

## 2020-09-08 VITALS
DIASTOLIC BLOOD PRESSURE: 66 MMHG | HEART RATE: 72 BPM | HEIGHT: 67 IN | WEIGHT: 197 LBS | SYSTOLIC BLOOD PRESSURE: 118 MMHG | TEMPERATURE: 98.4 F | BODY MASS INDEX: 30.92 KG/M2

## 2020-09-08 DIAGNOSIS — E78.2 MIXED HYPERLIPIDEMIA: ICD-10-CM

## 2020-09-08 DIAGNOSIS — E11.9 TYPE 2 DIABETES MELLITUS WITHOUT COMPLICATION, WITHOUT LONG-TERM CURRENT USE OF INSULIN (HCC): Primary | ICD-10-CM

## 2020-09-08 DIAGNOSIS — I10 ESSENTIAL HYPERTENSION: ICD-10-CM

## 2020-09-08 DIAGNOSIS — D03.9 MELANOMA IN SITU, UNSPECIFIED SITE (HCC): ICD-10-CM

## 2020-09-08 PROCEDURE — 1036F TOBACCO NON-USER: CPT | Performed by: PHYSICIAN ASSISTANT

## 2020-09-08 PROCEDURE — 3078F DIAST BP <80 MM HG: CPT | Performed by: PHYSICIAN ASSISTANT

## 2020-09-08 PROCEDURE — 3074F SYST BP LT 130 MM HG: CPT | Performed by: PHYSICIAN ASSISTANT

## 2020-09-08 PROCEDURE — 99396 PREV VISIT EST AGE 40-64: CPT | Performed by: PHYSICIAN ASSISTANT

## 2020-09-08 NOTE — PATIENT INSTRUCTIONS
1  Health care maintenance-healthy appearing 51-year-old female  Physical form was filled out  No signs of infection present  Patient reports that she does not need PPD testing for this physical   She does have blood work and follow-up scheduled in December for her regular conditions

## 2020-09-08 NOTE — PROGRESS NOTES
Assessment and Plan:  Patient Instructions   1  Health care maintenance-healthy appearing 61-year-old female  Physical form was filled out  No signs of infection present  Patient reports that she does not need PPD testing for this physical   She does have blood work and follow-up scheduled in December for her regular conditions  Problem List Items Addressed This Visit        Endocrine    Type 2 diabetes mellitus without complication, without long-term current use of insulin ([address] ) - Primary       Cardiovascular and Mediastinum    Essential hypertension       Other    Mixed hyperlipidemia    Melanoma in situ ([facility] )                 Diagnoses and all orders for this visit:    Type 2 diabetes mellitus without complication, without long-term current use of insulin ([address] )    Essential hypertension    Mixed hyperlipidemia    Melanoma in situ, unspecified site 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  She has different duties caring for them but will be in close contact with them  She has not had any problems with Infectious Disease recently  She denies any fevers, chills, nausea, vomiting, diarrhea, cough  The following portions of the patient's history were reviewed and updated as appropriate: allergies, current medications, past family history, past medical history, past social history, past surgical history and problem list       Review of Systems   Constitutional: Negative for chills, fatigue and fever  HENT: Negative for congestion, ear pain and sinus pressure  Eyes: Negative for visual disturbance     Respiratory: Negative for cough, chest tightness and shortness of breath  Cardiovascular: Negative for chest pain and palpitations  Gastrointestinal: Negative for diarrhea, nausea and vomiting  Endocrine: Negative for polyuria  Genitourinary: Negative for dysuria and frequency  Musculoskeletal: Negative for arthralgias and myalgias  Skin: Negative for pallor and rash  Neurological: Negative for dizziness, weakness, light-headedness, numbness and headaches  Psychiatric/Behavioral: Negative for agitation, behavioral problems and sleep disturbance  All other systems reviewed and are negative  Data to review:       Objective:    Vitals:    09/08/20 1512   BP: 118/66   BP Location: Left arm   Patient Position: Sitting   Cuff Size: Standard   Pulse: 72   Temp: 98 4 °F (36 9 °C)   TempSrc: Temporal   Weight: 89 4 kg (197 lb)   Height: 5' 7" (1 702 m)        Physical Exam  Constitutional:       General: She is not in acute distress  Appearance: Normal appearance  HENT:      Head: Normocephalic and atraumatic  Right Ear: Tympanic membrane normal       Left Ear: Tympanic membrane normal       Nose: No congestion or rhinorrhea  Eyes:      Conjunctiva/sclera: Conjunctivae normal       Pupils: Pupils are equal, round, and reactive to light  Neck:      Musculoskeletal: Normal range of motion and neck supple  No muscular tenderness  Vascular: No carotid bruit  Cardiovascular:      Rate and Rhythm: Normal rate and regular rhythm  Pulses:           Dorsalis pedis pulses are 2+ on the right side and 2+ on the left side  Posterior tibial pulses are 2+ on the right side and 2+ on the left side  Heart sounds: No murmur  Pulmonary:      Effort: Pulmonary effort is normal  No respiratory distress  Breath sounds: Normal breath sounds  Abdominal:      Palpations: Abdomen is soft  Musculoskeletal: Normal range of motion     Feet:      Right foot:      Skin integrity: No ulcer, skin breakdown, erythema, warmth, callus or dry skin  Left foot:      Skin integrity: No ulcer, skin breakdown, erythema, warmth, callus or dry skin  Lymphadenopathy:      Cervical: No cervical adenopathy  Skin:     General: Skin is warm  Capillary Refill: Capillary refill takes less than 2 seconds  Neurological:      General: No focal deficit present  Mental Status: She is alert and oriented to person, place, and time  Psychiatric:         Mood and Affect: Mood normal              Diabetic Foot Exam    Patient's shoes and socks removed  Right Foot/Ankle   Right Foot Inspection  Skin Exam: skin normal and skin intact no dry skin, no warmth, no callus, no erythema, no maceration, no abnormal color, no pre-ulcer, no ulcer and no callus                          Toe Exam: ROM and strength within normal limits  Sensory   Vibration: intact  Proprioception: intact   Monofilament testing: intact  Vascular  Capillary refills: < 3 seconds  The right DP pulse is 2+  The right PT pulse is 2+  Right Toe  - Comprehensive Exam  Ecchymosis: none  Arch: normal  Hammertoes: absent  Claw Toes: absent  Swelling: none   Tenderness: none         Left Foot/Ankle  Left Foot Inspection  Skin Exam: skin normal and skin intactno dry skin, no warmth, no erythema, no maceration, normal color, no pre-ulcer, no ulcer and no callus                         Toe Exam: ROM and strength within normal limits                   Sensory   Vibration: intact  Proprioception: intact  Monofilament: intact  Vascular  Capillary refills: < 3 seconds  The left DP pulse is 2+  The left PT pulse is 2+  Left Toe  - Comprehensive Exam  Ecchymosis: none  Arch: normal  Hammertoes: absent  Claw toes: absent  Swelling: none   Tenderness: none       Assign Risk Category:  No deformity present;  No loss of protective sensation;        Risk: 0

## 2020-10-03 DIAGNOSIS — E11.9 TYPE 2 DIABETES MELLITUS WITHOUT COMPLICATION, WITHOUT LONG-TERM CURRENT USE OF INSULIN (HCC): ICD-10-CM

## 2020-10-20 ENCOUNTER — TRANSCRIBE ORDERS (OUTPATIENT)
Dept: FAMILY MEDICINE CLINIC | Facility: CLINIC | Age: 65
End: 2020-10-20

## 2020-10-20 DIAGNOSIS — H25.13 AGE-RELATED NUCLEAR CATARACT, BILATERAL: Primary | ICD-10-CM

## 2020-10-27 ENCOUNTER — DOCTOR'S OFFICE (OUTPATIENT)
Dept: URBAN - METROPOLITAN AREA CLINIC 136 | Facility: CLINIC | Age: 65
Setting detail: OPHTHALMOLOGY
End: 2020-10-27
Payer: COMMERCIAL

## 2020-10-27 DIAGNOSIS — H35.3132: ICD-10-CM

## 2020-10-27 PROBLEM — H27.8: Status: ACTIVE | Noted: 2019-01-25

## 2020-10-27 PROBLEM — H52.4 MYOPIA, ASTIGMATISM, PRESBYOPIA OU ; BOTH EYES: Status: ACTIVE | Noted: 2018-07-17

## 2020-10-27 PROBLEM — H52.13 MYOPIA, ASTIGMATISM, PRESBYOPIA OU ; BOTH EYES: Status: ACTIVE | Noted: 2018-07-17

## 2020-10-27 PROBLEM — H27.9: Status: ACTIVE | Noted: 2019-01-25

## 2020-10-27 PROBLEM — H52.223 MYOPIA, ASTIGMATISM, PRESBYOPIA OU ; BOTH EYES: Status: ACTIVE | Noted: 2018-07-17

## 2020-10-27 PROBLEM — H43.812 POSTERIOR VITREOUS DETACHMENT ; LEFT EYE: Status: ACTIVE | Noted: 2017-06-19

## 2020-10-27 PROCEDURE — 92014 COMPRE OPH EXAM EST PT 1/>: CPT | Performed by: OPTOMETRIST

## 2020-10-27 PROCEDURE — 92134 CPTRZ OPH DX IMG PST SGM RTA: CPT | Performed by: OPTOMETRIST

## 2020-10-27 ASSESSMENT — REFRACTION_CURRENTRX
OS_AXIS: 166
OD_OVR_VA: 20/
OS_VPRISM_DIRECTION: SV
OD_OVR_VA: 20/
OD_SPHERE: -1.25
OS_OVR_VA: 20/
OS_CYLINDER: -2.00
OS_ADD: +2.50
OS_VPRISM_DIRECTION: BF
OD_VPRISM_DIRECTION: SV
OS_OVR_VA: 20/
OS_SPHERE: +2.50
OS_SPHERE: -1.25
OD_SPHERE: +2.50
OD_AXIS: 012
OD_CYLINDER: -2.00
OD_VPRISM_DIRECTION: BF
OD_ADD: +2.50

## 2020-10-27 ASSESSMENT — REFRACTION_MANIFEST
OU_VA: 20/20-2
OS_SPHERE: -0.50
OD_CYLINDER: SPH
OD_SPHERE: -0.75
OS_SPHERE: +2.50
OS_CYLINDER: SPH
OD_VA2: 20/30
OD_CYLINDER: SPH
OS_VA1: 20/20-2
OS_VA2: 20/20
OD_VA1: 20/30+2
OD_SPHERE: +2.50
OS_CYLINDER: SPH

## 2020-10-27 ASSESSMENT — KERATOMETRY
OS_AXISANGLE_DEGREES: 139
OD_AXISANGLE_DEGREES: 180
OS_K2POWER_DIOPTERS: 44.75
OD_K1POWER_DIOPTERS: 42.25
OD_K2POWER_DIOPTERS: 44.25

## 2020-10-27 ASSESSMENT — LID POSITION - DERMATOCHALASIS
OD_DERMATOCHALASIS: RUL T
OS_DERMATOCHALASIS: LUL T

## 2020-10-27 ASSESSMENT — REFRACTION_AUTOREFRACTION
OD_AXIS: 151
OS_CYLINDER: -1.25
OS_SPHERE: +0.50
OD_CYLINDER: -1.50
OS_AXIS: 041
OD_SPHERE: +0.25

## 2020-10-27 ASSESSMENT — AXIALLENGTH_DERIVED: OD_AL: 23.8821

## 2020-10-27 ASSESSMENT — SPHEQUIV_DERIVED
OD_SPHEQUIV: -0.5
OS_SPHEQUIV: -0.125

## 2020-10-27 ASSESSMENT — TONOMETRY
OS_IOP_MMHG: 08
OD_IOP_MMHG: 08

## 2020-10-27 ASSESSMENT — VISUAL ACUITY
OS_BCVA: 20/30+1
OD_BCVA: 20/30+1

## 2020-10-27 ASSESSMENT — CONFRONTATIONAL VISUAL FIELD TEST (CVF)
OD_FINDINGS: FULL
OS_FINDINGS: FULL

## 2020-10-27 ASSESSMENT — CORNEAL EDEMA CLINICAL DESCRIPTION: OS_CORNEALEDEMA: AT INCISION SITE

## 2021-01-09 LAB — HBA1C MFR BLD HPLC: 6.9 %

## 2021-01-19 ENCOUNTER — OFFICE VISIT (OUTPATIENT)
Dept: FAMILY MEDICINE CLINIC | Facility: CLINIC | Age: 66
End: 2021-01-19
Payer: MEDICARE

## 2021-01-19 VITALS
WEIGHT: 194 LBS | BODY MASS INDEX: 30.45 KG/M2 | SYSTOLIC BLOOD PRESSURE: 116 MMHG | RESPIRATION RATE: 16 BRPM | TEMPERATURE: 97.8 F | HEART RATE: 68 BPM | DIASTOLIC BLOOD PRESSURE: 64 MMHG | HEIGHT: 67 IN

## 2021-01-19 DIAGNOSIS — I10 HYPERTENSION, UNSPECIFIED TYPE: ICD-10-CM

## 2021-01-19 DIAGNOSIS — E11.9 TYPE 2 DIABETES MELLITUS WITHOUT COMPLICATION, WITHOUT LONG-TERM CURRENT USE OF INSULIN (HCC): Primary | ICD-10-CM

## 2021-01-19 DIAGNOSIS — E78.2 MIXED HYPERLIPIDEMIA: ICD-10-CM

## 2021-01-19 DIAGNOSIS — I10 ESSENTIAL HYPERTENSION: ICD-10-CM

## 2021-01-19 DIAGNOSIS — D03.9 MELANOMA IN SITU, UNSPECIFIED SITE (HCC): ICD-10-CM

## 2021-01-19 DIAGNOSIS — K21.9 GASTROESOPHAGEAL REFLUX DISEASE WITHOUT ESOPHAGITIS: ICD-10-CM

## 2021-01-19 PROCEDURE — 99214 OFFICE O/P EST MOD 30 MIN: CPT | Performed by: PHYSICIAN ASSISTANT

## 2021-01-19 RX ORDER — LISINOPRIL 10 MG/1
10 TABLET ORAL DAILY
Qty: 90 TABLET | Refills: 1 | Status: SHIPPED | OUTPATIENT
Start: 2021-01-19 | End: 2021-04-08 | Stop reason: SDUPTHER

## 2021-01-19 RX ORDER — PRAVASTATIN SODIUM 20 MG
20 TABLET ORAL DAILY
Qty: 90 TABLET | Refills: 1 | Status: SHIPPED | OUTPATIENT
Start: 2021-01-19 | End: 2021-04-08 | Stop reason: SDUPTHER

## 2021-01-19 RX ORDER — PANTOPRAZOLE SODIUM 20 MG/1
20 TABLET, DELAYED RELEASE ORAL DAILY
Qty: 90 TABLET | Refills: 1 | Status: SHIPPED | OUTPATIENT
Start: 2021-01-19 | End: 2021-04-08 | Stop reason: SDUPTHER

## 2021-01-19 NOTE — PROGRESS NOTES
Assessment and Plan:  Patient Instructions   Assessment/plan:  1  Type 2 diabetes-hemoglobin A1c is up slightly at 6 9  Continue metformin therapy  Continue healthy diet and exercise and we will reassess this in 6 months  If it would continue to arise recommend increasing metformin therapy  2  Benign essential hypertension-presently stable with lisinopril, no medication changes  3  Hyperlipidemia-cholesterol numbers were reviewed in detail in presently stable with pravastatin  4  GERD-stable on Protonix 20 mg daily        Problem List Items Addressed This Visit        Endocrine    Type 2 diabetes mellitus without complication, without long-term current use of insulin ([facility] ) - Primary    Relevant Orders    CBC and differential    Comprehensive metabolic panel    Hemoglobin A1C    Lipid Panel with Direct LDL reflex    Microalbumin / creatinine urine ratio    TSH, 3rd generation       Cardiovascular and Mediastinum    Essential hypertension    Relevant Medications    lisinopril (ZESTRIL) 10 mg tablet    Other Relevant Orders    CBC and differential    Comprehensive metabolic panel    Hemoglobin A1C    Lipid Panel with Direct LDL reflex    Microalbumin / creatinine urine ratio    TSH, 3rd generation       Other    Mixed hyperlipidemia    Relevant Medications    pravastatin (PRAVACHOL) 20 mg tablet    Other Relevant Orders    CBC and differential    Comprehensive metabolic panel    Hemoglobin A1C    Lipid Panel with Direct LDL reflex    Microalbumin / creatinine urine ratio    TSH, 3rd generation    Melanoma in situ ([address] )    Relevant Orders    CBC and differential    Comprehensive metabolic panel    Hemoglobin A1C    Lipid Panel with Direct LDL reflex    Microalbumin / creatinine urine ratio    TSH, 3rd generation      Other Visit Diagnoses     Hypertension, unspecified type        Relevant Medications    lisinopril (ZESTRIL) 10 mg tablet    Other Relevant Orders    CBC and differential    Comprehensive metabolic panel    Hemoglobin A1C    Lipid Panel with Direct LDL reflex    Microalbumin / creatinine urine ratio    TSH, 3rd generation    Gastroesophageal reflux disease without esophagitis        Relevant Medications    pantoprazole (PROTONIX) 20 mg tablet    Other Relevant Orders    CBC and differential    Comprehensive metabolic panel    Hemoglobin A1C    Lipid Panel with Direct LDL reflex    Microalbumin / creatinine urine ratio    TSH, 3rd generation                 Diagnoses and all orders for this visit:    Type 2 diabetes mellitus without complication, without long-term current use of insulin ([facility])  -     CBC and differential; Future  -     Comprehensive metabolic panel; Future  -     Hemoglobin A1C; Future  -     Lipid Panel with Direct LDL reflex; Future  -     Microalbumin / creatinine urine ratio; Future  -     TSH, 3rd generation; Future    Essential hypertension  -     CBC and differential; Future  -     Comprehensive metabolic panel; Future  -     Hemoglobin A1C; Future  -     Lipid Panel with Direct LDL reflex; Future  -     Microalbumin / creatinine urine ratio; Future  -     TSH, 3rd generation; Future    Mixed hyperlipidemia  -     CBC and differential; Future  -     Comprehensive metabolic panel; Future  -     Hemoglobin A1C; Future  -     Lipid Panel with Direct LDL reflex; Future  -     Microalbumin / creatinine urine ratio; Future  -     TSH, 3rd generation; Future  -     pravastatin (PRAVACHOL) 20 mg tablet; Take 1 tablet (20 mg total) by mouth daily    Melanoma in situ, unspecified site (HCC)  -     CBC and differential; Future  -     Comprehensive metabolic panel; Future  -     Hemoglobin A1C; Future  -     Lipid Panel with Direct LDL reflex; Future  -     Microalbumin / creatinine urine ratio; Future  -     TSH, 3rd generation; Future    Hypertension, unspecified type  -     CBC and differential; Future  -     Comprehensive metabolic panel;  Future  -     Hemoglobin A1C; Future  -     Lipid Panel with Direct LDL reflex; Future  -     Microalbumin / creatinine urine ratio; Future  -     TSH, 3rd generation; Future  -     lisinopril (ZESTRIL) 10 mg tablet; Take 1 tablet (10 mg total) by mouth daily    Gastroesophageal reflux disease without esophagitis  -     CBC and differential; Future  -     Comprehensive metabolic panel; Future  -     Hemoglobin A1C; Future  -     Lipid Panel with Direct LDL reflex; Future  -     Microalbumin / creatinine urine ratio; Future  -     TSH, 3rd generation; Future  -     pantoprazole (PROTONIX) 20 mg tablet; Take 1 tablet (20 mg total) by mouth daily              Subjective:      Patient ID: Sancho Barker is a 72 y o  female  CC:    Chief Complaint   Patient presents with    Follow-up    Results     lab       HPI:    HPI:  This is a 66-year-old female who presents to the office for follow-up of chronic health conditions and recent blood work  She does have a history of type 2 diabetes which has been stable with metformin therapy and diet  She has a history of hypertension which has been stable with lisinopril and she also continues on pravastatin for her cholesterol  Does have a history of esophageal reflux disease which is stable on Protonix  She has occasional breakthrough symptoms and usually uses peppermint gum for this as necessary and it seems to work quite well  The following portions of the patient's history were reviewed and updated as appropriate: allergies, current medications, past family history, past medical history, past social history, past surgical history and problem list       Review of Systems      Data to review:       Objective:    Vitals:    01/19/21 1509   BP: 116/64   BP Location: Left arm   Patient Position: Sitting   Cuff Size: Adult   Pulse: 68   Resp: 16   Temp: 97 8 °F (36 6 °C)   TempSrc: Tympanic   Weight: 88 kg (194 lb)   Height: 5' 7" (1 702 m)        Physical Exam      BMI Counseling: Body mass index is 30 38 kg/m²   The BMI is above normal  Nutrition recommendations include decreasing portion sizes  Exercise recommendations include exercising 3-5 times per week

## 2021-01-19 NOTE — PATIENT INSTRUCTIONS
Assessment/plan:  1  Type 2 diabetes-hemoglobin A1c is up slightly at 6 9  Continue metformin therapy  Continue healthy diet and exercise and we will reassess this in 6 months  If it would continue to arise recommend increasing metformin therapy  2  Benign essential hypertension-presently stable with lisinopril, no medication changes  3  Hyperlipidemia-cholesterol numbers were reviewed in detail in presently stable with pravastatin  4  GERD-stable on Protonix 20 mg daily

## 2021-02-09 ENCOUNTER — PREP FOR PROCEDURE (OUTPATIENT)
Dept: OBGYN CLINIC | Facility: OTHER | Age: 66
End: 2021-02-09

## 2021-02-09 DIAGNOSIS — M17.11 PRIMARY OSTEOARTHRITIS OF RIGHT KNEE: Primary | ICD-10-CM

## 2021-02-23 ENCOUNTER — CLINICAL SUPPORT (OUTPATIENT)
Dept: URGENT CARE | Facility: MEDICAL CENTER | Age: 66
End: 2021-02-23
Payer: MEDICARE

## 2021-02-23 ENCOUNTER — TRANSCRIBE ORDERS (OUTPATIENT)
Dept: ADMINISTRATIVE | Facility: HOSPITAL | Age: 66
End: 2021-02-23

## 2021-02-23 ENCOUNTER — LAB (OUTPATIENT)
Dept: LAB | Facility: MEDICAL CENTER | Age: 66
End: 2021-02-23
Payer: MEDICARE

## 2021-02-23 DIAGNOSIS — M17.11 PRIMARY OSTEOARTHRITIS OF RIGHT KNEE: ICD-10-CM

## 2021-02-23 DIAGNOSIS — Z01.89 ENCOUNTER FOR SPECIAL EXAMINATION: ICD-10-CM

## 2021-02-23 DIAGNOSIS — Z01.89 ENCOUNTER FOR SPECIAL EXAMINATION: Primary | ICD-10-CM

## 2021-02-23 LAB
ALBUMIN SERPL BCP-MCNC: 4.2 G/DL (ref 3.5–5)
ALP SERPL-CCNC: 69 U/L (ref 46–116)
ALT SERPL W P-5'-P-CCNC: 29 U/L (ref 12–78)
ANION GAP SERPL CALCULATED.3IONS-SCNC: 4 MMOL/L (ref 4–13)
APTT PPP: 28 SECONDS (ref 23–37)
AST SERPL W P-5'-P-CCNC: 20 U/L (ref 5–45)
ATRIAL RATE: 60 BPM
BASOPHILS # BLD AUTO: 0.05 THOUSANDS/ΜL (ref 0–0.1)
BASOPHILS NFR BLD AUTO: 1 % (ref 0–1)
BILIRUB SERPL-MCNC: 0.7 MG/DL (ref 0.2–1)
BUN SERPL-MCNC: 23 MG/DL (ref 5–25)
CALCIUM SERPL-MCNC: 9.8 MG/DL (ref 8.3–10.1)
CHLORIDE SERPL-SCNC: 106 MMOL/L (ref 100–108)
CO2 SERPL-SCNC: 31 MMOL/L (ref 21–32)
CREAT SERPL-MCNC: 0.95 MG/DL (ref 0.6–1.3)
CRP SERPL QL: 3.1 MG/L
EOSINOPHIL # BLD AUTO: 0.15 THOUSAND/ΜL (ref 0–0.61)
EOSINOPHIL NFR BLD AUTO: 2 % (ref 0–6)
ERYTHROCYTE [DISTWIDTH] IN BLOOD BY AUTOMATED COUNT: 12.8 % (ref 11.6–15.1)
EST. AVERAGE GLUCOSE BLD GHB EST-MCNC: 128 MG/DL
GFR SERPL CREATININE-BSD FRML MDRD: 63 ML/MIN/1.73SQ M
GLUCOSE SERPL-MCNC: 97 MG/DL (ref 65–140)
HBA1C MFR BLD: 6.1 %
HCT VFR BLD AUTO: 43.8 % (ref 34.8–46.1)
HGB BLD-MCNC: 14.4 G/DL (ref 11.5–15.4)
IMM GRANULOCYTES # BLD AUTO: 0.03 THOUSAND/UL (ref 0–0.2)
IMM GRANULOCYTES NFR BLD AUTO: 0 % (ref 0–2)
INR PPP: 0.93 (ref 0.84–1.19)
LYMPHOCYTES # BLD AUTO: 1.75 THOUSANDS/ΜL (ref 0.6–4.47)
LYMPHOCYTES NFR BLD AUTO: 26 % (ref 14–44)
MCH RBC QN AUTO: 29.3 PG (ref 26.8–34.3)
MCHC RBC AUTO-ENTMCNC: 32.9 G/DL (ref 31.4–37.4)
MCV RBC AUTO: 89 FL (ref 82–98)
MONOCYTES # BLD AUTO: 0.65 THOUSAND/ΜL (ref 0.17–1.22)
MONOCYTES NFR BLD AUTO: 10 % (ref 4–12)
NEUTROPHILS # BLD AUTO: 4.12 THOUSANDS/ΜL (ref 1.85–7.62)
NEUTS SEG NFR BLD AUTO: 61 % (ref 43–75)
NRBC BLD AUTO-RTO: 0 /100 WBCS
P AXIS: 30 DEGREES
PLATELET # BLD AUTO: 293 THOUSANDS/UL (ref 149–390)
PMV BLD AUTO: 10.4 FL (ref 8.9–12.7)
POTASSIUM SERPL-SCNC: 4.5 MMOL/L (ref 3.5–5.3)
PR INTERVAL: 128 MS
PROT SERPL-MCNC: 7.6 G/DL (ref 6.4–8.2)
PROTHROMBIN TIME: 12.5 SECONDS (ref 11.6–14.5)
QRS AXIS: 11 DEGREES
QRSD INTERVAL: 94 MS
QT INTERVAL: 432 MS
QTC INTERVAL: 432 MS
RBC # BLD AUTO: 4.91 MILLION/UL (ref 3.81–5.12)
SODIUM SERPL-SCNC: 141 MMOL/L (ref 136–145)
T WAVE AXIS: 25 DEGREES
VENTRICULAR RATE: 60 BPM
WBC # BLD AUTO: 6.75 THOUSAND/UL (ref 4.31–10.16)

## 2021-02-23 PROCEDURE — 93010 ELECTROCARDIOGRAM REPORT: CPT | Performed by: INTERNAL MEDICINE

## 2021-02-23 PROCEDURE — 86140 C-REACTIVE PROTEIN: CPT

## 2021-02-23 PROCEDURE — 85730 THROMBOPLASTIN TIME PARTIAL: CPT

## 2021-02-23 PROCEDURE — 36415 COLL VENOUS BLD VENIPUNCTURE: CPT

## 2021-02-23 PROCEDURE — 86901 BLOOD TYPING SEROLOGIC RH(D): CPT

## 2021-02-23 PROCEDURE — 86900 BLOOD TYPING SEROLOGIC ABO: CPT

## 2021-02-23 PROCEDURE — 93005 ELECTROCARDIOGRAM TRACING: CPT

## 2021-02-23 PROCEDURE — 86850 RBC ANTIBODY SCREEN: CPT

## 2021-02-23 PROCEDURE — 85610 PROTHROMBIN TIME: CPT

## 2021-02-23 PROCEDURE — 85025 COMPLETE CBC W/AUTO DIFF WBC: CPT

## 2021-02-23 PROCEDURE — 83036 HEMOGLOBIN GLYCOSYLATED A1C: CPT

## 2021-02-23 PROCEDURE — 80053 COMPREHEN METABOLIC PANEL: CPT

## 2021-02-25 LAB
ABO GROUP BLD: NORMAL
BLD GP AB SCN SERPL QL: NEGATIVE
RH BLD: POSITIVE
SPECIMEN EXPIRATION DATE: NORMAL

## 2021-03-02 ENCOUNTER — OFFICE VISIT (OUTPATIENT)
Dept: FAMILY MEDICINE CLINIC | Facility: CLINIC | Age: 66
End: 2021-03-02
Payer: MEDICARE

## 2021-03-02 VITALS
HEIGHT: 67 IN | WEIGHT: 195 LBS | BODY MASS INDEX: 30.61 KG/M2 | DIASTOLIC BLOOD PRESSURE: 76 MMHG | HEART RATE: 68 BPM | SYSTOLIC BLOOD PRESSURE: 138 MMHG | TEMPERATURE: 97.8 F | RESPIRATION RATE: 16 BRPM

## 2021-03-02 DIAGNOSIS — Z12.31 ENCOUNTER FOR SCREENING MAMMOGRAM FOR MALIGNANT NEOPLASM OF BREAST: ICD-10-CM

## 2021-03-02 DIAGNOSIS — Z01.818 PRE-OP EXAMINATION: Primary | ICD-10-CM

## 2021-03-02 DIAGNOSIS — E11.9 TYPE 2 DIABETES MELLITUS WITHOUT COMPLICATION, WITHOUT LONG-TERM CURRENT USE OF INSULIN (HCC): ICD-10-CM

## 2021-03-02 DIAGNOSIS — M17.11 PRIMARY OSTEOARTHRITIS OF RIGHT KNEE: ICD-10-CM

## 2021-03-02 DIAGNOSIS — I10 ESSENTIAL HYPERTENSION: ICD-10-CM

## 2021-03-02 DIAGNOSIS — Z23 ENCOUNTER FOR IMMUNIZATION: ICD-10-CM

## 2021-03-02 DIAGNOSIS — Z00.00 WELCOME TO MEDICARE PREVENTIVE VISIT: ICD-10-CM

## 2021-03-02 PROCEDURE — 90670 PCV13 VACCINE IM: CPT | Performed by: PHYSICIAN ASSISTANT

## 2021-03-02 PROCEDURE — G0009 ADMIN PNEUMOCOCCAL VACCINE: HCPCS | Performed by: PHYSICIAN ASSISTANT

## 2021-03-02 PROCEDURE — 1123F ACP DISCUSS/DSCN MKR DOCD: CPT | Performed by: PHYSICIAN ASSISTANT

## 2021-03-02 PROCEDURE — 99214 OFFICE O/P EST MOD 30 MIN: CPT | Performed by: PHYSICIAN ASSISTANT

## 2021-03-02 PROCEDURE — G0402 INITIAL PREVENTIVE EXAM: HCPCS | Performed by: PHYSICIAN ASSISTANT

## 2021-03-02 RX ORDER — CHLORHEXIDINE GLUCONATE 4 G/100ML
SOLUTION TOPICAL
COMMUNITY
End: 2021-03-20 | Stop reason: HOSPADM

## 2021-03-02 NOTE — PATIENT INSTRUCTIONS
Problem List Items Addressed This Visit        Musculoskeletal and Integument    Osteoarthritis of right knee       Other    Pre-op examination - Primary      Other Visit Diagnoses     Encounter for screening mammogram for malignant neoplasm of breast

## 2021-03-02 NOTE — PROGRESS NOTES
Presurgical Evaluation    Subjective:      Patient ID: Layne Cohen is a 72 y o  female  Chief Complaint   Patient presents with    Pre-op Exam     Patient present today for her Pre-op clearance  Patient is scheduled on 3/19/2021 to get her right knee replacement ;    Medicare Wellness Visit     Pt due       Patient having surgery to replace her right knee on the 19th of March  The following portions of the patient's history were reviewed and updated as appropriate: allergies, current medications, past family history, past medical history, past social history, past surgical history and problem list     Procedure date: 3/19/21    Surgeon:  Dr Karla Santoyo MD  Planned procedure:  R TKR  Diagnosis for procedure:  R knee OA    Prior anesthesia: Yes   General; Complications:  None / Tolerated well    CAD History: None   NOTE: Patient should see Cardiology if time available before surgery, and if appropriate  Pulmonary History: None    Renal history: None    Diabetes History:  Type 2  Controlled     Neurological History: None     On Immunosuppressant meds/biologics: No      Review of Systems   Constitutional: Negative  HENT: Negative  Eyes: Negative  Respiratory: Negative  Cardiovascular: Negative  Gastrointestinal: Negative  Endocrine: Negative  Genitourinary: Negative  Musculoskeletal: Negative  Skin: Negative  Allergic/Immunologic: Negative  Neurological: Negative  Hematological: Negative  Psychiatric/Behavioral: Negative            Current Outpatient Medications   Medication Sig Dispense Refill    aspirin 81 MG tablet Take 1 tablet by mouth daily      Calcium Carb-Ergocalciferol 250-125 MG-UNIT TABS Take 1 tablet by mouth daily       fexofenadine (ALLEGRA) 180 MG tablet Take 180 mg by mouth daily      fluticasone (FLONASE) 50 mcg/act nasal spray 1 spray into each nostril 2 (two) times a day as needed       lisinopril (ZESTRIL) 10 mg tablet Take 1 tablet (10 mg total) by mouth daily 90 tablet 1    metFORMIN (GLUCOPHAGE) 500 mg tablet TAKE 1 TABLET BY MOUTH DAILY WITH BREAKFAST 90 tablet 1    pantoprazole (PROTONIX) 20 mg tablet Take 1 tablet (20 mg total) by mouth daily 90 tablet 1    pravastatin (PRAVACHOL) 20 mg tablet Take 1 tablet (20 mg total) by mouth daily 90 tablet 1    chlorhexidine (Hibiclens) 4 % external liquid Hibiclens 4 % topical liquid   Apply by topical route to the operative leg the night prior to surgery   mupirocin (BACTROBAN) 2 % ointment        No current facility-administered medications for this visit  Allergies on file:   Patient has no known allergies      Patient Active Problem List   Diagnosis    Allergic rhinitis    Type 2 diabetes mellitus without complication, without long-term current use of insulin [facility]    GERD without esophagitis    Mixed hyperlipidemia    Essential hypertension    Melanoma in situ ([facility] )    Encounter for physical examination related to employment    Allergic dermatitis    Pre-op examination    Arthritis of right knee    Osteoarthritis of right knee        Past Medical History:   Diagnosis Date    Diabetes mellitus [facility]     Hypertension     Malignant neoplasm of skin     last assessed 04/11/2017       Past Surgical History:   Procedure Laterality Date    CATARACT EXTRACTION Bilateral     CHOLANGIOGRAM N/A 7/2/2018    Procedure: CHOLANGIOGRAM;  Surgeon: Rebecca Landry MD;  [facility];  Service: General    CHOLECYSTECTOMY LAPAROSCOPIC N/A 7/2/2018    Procedure: CHOLECYSTECTOMY LAPAROSCOPIC W/IOC;  Surgeon: Rebecca Landry MD;  [facility];  Service: General    COLON SURGERY      resection large intestine (diverticulitis)    COLONOSCOPY      [place] LAP, INCISIONAL HERNIA REPAIR,REDUCIBLE N/A 5/30/2019    Procedure: REPAIR HERNIA INCISIONAL LAPAROSCOPIC W/ ROBOTICS WITH MESH PLACEMENT;  Surgeon: Rebecca Landry MD;  [facility];  Service: General    TONSILLECTOMY child       Family History   Problem Relation Age of Onset    Alzheimer's disease Mother     Anxiety disorder Mother     Diabetes Mother     Cancer Father     Diabetes Sister     Hypertension Sister     Stroke Sister        Social History     Tobacco Use    Smoking status: Never Smoker    Smokeless tobacco: Never Used   Substance Use Topics    Alcohol use: Yes     Frequency: Monthly or less     Comment: occasional    Drug use: No       Objective:    Vitals:    03/02/21 1325   BP: 138/76   BP Location: Left arm   Patient Position: Sitting   Cuff Size: Large   Pulse: 68   Resp: 16   Temp: 97 8 °F (36 6 °C)   TempSrc: Tympanic   Weight: 88 5 kg (195 lb)   Height: 5' 7" (1 702 m)        Physical Exam  Vitals signs and nursing note reviewed  Constitutional:       General: She is not in acute distress  Appearance: Normal appearance  She is well-developed  She is not diaphoretic  HENT:      Head: Normocephalic and atraumatic  Right Ear: Hearing, tympanic membrane, ear canal and external ear normal       Left Ear: Hearing, tympanic membrane, ear canal and external ear normal       Nose: Nose normal       Mouth/Throat:      Pharynx: Uvula midline  No oropharyngeal exudate  Eyes:      General: Lids are normal  No scleral icterus  Conjunctiva/sclera: Conjunctivae normal       Pupils: Pupils are equal, round, and reactive to light  Neck:      Musculoskeletal: Normal range of motion  Thyroid: No thyroid mass or thyromegaly  Vascular: No carotid bruit  Cardiovascular:      Rate and Rhythm: Regular rhythm  Pulses: Normal pulses  Heart sounds: Normal heart sounds  No murmur  No friction rub  No gallop  Pulmonary:      Effort: Pulmonary effort is normal  No respiratory distress  Breath sounds: Normal breath sounds  No wheezing, rhonchi or rales  Abdominal:      General: Bowel sounds are normal  There is no distension or abdominal bruit  Palpations: Abdomen is soft  There is no mass  Tenderness: There is no abdominal tenderness  There is no guarding or rebound  Hernia: No hernia is present  Musculoskeletal: Normal range of motion  Lymphadenopathy:      Cervical: No cervical adenopathy  Skin:     General: Skin is warm and dry  Neurological:      Mental Status: She is alert and oriented to person, place, and time  She is not disoriented  Sensory: No sensory deficit  Motor: No abnormal muscle tone  Coordination: Coordination normal       Gait: Gait normal       Deep Tendon Reflexes: Reflexes are normal and symmetric  Psychiatric:         Speech: Speech normal          Behavior: Behavior normal          Thought Content: Thought content normal          Judgment: Judgment normal            Preop labs/testing available and reviewed: yes    eGFR   Date Value Ref Range Status   2021 63 ml/min/1 73sq m Final     WBC   Date Value Ref Range Status   2021 6 75 4 31 - 10 16 Thousand/uL Final     INR   Date Value Ref Range Status   2021 0 93 0 84 - 1 19 Final       EKG yes    Echo no    Stress test/cath no    PFT/Mukund no    Functional capacity: Shovel snow                          6 Mets   Pick the highest level patient can comfortably perform   4 mets or greater for surgery    RCRI  High Risk surgery? 1 Point  CAD History:         1 Point   MI; Positive Stress Test; CP due to Mi;  Nitrate Usage to control Angina;  Pathologic Q wave on EKG  CHF Active:         1 Point   Pulm Edema; Paroxysmal Nocturnal Dyspnea;  Bibasilar Rales (crackles);S3; CHF on CXR  Cerebrovascular Disease (TIA or CVA):     1 Point  DM on Insulin:        1 Point  Serum Creat >2 0 mg/dl:       1 Point          Total Points: 0     Scorin: Class I, Very Low Risk (0 4%)     1: Class II, Low risk (0 9%)     2: Class III Moderate (6 6%)     3: Class IV High (>11%)      MINGO Risk:  GFR:   eGFR   Date Value Ref Range Status   2021 63 ml/min/1 73sq m Final For PCP:  If GFR>60, Hold ACE/ARB/Diuretic on the day of surgery, and NSAIDS 10 days before  If GFR<45, Consider PRE and POST op Nephrology Consult  If 46 <GFR> 59 : Has Patient had MINGO in last 6 Months? no   If YES: Preop Nephrology consult   If No:  Hipolito Perez Nephrology consult  Assessment/Plan:    Patient is medically optimized (cleared) for the planned procedure  Further testing/evaluation is not required  Postop concerns: no    Problem List Items Addressed This Visit        Endocrine    Type 2 diabetes mellitus without complication, without long-term current use of insulin ([address] )     Very stable on current regimen when A1c of 6 1  Cleared for surgery  Lab Results   Component Value Date    HGBA1C 6 1 (H) 02/23/2021               Cardiovascular and Mediastinum    Essential hypertension     Stable continue current medication  Musculoskeletal and Integument    Osteoarthritis of right knee       Other    Pre-op examination - Primary     Patient is scheduled for a right knee replacement with Dr Rosalva Jimenez on the 19th of March  EKG and blood work are present reviewed and acceptable including EKG hemoglobin A1c of 6 1 within normal fasting sugar of 97, normal PT INR, CRP, CBC, and CMP  Patient is therefore cleared form is completed and will be faxed             Other Visit Diagnoses     Encounter for screening mammogram for malignant neoplasm of breast               Diagnoses and all orders for this visit:    Pre-op examination    Primary osteoarthritis of right knee    Encounter for screening mammogram for malignant neoplasm of breast  -     Mammo screening bilateral w 3d & cad; Future    Essential hypertension    Type 2 diabetes mellitus without complication, without long-term current use of insulin ([address] )    Other orders  -     mupirocin (BACTROBAN) 2 % ointment  -     chlorhexidine (Hibiclens) 4 % external liquid; Hibiclens 4 % topical liquid   Apply by topical route to the operative leg the night prior to surgery  Pre-Surgery Instructions:   Medication Instructions    aspirin 81 MG tablet per anesthesia guidelines     Calcium Carb-Ergocalciferol 250-125 MG-UNIT TABS per anesthesia guidelines     fexofenadine (ALLEGRA) 180 MG tablet per anesthesia guidelines     fluticasone (FLONASE) 50 mcg/act nasal spray per anesthesia guidelines     lisinopril (ZESTRIL) 10 mg tablet per anesthesia guidelines     metFORMIN (GLUCOPHAGE) 500 mg tablet per anesthesia guidelines     pantoprazole (PROTONIX) 20 mg tablet per anesthesia guidelines     pravastatin (PRAVACHOL) 20 mg tablet per anesthesia guidelines         NOTE: Please use the above to review important meds for your specialty, the remainder "per anesthesia Guidelines "    NOTE: Please place an Inbasket message for "[facility]" pool for complicated patients

## 2021-03-02 NOTE — ASSESSMENT & PLAN NOTE
Patient is scheduled for a right knee replacement with Dr Mignon Ford on the 19th of March  EKG and blood work are present reviewed and acceptable including EKG hemoglobin A1c of 6 1 within normal fasting sugar of 97, normal PT INR, CRP, CBC, and CMP  Patient is therefore cleared form is completed and will be faxed

## 2021-03-02 NOTE — ASSESSMENT & PLAN NOTE
Patient is due for shingles vaccination and mammogram as well as pap and eye exam   Blood work up-to-date  Five wishes and blue Packet given today  Prevnar 13 given today  Discussed covid vaccine and why patient should get 1 is available to her

## 2021-03-02 NOTE — ASSESSMENT & PLAN NOTE
Very stable on current regimen when A1c of 6 1  Cleared for surgery    Lab Results   Component Value Date    HGBA1C 6 1 (H) 02/23/2021

## 2021-03-02 NOTE — PROGRESS NOTES
Assessment and Plan:     Problem List Items Addressed This Visit        Endocrine    Type 2 diabetes mellitus without complication, without long-term current use of insulin ([address] )     Very stable on current regimen when A1c of 6 1  Cleared for surgery  Lab Results   Component Value Date    HGBA1C 6 1 (H) 02/23/2021               Cardiovascular and Mediastinum    Essential hypertension     Stable continue current medication  Musculoskeletal and Integument    Osteoarthritis of right knee       Other    Pre-op examination - Primary     Patient is scheduled for a right knee replacement with Dr Yanira Champion on the 19th of March  EKG and blood work are present reviewed and acceptable including EKG hemoglobin A1c of 6 1 within normal fasting sugar of 97, normal PT INR, CRP, CBC, and CMP  Patient is therefore cleared form is completed and will be faxed  Welcome to Medicare preventive visit     Patient is due for shingles vaccination and mammogram as well as pap and eye exam   Blood work up-to-date  Five wishes and blue Packet given today  Prevnar 13 given today  Discussed covid vaccine and why patient should get 1 is available to her  Other Visit Diagnoses     Encounter for screening mammogram for malignant neoplasm of breast        Relevant Orders    Mammo screening bilateral w 3d & cad    Encounter for immunization        Relevant Orders    PNEUMOCOCCAL CONJUGATE VACCINE 13-VALENT GREATER THAN 6 MONTHS (Completed)           Preventive health issues were discussed with patient, and age appropriate screening tests were ordered as noted in patient's After Visit Summary  Personalized health advice and appropriate referrals for health education or preventive services given if needed, as noted in patient's After Visit Summary       History of Present Illness:     Patient presents for Medicare Annual Wellness visit    Patient Care Team:  Maryjo Pike PA-C as PCP - General (Family Medicine)  Jose Suzao PA-C as PCP - [facility]CP-[facility] Attributed-Roster  RAJ Lobo PA-C     Problem List:     Patient Active Problem List   Diagnosis    Allergic rhinitis    Type 2 diabetes mellitus without complication, without long-term current use of insulin [facility]    GERD without esophagitis    Mixed hyperlipidemia    Essential hypertension    Melanoma in situ ([facility]    Encounter for physical examination related to employment    Allergic dermatitis    Pre-op examination    Arthritis of right knee    Osteoarthritis of right knee    Welcome to Medicare preventive visit      Past Medical and Surgical History:     Past Medical History:   Diagnosis Date    Diabetes mellitus ([facility]     Hypertension     Malignant neoplasm of skin     last assessed 04/11/2017     Past Surgical History:   Procedure Laterality Date    CATARACT EXTRACTION Bilateral     CHOLANGIOGRAM N/A 7/2/2018    Procedure: CHOLANGIOGRAM;  Surgeon: Ronald Calderon MD;  [facility];  Service: General    CHOLECYSTECTOMY LAPAROSCOPIC N/A 7/2/2018    Procedure: CHOLECYSTECTOMY LAPAROSCOPIC W/IOC;  Surgeon: Ronald Calderon MD;  [facility];  Service: General    COLON SURGERY      resection large intestine (diverticulitis)    COLONOSCOPY      [place] LAP, INCISIONAL HERNIA REPAIR,REDUCIBLE N/A 5/30/2019    Procedure: REPAIR HERNIA INCISIONAL LAPAROSCOPIC W/ ROBOTICS WITH MESH PLACEMENT;  Surgeon: Ronald Calderon MD;  [facility];  Service: General    TONSILLECTOMY  child      Family History:     Family History   Problem Relation Age of Onset    Alzheimer's disease Mother     Anxiety disorder Mother     Diabetes Mother    Luis Knock Father     Diabetes Sister     Hypertension Sister     Stroke Sister       Social History:        Social History     Socioeconomic History    Marital status: /Civil Union     Spouse name: None    Number of children: None    Years of education: None    Highest education level: None   Occupational History    Occupation: employed   Social Needs    Financial resource strain: None    Food insecurity     Worry: None     Inability: None    Transportation needs     Medical: None     Non-medical: None   Tobacco Use    Smoking status: Never Smoker    Smokeless tobacco: Never Used   Substance and Sexual Activity    Alcohol use: Yes     Frequency: Monthly or less     Comment: occasional    Drug use: No    Sexual activity: None   Lifestyle    Physical activity     Days per week: None     Minutes per session: None    Stress: None   Relationships    Social connections     Talks on phone: None     Gets together: None     Attends Yazdanism service: None     Active member of club or organization: None     Attends meetings of clubs or organizations: None     Relationship status: None    Intimate partner violence     Fear of current or ex partner: None     Emotionally abused: None     Physically abused: None     Forced sexual activity: None   Other Topics Concern    None   Social History Narrative    None      Medications and Allergies:     Current Outpatient Medications   Medication Sig Dispense Refill    aspirin 81 MG tablet Take 1 tablet by mouth daily      Calcium Carb-Ergocalciferol 250-125 MG-UNIT TABS Take 1 tablet by mouth daily       fexofenadine (ALLEGRA) 180 MG tablet Take 180 mg by mouth daily      fluticasone (FLONASE) 50 mcg/act nasal spray 1 spray into each nostril 2 (two) times a day as needed       lisinopril (ZESTRIL) 10 mg tablet Take 1 tablet (10 mg total) by mouth daily 90 tablet 1    metFORMIN (GLUCOPHAGE) 500 mg tablet TAKE 1 TABLET BY MOUTH DAILY WITH BREAKFAST 90 tablet 1    pantoprazole (PROTONIX) 20 mg tablet Take 1 tablet (20 mg total) by mouth daily 90 tablet 1    pravastatin (PRAVACHOL) 20 mg tablet Take 1 tablet (20 mg total) by mouth daily 90 tablet 1    chlorhexidine (Hibiclens) 4 % external liquid Hibiclens 4 % topical liquid   Apply by topical route to the operative leg the night prior to surgery   mupirocin (BACTROBAN) 2 % ointment        No current facility-administered medications for this visit  No Known Allergies   Immunizations:     Immunization History   Administered Date(s) Administered    INFLUENZA 11/23/2015, 09/26/2016, 09/18/2018, 11/03/2020    Influenza Quadrivalent Preservative Free 3 years and older IM 09/25/2014, 11/23/2015, 09/26/2016    Influenza, recombinant, quadrivalent,injectable, preservative free 09/18/2018    Influenza, seasonal, injectable 12/20/2011    Pneumococcal Conjugate 13-Valent 03/02/2021    Pneumococcal Polysaccharide PPV23 08/14/2017    Zoster 06/30/2016      Health Maintenance:         Topic Date Due    HIV Screening  12[birth year]28/1970    Cervical Cancer Screening  12/28/1976    MAMMOGRAM  11/21/2020    Hepatitis C Screening  09/08/2021 (Originally 1955)    Colorectal Cancer Screening  12/10/2028     There are no preventive care reminders to display for this patient  Medicare Health Risk Assessment:     /76 (BP Location: Left arm, Patient Position: Sitting, Cuff Size: Large)   Pulse 68   Temp 97 8 °F (36 6 °C) (Tympanic)   Resp 16   Ht 5' 7" (1 702 m)   Wt 88 5 kg (195 lb)   BMI 30 54 kg/m²      Raman Mcneal is here for her Welcome to Medicare visit  Health Risk Assessment:   Patient rates overall health as very good  Patient feels that their physical health rating is same  Eyesight was rated as same  Hearing was rated as same  Patient feels that their emotional and mental health rating is same  Pain experienced in the last 7 days has been some  Patient's pain rating has been 5/10  Patient states that she has experienced no weight loss or gain in last 6 months  Right Knee pain    Fall Risk Screening:    In the past year, patient has experienced: no history of falling in past year      Urinary Incontinence Screening:   Patient has not leaked urine accidently in the last six months  Home Safety:  Patient has trouble with stairs inside or outside of their home  Patient has working smoke alarms and has working carbon monoxide detector  Home safety hazards include: none  Nutrition:   Current diet is Regular  Medications:   Patient is currently taking over-the-counter supplements  OTC medications include: see medication list  Patient is able to manage medications  Activities of Daily Living (ADLs)/Instrumental Activities of Daily Living (IADLs):   Walk and transfer into and out of bed and chair?: Yes  Dress and groom yourself?: Yes    Bathe or shower yourself?: Yes    Feed yourself?  Yes  Do your laundry/housekeeping?: Yes  Manage your money, pay your bills and track your expenses?: Yes  Make your own meals?: Yes    Do your own shopping?: Yes    Previous Hospitalizations:   Any hospitalizations or ED visits within the last 12 months?: No      Advance Care Planning:     Five wishes given: Yes      Cognitive Screening:   Provider or family/friend/caregiver concerned regarding cognition?: No    PREVENTIVE SCREENINGS      Cardiovascular Screening:    General: Screening Not Indicated and History Lipid Disorder      Diabetes Screening:     General: Screening Not Indicated and History Diabetes      Colorectal Cancer Screening:     General: Screening Current      Breast Cancer Screening:     General: Screening Current      Cervical Cancer Screening:    General: Screening Not Indicated      Osteoporosis Screening:    General: Screening Current      Abdominal Aortic Aneurysm (AAA) Screening:        General: Screening Not Indicated      Lung Cancer Screening:     General: Screening Not Indicated      Hepatitis C Screening:    General: Screening Current      Anthony Mckenzie PA-C

## 2021-03-04 ENCOUNTER — EVALUATION (OUTPATIENT)
Dept: PHYSICAL THERAPY | Facility: REHABILITATION | Age: 66
End: 2021-03-04
Payer: MEDICARE

## 2021-03-04 DIAGNOSIS — M17.11 PRIMARY OSTEOARTHRITIS OF RIGHT KNEE: ICD-10-CM

## 2021-03-04 PROCEDURE — 97161 PT EVAL LOW COMPLEX 20 MIN: CPT | Performed by: PHYSICAL THERAPIST

## 2021-03-04 PROCEDURE — 97110 THERAPEUTIC EXERCISES: CPT | Performed by: PHYSICAL THERAPIST

## 2021-03-04 NOTE — LETTER
2021    MD Jackie Alonzo [address] [facility]    Patient: Melissa Hernandez   YOB: 1955   Date of Visit: 3/4/2021     Encounter Diagnosis     ICD-10-CM    1  Primary osteoarthritis of right knee  M17 11 Ambulatory referral to Physical Therapy       Dear Dr Gonzalez Cam:    Thank you for your recent referral of Melissa Hernandez  Please review the attached evaluation summary from Shannon's recent visit  Please verify that you agree with the plan of care by signing the attached order  If you have any questions or concerns, please do not hesitate to call  I sincerely appreciate the opportunity to share in the care of one of your patients and hope to have another opportunity to work with you in the near future  Sincerely,    Areli Mayo, PT      Referring Provider:      I certify that I have read the below Plan of Care and certify the need for these services furnished under this plan of treatment while under my care  MD Jackie Alonzo [address] [place] [id number]  Via Fax: [phone]          PT Evaluation     Today's date: 3/4/2021  Patient name: Melissa Hernandez  : 1955  MRN: [id number]  Referring provider: J Luis Jackson MD  Dx:   Encounter Diagnosis     ICD-10-CM    1  Primary osteoarthritis of right knee  M17 11 Ambulatory referral to Physical Therapy       Start Time: 1546  Stop Time: 1635  Total time in clinic (min): 49 minutes    Assessment  Assessment details: Pt is a 72year old female who presents to outpatient PT pre-op for R TKA  Pt presents with increased knee pain, decreased involved knee ROM and strength and limited function with difficulty negotiating steps, walking and standing  PT session focused on details of surgery, home assessment, home preparation checklist, post-op HEP, prognosis and post-op treatment/expectations   Pt left PT session with all of her questions answered and post-op PT appointment scheduled  Pt to contact PT with any additional questions as needed  Impairments: abnormal or restricted ROM, activity intolerance, impaired balance, impaired physical strength, lacks appropriate home exercise program and pain with function  Understanding of Dx/Px/POC: good   Prognosis: good    Goals  -Completed pre-op evaluation    Plan  Plan details: Pt d/c'ed from skilled PT  Pt will return for post-op appt  Patient would benefit from: skilled physical therapy  Planned modality interventions: thermotherapy: hydrocollator packs and cryotherapy  Planned therapy interventions: patient education, strengthening, stretching, therapeutic activities, therapeutic exercise, home exercise program, flexibility, functional ROM exercises, joint mobilization, manual therapy and abdominal trunk stabilization  Treatment plan discussed with: patient        Subjective Evaluation    History of Present Illness  Mechanism of injury: Pt will be having R TKA performed on 3/19/2021  Pt comes to PT for pre-op evaluation  Pain: 0/10-10/10 with prolonged standing or walking  Stiffness when sitting for too long  Pain when lying supine at night  Pt negotiates steps reciprocally with rail and pain  Pt lives in a multiple level home  Pt has 1 flight of steps in the house with 1 railing on L going up  Pt has 2 steps to enter house from outside without railing  Pt lives with her   Pt's  will be her caregiver  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  [employer]  No steps to get in/out     Pt has an SUV that she will be leaving the hospital in              Objective     Active Range of Motion   Left Knee   Flexion: 136 degrees     Right Knee   Flexion: 129 degrees with pain  Extension: 0 degrees     Additional Active Range of Motion Details  L knee hyperextends 2 deg     Strength/Myotome Testing     Right Knee   Flexion: 4  Extension: 4    Additional Strength Details  Pain with resisted knee ext on R   Hip flexion: 3+/5 R, 4/5 L   Hip ABD: 3+/5 R, 4/5 L   Hip ext: 4/5 b/l     General Comments:      Knee Comments  Pt does not walk with an AD  Pt does own a SPC   Pt does not own a walker    SLS: 3 secs b/l   TUG test: 11 63 secs                    Precautions: HTN, DM, malignant neoplasm of skin   Daily Treatment Diary    Manuals 3/4                                                 Neuro Re-Ed                                                                                Ther Ex          Pt edu re: diagnosis, surgery, prognosis, treatment, home preparation, post-op HEP 25 mins                                                                               Ther Activity                              Gait Training                              Modalities

## 2021-03-04 NOTE — PROGRESS NOTES
PT Evaluation     Today's date: 3/4/2021  Patient name: Minerva Miranda  : 1955  MRN: [id number]  Referring provider: Ankit Montemayor MD  Dx:   Encounter Diagnosis     ICD-10-CM    1  Primary osteoarthritis of right knee  M17 11 Ambulatory referral to Physical Therapy       Start Time: 1546  Stop Time: 1635  Total time in clinic (min): 49 minutes    Assessment  Assessment details: Pt is a 72year old female who presents to outpatient PT pre-op for R TKA  Pt presents with increased knee pain, decreased involved knee ROM and strength and limited function with difficulty negotiating steps, walking and standing  PT session focused on details of surgery, home assessment, home preparation checklist, post-op HEP, prognosis and post-op treatment/expectations  Pt left PT session with all of her questions answered and post-op PT appointment scheduled  Pt to contact PT with any additional questions as needed  Impairments: abnormal or restricted ROM, activity intolerance, impaired balance, impaired physical strength, lacks appropriate home exercise program and pain with function  Understanding of Dx/Px/POC: good   Prognosis: good    Goals  -Completed pre-op evaluation    Plan  Plan details: Pt d/c'ed from skilled PT  Pt will return for post-op appt  Patient would benefit from: skilled physical therapy  Planned modality interventions: thermotherapy: hydrocollator packs and cryotherapy  Planned therapy interventions: patient education, strengthening, stretching, therapeutic activities, therapeutic exercise, home exercise program, flexibility, functional ROM exercises, joint mobilization, manual therapy and abdominal trunk stabilization  Treatment plan discussed with: patient        Subjective Evaluation    History of Present Illness  Mechanism of injury: Pt will be having R TKA performed on 3/19/2021  Pt comes to PT for pre-op evaluation  Pain: 0/10-10/10 with prolonged standing or walking   Stiffness when sitting for too long  Pain when lying supine at night  Pt negotiates steps reciprocally with rail and pain  Pt lives in a multiple level home  Pt has 1 flight of steps in the house with 1 railing on L going up  Pt has 2 steps to enter house from outside without railing  Pt lives with her   Pt's  will be her caregiver  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  [employer]  No steps to get in/out  Pt has an SUV that she will be leaving the hospital in              Objective     Active Range of Motion   Left Knee   Flexion: 136 degrees     Right Knee   Flexion: 129 degrees with pain  Extension: 0 degrees     Additional Active Range of Motion Details  L knee hyperextends 2 deg     Strength/Myotome Testing     Right Knee   Flexion: 4  Extension: 4    Additional Strength Details  Pain with resisted knee ext on R   Hip flexion: 3+/5 R, 4/5 L   Hip ABD: 3+/5 R, 4/5 L   Hip ext: 4/5 b/l     General Comments:      Knee Comments  Pt does not walk with an AD  Pt does own a SPC   Pt does not own a walker    SLS: 3 secs b/l   TUG test: 11 63 secs                    Precautions: HTN, DM, malignant neoplasm of skin   Daily Treatment Diary    Manuals 3/4                                                 Neuro Re-Ed                                                                                Ther Ex          Pt edu re: diagnosis, surgery, prognosis, treatment, home preparation, post-op HEP 25 mins                                                                               Ther Activity                              Gait Training                              Modalities

## 2021-03-11 RX ORDER — IBUPROFEN 200 MG
200-800 TABLET ORAL EVERY 6 HOURS PRN
COMMUNITY
End: 2021-03-20 | Stop reason: HOSPADM

## 2021-03-11 RX ORDER — ACETAMINOPHEN 500 MG
500-1000 TABLET ORAL EVERY 6 HOURS PRN
COMMUNITY

## 2021-03-11 NOTE — PRE-PROCEDURE INSTRUCTIONS
Pre-Surgery Instructions:   Medication Instructions    acetaminophen (TYLENOL) 500 mg tablet Instructed patient per Anesthesia Guidelines   aspirin 81 MG tablet Patient was instructed by Physician and understands   chlorhexidine (Hibiclens) 4 % external liquid Patient was instructed by Physician and understands   fexofenadine (ALLEGRA) 180 MG tablet Instructed patient per Anesthesia Guidelines   fluticasone (FLONASE) 50 mcg/act nasal spray Instructed patient per Anesthesia Guidelines   ibuprofen (MOTRIN) 200 mg tablet Instructed patient per Anesthesia Guidelines   lisinopril (ZESTRIL) 10 mg tablet Instructed patient per Anesthesia Guidelines   metFORMIN (GLUCOPHAGE) 500 mg tablet Instructed patient per Anesthesia Guidelines   mupirocin (BACTROBAN) 2 % ointment Instructed patient per Anesthesia Guidelines   pantoprazole (PROTONIX) 20 mg tablet Instructed patient per Anesthesia Guidelines   pravastatin (PRAVACHOL) 20 mg tablet Instructed patient per Anesthesia Guidelines  Instructed to take pantoprazole and pravastatin am of surgery with sip of water per anesthesia guidelines  Can take tylenol am of surgery if needed

## 2021-03-15 DIAGNOSIS — M17.11 ARTHRITIS OF RIGHT KNEE: Primary | ICD-10-CM

## 2021-03-16 DIAGNOSIS — M17.11 ARTHRITIS OF RIGHT KNEE: ICD-10-CM

## 2021-03-16 PROCEDURE — U0003 INFECTIOUS AGENT DETECTION BY NUCLEIC ACID (DNA OR RNA); SEVERE ACUTE RESPIRATORY SYNDROME CORONAVIRUS 2 (SARS-COV-2) (CORONAVIRUS DISEASE [COVID-19]), AMPLIFIED PROBE TECHNIQUE, MAKING USE OF HIGH THROUGHPUT TECHNOLOGIES AS DESCRIBED BY CMS-2020-01-R: HCPCS | Performed by: ORTHOPAEDIC SURGERY

## 2021-03-16 PROCEDURE — U0005 INFEC AGEN DETEC AMPLI PROBE: HCPCS | Performed by: ORTHOPAEDIC SURGERY

## 2021-03-17 ENCOUNTER — ANESTHESIA EVENT (OUTPATIENT)
Dept: PERIOP | Facility: HOSPITAL | Age: 66
End: 2021-03-17
Payer: MEDICARE

## 2021-03-17 LAB — SARS-COV-2 RNA RESP QL NAA+PROBE: NEGATIVE

## 2021-03-19 ENCOUNTER — HOSPITAL ENCOUNTER (OUTPATIENT)
Facility: HOSPITAL | Age: 66
Setting detail: OUTPATIENT SURGERY
Discharge: HOME | End: 2021-03-20
Attending: ORTHOPAEDIC SURGERY | Admitting: ORTHOPAEDIC SURGERY
Payer: MEDICARE

## 2021-03-19 ENCOUNTER — ANESTHESIA (OUTPATIENT)
Dept: PERIOP | Facility: HOSPITAL | Age: 66
End: 2021-03-19
Payer: MEDICARE

## 2021-03-19 ENCOUNTER — APPOINTMENT (OUTPATIENT)
Dept: RADIOLOGY | Facility: HOSPITAL | Age: 66
End: 2021-03-19
Payer: MEDICARE

## 2021-03-19 DIAGNOSIS — M17.11 ARTHRITIS OF RIGHT KNEE: Primary | ICD-10-CM

## 2021-03-19 LAB
ABO GROUP BLD: NORMAL
GLUCOSE SERPL-MCNC: 110 MG/DL (ref 65–140)
GLUCOSE SERPL-MCNC: 132 MG/DL (ref 65–140)
RH BLD: POSITIVE

## 2021-03-19 PROCEDURE — C1713 ANCHOR/SCREW BN/BN,TIS/BN: HCPCS | Performed by: ORTHOPAEDIC SURGERY

## 2021-03-19 PROCEDURE — 73560 X-RAY EXAM OF KNEE 1 OR 2: CPT

## 2021-03-19 PROCEDURE — C1776 JOINT DEVICE (IMPLANTABLE): HCPCS | Performed by: ORTHOPAEDIC SURGERY

## 2021-03-19 PROCEDURE — 97163 PT EVAL HIGH COMPLEX 45 MIN: CPT

## 2021-03-19 PROCEDURE — 82948 REAGENT STRIP/BLOOD GLUCOSE: CPT

## 2021-03-19 DEVICE — ATTUNE PATELLA MEDIALIZED DOME 32MM CEMENTED AOX
Type: IMPLANTABLE DEVICE | Site: KNEE | Status: FUNCTIONAL
Brand: ATTUNE

## 2021-03-19 DEVICE — ATTUNE KNEE SYSTEM TIBIAL BASE POROCOAT ROTATING PLATFORM SIZE 4 CEMENTLESS
Type: IMPLANTABLE DEVICE | Site: KNEE | Status: FUNCTIONAL
Brand: ATTUNE

## 2021-03-19 DEVICE — ATTUNE FEMORAL POROCOAT POSTERIOR STABILIZED SIZE 5N RIGHT CEMENTLESS
Type: IMPLANTABLE DEVICE | Site: KNEE | Status: FUNCTIONAL
Brand: ATTUNE

## 2021-03-19 DEVICE — ATTUNE KNEE SYSTEM TIBIAL INSERT ROTATING PLATFORM POSTERIOR STABILIZED 5 15MM AOX
Type: IMPLANTABLE DEVICE | Site: KNEE | Status: FUNCTIONAL
Brand: ATTUNE

## 2021-03-19 DEVICE — DEPUY CMW 2 FAST SET BONE CEMENT 20G: Type: IMPLANTABLE DEVICE | Site: KNEE | Status: FUNCTIONAL

## 2021-03-19 RX ORDER — EPHEDRINE SULFATE 50 MG/ML
INJECTION INTRAVENOUS AS NEEDED
Status: DISCONTINUED | OUTPATIENT
Start: 2021-03-19 | End: 2021-03-19

## 2021-03-19 RX ORDER — METHOCARBAMOL 500 MG/1
500 TABLET, FILM COATED ORAL EVERY 6 HOURS SCHEDULED
Status: DISCONTINUED | OUTPATIENT
Start: 2021-03-19 | End: 2021-03-20 | Stop reason: HOSPADM

## 2021-03-19 RX ORDER — SENNOSIDES 8.6 MG
1 TABLET ORAL DAILY
Status: DISCONTINUED | OUTPATIENT
Start: 2021-03-19 | End: 2021-03-20 | Stop reason: HOSPADM

## 2021-03-19 RX ORDER — FLUTICASONE PROPIONATE 50 MCG
1 SPRAY, SUSPENSION (ML) NASAL 2 TIMES DAILY PRN
Status: DISCONTINUED | OUTPATIENT
Start: 2021-03-19 | End: 2021-03-20 | Stop reason: HOSPADM

## 2021-03-19 RX ORDER — CEFAZOLIN SODIUM 2 G/50ML
2000 SOLUTION INTRAVENOUS EVERY 8 HOURS
Status: COMPLETED | OUTPATIENT
Start: 2021-03-19 | End: 2021-03-19

## 2021-03-19 RX ORDER — CEFAZOLIN SODIUM 2 G/50ML
2000 SOLUTION INTRAVENOUS ONCE
Status: COMPLETED | OUTPATIENT
Start: 2021-03-19 | End: 2021-03-19

## 2021-03-19 RX ORDER — MAGNESIUM HYDROXIDE 1200 MG/15ML
LIQUID ORAL AS NEEDED
Status: DISCONTINUED | OUTPATIENT
Start: 2021-03-19 | End: 2021-03-19 | Stop reason: HOSPADM

## 2021-03-19 RX ORDER — ONDANSETRON 2 MG/ML
4 INJECTION INTRAMUSCULAR; INTRAVENOUS EVERY 6 HOURS PRN
Status: DISCONTINUED | OUTPATIENT
Start: 2021-03-19 | End: 2021-03-20 | Stop reason: HOSPADM

## 2021-03-19 RX ORDER — PROPOFOL 10 MG/ML
INJECTION, EMULSION INTRAVENOUS AS NEEDED
Status: DISCONTINUED | OUTPATIENT
Start: 2021-03-19 | End: 2021-03-19

## 2021-03-19 RX ORDER — ONDANSETRON 2 MG/ML
INJECTION INTRAMUSCULAR; INTRAVENOUS AS NEEDED
Status: DISCONTINUED | OUTPATIENT
Start: 2021-03-19 | End: 2021-03-19

## 2021-03-19 RX ORDER — MIDAZOLAM HYDROCHLORIDE 2 MG/2ML
INJECTION, SOLUTION INTRAMUSCULAR; INTRAVENOUS
Status: COMPLETED | OUTPATIENT
Start: 2021-03-19 | End: 2021-03-19

## 2021-03-19 RX ORDER — HYDROMORPHONE HCL/PF 1 MG/ML
0.5 SYRINGE (ML) INJECTION
Status: DISCONTINUED | OUTPATIENT
Start: 2021-03-19 | End: 2021-03-19 | Stop reason: HOSPADM

## 2021-03-19 RX ORDER — SODIUM CHLORIDE, SODIUM LACTATE, POTASSIUM CHLORIDE, CALCIUM CHLORIDE 600; 310; 30; 20 MG/100ML; MG/100ML; MG/100ML; MG/100ML
125 INJECTION, SOLUTION INTRAVENOUS CONTINUOUS
Status: DISCONTINUED | OUTPATIENT
Start: 2021-03-19 | End: 2021-03-19

## 2021-03-19 RX ORDER — CHLORHEXIDINE GLUCONATE 0.12 MG/ML
15 RINSE ORAL ONCE
Status: COMPLETED | OUTPATIENT
Start: 2021-03-19 | End: 2021-03-19

## 2021-03-19 RX ORDER — DOCUSATE SODIUM 100 MG/1
100 CAPSULE, LIQUID FILLED ORAL 2 TIMES DAILY
Status: DISCONTINUED | OUTPATIENT
Start: 2021-03-19 | End: 2021-03-20 | Stop reason: HOSPADM

## 2021-03-19 RX ORDER — ACETAMINOPHEN 325 MG/1
650 TABLET ORAL EVERY 4 HOURS PRN
Status: DISCONTINUED | OUTPATIENT
Start: 2021-03-19 | End: 2021-03-20 | Stop reason: HOSPADM

## 2021-03-19 RX ORDER — FENTANYL CITRATE/PF 50 MCG/ML
25 SYRINGE (ML) INJECTION
Status: DISCONTINUED | OUTPATIENT
Start: 2021-03-19 | End: 2021-03-19 | Stop reason: HOSPADM

## 2021-03-19 RX ORDER — MEPERIDINE HYDROCHLORIDE 25 MG/ML
12.5 INJECTION INTRAMUSCULAR; INTRAVENOUS; SUBCUTANEOUS
Status: DISCONTINUED | OUTPATIENT
Start: 2021-03-19 | End: 2021-03-19 | Stop reason: HOSPADM

## 2021-03-19 RX ORDER — OXYCODONE HYDROCHLORIDE 10 MG/1
10 TABLET ORAL EVERY 4 HOURS PRN
Status: DISCONTINUED | OUTPATIENT
Start: 2021-03-19 | End: 2021-03-20 | Stop reason: HOSPADM

## 2021-03-19 RX ORDER — ONDANSETRON 2 MG/ML
4 INJECTION INTRAMUSCULAR; INTRAVENOUS ONCE AS NEEDED
Status: DISCONTINUED | OUTPATIENT
Start: 2021-03-19 | End: 2021-03-19 | Stop reason: HOSPADM

## 2021-03-19 RX ORDER — ACETAMINOPHEN 325 MG/1
975 TABLET ORAL ONCE
Status: COMPLETED | OUTPATIENT
Start: 2021-03-19 | End: 2021-03-19

## 2021-03-19 RX ORDER — BUPIVACAINE HYDROCHLORIDE 7.5 MG/ML
INJECTION, SOLUTION INTRASPINAL AS NEEDED
Status: DISCONTINUED | OUTPATIENT
Start: 2021-03-19 | End: 2021-03-19

## 2021-03-19 RX ORDER — PANTOPRAZOLE SODIUM 20 MG/1
20 TABLET, DELAYED RELEASE ORAL
Status: DISCONTINUED | OUTPATIENT
Start: 2021-03-20 | End: 2021-03-20 | Stop reason: HOSPADM

## 2021-03-19 RX ORDER — ROPIVACAINE HYDROCHLORIDE 5 MG/ML
INJECTION, SOLUTION EPIDURAL; INFILTRATION; PERINEURAL
Status: COMPLETED | OUTPATIENT
Start: 2021-03-19 | End: 2021-03-19

## 2021-03-19 RX ORDER — ASPIRIN 325 MG
325 TABLET ORAL DAILY
Status: DISCONTINUED | OUTPATIENT
Start: 2021-03-19 | End: 2021-03-20 | Stop reason: HOSPADM

## 2021-03-19 RX ORDER — TRANEXAMIC ACID 100 MG/ML
INJECTION, SOLUTION INTRAVENOUS AS NEEDED
Status: DISCONTINUED | OUTPATIENT
Start: 2021-03-19 | End: 2021-03-19

## 2021-03-19 RX ORDER — FENTANYL CITRATE 50 UG/ML
INJECTION, SOLUTION INTRAMUSCULAR; INTRAVENOUS
Status: COMPLETED | OUTPATIENT
Start: 2021-03-19 | End: 2021-03-19

## 2021-03-19 RX ORDER — PROPOFOL 10 MG/ML
INJECTION, EMULSION INTRAVENOUS CONTINUOUS PRN
Status: DISCONTINUED | OUTPATIENT
Start: 2021-03-19 | End: 2021-03-19

## 2021-03-19 RX ORDER — OXYCODONE HYDROCHLORIDE 5 MG/1
5 TABLET ORAL EVERY 4 HOURS PRN
Status: DISCONTINUED | OUTPATIENT
Start: 2021-03-19 | End: 2021-03-20 | Stop reason: HOSPADM

## 2021-03-19 RX ORDER — CALCIUM CARBONATE 200(500)MG
1000 TABLET,CHEWABLE ORAL DAILY PRN
Status: DISCONTINUED | OUTPATIENT
Start: 2021-03-19 | End: 2021-03-20 | Stop reason: HOSPADM

## 2021-03-19 RX ORDER — PRAVASTATIN SODIUM 20 MG
20 TABLET ORAL DAILY
Status: DISCONTINUED | OUTPATIENT
Start: 2021-03-20 | End: 2021-03-20 | Stop reason: HOSPADM

## 2021-03-19 RX ORDER — LISINOPRIL 10 MG/1
10 TABLET ORAL DAILY
Status: DISCONTINUED | OUTPATIENT
Start: 2021-03-19 | End: 2021-03-20 | Stop reason: HOSPADM

## 2021-03-19 RX ORDER — SODIUM CHLORIDE 9 MG/ML
125 INJECTION, SOLUTION INTRAVENOUS CONTINUOUS
Status: DISCONTINUED | OUTPATIENT
Start: 2021-03-19 | End: 2021-03-20 | Stop reason: HOSPADM

## 2021-03-19 RX ADMIN — MIDAZOLAM 2 MG: 1 INJECTION INTRAMUSCULAR; INTRAVENOUS at 07:05

## 2021-03-19 RX ADMIN — OXYCODONE HYDROCHLORIDE 10 MG: 10 TABLET ORAL at 14:33

## 2021-03-19 RX ADMIN — ACETAMINOPHEN 650 MG: 325 TABLET ORAL at 12:58

## 2021-03-19 RX ADMIN — PROPOFOL 50 MG: 10 INJECTION, EMULSION INTRAVENOUS at 07:34

## 2021-03-19 RX ADMIN — ROPIVACAINE HYDROCHLORIDE 30 ML: 5 INJECTION, SOLUTION EPIDURAL; INFILTRATION; PERINEURAL at 07:05

## 2021-03-19 RX ADMIN — CEFAZOLIN SODIUM 2000 MG: 2 SOLUTION INTRAVENOUS at 23:26

## 2021-03-19 RX ADMIN — PROPOFOL 100 MCG/KG/MIN: 10 INJECTION, EMULSION INTRAVENOUS at 07:34

## 2021-03-19 RX ADMIN — BUPIVACAINE HYDROCHLORIDE IN DEXTROSE 1.6 ML: 7.5 INJECTION, SOLUTION SUBARACHNOID at 07:32

## 2021-03-19 RX ADMIN — PHENYLEPHRINE HYDROCHLORIDE 100 MCG: 10 INJECTION INTRAVENOUS at 08:08

## 2021-03-19 RX ADMIN — SENNOSIDES 8.6 MG: 8.6 TABLET ORAL at 10:37

## 2021-03-19 RX ADMIN — FENTANYL CITRATE 100 MCG: 50 INJECTION, SOLUTION INTRAMUSCULAR; INTRAVENOUS at 07:05

## 2021-03-19 RX ADMIN — OXYCODONE HYDROCHLORIDE 10 MG: 10 TABLET ORAL at 10:37

## 2021-03-19 RX ADMIN — SODIUM CHLORIDE, SODIUM LACTATE, POTASSIUM CHLORIDE, AND CALCIUM CHLORIDE: .6; .31; .03; .02 INJECTION, SOLUTION INTRAVENOUS at 08:07

## 2021-03-19 RX ADMIN — CEFAZOLIN SODIUM 2000 MG: 2 SOLUTION INTRAVENOUS at 07:22

## 2021-03-19 RX ADMIN — TRANEXAMIC ACID 1000 MG: 1 INJECTION, SOLUTION INTRAVENOUS at 07:46

## 2021-03-19 RX ADMIN — ONDANSETRON 4 MG: 2 INJECTION INTRAMUSCULAR; INTRAVENOUS at 12:59

## 2021-03-19 RX ADMIN — METHOCARBAMOL 500 MG: 500 TABLET ORAL at 11:33

## 2021-03-19 RX ADMIN — DOCUSATE SODIUM 100 MG: 100 CAPSULE, LIQUID FILLED ORAL at 18:20

## 2021-03-19 RX ADMIN — OXYCODONE 5 MG: 5 TABLET ORAL at 23:26

## 2021-03-19 RX ADMIN — SODIUM CHLORIDE, SODIUM LACTATE, POTASSIUM CHLORIDE, AND CALCIUM CHLORIDE 125 ML/HR: .6; .31; .03; .02 INJECTION, SOLUTION INTRAVENOUS at 06:24

## 2021-03-19 RX ADMIN — METHOCARBAMOL 500 MG: 500 TABLET ORAL at 18:20

## 2021-03-19 RX ADMIN — PHENYLEPHRINE HYDROCHLORIDE 100 MCG: 10 INJECTION INTRAVENOUS at 08:26

## 2021-03-19 RX ADMIN — CEFAZOLIN SODIUM 2000 MG: 2 SOLUTION INTRAVENOUS at 14:33

## 2021-03-19 RX ADMIN — EPHEDRINE SULFATE 10 MG: 50 INJECTION, SOLUTION INTRAVENOUS at 08:15

## 2021-03-19 RX ADMIN — ACETAMINOPHEN 650 MG: 325 TABLET ORAL at 18:20

## 2021-03-19 RX ADMIN — PROPOFOL 30 MG: 10 INJECTION, EMULSION INTRAVENOUS at 07:56

## 2021-03-19 RX ADMIN — EPHEDRINE SULFATE 10 MG: 50 INJECTION, SOLUTION INTRAVENOUS at 08:02

## 2021-03-19 RX ADMIN — LISINOPRIL 10 MG: 10 TABLET ORAL at 10:38

## 2021-03-19 RX ADMIN — PHENYLEPHRINE HYDROCHLORIDE 100 MCG: 10 INJECTION INTRAVENOUS at 08:42

## 2021-03-19 RX ADMIN — METHOCARBAMOL 500 MG: 500 TABLET ORAL at 23:25

## 2021-03-19 RX ADMIN — SODIUM CHLORIDE 125 ML/HR: 0.9 INJECTION, SOLUTION INTRAVENOUS at 10:38

## 2021-03-19 RX ADMIN — ACETAMINOPHEN 975 MG: 325 TABLET ORAL at 06:27

## 2021-03-19 RX ADMIN — CHLORHEXIDINE GLUCONATE 0.12% ORAL RINSE 15 ML: 1.2 LIQUID ORAL at 06:29

## 2021-03-19 RX ADMIN — PHENYLEPHRINE HYDROCHLORIDE 100 MCG: 10 INJECTION INTRAVENOUS at 07:45

## 2021-03-19 RX ADMIN — MORPHINE SULFATE 2 MG: 2 INJECTION, SOLUTION INTRAMUSCULAR; INTRAVENOUS at 11:33

## 2021-03-19 RX ADMIN — SODIUM CHLORIDE 125 ML/HR: 0.9 INJECTION, SOLUTION INTRAVENOUS at 19:32

## 2021-03-19 RX ADMIN — EPHEDRINE SULFATE 5 MG: 50 INJECTION, SOLUTION INTRAVENOUS at 08:42

## 2021-03-19 RX ADMIN — ONDANSETRON 4 MG: 2 INJECTION INTRAMUSCULAR; INTRAVENOUS at 08:56

## 2021-03-19 RX ADMIN — EPHEDRINE SULFATE 5 MG: 50 INJECTION, SOLUTION INTRAVENOUS at 07:45

## 2021-03-19 RX ADMIN — ASPIRIN 325 MG ORAL TABLET 325 MG: 325 PILL ORAL at 18:20

## 2021-03-19 RX ADMIN — DOCUSATE SODIUM 100 MG: 100 CAPSULE, LIQUID FILLED ORAL at 10:38

## 2021-03-19 NOTE — INTERVAL H&P NOTE
H&P reviewed  After examining the patient I find no changes in the patients condition since the H&P had been written      Vitals:    03/19/21 0712   BP: 162/72   Pulse: 60   Resp: 16   Temp:    SpO2: 97%

## 2021-03-19 NOTE — PLAN OF CARE
Problem: Potential for Falls  Goal: Patient will remain free of falls  Description: INTERVENTIONS:  - Assess patient frequently for physical needs  -  Identify cognitive and physical deficits and behaviors that affect risk of falls    -  Floris fall precautions as indicated by assessment   - Educate patient/family on patient safety including physical limitations  - Instruct patient to call for assistance with activity based on assessment  - Modify environment to reduce risk of injury  - Consider OT/PT consult to assist with strengthening/mobility  Outcome: Progressing     Problem: PAIN - ADULT  Goal: Verbalizes/displays adequate comfort level or baseline comfort level  Description: Interventions:  - Encourage patient to monitor pain and request assistance  - Assess pain using appropriate pain scale  - Administer analgesics based on type and severity of pain and evaluate response  - Implement non-pharmacological measures as appropriate and evaluate response  - Consider cultural and social influences on pain and pain management  - Notify physician/advanced practitioner if interventions unsuccessful or patient reports new pain  Outcome: Progressing     Problem: INFECTION - ADULT  Goal: Absence or prevention of progression during hospitalization  Description: INTERVENTIONS:  - Assess and monitor for signs and symptoms of infection  - Monitor lab/diagnostic results  - Monitor all insertion sites, i e  indwelling lines, tubes, and drains  - Monitor endotracheal if appropriate and nasal secretions for changes in amount and color  - Floris appropriate cooling/warming therapies per order  - Administer medications as ordered  - Instruct and encourage patient and family to use good hand hygiene technique  - Identify and instruct in appropriate isolation precautions for identified infection/condition  Outcome: Progressing     Problem: SAFETY ADULT  Goal: Patient will remain free of falls  Description: INTERVENTIONS:  - Assess patient frequently for physical needs  -  Identify cognitive and physical deficits and behaviors that affect risk of falls    -  Vincent fall precautions as indicated by assessment   - Educate patient/family on patient safety including physical limitations  - Instruct patient to call for assistance with activity based on assessment  - Modify environment to reduce risk of injury  - Consider OT/PT consult to assist with strengthening/mobility  Outcome: Progressing  Goal: Maintain or return to baseline ADL function  Description: INTERVENTIONS:  -  Assess patient's ability to carry out ADLs; assess patient's baseline for ADL function and identify physical deficits which impact ability to perform ADLs (bathing, care of mouth/teeth, toileting, grooming, dressing, etc )  - Assess/evaluate cause of self-care deficits   - Assess range of motion  - Assess patient's mobility; develop plan if impaired  - Assess patient's need for assistive devices and provide as appropriate  - Encourage maximum independence but intervene and supervise when necessary  - Involve family in performance of ADLs  - Assess for home care needs following discharge   - Consider OT consult to assist with ADL evaluation and planning for discharge  - Provide patient education as appropriate  Outcome: Progressing  Goal: Maintain or return mobility status to optimal level  Description: INTERVENTIONS:  - Assess patient's baseline mobility status (ambulation, transfers, stairs, etc )    - Identify cognitive and physical deficits and behaviors that affect mobility  - Identify mobility aids required to assist with transfers and/or ambulation (gait belt, sit-to-stand, lift, walker, cane, etc )  - Vincent fall precautions as indicated by assessment  - Record patient progress and toleration of activity level on Mobility SBAR; progress patient to next Phase/Stage  - Instruct patient to call for assistance with activity based on assessment  - Consider rehabilitation consult to assist with strengthening/weightbearing, etc   Outcome: Progressing     Problem: DISCHARGE PLANNING  Goal: Discharge to home or other facility with appropriate resources  Description: INTERVENTIONS:  - Identify barriers to discharge w/patient and caregiver  - Arrange for needed discharge resources and transportation as appropriate  - Identify discharge learning needs (meds, wound care, etc )  - Arrange for interpretive services to assist at discharge as needed  - Refer to Case Management Department for coordinating discharge planning if the patient needs post-hospital services based on physician/advanced practitioner order or complex needs related to functional status, cognitive ability, or social support system  Outcome: Progressing     Problem: Knowledge Deficit  Goal: Patient/family/caregiver demonstrates understanding of disease process, treatment plan, medications, and discharge instructions  Description: Complete learning assessment and assess knowledge base    Interventions:  - Provide teaching at level of understanding  - Provide teaching via preferred learning methods  Outcome: Progressing     Problem: SKIN/TISSUE INTEGRITY - ADULT  Goal: Skin integrity remains intact  Description: INTERVENTIONS  - Identify patients at risk for skin breakdown  - Assess and monitor skin integrity  - Assess and monitor nutrition and hydration status  - Monitor labs (i e  albumin)  - Assess for incontinence   - Turn and reposition patient  - Assist with mobility/ambulation  - Relieve pressure over bony prominences  - Avoid friction and shearing  - Provide appropriate hygiene as needed including keeping skin clean and dry  - Evaluate need for skin moisturizer/barrier cream  - Collaborate with interdisciplinary team (i e  Nutrition, Rehabilitation, etc )   - Patient/family teaching  Outcome: Progressing  Goal: Incision(s), wounds(s) or drain site(s) healing without S/S of infection  Description: INTERVENTIONS  - Assess and document risk factors for skin impairment   - Assess and document dressing, incision, wound bed, drain sites and surrounding tissue  - Consider nutrition services referral as needed  - Oral mucous membranes remain intact  - Provide patient/ family education  Outcome: Progressing     Problem: MUSCULOSKELETAL - ADULT  Goal: Maintain or return mobility to safest level of function  Description: INTERVENTIONS:  - Assess patient's ability to carry out ADLs; assess patient's baseline for ADL function and identify physical deficits which impact ability to perform ADLs (bathing, care of mouth/teeth, toileting, grooming, dressing, etc )  - Assess/evaluate cause of self-care deficits   - Assess range of motion  - Assess patient's mobility  - Assess patient's need for assistive devices and provide as appropriate  - Encourage maximum independence but intervene and supervise when necessary  - Involve family in performance of ADLs  - Assess for home care needs following discharge   - Consider OT consult to assist with ADL evaluation and planning for discharge  - Provide patient education as appropriate  Outcome: Progressing  Goal: Maintain proper alignment of affected body part  Description: INTERVENTIONS:  - Support, maintain and protect limb and body alignment  - Provide patient/ family with appropriate education  Outcome: Progressing

## 2021-03-19 NOTE — OP NOTE
OPERATIVE REPORT  PATIENT NAME: Santa Saldivar    :  1955  MRN: [id number]  Pt Location: [facility] OR ROOM 02    SURGERY DATE: 3/19/2021    Surgeon(s) and Role:     * Kade Saldaña MD - Primary     * Marilu Aiken PA-C - Assisting    Preop Diagnosis:  Primary osteoarthritis of right knee [M17 11]    Post-Op Diagnosis Codes:     * Primary osteoarthritis of right knee [M17 11]    Procedure(s) (LRB):  ARTHROPLASTY KNEE TOTAL (Right)    Specimen(s):  * No specimens in log *    Estimated Blood Loss:   Minimal    Drains:  Urethral Catheter Latex 16 Fr  (Active)   Number of days: 0       Anesthesia Type:   Spinal    Operative Indications:  Primary osteoarthritis of right knee [M17 11]      Operative Findings: Moderate to severe tricompartment OA    Complications:   None    Procedure and Technique:  Patient was seen and evaluated in the preop area  The right knee was identified as the operative site  She confirmed that the right knee was the operative site  It was marked with my initials  An abductor block was performed by Anesthesia  Once in the operating theater, spinal anesthesia was performed by Anesthesia  A Tsai catheter was placed by nursing  A tourniquet was applied to the right thigh  The right leg was prepped and draped in the usual orthopedic sterile fashion  Time-out was correct before beginning the case  The leg was exsanguinated with a Esmarch bandage  The tourniquet was inflated to 300 mmHg  An anterior incision was then made  Soft tissues were dissected down until we identified the patella and quadriceps tendons  Flaps were raised circumferentially  A 1/3 medial 2/3 lateral quadriceps split was performed  A medial parapatellar arthrotomy was performed  The medial release was performed  The fat pad was debulked  The patella was inverted and the knee was brought into flexion  ACL and PCL were removed  The knee was then dislocated  The remaining medial meniscus was removed    The lateral meniscus was removed  The tibial cutting guide was then placed in the tibial cut was performed  This was found to be acceptable  We then placed the intramedullary mere down the femur and performed distal femoral cut  With the knee in full extension we were able to insert a 5 mm spacer  We then measured the femur to be a size 5  We then prepared the femur to accept a size 5 component  A decision was made to proceed with a 5 narrow final component  We then prepared the tibia to accept a size 4 base plate  The 5 mm spacer was placed and the knee was brought through range of motion and found to be stable throughout a full range of motion  Good varus and valgus stability was noted  We then performed a 7 mm patellar cut  A 32 mm button was placed and found to have good tracking  Trial components were removed  Pulse lavage was then used to irrigate and wash out the knee  The final components were then placed on the table  The 5 narrow femoral component was then impacted place  This was a cementless component  The cement was 4 tibial tray was then impacted into place  A size 5 rotating platform poly was then placed  The 32 patellar button was then cemented in place and excess cement was removed  Final irrigation was performed  We placed 3 1  Vicryl sutures 2 at the most proximal portion of the quadriceps split and 1 at the superior medial   There was noted to be good closure of the arthrotomy with good tracking the patella through range of motion  2  0 Vicryl was used to close the deep layer the skin  Skin glue was then placed  Sterile dressing was applied  The patient was awoken taken to PACU in stable condition     A physician assistant was required during the procedure for retraction tissue handling,dissection and suturing    Patient Disposition:  PACU     SIGNATURE: Johnny Hummel MD  DATE: March 19, 2021  TIME: 8:50 AM

## 2021-03-19 NOTE — ANESTHESIA PROCEDURE NOTES
Spinal Block    Patient location during procedure: OR  Start time: 3/19/2021 7:32 AM  Reason for block: primary anesthetic  Staffing  Anesthesiologist: Vanessa Mayes DO  Resident/CRNA: Ute Torres CRNA  Performed: resident/CRNA   Preanesthetic Checklist  Completed: patient identified, site marked, surgical consent, pre-op evaluation, timeout performed, IV checked, risks and benefits discussed and monitors and equipment checked  Spinal Block  Patient position: sitting  Prep: Betadine  Patient monitoring: heart rate, continuous pulse ox and frequent blood pressure checks  Approach: midline  Location: L3-4  Injection technique: single-shot  Needle  Needle type: pencil-tip   Needle gauge: 25 G  Needle length: 10 cm  Assessment  Sensory level: T4  Events: cerebrospinal fluid  Injection Assessment:  positive aspiration for clear CSF, no paresthesia on injection and negative aspiration for heme    Post-procedure:  site cleaned

## 2021-03-19 NOTE — DISCHARGE INSTRUCTIONS
[facility] Operative Joint Replacement Instructions  SHAWN Vines  Office Phone: [phone]  Remove clear dressing post op day 7  All Prescribed Medications Were E-Scribed to pharmacy on file at [place] [address] Blood Thinners  ¨Yes Enteric Coated Aspirin 325 mg: Take twice daily until your first post operative visit  ¨No Plavix: Patients on Plavix will resume their standard dose while in the hospital    ¨No Xarelto: Take once daily with dinner for 2 weeks  ¨No Coumadin: If we have started you on this for blood thinning, you will be discharged from the hospital with instructions how much coumadin to take daily until your next blood draw  You will be tested on Mondays and Thursdays  The home health nurse will draw your blood  Our office will select the dosing instructions for you until your next blood draw  If you were on the medication before the surgery, the physician who manages your Coumadin will resume the care of that medication  On the day you have your blood test drawn, do not take your Coumadin dose until contacted by the office  If you have not heard by 4 P  M , please take the dose you took the day before and call the managing doctor's office for instructions in the morning  ¨ Muscle Relaxer:   ¨No Methocarbamol 750 mg: Take 1 tablet every 8 hours as needed  ¨No Cyclobenzaprine 5 mg: Take 1 tablet every 8 hours as needed  ¨ Pain Medications:   ¨No Dilaudid 2 mg: Take 1 tablet every 4 hours as needed for pain for the first 3 days  Once completed start Oxycodone 5 mg  ¨Yes Oxycodone 5 mg: Take 1-2 tablets every 4 hours as needed for pain  Please follow the direction on the label  Do not drive while taking pain medication  Do not take pain medication on an empty stomach  This may make you nauseated  Your prescriptions may run out before you return for your next visit   Please give us two regular office day's notice before you run out, to prevent any problems of not having pain medicine  Please refrain from using alcohol with your pain medicines  You may also include Tylenol for pain  You should not exceed 3000 mg of Tylenol in a day  Please be careful to not overdose the Tylenol  ¨ Arthritis/Anti-inflammatory: If you were taking an anti inflammatory medicine prior to your surgery, please wait until you have stopped the blood thinning medicine to resume taking it  ¨ Senokot: This is our recommended stool softener  Please use this medication to help prevent constipation that can arise from iron and pain medication use  It is also advisable to increase your fluid intake and fiber in your diet during your kaylah and pain medication therapy  ¨ Herbal Medicines: Please hold all these preparations until after your first post-operative visit  ACTIVITY  ¨ Your weight bearing status is: WBAT  ¨ If you have been furnished with an assistive device  Please use it until your therapist makes a change  ¨ If at all possible, have someone with you to help you at all times  ¨ You may participate in activity as tolerated  It is likely that you will tire more easily for a time after surgery  Please rest when you need it to help your healing process  ¨ Stairs are not restricted for you  Please climb stairs as instructed by your physical therapist   Car Severino Please elevate your leg or legs while resting  This is important to prevent lower leg swelling  ¨ When you are back at home you will likely have physical therapy three times a week  On the days you do not have formal therapy please perform the home exercises given to you  ¨ Immediately following the surgery you are not permitted to drive until cleared by your surgeon  This typically does not happen until your first visit after surgery or later  ¨ Please do not perform lifting tasks or strenuous domestic activities  ¨ If you currently are employed, you are off work until cleared by your surgeon   Everyone's ability to return to work is determined by his or her abilities  Your return to work may take a few weeks to a few months  ¨ Sexual activities are permitted as tolerated  A precautionary guide has been given to you before surgery  If you lost it we will gladly supply you with another  NORMAL FINDINGS   Numbness along the incision    Mechanical click of a knee replacement   Your incision area will feel warm  WOUND CARE  ¨ If you have a clear plastic (Tegaderm) dressing, you should maintain it for 5-7 days post operatively then remove it, or it will be removed by the home nurses  It is ok to leave it on until follow up with your physician  ¨ It is not uncommon for some reddish fluid to accumulate under the tegaderm   This is no cause for alarm  ¨ It is OK to shower with the tegaderm dressing on  Please do not soak or submerge the incision into a pool, bath or hot tub until after your 1st post-operative visit  ¨ If you have narrow white tape strips over the incision (Steri-Strips), Do not remove them, allow them to fall off  ¨ If there is a Mesh Dressing over the incision (Prineo), you should maintain it for 10-14 days post operatively then remove it, or it will be removed by the home nurses  It is ok to leave it on until follow up with your physician if that visit is within 14 days post operatively  You can cover the incision with gauze dressing if there is mild drainage  ¨ It is OK to shower with Steri Strips or Mesh Dressing  Showers should be quick, Hand wash with soap, pat dry with a towel  Do not scrub, soak or submerge incision  ¨ You may leave the incision open to the air after removing your dressing  ¨ Please be generous with the use of ice  It is a very good remedy  Apply ice for only twenty minutes at a time  You may use it every hour  It is recommended to ice before and after anticipated activities, which includes exercise and physical therapy  ¨ Wear your knee-high pressure stockings every day   They may be removed for sleep at night  Continue to wear them until you are seen for your first follow up  FOLLOW UP  ¨ You should have a scheduled visit with your surgeon scheduled for two to four weeks after surgery  If you do not remember your appointment date and time, or if you are sure you do not have one, please call to set one up  If you have any questions or concerns before then, do not hesitate to call  ¨ Please inform the office if you experience one of the following:   Fever that is not responding to Tylenol and is above 101 degrees    Redness of the skin that is increasing in area    Your incision is soaking the dressings with drainage or blood    You're unable to bear weight on your operative leg or legs

## 2021-03-19 NOTE — ANESTHESIA PREPROCEDURE EVALUATION
Review of Systems/Medical History  Patient summary reviewed  Chart reviewed  Cardiovascular  EKG reviewed , Exercise tolerance (METS): >4 ,  Hyperlipidemia, Hypertension controlled,    Pulmonary  Negative pulmonary ROS        GI/Hepatic    GERD well controlled,        Negative  ROS        Endo/Other  Diabetes well controlled type 2 Oral agent,   Obesity    GYN  Negative gynecology ROS          Hematology  Negative hematology ROS      Musculoskeletal  Negative musculoskeletal ROS        Neurology  Negative neurology ROS      Psychology   Negative psychology ROS              Physical Exam    Airway    Mallampati score: II  TM Distance: >3 FB  Neck ROM: full     Dental   No notable dental hx     Cardiovascular  Rhythm: regular, Rate: normal, Cardiovascular exam normal    Pulmonary  Breath sounds clear to auscultation,     Other Findings        Anesthesia Plan  ASA Score- 2     Anesthesia Type- spinal and regional with ASA Monitors  Additional Monitors:   Airway Plan:           Plan Factors-Exercise tolerance (METS): >4     Chart reviewed  EKG reviewed  Existing labs reviewed  Patient summary reviewed  Patient is not a current smoker  Patient instructed to abstain from smoking on day of procedure  Patient did not smoke on day of surgery  Obstructive sleep apnea risk education given perioperatively  Induction- intravenous  Postoperative Plan-     Informed Consent- Anesthetic plan and risks discussed with patient and spouse

## 2021-03-19 NOTE — PLAN OF CARE
Problem: PHYSICAL THERAPY ADULT  Goal: Performs mobility at highest level of function for planned discharge setting  See evaluation for individualized goals  Description: Treatment/Interventions: Functional transfer training, LE strengthening/ROM, Elevations, Therapeutic exercise, Endurance training, Patient/family training, Bed mobility, Gait training, Spoke to nursing, OT  Equipment Recommended: Mitchel Lee       See flowsheet documentation for full assessment, interventions and recommendations  Note: Prognosis: Good  Problem List: Decreased strength, Decreased range of motion, Decreased endurance, Impaired balance, Decreased mobility, Orthopedic restrictions, Pain(WBAT RLE)  Assessment: Pt  72 y  o female presents for elective R TKR  Past medical hx includes DM, HTN, hyperlipidemia, OA  Pt admitted for Osteoarthritis of right knee  S/p elective TKR POD #0 on 3/19/21  Pt referred to PT for functional mobility evaluation & D/C planning w/ orders of Activity beginning POD #0 and WBAT on RLE  Upon arrival, pt reported 5/10 pain in RLE  PTA, pt reports being I w/o AD  During evaluation, deficits included dec mobility, balance, ambulation  Pt demonstrated dec endurance and tolerance to activity  Evaluation of functional mobility required minAx1 for bed mobility, transfers, and amb  Pt was able to ambulate 6' w/ RW  Gait deviations as above, antalgic w/ step to pattern but no gross LOB noted  Pt reported inc dizziness w/ amb and required seated rest break  BP seated in chair post amb 129/75  Post amb pt reported inc nausea that dissipated w/ rest  Nsg staff most recent vital signs as follows: /75 (BP Location: Right arm)   Pulse 60   Temp (!) 97 1 °F (36 2 °C) (Temporal)   Resp 18   Ht 5' 7" (1 702 m)   Wt 88 5 kg (195 lb)   SpO2 98%   Breastfeeding No   BMI 30 54 kg/m²   At end of session, pt OOB in chair in stable condition, call bell & phone in reach, all lines intact   Pt was educated on fall precautions and reinforced w/ good understanding  Pt would benefit from continued PT to address deficits as defined above and maximize level of independence with functional mobility and safety  The patient's AM-PAC Basic Mobility Inpatient Short Form Raw Score is 18, Standardized Score is 41 05  A standardized score less than 42 9 suggests the patient may benefit from discharge to post-acute rehabilitation services  Please also refer to the recommendation of the Physical Therapist for safe discharge planning  Despite AM-PAC score, from PT/mobility standpoint recommendation for D/C home w/ OPPT and inc social support, when medically cleared based on objective measures above and current function  Pt would benefit from RW at D/C  CM to follow  Nsg staff to continue to mobilized pt (OOB in chair for all meals & ambulate in room/unit) as tolerated to prevent further decline in function  Nsg notified  Barriers to Discharge: Inaccessible home environment  Barriers to Discharge Comments: Stairs  PT Discharge Recommendation: Home with skilled therapy, Return to previous environment with social support(OPPT w/ inc social support)          See flowsheet documentation for full assessment

## 2021-03-19 NOTE — PHYSICAL THERAPY NOTE
PT EVALUATION    Pt  Name: Ina Suarez  Pt  Age: 72 y o    MRN: [id number]  LENGTH OF STAY: 0    Patient Active Problem List   Diagnosis    Allergic rhinitis    Type 2 diabetes mellitus without complication, without long-term current use of insulin ([place] [address] )    GERD without esophagitis    Mixed hyperlipidemia    Essential hypertension    [facility]anoma in situ ([facility] )    Encounter for physical examination related to employment    Allergic dermatitis    Pre-op examination    Arthritis of right knee    Osteoarthritis of right knee    Welcome to Medicare preventive visit       Admitting Diagnoses:   Primary osteoarthritis of right knee [M17 11]    Past Medical History:   Diagnosis Date    Diabetes mellitus ([facility] )     NIDDM    Diverticulosis     hx colon resection    GERD (gastroesophageal reflux disease)     Hyperlipidemia     Hypertension     Macular degeneration     Malignant neoplasm of skin     skin cancer right upper arm in past    OA (osteoarthritis)     right knee       Past Surgical History:   Procedure Laterality Date    CATARACT EXTRACTION Bilateral     CHOLANGIOGRAM N/A 7/2/2018    Procedure: CHOLANGIOGRAM;  Surgeon: Mehrdad Chapin MD;  [facility];  Service: General    CHOLECYSTECTOMY LAPAROSCOPIC N/A 7/2/2018    Procedure: CHOLECYSTECTOMY LAPAROSCOPIC W/IOC;  Surgeon: Mehrdad Chapin MD;  [facility];  Service: General    COLON SURGERY      resection large intestine (diverticulitis)    COLONOSCOPY      SD LAP, INCISIONAL HERNIA REPAIR,REDUCIBLE N/A 5/30/2019    Procedure: REPAIR HERNIA INCISIONAL LAPAROSCOPIC W/ ROBOTICS WITH MESH PLACEMENT;  Surgeon: Mehrdad Chapin MD;  [facility];  Service: General    TONSILLECTOMY  child       Imaging Studies:  XR knee right 1 or 2 views    (Results Pending)        03/19/21 1450   PT Last Visit   PT Visit Date 03/19/21   Note Type   Note type Evaluation   Pain Assessment   Pain Assessment Tool 0-10   Pain Score 5 Pain Location/Orientation Orientation: Right;Location: Knee   Hospital Pain Intervention(s) Cold applied;Repositioned;Medication (See MAR); Ambulation/increased activity; Emotional support; Rest   Home Living   Type of [address] Two level;1/2 bath on main level;Bed/bath upstairs; Able to live on main level with bedroom/bathroom;Stairs to enter without rails  (2STE through garage; full flight to 2nd floor w/ hand rail)   Bathroom Shower/Tub Tub/shower unit   [employer] Raised   Bathroom Equipment Other (Comment)  (None)   [facility]   Prior Function   Level of [place] Independent with ADLs and functional mobility  (w/o AD)   Lives With Spouse   Receives Help From [facility], [place] in the last 6 months 0   Vocational Part time employment  (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 )   Comments (+)    Restrictions/Precautions   [place] Bearing Precautions Per Order Yes   RLE [place] Bearing Per Order WBAT   Other Precautions WBS; Multiple lines; Fall Risk;Pain  (WBAT RLE)   General   Family/Caregiver Present Yes   Cognition   Overall Cognitive Status WFL   Arousal/Participation Alert   Orientation Level Oriented X4   Following Commands Follows all commands and directions without difficulty   Comments Cooperative and pleasant   RUE Assessment   RUE Assessment WFL  (5/5 grossly)   LUE Assessment   LUE Assessment WFL  (5/5 grossly)   RLE Assessment   RLE Assessment X   Strength RLE   R Hip Flexion 3/5   R Knee Extension 3/5   R Ankle Dorsiflexion 4+/5   R Ankle Plantar Flexion 4+/5   R Knee Flexion   (Limited ROM 2* inc pain)   LLE Assessment   LLE Assessment WFL  (5/5 grossly)   Coordination   Sensation WFL   Bed Mobility   Supine to Sit 4  Minimal assistance   Additional items Assist x 1;HOB elevated; Increased time required; Bedrails;Verbal cues;LE management   Sit to Supine Unable to assess   Additional Comments Cues for technique and safety; Pt OOB in chair post session   Transfers   Sit to Stand 4  Minimal assistance   Additional items Assist x 1; Increased time required;Verbal cues   Stand to Sit 4  Minimal assistance   Additional items Assist x 1; Armrests; Increased time required;Verbal cues   Additional Comments Cues for technique and safety   Ambulation/Elevation   Gait pattern Antalgic;Decreased foot clearance;Decreased R stance; Short stride; Step to;Excessively slow   Gait Assistance 4  Minimal assist   Additional items Assist x 1;Verbal cues; Tactile cues   Assistive Device Rolling walker   Distance 6'x1   Balance   Static Sitting Good   Dynamic Sitting Fair +   Static Standing Fair  (w/ RW)   Dynamic Standing Fair -  (w/ RW)   Ambulatory Fair -  (w/ RW)   Endurance Deficit   Endurance Deficit Yes   Endurance Deficit Description pain; dizziness   Activity Tolerance   Activity Tolerance Patient limited by pain   Nurse Made Aware ADWOA Carl   Assessment   Prognosis Good   Problem List Decreased strength;Decreased range of motion;Decreased endurance; Impaired balance;Decreased mobility;Orthopedic restrictions;Pain  (WBAT RLE)   Assessment Pt  72 y  o female presents for elective R TKR  Past medical hx includes DM, HTN, hyperlipidemia, OA  Pt admitted for Osteoarthritis of right knee  S/p elective TKR POD #0 on 3/19/21  Pt referred to PT for functional mobility evaluation & D/C planning w/ orders of Activity beginning POD #0 and WBAT on RLE  Upon arrival, pt reported 5/10 pain in RLE  PTA, pt reports being I w/o AD  During evaluation, deficits included dec mobility, balance, ambulation  Pt demonstrated dec endurance and tolerance to activity  Evaluation of functional mobility required minAx1 for bed mobility, transfers, and amb  Pt was able to ambulate 6' w/ RW  Gait deviations as above, antalgic w/ step to pattern but no gross LOB noted  Pt reported inc dizziness w/ amb and required seated rest break   BP seated in chair post amb 129/75  Post amb pt reported inc nausea that dissipated w/ rest  Nsg staff most recent vital signs as follows: /75 (BP Location: Right arm)   Pulse 60   Temp (!) 97 1 °F (36 2 °C) (Temporal)   Resp 18   Ht 5' 7" (1 702 m)   Wt 88 5 kg (195 lb)   SpO2 98%   Breastfeeding No   BMI 30 54 kg/m²   At end of session, pt OOB in chair in stable condition, call bell & phone in reach, all lines intact  Pt was educated on fall precautions and reinforced w/ good understanding  Pt would benefit from continued PT to address deficits as defined above and maximize level of independence with functional mobility and safety  The patient's AM-PAC Basic Mobility Inpatient Short Form Raw Score is 18, Standardized Score is 41 05  A standardized score less than 42 9 suggests the patient may benefit from discharge to post-acute rehabilitation services  Please also refer to the recommendation of the Physical Therapist for safe discharge planning  Despite AM-PAC score, from PT/mobility standpoint recommendation for D/C home w/ OPPT and inc social support, when medically cleared based on objective measures above and current function  Pt would benefit from RW at D/C  CM to follow  Nsg staff to continue to mobilized pt (OOB in chair for all meals & ambulate in room/unit) as tolerated to prevent further decline in function  Nsg notified      Barriers to Discharge Inaccessible home environment   Barriers to Discharge Comments Stairs   Goals   Patient Goals To go home   STG Expiration Date 03/26/21   Short Term Goal #1 1) Inc overall LE strength by 1/2 MMT grade to improve functional mobility; 2) Pt will demonstrate improved bed mobility with S to dec caregiver burden; 3) Pt will demonstrate improved transfers w/ S for inc safety; 4) Pt will be able to amb w/ S >150' w/ RW for household distances to inc safety and dec caregiver burden; 5) Pt will be able to navigate stairs with S to dec caregiver burden and inc safety with functional mobility; 6) Improve general balance by 1 grade to inc safety; 7) PT for ongoing patient and caregiver education    PT Treatment Day 0   Plan   Treatment/Interventions Functional transfer training;LE strengthening/ROM; Elevations; Therapeutic exercise; Endurance training;Patient/family training;Bed mobility;Gait training;Spoke to nursing;OT   PT Frequency 7x/wk; Twice a day;Weekend   Recommendation   PT Discharge Recommendation Home with skilled therapy; Return to previous environment with social support  (OPPT w/ inc social support)   Equipment Recommended [place] walker   Change/add to [employer]?  No   Additional Comments Pt to achieve PT goals prior to D/C   AM-PAC Basic Mobility Inpatient   Turning in Bed Without Bedrails 3   Lying on Back to Sitting on Edge of Flat Bed 3   Moving Bed to Chair 3   Standing Up From Chair 3   Walk in Room 3   Climb 3-5 Stairs 3   Basic Mobility Inpatient Raw Score 18   Basic Mobility Standardized Score 41 05   Hx/personal factors: co-morbidities, inaccessible home, mutliple lines, use of AD, pain, WB restrictions, fall risk and assist w/ ADL's  Examination: dec mobility, dec balance, dec endurance, dec amb, risk for falls, pain, WB restrictions  Clinical: unpredictable (ongoing medical status, risk for falls, POD #0 and pain mgt)  Complexity: high     Nina Claribel

## 2021-03-19 NOTE — ANESTHESIA PROCEDURE NOTES
Peripheral Block    Patient location during procedure: holding area  Start time: 3/19/2021 7:03 AM  Reason for block: at surgeon's request and post-op pain management  Staffing  Anesthesiologist: Wing Dalia DO  Performed: anesthesiologist   Preanesthetic Checklist  Completed: patient identified, site marked, surgical consent, pre-op evaluation, timeout performed, IV checked, risks and benefits discussed and monitors and equipment checked  Peripheral Block  Patient position: supine  Prep: ChloraPrep  Patient monitoring: frequent blood pressure checks and continuous pulse ox  Block type: adductor canal block  Laterality: right  Injection technique: single-shot  Procedures: ultrasound guided, Ultrasound guidance required for the procedure to increase accuracy and safety of medication placement and decrease risk of complications    Ultrasound permanent image savedropivacaine (NAROPIN) 0 5 % perineural infiltration, 30 mL  midazolam (VERSED) 2 mg/2 mL IV, 2 mg  fentaNYL 50 mcg/mL IV, 100 mcg  Needle  Needle type: Stimuplex   Needle localization: anatomical landmarks and ultrasound guidance  Assessment  Injection assessment: incremental injection and negative aspiration for heme  Post-procedure:  site cleaned  patient tolerated the procedure well with no immediate complications

## 2021-03-20 VITALS
BODY MASS INDEX: 30.61 KG/M2 | HEART RATE: 71 BPM | OXYGEN SATURATION: 93 % | DIASTOLIC BLOOD PRESSURE: 75 MMHG | TEMPERATURE: 98.4 F | WEIGHT: 195 LBS | SYSTOLIC BLOOD PRESSURE: 153 MMHG | HEIGHT: 67 IN | RESPIRATION RATE: 16 BRPM

## 2021-03-20 LAB
ANION GAP SERPL CALCULATED.3IONS-SCNC: 8 MMOL/L (ref 4–13)
BUN SERPL-MCNC: 11 MG/DL (ref 5–25)
CALCIUM SERPL-MCNC: 8.6 MG/DL (ref 8.3–10.1)
CHLORIDE SERPL-SCNC: 106 MMOL/L (ref 100–108)
CO2 SERPL-SCNC: 26 MMOL/L (ref 21–32)
CREAT SERPL-MCNC: 0.75 MG/DL (ref 0.6–1.3)
GFR SERPL CREATININE-BSD FRML MDRD: 84 ML/MIN/1.73SQ M
GLUCOSE P FAST SERPL-MCNC: 156 MG/DL (ref 65–99)
GLUCOSE SERPL-MCNC: 156 MG/DL (ref 65–140)
POTASSIUM SERPL-SCNC: 4.1 MMOL/L (ref 3.5–5.3)
SODIUM SERPL-SCNC: 140 MMOL/L (ref 136–145)

## 2021-03-20 PROCEDURE — 99218 PR INITIAL OBSERVATION CARE/DAY 30 MINUTES: CPT | Performed by: PHYSICIAN ASSISTANT

## 2021-03-20 PROCEDURE — 80048 BASIC METABOLIC PNL TOTAL CA: CPT | Performed by: ORTHOPAEDIC SURGERY

## 2021-03-20 PROCEDURE — 97110 THERAPEUTIC EXERCISES: CPT

## 2021-03-20 PROCEDURE — 97116 GAIT TRAINING THERAPY: CPT

## 2021-03-20 RX ORDER — DOCUSATE SODIUM 100 MG/1
100 CAPSULE, LIQUID FILLED ORAL 2 TIMES DAILY
Qty: 10 CAPSULE | Refills: 0
Start: 2021-03-20 | End: 2021-09-28

## 2021-03-20 RX ORDER — OXYCODONE HYDROCHLORIDE 5 MG/1
5 TABLET ORAL EVERY 4 HOURS PRN
Qty: 30 TABLET | Refills: 0
Start: 2021-03-20 | End: 2021-03-30

## 2021-03-20 RX ORDER — ASPIRIN 325 MG
325 TABLET ORAL DAILY
Refills: 0
Start: 2021-03-20

## 2021-03-20 RX ORDER — SENNOSIDES 8.6 MG
8.6 TABLET ORAL DAILY
Refills: 0
Start: 2021-03-20 | End: 2022-05-16 | Stop reason: ALTCHOICE

## 2021-03-20 RX ADMIN — METHOCARBAMOL 500 MG: 500 TABLET ORAL at 12:43

## 2021-03-20 RX ADMIN — PANTOPRAZOLE SODIUM 20 MG: 20 TABLET, DELAYED RELEASE ORAL at 06:03

## 2021-03-20 RX ADMIN — METHOCARBAMOL 500 MG: 500 TABLET ORAL at 06:04

## 2021-03-20 RX ADMIN — ASPIRIN 325 MG ORAL TABLET 325 MG: 325 PILL ORAL at 08:13

## 2021-03-20 RX ADMIN — OXYCODONE HYDROCHLORIDE 10 MG: 10 TABLET ORAL at 08:13

## 2021-03-20 RX ADMIN — METFORMIN HYDROCHLORIDE 500 MG: 500 TABLET ORAL at 08:13

## 2021-03-20 RX ADMIN — DOCUSATE SODIUM 100 MG: 100 CAPSULE, LIQUID FILLED ORAL at 08:13

## 2021-03-20 RX ADMIN — Medication 1 TABLET: at 08:13

## 2021-03-20 RX ADMIN — LISINOPRIL 10 MG: 10 TABLET ORAL at 08:13

## 2021-03-20 RX ADMIN — ACETAMINOPHEN 650 MG: 325 TABLET ORAL at 12:43

## 2021-03-20 RX ADMIN — PRAVASTATIN SODIUM 20 MG: 20 TABLET ORAL at 08:13

## 2021-03-20 RX ADMIN — OXYCODONE HYDROCHLORIDE 10 MG: 10 TABLET ORAL at 03:03

## 2021-03-20 RX ADMIN — SENNOSIDES 8.6 MG: 8.6 TABLET ORAL at 08:13

## 2021-03-20 NOTE — PROGRESS NOTES
Orthopedic Total Knee Progress Note  ([facility] - Dr Inocencio Vargas)    ASSESSMENT & PLAN:  Status post- RIGHT total knee arthroplasty:  LOS: 0 days   Doing well postoperatively  Pain Relief: Pt comfortable and pain controlled  Continues current post-op course  Activity: up with assistance  Working with PT / OT  Weight Bearing: WBAT      SUBJECTIVE:    Systemic or Specific Complaints: Pain medications covering pain  OBJECTIVE:  Vital signs in last 24 hours:  Temp:  [97 °F (36 1 °C)-99 1 °F (37 3 °C)] 98 4 °F (36 9 °C)  HR:  [60-92] 61  Resp:  [14-18] 18  BP: (112-157)/(60-79) 121/67  General: alert, appears stated age and cooperative   Neurovascular: Intact    Wound: No Erythema and Wound Intact   Range of Motion: Normal for post surgery   DVT Exam: Negative Roxane's sign  No cords or calf tenderness  No significant calf/ankle edema       Data Review:  CBC:   Lab Results   Component Value Date    WBC 6 75 02/23/2021    RBC 4 91 02/23/2021    HGB 14 4 02/23/2021    HCT 43 8 02/23/2021     02/23/2021     Plan:  DVT Prophylaxis   Mobilize with PT / OT  D/C Planning for Disposition  Goal  Is to dc today after her 2 sessions of PT      Vitaliy Thompson  Date: 3/20/2021  Time: 7:36 AM

## 2021-03-20 NOTE — CASE MANAGEMENT
LOS: 1 GMLOS: N/A RISK OF READMISSION: N/A BUNDLE: NO    CM met with pt and spouse at bedside to discuss discharge needs  Pt lives in a house with her   BR on the 2nd level; will work w/ PT on stair training today  Able to remain on main level if unable to complete stairs  ADL's are completed independently with no DME use  Pt would like a RW and BSC; CM will order  Pt will be doing OOPT at [facility]; next appt is 3/22  Spouse will be providing transportation; will also transport home  Pt is hoping to discharge home today  CM will deliver DME to pt's room once approved by [facility]  No other needs expressed or identified  CM will continue to follow as needed  CM reviewed d/c planning process including the following: identifying help at home, patient preference for d/c planning needs, Discharge Lounge, [facility] Meds to Bed program, availability of treatment team to discuss questions or concerns patient and/or family may have regarding understanding medications and recognizing signs and symptoms once discharged  CM also encouraged patient to follow up with all recommended appointments after discharge  Patient advised of importance for patient and family to participate in managing patients medical well being

## 2021-03-20 NOTE — CONSULTS
[address] [facility] D [place] 1955, 72 y o  female MRN: [id number]  Unit/Bed#: E2 -01 Encounter: [id number]  Primary Care Provider: Alvina Bell PA-C   Date and time admitted to hospital: 3/19/2021  5:14 AM    Consults    * Osteoarthritis of right knee  Assessment & Plan  ·  Status post right knee arthroplasty  ·  Management per primary team    Essential hypertension  Assessment & Plan   Blood pressure    Can restart lisinopril at discharge; monitor for hypotension    Mixed hyperlipidemia  Assessment & Plan   Continue statin and aspirin    Type 2 diabetes mellitus without complication, without long-term current use of insulin (HCC)  Assessment & Plan    Lab Results   Component Value Date    HGBA1C 6 1 (H) 02/23/2021     Continue metformin at discharge          VTE Prophylaxis: Reason for no pharmacologic prophylaxis Management per primary team and postoperative status  / sequential compression device     Recommendations for Discharge:  ·  patient to be discharged home today following physical therapy    Counseling / Coordination of Care Time: 30 minutes  Greater than 50% of total time spent on patient counseling and coordination of care  Collaboration of Care: Were Recommendations Directly Discussed with Primary Treatment Team? - No     History of Present Illness:    Glenys Curry is a 72 y o  female who is originally admitted to the  orthopedic service due to  Right knee osteoarthritis  We are consulted for   For medical management including hypertension, hyperlipidemia, and non-insulin-dependent diabetes  Patient is doing well  She is tolerating p o  intake  Her pain is well controlled  Review of Systems:    Review of Systems   Constitutional: Negative  HENT: Negative  Eyes: Negative  Respiratory: Negative  Cardiovascular: Negative  Gastrointestinal: Negative  Endocrine: Negative  Genitourinary: Negative      Musculoskeletal: Positive for arthralgias, gait problem and joint swelling  Hematological: Negative  Psychiatric/Behavioral: Negative           Past Medical and Surgical History:     Past Medical History:   Diagnosis Date    Diabetes mellitus ([address] )     NIDDM    Diverticulosis     hx colon resection    GERD (gastroesophageal reflux disease)     Hyperlipidemia     Hypertension     Macular degeneration     Malignant neoplasm of skin     skin cancer right upper arm in past    OA (osteoarthritis)     right knee       Past Surgical History:   Procedure Laterality Date    CATARACT EXTRACTION Bilateral     CHOLANGIOGRAM N/A 7/2/2018    Procedure: CHOLANGIOGRAM;  Surgeon: Dale Rangel MD;  [facility]: [facility];  Service: General    CHOLECYSTECTOMY LAPAROSCOPIC N/A 7/2/2018    Procedure: CHOLECYSTECTOMY LAPAROSCOPIC W/IOC;  Surgeon: Dale Rangel MD;  [facility];  Service: General    COLON SURGERY      resection large intestine (diverticulitis)    COLONOSCOPY      IL LAP, INCISIONAL HERNIA REPAIR,REDUCIBLE N/A 5/30/2019    Procedure: REPAIR HERNIA INCISIONAL LAPAROSCOPIC W/ ROBOTICS WITH MESH PLACEMENT;  Surgeon: Dale Rangel MD;  [facility];  Service: General    TONSILLECTOMY  child       Meds/Allergies:    all medications and allergies reviewed    Allergies: No Known Allergies    Social History:     Marital Status: /Civil Union    Substance Use History:   Social History     Substance and Sexual Activity   Alcohol Use Never    Frequency: Never    Drinks per session: 1 or 2    Binge frequency: Never    Comment: beer/ liquor     Social History     Tobacco Use   Smoking Status Never Smoker   Smokeless Tobacco Never Used     Social History     Substance and Sexual Activity   Drug Use No       Family History:    non-contributory    Physical Exam:     Vitals:   Blood Pressure: 153/75 (03/20/21 0700)  Pulse: 71 (03/20/21 0700)  Temperature: 98 4 °F (36 9 °C) (03/20/21 0700)  Temp Source: Temporal (03/20/21 0700)  Respirations: 16 (03/20/21 0700)  Height: 5' 7" (170 2 cm) (03/19/21 0539)  Weight - Scale: 88 5 kg (195 lb) (03/19/21 0539)  SpO2: 93 % (03/20/21 0700)    Physical Exam  Constitutional:       Appearance: She is normal weight  HENT:      Nose: Nose normal       Mouth/Throat:      Mouth: Mucous membranes are moist       Pharynx: Oropharynx is clear  Eyes:      Extraocular Movements: Extraocular movements intact  Conjunctiva/sclera: Conjunctivae normal    Neck:      Musculoskeletal: Neck supple  Cardiovascular:      Rate and Rhythm: Normal rate and regular rhythm  Pulses: Normal pulses  Heart sounds: Normal heart sounds  Pulmonary:      Effort: Pulmonary effort is normal       Breath sounds: Normal breath sounds  Abdominal:      General: Abdomen is flat  Bowel sounds are normal       Palpations: Abdomen is soft  Musculoskeletal: Normal range of motion  Comments: Incision clean dry and intact on right knee   Skin:     General: Skin is warm and dry  Capillary Refill: Capillary refill takes less than 2 seconds  Neurological:      General: No focal deficit present  Mental Status: She is alert and oriented to person, place, and time  Mental status is at baseline  Psychiatric:         Mood and Affect: Mood normal          Thought Content: Thought content normal        Additional Data:     Lab Results: I have personally reviewed pertinent reports            Results from last 7 days   Lab Units 03/20/21 0539   SODIUM mmol/L 140   POTASSIUM mmol/L 4 1   CHLORIDE mmol/L 106   CO2 mmol/L 26   BUN mg/dL 11   CREATININE mg/dL 0 75   ANION GAP mmol/L 8   CALCIUM mg/dL 8 6   GLUCOSE RANDOM mg/dL 156*             Lab Results   Component Value Date/Time    HGBA1C 6 1 (H) 02/23/2021 03:30 PM    HGBA1C 6 9 (H) 01/09/2021 08:10 AM    HGBA1C 6 9 01/09/2021    HGBA1C 6 2 (H) 05/13/2020 08:09 AM    HGBA1C 6 2 05/13/2020    HGBA1C 6 3 (H) 08/17/2019 07:32 AM     Results from last 7 days   Lab Units 03/19/21  0930 03/19/21  0615   POC GLUCOSE mg/dl 110 132           Imaging: I have personally reviewed pertinent reports  XR knee right 1 or 2 views    (Results Pending)       EKG, Pathology, and Other Studies Reviewed on Admission:   · EKG: Normal sinus rhythm on 03/19    ** Please Note: This note has been constructed using a voice recognition system   **

## 2021-03-20 NOTE — PLAN OF CARE
Problem: PHYSICAL THERAPY ADULT  Goal: Performs mobility at highest level of function for planned discharge setting  See evaluation for individualized goals  Description: Treatment/Interventions: Functional transfer training, LE strengthening/ROM, Elevations, Therapeutic exercise, Endurance training, Patient/family training, Bed mobility, Gait training, Spoke to nursing, OT  Equipment Recommended: Charles Castillo       See flowsheet documentation for full assessment, interventions and recommendations  3/20/2021 1417 by Rodríguez Cormier PTA  Outcome: Progressing  Note: Prognosis: Good  Problem List: Decreased strength, Decreased range of motion, Decreased endurance, Impaired balance, Decreased mobility, Pain, Orthopedic restrictions  Assessment: Pt tolerated tx well this pm session  She was able to progress her endurance with gait training to 85'x2 with RW ans supervision assist x 1  Her  was present for entire tx session  She completed all exercises as directed and had more quad control this session  Pt complete 10 steps with stair training today   Plan is for pt to return home with support of her  and go to out pt PT to maximize outcomes of TKR sx  Barriers to Discharge: Inaccessible home environment  Barriers to Discharge Comments: stairs  PT Discharge Recommendation: Return to previous environment with social support, Home with skilled therapy(out pt PT)     PT - OK to Discharge: Yes    See flowsheet documentation for full assessment

## 2021-03-20 NOTE — PLAN OF CARE
Problem: Potential for Falls  Goal: Patient will remain free of falls  Description: INTERVENTIONS:  - Assess patient frequently for physical needs  -  Identify cognitive and physical deficits and behaviors that affect risk of falls    -  [place] fall precautions as indicated by assessment   - Educate patient/family on patient safety including physical limitations  - Instruct patient to call for assistance with activity based on assessment  - Modify environment to reduce risk of injury  - Consider OT/PT consult to assist with strengthening/mobility  Outcome: Progressing     Problem: PAIN - ADULT  Goal: Verbalizes/displays adequate comfort level or baseline comfort level  Description: Interventions:  - Encourage patient to monitor pain and request assistance  - Assess pain using appropriate pain scale  - Administer analgesics based on type and severity of pain and evaluate response  - Implement non-pharmacological measures as appropriate and evaluate response  - Consider cultural and social influences on pain and pain management  - Notify physician/advanced practitioner if interventions unsuccessful or patient reports new pain  Outcome: Progressing     Problem: INFECTION - ADULT  Goal: Absence or prevention of progression during hospitalization  Description: INTERVENTIONS:  - Assess and monitor for signs and symptoms of infection  - Monitor lab/diagnostic results  - Monitor all insertion sites, i e  indwelling lines, tubes, and drains  - Monitor endotracheal if appropriate and nasal secretions for changes in amount and color  - [place] appropriate cooling/warming therapies per order  - Administer medications as ordered  - Instruct and encourage patient and family to use good hand hygiene technique  - Identify and instruct in appropriate isolation precautions for identified infection/condition  Outcome: Progressing     Problem: SAFETY ADULT  Goal: Patient will remain free of falls  Description: INTERVENTIONS:  - Assess patient frequently for physical needs  -  Identify cognitive and physical deficits and behaviors that affect risk of falls  -  [place] fall precautions as indicated by assessment   - Educate patient/family on patient safety including physical limitations  - Instruct patient to call for assistance with activity based on assessment  - Modify environment to reduce risk of injury  - Consider OT/PT consult to assist with strengthening/mobility  Outcome: Progressing  Goal: Maintain or return to baseline ADL function  Description: INTERVENTIONS:  - Assess patient frequently for physical needs  -  Identify cognitive and physical deficits and behaviors that affect risk of falls    -  [place] fall precautions as indicated by assessment   - Educate patient/family on patient safety including physical limitations  - Instruct patient to call for assistance with activity based on assessment  - Modify environment to reduce risk of injury  - Consider OT/PT consult to assist with strengthening/mobility  Outcome: Progressing  Goal: Maintain or return mobility status to optimal level  Description: INTERVENTIONS:  -  Assess patient's ability to carry out ADLs; assess patient's baseline for ADL function and identify physical deficits which impact ability to perform ADLs (bathing, care of mouth/teeth, toileting, grooming, dressing, etc )  - Assess/evaluate cause of self-care deficits   - Assess range of motion  - Assess patient's mobility; develop plan if impaired  - Assess patient's need for assistive devices and provide as appropriate  - Encourage maximum independence but intervene and supervise when necessary  - Involve family in performance of ADLs  - Assess for home care needs following discharge   - Consider OT consult to assist with ADL evaluation and planning for discharge  - Provide patient education as appropriate  Outcome: Progressing     Problem: DISCHARGE PLANNING  Goal: Discharge to home or other facility with appropriate resources  Description: INTERVENTIONS:  - Identify barriers to discharge w/patient and caregiver  - Arrange for needed discharge resources and transportation as appropriate  - Identify discharge learning needs (meds, wound care, etc )  - Arrange for interpretive services to assist at discharge as needed  - Refer to Case Management Department for coordinating discharge planning if the patient needs post-hospital services based on physician/advanced practitioner order or complex needs related to functional status, cognitive ability, or social support system  Outcome: Progressing     Problem: Knowledge Deficit  Goal: Patient/family/caregiver demonstrates understanding of disease process, treatment plan, medications, and discharge instructions  Description: Complete learning assessment and assess knowledge base    Interventions:  - Provide teaching at level of understanding  - Provide teaching via preferred learning methods  Outcome: Progressing     Problem: SKIN/TISSUE INTEGRITY - ADULT  Goal: Skin integrity remains intact  Description: INTERVENTIONS  - Identify patients at risk for skin breakdown  - Assess and monitor skin integrity  - Assess and monitor nutrition and hydration status  - Monitor labs (i e  albumin)  - Assess for incontinence   - Turn and reposition patient  - Assist with mobility/ambulation  - Relieve pressure over bony prominences  - Avoid friction and shearing  - Provide appropriate hygiene as needed including keeping skin clean and dry  - Evaluate need for skin moisturizer/barrier cream  - Collaborate with interdisciplinary team (i e  Nutrition, Rehabilitation, etc )   - Patient/family teaching  Outcome: Progressing  Goal: Incision(s), wounds(s) or drain site(s) healing without S/S of infection  Description: INTERVENTIONS  - Assess and document risk factors for skin impairment   - Assess and document dressing, incision, wound bed, drain sites and surrounding tissue  - Consider nutrition services referral as needed  - Oral mucous membranes remain intact  - Provide patient/ family education  Outcome: Progressing     Problem: MUSCULOSKELETAL - ADULT  Goal: Maintain or return mobility to safest level of function  Description: INTERVENTIONS:  - Assess patient's ability to carry out ADLs; assess patient's baseline for ADL function and identify physical deficits which impact ability to perform ADLs (bathing, care of mouth/teeth, toileting, grooming, dressing, etc )  - Assess/evaluate cause of self-care deficits   - Assess range of motion  - Assess patient's mobility  - Assess patient's need for assistive devices and provide as appropriate  - Encourage maximum independence but intervene and supervise when necessary  - Involve family in performance of ADLs  - Assess for home care needs following discharge   - Consider OT consult to assist with ADL evaluation and planning for discharge  - Provide patient education as appropriate  Outcome: Progressing  Goal: Maintain proper alignment of affected body part  Description: INTERVENTIONS:  - Support, maintain and protect limb and body alignment  - Provide patient/ family with appropriate education  Outcome: Progressing

## 2021-03-20 NOTE — UTILIZATION REVIEW
Initial Clinical Review    Elective OP surgical procedure  Age/Sex: 72 y o  female  Surgery Date: 3/19/2021  Procedure: ARTHROPLASTY KNEE TOTAL (Right)  Anesthesia: spinal  Operative Findings: Moderate to severe tricompartment OA     POD#1 Progress Note:   Status post- RIGHT total knee arthroplasty:  LOS: 0 days   Doing well postoperatively  Pain Relief: Pt comfortable and pain controlled  Continues current post-op course  Activity: up with assistance   Working with PT / OT  Weight Bearing: WBAT     Admission Orders: Date/Time/Statement:   03/19/21 3952  Outpatient No Charge Bed (Outpatient No Charge Bed/Extended Recovery) Once      03/19/21 0725         Vital Signs: /75 (BP Location: Right arm)   Pulse 71   Temp 98 4 °F (36 9 °C) (Temporal)   Resp 16   Ht 5' 7" (1 702 m)   Wt 88 5 kg (195 lb)   SpO2 93%   Breastfeeding No   BMI 30 54 kg/m²   Diet:reg diet  Mobility: weight bearing w/ PT/OT  DVT Prophylaxis: scd  Medications/Pain Control:   Scheduled Medications:  aspirin, 325 mg, Oral, Daily  docusate sodium, 100 mg, Oral, BID  lisinopril, 10 mg, Oral, Daily  metFORMIN, 500 mg, Oral, Daily With Breakfast  methocarbamol, 500 mg, Oral, Q6H [facility]  multivitamin-minerals, 1 tablet, Oral, Daily  pantoprazole, 20 mg, Oral, Daily Before Breakfast  pravastatin, 20 mg, Oral, Daily  senna, 1 tablet, Oral, Daily      Continuous IV Infusions:  sodium chloride, 125 mL/hr, Intravenous, Continuous      PRN Meds:  acetaminophen, 650 mg, Oral, Q4H PRN  calcium carbonate, 1,000 mg, Oral, Daily PRN  fluticasone, 1 spray, Each Nare, BID PRN  magnesium hydroxide, 30 mL, Oral, Daily PRN  ondansetron, 4 mg, Intravenous, Q6H PRN  oxyCODONE, 10 mg, Oral, Q4H PRN 3/19 x 2, 3/20 x 2  oxyCODONE, 5 mg, Oral, Q4H PRN 3/19 x 1  IV mso4 3/19 x 1      Network Utilization Review Department  ATTENTION: Please call with any questions or concerns to [phone] and carefully listen to the prompts so that you are directed to the right person  All voicemails are confidential   Danielle Johnson all requests for admission clinical reviews, approved or denied determinations and any other requests to dedicated fax number below belonging to the campus where the patient is receiving treatment   List of dedicated fax numbers for the Facilities:  [address] DENIALS (Administrative/Medical Necessity) [phone]   [address] (Maternity/NICU/Pediatrics) [phone] [address] [place] [address] [address] [facility] [address] Laura Zapata 1023 (  [facility]) [address] [place] [address] Vinh Nunez [address] [place] [address] [phone]

## 2021-03-20 NOTE — PHYSICAL THERAPY NOTE
03/20/21 1325   PT Last Visit   PT Visit Date 03/20/21   Note Type   Note Type BID visit/treatment   Pain Assessment   Pain Assessment Tool 0-10   Pain Score 2   Pain Location/Orientation Orientation: Right;Location: Knee   Restrictions/Precautions   Weight Bearing Precautions Per Order Yes   RLE Weight Bearing Per Order WBAT   Other Precautions WBS; Fall Risk;Pain   General   Chart Reviewed Yes   Family/Caregiver Present Yes  ()   Cognition   Overall Cognitive Status WFL   Orientation Level Oriented X4   Subjective   Subjective pt excited to do stairs so she can go home   Bed Mobility   Supine to Sit 5  Supervision   Additional items Assist x 1;Bedrails   Transfers   Sit to Stand 5  Supervision   Additional items Assist x 1; Armrests   Stand to Sit 5  Supervision   Additional items Assist x 1; Armrests   Ambulation/Elevation   Gait pattern Antalgic;Decreased foot clearance;Decreased R stance; Step to;Excessively slow   Gait Assistance 5  Supervision   Additional items Assist x 1   Assistive Device Rolling walker   Distance 85'x2   Stair Management Assistance 4  Minimal assist   Additional items Assist x 1;Verbal cues   Stair Management Technique One rail L;Step to pattern; Sideways;Nonreciprocal   Number of Stairs 10   Balance   Static Sitting Good   Dynamic Sitting Fair +   Static Standing Fair   Dynamic Standing Fair -   Ambulatory Fair -   Endurance Deficit   Endurance Deficit Yes   Endurance Deficit Description fatigue   Activity Tolerance   Activity Tolerance Patient tolerated treatment well   Nurse Made Aware yes   Exercises   TKR Supine;Sitting;15 reps;AAROM;AROM; Right   Assessment   Prognosis Good   Problem List Decreased strength;Decreased range of motion;Decreased endurance; Impaired balance;Decreased mobility;Pain;Orthopedic restrictions   Assessment Pt tolerated tx well this pm session  She was able to progress her endurance with gait training to 85'x2 with RW ans supervision assist x 1   Her  was present for entire tx session  She completed all exercises as directed and had more quad control this session  Pt complete 10 steps with stair training today   Plan is for pt to return home with support of her  and go to out pt PT to maximize outcomes of TKR sx  Goals   Patient Goals to get better   STG Expiration Date 03/26/21   PT Treatment Day 2   Plan   Treatment/Interventions Functional transfer training;LE strengthening/ROM; Elevations; Therapeutic exercise; Endurance training;Patient/family training;Equipment eval/education; Bed mobility;Gait training;Spoke to nursing;Family   Progress Progressing toward goals   PT Frequency 7x/wk; Twice a day   Recommendation   PT Discharge Recommendation Return to previous environment with social support;Home with skilled therapy  (out pt PT)   Equipment Recommended [facility] Recommended Wheeled walker   PT - OK to Discharge Yes   AM-PAC Basic Mobility Inpatient   Turning in Bed Without Bedrails 3   Lying on Back to Sitting on Edge of Flat Bed 3   Moving Bed to Chair 4   Standing Up From Chair 4   Walk in Room 3   Climb 3-5 Stairs 3   Basic Mobility Inpatient Raw Score 20   Basic Mobility Standardized Score 43 99   Sujatha Kwong, PTA

## 2021-03-20 NOTE — PHYSICAL THERAPY NOTE
03/20/21 0902   PT Last Visit   PT Visit Date 03/20/21   Note Type   Note Type BID visit/treatment   Pain Assessment   Pain Assessment Tool 0-10   Pain Score 7   Pain Location/Orientation Orientation: Right;Location: Knee   Restrictions/Precautions   Weight Bearing Precautions Per Order Yes   RLE Weight Bearing Per Order WBAT   Cognition   Overall Cognitive Status WFL   Orientation Level Oriented X4   Subjective   Subjective pt agreeable to particiapte in PT tx this am    Transfers   Sit to Stand 5  Supervision   Additional items Assist x 1; Armrests   Ambulation/Elevation   Gait pattern Improper Weight shift; Antalgic;Decreased foot clearance;Decreased R stance; Inconsistent catherine; Short stride; Excessively slow   Gait Assistance 5  Supervision   Additional items Assist x 1   Assistive Device Rolling walker   Distance 45'x2   Stair Management Assistance Not tested   Balance   Static Sitting Good   Dynamic Sitting Fair +   Static Standing Fair   Dynamic Standing Fair -   Ambulatory Fair -   Endurance Deficit   Endurance Deficit Yes   Endurance Deficit Description fatigue ,pain   Activity Tolerance   Activity Tolerance Patient tolerated treatment well;Patient limited by fatigue;Patient limited by pain   Nurse Made Aware yes   Exercises   TKR Supine;Sitting;10 reps;AAROM;AROM; Right   Assessment   Prognosis Good   Problem List Decreased strength;Decreased range of motion;Decreased endurance; Impaired balance;Decreased mobility;Orthopedic restrictions;Pain   Assessment Pt tolerated tx well this session  She was able to progress her endurance with gait training to 45'x2 with RW and supervision x 1  Pt completed TKR exercises with some AAROM required secondary to weakness  Pt plan is to return home wot skilled PT ,as she has an out pt appt scheduled for evaluation this coming Monday  Continue with current POC     Barriers to Discharge Inaccessible home environment   Barriers to Discharge Comments stairs   Goals   Patient Goals to go home later today   STG Expiration Date 03/26/21   PT Treatment Day 1   Plan   Treatment/Interventions Functional transfer training;LE strengthening/ROM; Elevations; Therapeutic exercise; Endurance training;Patient/family training;Equipment eval/education; Bed mobility;Gait training;Spoke to nursing   Progress Progressing toward goals   PT Frequency 7x/wk; Twice a day   Recommendation   PT Discharge Recommendation Return to previous environment with social support;Home with skilled therapy   Equipment Recommended [place] walker   PT - OK to Discharge No   Additional Comments needs stair training this pm session   Eduin 8 in Bed Without Bedrails 3   Lying on Back to Sitting on Edge of Flat Bed 3   Moving Bed to Chair 3   Standing Up From Chair 4   Walk in Room 3   Climb 3-5 Stairs 3   Basic Mobility Inpatient Raw Score 19   Basic Mobility Standardized Score 42 48   Benjamín Barriga, PTA

## 2021-03-20 NOTE — PLAN OF CARE
Problem: PHYSICAL THERAPY ADULT  Goal: Performs mobility at highest level of function for planned discharge setting  See evaluation for individualized goals  Description: Treatment/Interventions: Functional transfer training, LE strengthening/ROM, Elevations, Therapeutic exercise, Endurance training, Patient/family training, Bed mobility, Gait training, Spoke to nursing, OT  Equipment Recommended: Mitchel Lee       See flowsheet documentation for full assessment, interventions and recommendations  Outcome: Progressing  Note: Prognosis: Good  Problem List: Decreased strength, Decreased range of motion, Decreased endurance, Impaired balance, Decreased mobility, Orthopedic restrictions, Pain  Assessment: Pt tolerated tx well this session  She was able to progress her endurance with gait training to 45'x2 with RW and supervision x 1  Pt completed TKR exercises with some AAROM required secondary to weakness  Pt plan is to return home wot skilled PT ,as she has an out pt appt scheduled for evaluation this coming Monday  Continue with current POC  Barriers to Discharge: Inaccessible home environment  Barriers to Discharge Comments: stairs  PT Discharge Recommendation: Return to previous environment with social support, Home with skilled therapy     PT - OK to Discharge: No    See flowsheet documentation for full assessment

## 2021-03-22 ENCOUNTER — EVALUATION (OUTPATIENT)
Dept: PHYSICAL THERAPY | Facility: REHABILITATION | Age: 66
End: 2021-03-22
Payer: MEDICARE

## 2021-03-22 DIAGNOSIS — M17.11 PRIMARY OSTEOARTHRITIS OF RIGHT KNEE: Primary | ICD-10-CM

## 2021-03-22 PROCEDURE — 97164 PT RE-EVAL EST PLAN CARE: CPT | Performed by: PHYSICAL THERAPIST

## 2021-03-22 PROCEDURE — 97110 THERAPEUTIC EXERCISES: CPT | Performed by: PHYSICAL THERAPIST

## 2021-03-22 NOTE — PROGRESS NOTES
PT Evaluation     Today's date: 3/22/2021  Patient name: Anthony Morfin  : 1955  MRN: [id number]  Referring provider: Lina Crawford MD  Dx:   Encounter Diagnosis     ICD-10-CM    1  Primary osteoarthritis of right knee  M17 11        Start Time: 1006  Stop Time: 1055  Total time in clinic (min): 49 minutes    Assessment  Assessment details: Pt is a 72year old female who presents to outpatient PT s/p R TKA  Pt demonstrates impairments including increased pain, decreased knee ROM, decreased LE strength, impaired balance and impaired functional mobility with difficulty dressing, performing transfers, walking, standing, and negotiating steps  Pt will benefit from skilled PT to address above impairments and maximize function  Impairments: abnormal or restricted ROM, activity intolerance, impaired balance, impaired physical strength, lacks appropriate home exercise program and pain with function  Understanding of Dx/Px/POC: good   Prognosis: good    Goals  Impairment Goals 4 weeks  -Pt will demonstrate decreased pain to 5/10 at worst  -Pt will demonstrate increased knee flexion ROM to>90 deg  -Pt will present with knee ext to 0 deg   -Pt will demonstrated increased LE strength all planes to >4/5 b/l  Functional Goals 8 weeks  -Pt will demonstrate ability to walk x15 mins without AD   -Pt will demonstrate ability to stand x30 mins to cook/prep food  -Pt will demonstrate ability to negotiate 12 steps reciprocally        Plan  Plan details: Cont per POC  Assess compliance with HEP     Patient would benefit from: skilled physical therapy  Planned modality interventions: thermotherapy: hydrocollator packs and cryotherapy  Planned therapy interventions: manual therapy, joint mobilization, patient education, strengthening, stretching, therapeutic activities, therapeutic exercise, home exercise program, functional ROM exercises, flexibility and balance  Frequency: 2x week  Duration in visits: 8  Treatment plan discussed with: patient        Subjective Evaluation    History of Present Illness  Mechanism of injury: Pt is s/p R TKA 3/19/21  Pt was d/c'ed on 3/20/21  Pt is not taking prescription pain meds, she takes tylenol as needed  She comes to PT with walker  Prior to surgery, pt walked without AD  Pt was able to get into her second floor and sleep in a bed  She goes up with 1 rail and her  nearby  She has been able to ADL's as long as she is standing still  Walking tolerance with walker: 15 mins  Standing tolerance: 5-10 mins  Sitting tolerance: 10 mins  Negotiating steps non-reciprocally with rail and close supervision  Pt is not driving secondary to surgery  Pt has help form  to don/doff shoes/socks/pants  Pt transfers independently from bed/couch using non-operated leg to assist with lifting operated leg        Pain  Current pain ratin  At best pain ratin  At worst pain ratin  Location: lateral knee   Quality: dull ache  Relieving factors: ice, rest and support  Progression: improved    Social Support  Steps to enter house: yes  Stairs in house: yes   Lives in: multiple-level home    Employment status: working ( part time )  Treatments  Current treatment: physical therapy  Patient Goals  Patient goal: to walk without walker         Objective     Observations     Right Knee   Positive for edema and incision  Additional Observation Details  Incision covered with bandage, no noticeable redness/leagage/drainage    Tenderness   Left Knee   Tenderness in the lateral joint line       Neurological Testing     Sensation     Knee   Left Knee   Intact: light touch    Active Range of Motion   Left Knee   Flexion: 136 degrees     Right Knee   Flexion: 54 degrees with pain    Additional Active Range of Motion Details  L knee 2 deg hyperextension  R knee lacks 11 deg ext   Decreased patellar mobility on L in all planes    Knee flexion 76 deg after heel slides     Strength/Myotome Testing Left Knee   Flexion: 4+  Extension: 4+    Right Knee   Flexion: 3-  Extension: 3-  Quadriceps contraction: poor    Additional Strength Details  DF; 4/5 R, 5/5 L   Hip flexion  3-/5, quad lag on R, 4/5 L     General Comments:      Knee Comments  Able to HR/TR  SLS: unable R, 5 secs L     Pt ambulates with RW with decreased stance time on L, decreased knee flexion during swing phase and decreased heel strike on L         Flowsheet Rows      Most Recent Value   PT/OT G-Codes   Current Score  50   Projected Score  77                   Precautions: hx of skin CA, HTN, GERD, diabetes  Daily Treatment Diary    Manuals 3/22         Knee PROM          Patellar Mobs                              Neuro Re-Ed          SLS           Tandem Stance                                                            Ther Ex          Bike          Seated HS stretch 20"x1 R         Seated gastroc stretch 20"x1 R         Heel slides 10"x5 R         Quad set 10"x5 R         Heel Prop 1 min R         Quad set with heel prop          SLR          S/L ABD          Prone hip ext          S/L ADD                                        Ther Activity          FSU and over                    Gait Training                              Modalities

## 2021-03-22 NOTE — LETTER
2021    MD Jackie Verma [address] [facility]    Patient: Nargis Chavez   YOB: 1955   Date of Visit: 3/22/2021     Encounter Diagnosis     ICD-10-CM    1  Primary osteoarthritis of right knee  M17 11        Dear Dr Sidney Acuna:    Thank you for your recent referral of Nargis Chavez  Please review the attached evaluation summary from Shannon's recent visit  Please verify that you agree with the plan of care by signing the attached order  If you have any questions or concerns, please do not hesitate to call  I sincerely appreciate the opportunity to share in the care of one of your patients and hope to have another opportunity to work with you in the near future  Sincerely,    Dillon Cui, PT      Referring Provider:      I certify that I have read the below Plan of Care and certify the need for these services furnished under this plan of treatment while under my care  MD Jackie Verma [address] [place] [id number]  Via Fax: [phone]          PT Evaluation     Today's date: 3/22/2021  Patient name: Nargis Chavez  : 1955  MRN: [id number]  Referring provider: Lincoln Sumner MD  Dx:   Encounter Diagnosis     ICD-10-CM    1  Primary osteoarthritis of right knee  M17 11        Start Time: 1006  Stop Time: 1055  Total time in clinic (min): 49 minutes    Assessment  Assessment details: Pt is a 72year old female who presents to outpatient PT s/p R TKA  Pt demonstrates impairments including increased pain, decreased knee ROM, decreased LE strength, impaired balance and impaired functional mobility with difficulty dressing, performing transfers, walking, standing, and negotiating steps  Pt will benefit from skilled PT to address above impairments and maximize function       Impairments: abnormal or restricted ROM, activity intolerance, impaired balance, impaired physical strength, lacks appropriate home exercise program and pain with function  Understanding of Dx/Px/POC: good   Prognosis: good    Goals  Impairment Goals 4 weeks  -Pt will demonstrate decreased pain to 5/10 at worst  -Pt will demonstrate increased knee flexion ROM to>90 deg  -Pt will present with knee ext to 0 deg   -Pt will demonstrated increased LE strength all planes to >4/5 b/l  Functional Goals 8 weeks  -Pt will demonstrate ability to walk x15 mins without AD   -Pt will demonstrate ability to stand x30 mins to cook/prep food  -Pt will demonstrate ability to negotiate 12 steps reciprocally        Plan  Plan details: Cont per POC  Assess compliance with HEP  Patient would benefit from: skilled physical therapy  Planned modality interventions: thermotherapy: hydrocollator packs and cryotherapy  Planned therapy interventions: manual therapy, joint mobilization, patient education, strengthening, stretching, therapeutic activities, therapeutic exercise, home exercise program, functional ROM exercises, flexibility and balance  Frequency: 2x week  Duration in visits: 8  Treatment plan discussed with: patient        Subjective Evaluation    History of Present Illness  Mechanism of injury: Pt is s/p R TKA 3/19/21  Pt was d/c'ed on 3/20/21  Pt is not taking prescription pain meds, she takes tylenol as needed  She comes to PT with walker  Prior to surgery, pt walked without AD  Pt was able to get into her second floor and sleep in a bed  She goes up with 1 rail and her  nearby  She has been able to ADL's as long as she is standing still       Walking tolerance with walker: 15 mins  Standing tolerance: 5-10 mins  Sitting tolerance: 10 mins  Negotiating steps non-reciprocally with rail and close supervision  Pt is not driving secondary to surgery  Pt has help form  to don/doff shoes/socks/pants  Pt transfers independently from bed/couch using non-operated leg to assist with lifting operated leg        Pain  Current pain ratin  At best pain ratin  At worst pain ratin  Location: lateral knee   Quality: dull ache  Relieving factors: ice, rest and support  Progression: improved    Social Support  Steps to enter house: yes  Stairs in house: yes   Lives in: multiple-level home    Employment status: working ( part time )  Treatments  Current treatment: physical therapy  Patient Goals  Patient goal: to walk without walker         Objective     Observations     Right Knee   Positive for edema and incision  Additional Observation Details  Incision covered with bandage, no noticeable redness/leagage/drainage    Tenderness   Left Knee   Tenderness in the lateral joint line  Neurological Testing     Sensation     Knee   Left Knee   Intact: light touch    Active Range of Motion   Left Knee   Flexion: 136 degrees     Right Knee   Flexion: 54 degrees with pain    Additional Active Range of Motion Details  L knee 2 deg hyperextension  R knee lacks 11 deg ext   Decreased patellar mobility on L in all planes    Knee flexion 76 deg after heel slides     Strength/Myotome Testing     Left Knee   Flexion: 4+  Extension: 4+    Right Knee   Flexion: 3-  Extension: 3-  Quadriceps contraction: poor    Additional Strength Details  DF; 4/5 R, 5/5 L   Hip flexion  3-/5, quad lag on R, 4/5 L     General Comments:      Knee Comments  Able to HR/TR  SLS: unable R, 5 secs L     Pt ambulates with RW with decreased stance time on L, decreased knee flexion during swing phase and decreased heel strike on L         Flowsheet Rows      Most Recent Value   PT/OT G-Codes   Current Score  50   Projected Score  77                   Precautions: hx of skin CA, HTN, GERD, diabetes  Daily Treatment Diary    Manuals 3/22         Knee PROM          Patellar Mobs                              Neuro Re-Ed          SLS           Tandem Stance                                                            Ther Ex          Bike Seated HS stretch 20"x1 R         Seated gastroc stretch 20"x1 R         Heel slides 10"x5 R         Quad set 10"x5 R         Heel Prop 1 min R         Quad set with heel prop          SLR          S/L ABD          Prone hip ext          S/L ADD                                        Ther Activity          FSU and over                    Gait Training                              Modalities

## 2021-03-24 ENCOUNTER — OFFICE VISIT (OUTPATIENT)
Dept: PHYSICAL THERAPY | Facility: REHABILITATION | Age: 66
End: 2021-03-24
Payer: MEDICARE

## 2021-03-24 DIAGNOSIS — M17.11 PRIMARY OSTEOARTHRITIS OF RIGHT KNEE: Primary | ICD-10-CM

## 2021-03-24 PROCEDURE — 97110 THERAPEUTIC EXERCISES: CPT | Performed by: PHYSICAL THERAPIST

## 2021-03-24 NOTE — PROGRESS NOTES
Daily Note     Today's date: 3/24/2021  Patient name: Tristen Marqius  : 1955  MRN: [id number]  Referring provider: Angus Woodall MD  Dx:   Encounter Diagnosis     ICD-10-CM    1  Primary osteoarthritis of right knee  M17 11        Start Time: 1232  Stop Time: 1314  Total time in clinic (min): 42 minutes    Subjective: Pt reports compliance with HEP  Objective: See treatment diary below  Knee flexion 90 deg on R with heel slides  Assessment: Tolerated treatment well  Patient demonstrated fatigue post treatment  Pt initially challenged with SLR on R requiring mod A to elevate through full ROM secondary to R LE weakness however pt able to perform 2 reps independently after initial assistance  Plan: Continue per plan of care            Precautions: hx of skin CA, HTN, GERD, diabetes  Daily Treatment Diary    Manuals 3/22 3/24        Knee PROM          Patellar Mobs  4 mins                            Neuro Re-Ed          SLS           Tandem Stance                                                            Ther Ex          Bike  10 mins AAROM        Seated HS stretch 20"x1 R 20"x3 R        Seated gastroc stretch 20"x1 R 20"x3 R        Heel slides 10"x5 R 10"x5 R        Quad set 10"x5 R 10"x5 R        Heel Prop 1 min R HEP        Quad set with heel prop  10"x5 R        SLR  1x5 R with PT mod A        S/L ABD          Bridge  5"x5         Ball squeeze  5"x10                                      Ther Activity          FSU and over                    Gait Training                              Modalities

## 2021-03-30 ENCOUNTER — OFFICE VISIT (OUTPATIENT)
Dept: PHYSICAL THERAPY | Facility: REHABILITATION | Age: 66
End: 2021-03-30
Payer: MEDICARE

## 2021-03-30 DIAGNOSIS — M17.11 PRIMARY OSTEOARTHRITIS OF RIGHT KNEE: Primary | ICD-10-CM

## 2021-03-30 PROCEDURE — 97140 MANUAL THERAPY 1/> REGIONS: CPT | Performed by: PHYSICAL THERAPIST

## 2021-03-30 PROCEDURE — 97110 THERAPEUTIC EXERCISES: CPT | Performed by: PHYSICAL THERAPIST

## 2021-03-30 NOTE — PROGRESS NOTES
Daily Note     Today's date: 3/30/2021  Patient name: Pablo Hoffman  : 1955  MRN: [id number]  Referring provider: Henry Valencia MD  Dx:   Encounter Diagnosis     ICD-10-CM    1  Primary osteoarthritis of right knee  M17 11        Start Time: 7730  Stop Time: 99  Total time in clinic (min): 41 minutes    Subjective: Pt reports she has good days ad bad days  She still gets pain in her knee and needs to take tylenol  Objective: See treatment diary below      Assessment: Tolerated treatment well  Patient demonstrated fatigue post treatment  Pt challenged with SLR requiring visual, verbal and tactile cueing to initiate and maintain quad set prior to and during SLR to avoid quad lag  Utilized towel roll under knee as cue to keep RLE straight and maintain quad set during lowering phase of SLR  Plan: Continue per plan of care  Update HEP NV            Precautions: hx of skin CA, HTN, GERD, diabetes  Daily Treatment Diary    Manuals 3/22 3/24 3/30       Knee PROM   4 mins       Patellar Mobs  4 mins 4 mins                           Neuro Re-Ed          SLS           Tandem Stance                                                            Ther Ex          Bike  10 mins AAROM 10 mins AAROM       Seated HS stretch 20"x1 R 20"x3 R 20"x3 R       Seated gastroc stretch 20"x1 R 20"x3 R HEP       Heel slides 10"x5 R 10"x5 R HEP        Quad set 10"x5 R 10"x5 R 10"x5       Heel Prop 1 min R HEP        Quad set with heel prop  10"x5 R 10"x5        SLR  1x5 R with PT mod A 2x5 R with PT min A        S/L ABD   nv       Bridge  5"x5  5"x10       Ball squeeze  5"x10 5"x15        HR/TR          Standing hip ABD          Standing SLR          Standing hip ext           Ther Activity          FSU and over                    Gait Training                              Modalities

## 2021-04-01 ENCOUNTER — OFFICE VISIT (OUTPATIENT)
Dept: PHYSICAL THERAPY | Facility: REHABILITATION | Age: 66
End: 2021-04-01
Payer: MEDICARE

## 2021-04-01 DIAGNOSIS — M17.11 PRIMARY OSTEOARTHRITIS OF RIGHT KNEE: Primary | ICD-10-CM

## 2021-04-01 PROCEDURE — 97110 THERAPEUTIC EXERCISES: CPT | Performed by: PHYSICAL THERAPIST

## 2021-04-01 PROCEDURE — 97140 MANUAL THERAPY 1/> REGIONS: CPT | Performed by: PHYSICAL THERAPIST

## 2021-04-01 NOTE — PROGRESS NOTES
Daily Note     Today's date: 2021  Patient name: Jacquelyn Gonzalez  : 1955  MRN: [id number]  Referring provider: Emy Sears MD  Dx:   Encounter Diagnosis     ICD-10-CM    1  Primary osteoarthritis of right knee  M17 11        Start Time: 1552  Stop Time: 1640  Total time in clinic (min): 48 minutes    Subjective: Pt reports she was able to get into her tub independently      Objective: See treatment diary below  Knee ext lacks 5 deg, knee flexion 95 deg after PROM      Assessment: Tolerated treatment well  Patient demonstrated fatigue post treatment  Pt significantly challenge with bearing wt through RLE during static stability secondary to increased pain requiring increased UE support to maintain balance and for added support to RLE       Plan: Continue per plan of care            Precautions: hx of skin CA, HTN, GERD, diabetes  Daily Treatment Diary    Manuals 3/22 3/24 3/30 4/1      Knee PROM   4 mins 4 mins      Patellar Mobs  4 mins 4 mins 4 mins                          Neuro Re-Ed          SLS     15"x1 ea      Tandem Stance    30"x1 ea                                                        Ther Ex          Bike  10 mins AAROM 10 mins AAROM 10 mins AAROM      Seated HS stretch 20"x1 R 20"x3 R 20"x3 R HEP      Seated gastroc stretch 20"x1 R 20"x3 R HEP       Heel slides 10"x5 R 10"x5 R HEP        Quad set 10"x5 R 10"x5 R 10"x5 HEP      Heel Prop 1 min R HEP        Quad set with heel prop  10"x5 R 10"x5  HEP      SLR  1x5 R with PT mod A 2x5 R with PT min A  1x10 R      S/L ABD   nv 1x8 R      Bridge  5"x5  5"x10 5"x10       Ball squeeze  5"x10 5"x15  5"x20      HR/TR    1x10 ea      Standing hip ABD          Standing SLR          Standing hip ext           Ther Activity          FSU and over          Sit to stand    x5      Gait Training                              Modalities

## 2021-04-06 ENCOUNTER — OFFICE VISIT (OUTPATIENT)
Dept: PHYSICAL THERAPY | Facility: REHABILITATION | Age: 66
End: 2021-04-06
Payer: MEDICARE

## 2021-04-06 DIAGNOSIS — M17.11 PRIMARY OSTEOARTHRITIS OF RIGHT KNEE: Primary | ICD-10-CM

## 2021-04-06 PROCEDURE — 97110 THERAPEUTIC EXERCISES: CPT | Performed by: PHYSICAL THERAPIST

## 2021-04-06 PROCEDURE — 97116 GAIT TRAINING THERAPY: CPT | Performed by: PHYSICAL THERAPIST

## 2021-04-06 NOTE — PROGRESS NOTES
Daily Note     Today's date: 2021  Patient name: Jacquelyn Gonzalez  : 1955  MRN: [id number]  Referring provider: Emy Sears MD  Dx:   Encounter Diagnosis     ICD-10-CM    1  Primary osteoarthritis of right knee  M17 11        Start Time: 1608  Stop Time: 1657  Total time in clinic (min): 49 minutes    Subjective: Pt reports she started walking around her kitchen and house without her walker  Objective: See treatment diary below  Knee flexion 108 deg      Assessment: Tolerated treatment well  Initiated ambulation with SPC with vc/tc to emphasize heel strike during initial stance and knee flexion during swing phase with a 2 pt step through pattern  Plan: Continue per plan of care    Pt to continue with walker as needed and use SPC          Precautions: hx of skin CA, HTN, GERD, diabetes  Daily Treatment Diary    Manuals 3/22 3/24 3/30 4/1 4/6     Knee PROM   4 mins 4 mins np     Patellar Mobs  4 mins 4 mins 4 mins np                         Neuro Re-Ed          SLS     15"x1 ea 15"x2 ea, 1UE support RLE     Tandem Stance    30"x1 ea 30"x2 ea                                                       Ther Ex          Bike  10 mins AAROM 10 mins AAROM 10 mins AAROM 10 mins AAROM     Seated HS stretch 20"x1 R 20"x3 R 20"x3 R HEP      Seated gastroc stretch 20"x1 R 20"x3 R HEP       Heel slides 10"x5 R 10"x5 R HEP   10"x10 R     Quad set 10"x5 R 10"x5 R 10"x5 HEP      Heel Prop 1 min R HEP        Quad set with heel prop  10"x5 R 10"x5  HEP      SLR  1x5 R with PT mod A 2x5 R with PT min A  1x10 R HEP     S/L ABD   nv 1x8 R HEP     Bridge  5"x5  5"x10 5"x10  HEP     Ball squeeze  5"x10 5"x15  5"x20 HEP     HR/TR    1x10 ea 1x15 ea     Standing hip ABD     1x5     Standing SLR          Standing hip ext           Ther Activity          FSU and over          Sit to stand    x5 x5      Gait Training          Amb with SPC     40'x4 laps     Forward weight shifts at bar     6 mins     Modalities

## 2021-04-08 ENCOUNTER — OFFICE VISIT (OUTPATIENT)
Dept: PHYSICAL THERAPY | Facility: REHABILITATION | Age: 66
End: 2021-04-08
Payer: MEDICARE

## 2021-04-08 DIAGNOSIS — K21.9 GASTROESOPHAGEAL REFLUX DISEASE WITHOUT ESOPHAGITIS: ICD-10-CM

## 2021-04-08 DIAGNOSIS — E78.2 MIXED HYPERLIPIDEMIA: ICD-10-CM

## 2021-04-08 DIAGNOSIS — E11.9 TYPE 2 DIABETES MELLITUS WITHOUT COMPLICATION, WITHOUT LONG-TERM CURRENT USE OF INSULIN (HCC): ICD-10-CM

## 2021-04-08 DIAGNOSIS — I10 HYPERTENSION, UNSPECIFIED TYPE: ICD-10-CM

## 2021-04-08 DIAGNOSIS — M17.11 PRIMARY OSTEOARTHRITIS OF RIGHT KNEE: Primary | ICD-10-CM

## 2021-04-08 PROCEDURE — 97110 THERAPEUTIC EXERCISES: CPT | Performed by: PHYSICAL THERAPIST

## 2021-04-08 NOTE — PROGRESS NOTES
Daily Note     Today's date: 2021  Patient name: Marisel Wylie  : 1955  MRN: [id number]  Referring provider: Yanet Larson MD  Dx:   Encounter Diagnosis     ICD-10-CM    1  Primary osteoarthritis of right knee  M17 11        Start Time:   Stop Time:   Total time in clinic (min): 41 minutes    Subjective: Pt comes to PT with SPC  Pt only uses walker at night to go to the bathroom and to shower  Pt now walks with her [facility] for all other ambulation  Objective: See treatment diary below  Knee flexion AROM 100 deg R, ext AROM lacks 4 deg       Assessment: Tolerated treatment well  Patient demonstrated fatigue post treatment  Pt demonstrated ability to perform intermittent full revolution on bike after AAROM warm-up  Pt is progressing well with increased knee flexion ROM  Pt challenged with standing hip 3 way requiring vc/tc to avoid compensation with lateral lean  Plan: Continue per plan of care            Precautions: hx of skin CA, HTN, GERD, diabetes  Daily Treatment Diary    Manuals 3/22 3/24 3/30 4/1 4/6 4/8    Knee PROM   4 mins 4 mins np     Patellar Mobs  4 mins 4 mins 4 mins np                         Neuro Re-Ed          SLS     15"x1 ea 15"x2 ea, 1UE support RLE 20"x1 ea    Tandem Stance    30"x1 ea 30"x2 ea 1'x1 ea                                                      Ther Ex          Bike  10 mins AAROM 10 mins AAROM 10 mins AAROM 10 mins AAROM 10 mins AAROM-AROM    Seated HS stretch 20"x1 R 20"x3 R 20"x3 R HEP      Seated gastroc stretch 20"x1 R 20"x3 R HEP       Heel slides 10"x5 R 10"x5 R HEP   10"x10 R     Quad set 10"x5 R 10"x5 R 10"x5 HEP      Heel Prop 1 min R HEP        Quad set with heel prop  10"x5 R 10"x5  HEP      SLR  1x5 R with PT mod A 2x5 R with PT min A  1x10 R HEP     S/L ABD   nv 1x8 R HEP     Bridge  5"x5  5"x10 5"x10  HEP     Ball squeeze  5"x10 5"x15  5"x20 HEP     HR/TR    1x10 ea 1x15 ea x20 ea    Standing hip ABD     1x5 1x10 ea    Standing SLR 1x10 ea    Standing hip ext       1x10 ea    Ther Activity          FSU and over          Sit to stand    x5 x5  x10     Gait Training          Amb with SPC     40'x4 laps np    Forward weight shifts at bar     6 mins np    Modalities

## 2021-04-09 RX ORDER — LISINOPRIL 10 MG/1
10 TABLET ORAL DAILY
Qty: 90 TABLET | Refills: 1 | Status: SHIPPED | OUTPATIENT
Start: 2021-04-09 | End: 2021-09-23 | Stop reason: SDUPTHER

## 2021-04-09 RX ORDER — PANTOPRAZOLE SODIUM 20 MG/1
20 TABLET, DELAYED RELEASE ORAL DAILY
Qty: 90 TABLET | Refills: 1 | Status: SHIPPED | OUTPATIENT
Start: 2021-04-09 | End: 2021-09-23 | Stop reason: SDUPTHER

## 2021-04-09 RX ORDER — PRAVASTATIN SODIUM 20 MG
20 TABLET ORAL DAILY
Qty: 90 TABLET | Refills: 1 | Status: SHIPPED | OUTPATIENT
Start: 2021-04-09 | End: 2021-09-23 | Stop reason: SDUPTHER

## 2021-04-13 ENCOUNTER — OFFICE VISIT (OUTPATIENT)
Dept: PHYSICAL THERAPY | Facility: REHABILITATION | Age: 66
End: 2021-04-13
Payer: MEDICARE

## 2021-04-13 DIAGNOSIS — M17.11 PRIMARY OSTEOARTHRITIS OF RIGHT KNEE: Primary | ICD-10-CM

## 2021-04-13 PROCEDURE — 97110 THERAPEUTIC EXERCISES: CPT | Performed by: PHYSICAL THERAPIST

## 2021-04-13 NOTE — PROGRESS NOTES
Daily Note     Today's date: 2021  Patient name: Des Figueroa  : 1955  MRN: [id number]  Referring provider: Isac Pierre MD  Dx:   Encounter Diagnosis     ICD-10-CM    1  Primary osteoarthritis of right knee  M17 11        Start Time:   Stop Time:   Total time in clinic (min): 42 minutes    Subjective: Pt reports she no longer uses her walker, only her cane now  Objective: See treatment diary below      Assessment: Tolerated treatment well  Patient demonstrated fatigue post treatment  Pt challenged with RLE stance during standing hip 3 way requiring vc/tc to avoid compensation with lateral lean  Pt also requires significant use of rail for UE support during RLE stance  Pt demonstrated ability to perform full revolution on bike without AAROM warm-up, pt is progressing well with increased ROM  Pt significantly challenged with tandem stance this visit requiring increased UE support with RLE in posterior position  Plan: Continue per plan of care            Precautions: hx of skin CA, HTN, GERD, diabetes  Daily Treatment Diary    Manuals 3/22 3/24 3/30 4/1 4/6 4/8 4/13   Knee PROM   4 mins 4 mins np  4 mins knee ext   Patellar Mobs  4 mins 4 mins 4 mins np                         Neuro Re-Ed          SLS     15"x1 ea 15"x2 ea, 1UE support RLE 20"x1 ea Hold, nv   Tandem Stance    30"x1 ea 30"x2 ea 1'x1 ea 1'x1 ea                                                     Ther Ex          Bike  10 mins AAROM 10 mins AAROM 10 mins AAROM 10 mins AAROM 10 mins AAROM-AROM 10 mins AROM   Seated HS stretch 20"x1 R 20"x3 R 20"x3 R HEP      Seated gastroc stretch 20"x1 R 20"x3 R HEP       Heel slides 10"x5 R 10"x5 R HEP   10"x10 R     Quad set 10"x5 R 10"x5 R 10"x5 HEP      Heel Prop 1 min R HEP     2 mins   Quad set with heel prop  10"x5 R 10"x5  HEP   10"x5 R   SLR  1x5 R with PT mod A 2x5 R with PT min A  1x10 R HEP     S/L ABD   nv 1x8 R HEP     Bridge  5"x5  5"x10 5"x10  HEP     [employer] squeeze  5"x10 5"x15  5"x20 HEP     HR/TR    1x10 ea 1x15 ea x20 ea x20 ea   Standing hip ABD     1x5 1x10 ea 2x5 ea   Standing SLR      1x10 ea 2x5 ea   Standing hip ext       1x10 ea 2x5 ea   Ther Activity          FSU and over          Sit to stand    x5 x5  x10  x5    Gait Training          Amb with SPC     40'x4 laps np    Forward weight shifts at bar     6 mins np    Modalities

## 2021-04-15 ENCOUNTER — OFFICE VISIT (OUTPATIENT)
Dept: PHYSICAL THERAPY | Facility: REHABILITATION | Age: 66
End: 2021-04-15
Payer: MEDICARE

## 2021-04-15 DIAGNOSIS — M17.11 PRIMARY OSTEOARTHRITIS OF RIGHT KNEE: Primary | ICD-10-CM

## 2021-04-15 PROCEDURE — 97110 THERAPEUTIC EXERCISES: CPT | Performed by: PHYSICAL THERAPIST

## 2021-04-15 PROCEDURE — 97116 GAIT TRAINING THERAPY: CPT | Performed by: PHYSICAL THERAPIST

## 2021-04-15 NOTE — PROGRESS NOTES
Daily Note     Today's date: 4/15/2021  Patient name: Xiomara Dempsey  : 1955  MRN: [id number]  Referring provider: Sergo Arroyo MD  Dx:   Encounter Diagnosis     ICD-10-CM    1  Primary osteoarthritis of right knee  M17 11        Start Time: 1524  Stop Time: 1605  Total time in clinic (min): 41 minutes    Subjective: Pt reports she is not putting a pillow under her knee anymore to help get her knee more straight  Objective: See treatment diary below      Assessment: Tolerated treatment well  Patient demonstrated fatigue post treatment  Pt required vc/tc to utilize small hip hike to neutral to elevate moving leg during standing hip 3 way  Pt continues to ambulate with decreased knee flexion on R requiring vc/tc to facilitate knee flexion during swing and emphasis on heel strike  Pt will benefit from progress note NV to determine progress towards LTG's and continued need for skilled PT  Plan: Continue per plan of care  Progress note during next visit            Precautions: hx of skin CA, HTN, GERD, diabetes  Daily Treatment Diary    Manuals 4/15     4/8 4/13   Knee PROM       4 mins knee ext   Patellar Mobs                    Re-eval/measurements          Neuro Re-Ed          SLS       20"x1 ea Hold, nv   Tandem Stance      1'x1 ea 1'x1 ea                                                     Ther Ex          Bike 10 mins AROM     10 mins AAROM-AROM 10 mins AROM   Seated HS stretch          Seated gastroc stretch          Heel slides          Quad set          Heel Prop       2 mins   Quad set with heel prop       10"x5 R   SLR          S/L ABD          Bridge          Ball squeeze          HR/TR x20 ea     x20 ea x20 ea   Standing hip ABD 1x10 ea     1x10 ea 2x5 ea   Standing SLR 1x10 ea     1x10 ea 2x5 ea   Standing hip ext  1x10 ea     1x10 ea 2x5 ea                       Ther Activity          Squatting 3" x10         FSU and over          Sit to stand HEP     x10  x5    Gait Training Amb with SPC 8 mins      np    Forward weight shifts at bar      np    Modalities

## 2021-04-20 ENCOUNTER — EVALUATION (OUTPATIENT)
Dept: PHYSICAL THERAPY | Facility: REHABILITATION | Age: 66
End: 2021-04-20
Payer: MEDICARE

## 2021-04-20 DIAGNOSIS — M17.11 PRIMARY OSTEOARTHRITIS OF RIGHT KNEE: Primary | ICD-10-CM

## 2021-04-20 PROCEDURE — 97140 MANUAL THERAPY 1/> REGIONS: CPT | Performed by: PHYSICAL THERAPIST

## 2021-04-20 PROCEDURE — 97110 THERAPEUTIC EXERCISES: CPT | Performed by: PHYSICAL THERAPIST

## 2021-04-20 NOTE — LETTER
2021    Clarisse Oppenheim, MD [address] [facility]    Patient: Karen Lao   YOB: 1955   Date of Visit: 2021     Encounter Diagnosis     ICD-10-CM    1  Primary osteoarthritis of right knee  M17 11        Dear Dr Brooke Brvao:    Thank you for your recent referral of Karen Lao  Please review the attached evaluation summary from Shannon's recent visit  Please verify that you agree with the plan of care by signing the attached order  If you have any questions or concerns, please do not hesitate to call  I sincerely appreciate the opportunity to share in the care of one of your patients and hope to have another opportunity to work with you in the near future  Sincerely,    Princess Brown, PT      Referring Provider:      I certify that I have read the below Plan of Care and certify the need for these services furnished under this plan of treatment while under my care  Clarisse Oppenheim, MD [address] [place] [id number]  Via Fax: [phone]          PT Evaluation     Today's date: 2021  Patient name: Karen Lao  : 1955  MRN: [id number]  Referring provider: Jacey Hardin MD  Dx:   Encounter Diagnosis     ICD-10-CM    1  Primary osteoarthritis of right knee  M17 11        Start Time: 1614  Stop Time: 1656  Total time in clinic (min): 42 minutes    Assessment  Assessment details: Pt is a 72year old female who presents to outpatient PT s/p R TKA  Pt presents upon re-evaluation with increased knee flexion and extension AROM, increased knee strength, decreased pain intensity and frequency as well as overall improved function with dressing, standing, transfers and ADL's  Pt still remains with proximal hip weakness, limited knee ext ROM and difficulty with negotiating steps and ambulation without AD   Pt will benefit from continued skilled PT to address above impairments and continue towards meeting LTG's  Impairments: abnormal or restricted ROM, activity intolerance, impaired balance, impaired physical strength, lacks appropriate home exercise program and pain with function  Understanding of Dx/Px/POC: good   Prognosis: good    Goals  Impairment Goals 4 weeks  -Pt will demonstrate decreased pain to 5/10 at worst - MET  -Pt will demonstrate increased knee flexion ROM to>90 deg - MET  -Pt will present with knee ext to 0 deg - PARTIALLY MET  -Pt will demonstrated increased LE strength all planes to >4/5 b/l - PARTIALLY MET  Functional Goals 8 weeks  -Pt will demonstrate ability to walk x15 mins without AD - PARTIALLY MET  -Pt will demonstrate ability to stand x30 mins to cook/prep food - PARTIALLY MET  -Pt will demonstrate ability to negotiate 12 steps reciprocally  - NOT MET      Plan  Plan details: Cont per POC  Focus on gait, stair negotiation, knee ext ROM and proximal hip strength     Patient would benefit from: skilled physical therapy  Planned modality interventions: thermotherapy: hydrocollator packs and cryotherapy  Planned therapy interventions: manual therapy, joint mobilization, patient education, strengthening, stretching, therapeutic activities, therapeutic exercise, home exercise program, functional ROM exercises, flexibility and balance  Frequency: 2x week  Duration in visits: 6  Treatment plan discussed with: patient        Subjective Evaluation    History of Present Illness  Mechanism of injury: Pt reports 65% improvement   Pt reports she negotiates steps non-reciprocally independently with 1 rail  Walking tolerance with SPC: 15 mins  Standing tolerance: 15 mins  Sitting tolerance:  30-60 mins  Pt is not driving secondary to surgery (discharge instructions stated 6 weeks until pt can drive)  Pt is able to independently don/doff shoes/socks/pants  Pt transfers independently from bed/couch without assistance from non-operated LE    Pt would like to be able to walk comfortably without AD and be able to negotiate steps reciprocally  Pt has stiffness when standing/walking after prolonged sitting    Pt is taking tylenol as needed but less than before  Pain  Current pain ratin  At best pain ratin  At worst pain rating: 3  Location: lateral knee   Quality: dull ache  Relieving factors: ice, rest, support and medications  Progression: improved    Social Support  Steps to enter house: yes  Stairs in house: yes   Lives in: multiple-level home    Employment status: working ( part time )  Treatments  Current treatment: physical therapy  Patient Goals  Patient goal: to walk without walker - MET         Objective     Observations     Right Knee   Positive for edema and incision  Additional Observation Details  Incision C/D/I    Tenderness   Left Knee   Tenderness in the lateral joint line  Additional Tenderness Details  Mild tenderness upper-mid 1/3rd and distal 1/3rd incision    Neurological Testing     Sensation     Knee   Left Knee   Intact: light touch    Active Range of Motion   Left Knee   Flexion: 136 degrees     Right Knee   Flexion: 115 degrees     Additional Active Range of Motion Details  L knee 2 deg hyperextension  R knee lacks 2 deg ext   Patellar mobility WFL      Strength/Myotome Testing     Left Knee   Flexion: 4+  Extension: 4+    Right Knee   Flexion: 4  Extension: 4  Quadriceps contraction: good    Additional Strength Details  DF; 5/5 R, 5/5 L   Hip IR/ER 4+/5 L, 4-/5 R  Hip flexion  4/5 R, 4/5 L   Hip ABD 3/5 R, 4/5 L   Hip ext 4/5 b/l     General Comments:      Knee Comments  Able to HR/TR  Able to uni HR x5 b/l, increased UE support on rail with RLE  SLS: 1 secs R, 5 secs L     Pt ambulates with SPC with more equal stance time however ambulates with RLE in ER and decreased heel strike  Pt ambulates without SPC with decreased stance time on L, decreased heel strike on L, and lateral lean during RLE stance                  Precautions: hx of skin CA, HTN, GERD, diabetes  Daily Treatment Diary    Manuals 4/15 4/20     4/13   Knee PROM       4 mins knee ext   Patellar Mobs                    Re-eval/measurements  30 mins         Neuro Re-Ed          SLS        Hold, nv   Tandem Stance       1'x1 ea                                                     Ther Ex          Bike 10 mins AROM 10 mins AROM      10 mins AROM   Seated HS stretch          Seated gastroc stretch          Heel slides          Quad set          Heel Prop       2 mins   Quad set with heel prop       10"x5 R   SLR          S/L ABD          Bridge          Ball squeeze          HR/TR x20 ea      x20 ea   Standing hip ABD 1x10 ea      2x5 ea   Standing SLR 1x10 ea      2x5 ea   Standing hip ext  1x10 ea      2x5 ea                       Ther Activity          Squatting 3" x10         FSU  *        FSU and over  *        LSU and over   *        Sit to stand HEP      x5    Gait Training          Amb with SPC 8 mins  *        Forward weight shifts at bar          Modalities

## 2021-04-20 NOTE — PROGRESS NOTES
PT Evaluation     Today's date: 2021  Patient name: Nerissa Begum  : 1955  MRN: [id number]  Referring provider: Maikel Rogel MD  Dx:   Encounter Diagnosis     ICD-10-CM    1  Primary osteoarthritis of right knee  M17 11        Start Time: 1614  Stop Time: 1656  Total time in clinic (min): 42 minutes    Assessment  Assessment details: Pt is a 72year old female who presents to outpatient PT s/p R TKA  Pt presents upon re-evaluation with increased knee flexion and extension AROM, increased knee strength, decreased pain intensity and frequency as well as overall improved function with dressing, standing, transfers and ADL's  Pt still remains with proximal hip weakness, limited knee ext ROM and difficulty with negotiating steps and ambulation without AD  Pt will benefit from continued skilled PT to address above impairments and continue towards meeting LTG's  Impairments: abnormal or restricted ROM, activity intolerance, impaired balance, impaired physical strength, lacks appropriate home exercise program and pain with function  Understanding of Dx/Px/POC: good   Prognosis: good    Goals  Impairment Goals 4 weeks  -Pt will demonstrate decreased pain to 5/10 at worst - MET  -Pt will demonstrate increased knee flexion ROM to>90 deg - MET  -Pt will present with knee ext to 0 deg - PARTIALLY MET  -Pt will demonstrated increased LE strength all planes to >4/5 b/l - PARTIALLY MET  Functional Goals 8 weeks  -Pt will demonstrate ability to walk x15 mins without AD - PARTIALLY MET  -Pt will demonstrate ability to stand x30 mins to cook/prep food - PARTIALLY MET  -Pt will demonstrate ability to negotiate 12 steps reciprocally  - NOT MET      Plan  Plan details: Cont per POC  Focus on gait, stair negotiation, knee ext ROM and proximal hip strength     Patient would benefit from: skilled physical therapy  Planned modality interventions: thermotherapy: hydrocollator packs and cryotherapy  Planned therapy interventions: manual therapy, joint mobilization, patient education, strengthening, stretching, therapeutic activities, therapeutic exercise, home exercise program, functional ROM exercises, flexibility and balance  Frequency: 2x week  Duration in visits: 6  Treatment plan discussed with: patient        Subjective Evaluation    History of Present Illness  Mechanism of injury: Pt reports 65% improvement   Pt reports she negotiates steps non-reciprocally independently with 1 rail  Walking tolerance with SPC: 15 mins  Standing tolerance: 15 mins  Sitting tolerance:  30-60 mins  Pt is not driving secondary to surgery (discharge instructions stated 6 weeks until pt can drive)  Pt is able to independently don/doff shoes/socks/pants  Pt transfers independently from bed/couch without assistance from non-operated LE    Pt would like to be able to walk comfortably without AD and be able to negotiate steps reciprocally  Pt has stiffness when standing/walking after prolonged sitting    Pt is taking tylenol as needed but less than before  Pain  Current pain ratin  At best pain ratin  At worst pain rating: 3  Location: lateral knee   Quality: dull ache  Relieving factors: ice, rest, support and medications  Progression: improved    Social Support  Steps to enter house: yes  Stairs in house: yes   Lives in: multiple-level home    Employment status: working ( part time )  Treatments  Current treatment: physical therapy  Patient Goals  Patient goal: to walk without walker - MET         Objective     Observations     Right Knee   Positive for edema and incision  Additional Observation Details  Incision C/D/I    Tenderness   Left Knee   Tenderness in the lateral joint line       Additional Tenderness Details  Mild tenderness upper-mid 1/3rd and distal 1/3rd incision    Neurological Testing     Sensation     Knee   Left Knee   Intact: light touch    Active Range of Motion   Left Knee   Flexion: 136 degrees Right Knee   Flexion: 115 degrees     Additional Active Range of Motion Details  L knee 2 deg hyperextension  R knee lacks 2 deg ext   Patellar mobility WFL      Strength/Myotome Testing     Left Knee   Flexion: 4+  Extension: 4+    Right Knee   Flexion: 4  Extension: 4  Quadriceps contraction: good    Additional Strength Details  DF; 5/5 R, 5/5 L   Hip IR/ER 4+/5 L, 4-/5 R  Hip flexion  4/5 R, 4/5 L   Hip ABD 3/5 R, 4/5 L   Hip ext 4/5 b/l     General Comments:      Knee Comments  Able to HR/TR  Able to uni HR x5 b/l, increased UE support on rail with RLE  SLS: 1 secs R, 5 secs L     Pt ambulates with SPC with more equal stance time however ambulates with RLE in ER and decreased heel strike  Pt ambulates without SPC with decreased stance time on L, decreased heel strike on L, and lateral lean during RLE stance                  Precautions: hx of skin CA, HTN, GERD, diabetes  Daily Treatment Diary    Manuals 4/15 4/20     4/13   Knee PROM       4 mins knee ext   Patellar Mobs                    Re-eval/measurements  30 mins         Neuro Re-Ed          SLS        Hold, nv   Tandem Stance       1'x1 ea                                                     Ther Ex          Bike 10 mins AROM 10 mins AROM      10 mins AROM   Seated HS stretch          Seated gastroc stretch          Heel slides          Quad set          Heel Prop       2 mins   Quad set with heel prop       10"x5 R   SLR          S/L ABD          Bridge          Ball squeeze          HR/TR x20 ea      x20 ea   Standing hip ABD 1x10 ea      2x5 ea   Standing SLR 1x10 ea      2x5 ea   Standing hip ext  1x10 ea      2x5 ea                       Ther Activity          Squatting 3" x10         FSU  *        FSU and over  *        LSU and over   *        Sit to stand HEP      x5    Gait Training          Amb with SPC 8 mins  *        Forward weight shifts at bar          Modalities

## 2021-04-21 ENCOUNTER — TRANSCRIBE ORDERS (OUTPATIENT)
Dept: PHYSICAL THERAPY | Facility: REHABILITATION | Age: 66
End: 2021-04-21

## 2021-04-21 DIAGNOSIS — M17.11 OSTEOARTHRITIS OF RIGHT KNEE, UNSPECIFIED OSTEOARTHRITIS TYPE: Primary | ICD-10-CM

## 2021-04-22 ENCOUNTER — OFFICE VISIT (OUTPATIENT)
Dept: PHYSICAL THERAPY | Facility: REHABILITATION | Age: 66
End: 2021-04-22
Payer: MEDICARE

## 2021-04-22 DIAGNOSIS — M17.11 PRIMARY OSTEOARTHRITIS OF RIGHT KNEE: Primary | ICD-10-CM

## 2021-04-22 PROCEDURE — 97110 THERAPEUTIC EXERCISES: CPT | Performed by: PHYSICAL THERAPIST

## 2021-04-22 PROCEDURE — 97530 THERAPEUTIC ACTIVITIES: CPT | Performed by: PHYSICAL THERAPIST

## 2021-04-22 NOTE — PROGRESS NOTES
Daily Note     Today's date: 2021  Patient name: Marisel Wylie  : 1955  MRN: [id number]  Referring provider: Yanet Larson MD  Dx:   Encounter Diagnosis     ICD-10-CM    1  Primary osteoarthritis of right knee  M17 11        Start Time: 1528  Stop Time: 1618  Total time in clinic (min): 50 minutes    Subjective: Pt reports the weather makes her knee feel more uncomfortable and stiff  Objective: See treatment diary below      Assessment: Tolerated treatment well  Patient exhibited good technique with therapeutic exercises  Able to complete 10 minutes on bike with goal of maintaining RPM's >30  Pt challenged with tandem and SLS requiring intermittent UE support  Pt required modification of tandem stance to 9/10 position with R posteriorly to aovid increase in R knee pain  Plan: Continue per plan of care          Precautions: hx of skin CA, HTN, GERD, diabetes  Daily Treatment Diary    Manuals 4/15 4/20 4/22    4/13   Knee PROM       4 mins knee ext   Patellar Mobs                    Re-eval/measurements  30 mins         Neuro Re-Ed          SLS    30"x2 ea    Hold, nv   Tandem Stance   1'x1 ea, modified R    1'x1 ea                                                     Ther Ex          Bike 10 mins AROM 10 mins AROM  10 mins AROM    10 mins AROM   Seated HS stretch          Seated gastroc stretch          Heel slides          Quad set          Heel Prop       2 mins   Quad set with heel prop       10"x5 R   SLR          S/L ABD          Bridge          Ball squeeze          HR/TR x20 ea  x20 ea    x20 ea   Standing hip ABD 1x10 ea  x10 ea    2x5 ea   Standing SLR 1x10 ea  x10 ea    2x5 ea   Standing hip ext  1x10 ea  x10 ea    2x5 ea                       Ther Activity          Squatting 3" x10  3"x10       FSU   4" 1x10 R       FSU and over   4" 1x10 R       LSU and over    4" 1x10       Sit to stand HEP      x5    Gait Training          Amb with SPC 8 mins  *        Forward weight shifts at bar          Modalities

## 2021-04-27 ENCOUNTER — OFFICE VISIT (OUTPATIENT)
Dept: PHYSICAL THERAPY | Facility: REHABILITATION | Age: 66
End: 2021-04-27
Payer: MEDICARE

## 2021-04-27 DIAGNOSIS — M17.11 PRIMARY OSTEOARTHRITIS OF RIGHT KNEE: Primary | ICD-10-CM

## 2021-04-27 PROCEDURE — 97530 THERAPEUTIC ACTIVITIES: CPT | Performed by: PHYSICAL THERAPIST

## 2021-04-27 PROCEDURE — 97110 THERAPEUTIC EXERCISES: CPT | Performed by: PHYSICAL THERAPIST

## 2021-04-27 NOTE — PROGRESS NOTES
Daily Note     Today's date: 2021  Patient name: Jarek Rosales  : 1955  MRN: [id number]  Referring provider: Ed Bernardo MD  Dx:   Encounter Diagnosis     ICD-10-CM    1  Primary osteoarthritis of right knee  M17 11        Start Time: 1614  Stop Time: 1652  Total time in clinic (min): 38 minutes    Subjective: Pt reports no new c/o      Objective: See treatment diary below      Assessment: Tolerated treatment well  Patient demonstrated fatigue post treatment  Pt required vc/tc to during LSU with RLE to shift weight over RLE prior to bearing weight on RLE to avoid increase in pain  Pt demonstrated ability to ascend 4 steps reciprocally with 1 rail without increase in pain  Pt descend non-reciprocally secondary to continued weakness  Plan: Continue per plan of care  Update HEP with standing hip 3 way next visit          Precautions: hx of skin CA, HTN, GERD, diabetes  Daily Treatment Diary    Manuals 4/15 4/20 4/22 4/27      Knee PROM          Patellar Mobs                    Re-eval/measurements  30 mins         Neuro Re-Ed          SLS    30"x2 ea 30"x2 ea      Tandem Stance   1'x1 ea, modified R 1'x1 ea                                                         Ther Ex          Bike 10 mins AROM 10 mins AROM  10 mins AROM 10 mins L2      Seated HS stretch          Seated gastroc stretch          Heel slides          Quad set          Heel Prop          Quad set with heel prop          SLR          S/L ABD          Bridge          Ball squeeze          HR/TR x20 ea  x20 ea 2x10 ea      Standing hip ABD 1x10 ea  x10 ea nv      Standing SLR 1x10 ea  x10 ea nv      Standing hip ext  1x10 ea  x10 ea nv      Side steps          Monster Walks          Ther Activity          Squatting 3" x10  3"x10 3" x10      FSU   4" 1x10 R 6" 1x10 R      FSU and over   4" 1x10 R 4"      LSU and over    4" 1x10 6" 1x10 ea      Stair negotiation    4 steps reciprocal ascending      Sit to stand HEP         Gait Training          Amb with SPC 8 mins  *        Forward weight shifts at bar          Modalities

## 2021-04-29 ENCOUNTER — OFFICE VISIT (OUTPATIENT)
Dept: PHYSICAL THERAPY | Facility: REHABILITATION | Age: 66
End: 2021-04-29
Payer: MEDICARE

## 2021-04-29 DIAGNOSIS — M17.11 PRIMARY OSTEOARTHRITIS OF RIGHT KNEE: Primary | ICD-10-CM

## 2021-04-29 PROCEDURE — 97530 THERAPEUTIC ACTIVITIES: CPT | Performed by: PHYSICAL THERAPIST

## 2021-04-29 PROCEDURE — 97110 THERAPEUTIC EXERCISES: CPT | Performed by: PHYSICAL THERAPIST

## 2021-04-29 NOTE — PROGRESS NOTES
Daily Note     Today's date: 2021  Patient name: Everardo Orellana  : 1955  MRN: [id number]  Referring provider: Alexia Fitzpatrick MD  Dx:   Encounter Diagnosis     ICD-10-CM    1  Primary osteoarthritis of right knee  M17 11        Start Time: 1606  Stop Time: 1648  Total time in clinic (min): 42 minutes    Subjective: Pt reports she was able to go up a full flight of steps, step over step, at home without difficulty  She is not able to go down steps this way  Objective: See treatment diary below      Assessment: Tolerated treatment well  Patient exhibited good technique with therapeutic exercises  Pt demonstrated improved technique with ambulation without SPC with increased knee flexion during swing and increased heel strike however still ambualtes with lateral lean and decreased stance time on R  With SPC, pt demonstrates equal stance time and decreased lean  Pt will benefit form continued skilled PT to progress LE strength and improve ambulation and stair negotiation  Plan: Continue per plan of care          Precautions: hx of skin CA, HTN, GERD, diabetes  Daily Treatment Diary    Manuals 4/15 4/20 4/22 4/27 4/29     Knee PROM          Patellar Mobs                    Re-eval/measurements  30 mins         Neuro Re-Ed          SLS    30"x2 ea 30"x2 ea 30"x2 ea     Tandem Stance   1'x1 ea, modified R 1'x1 ea  1'x1 ea                                                       Ther Ex          Bike 10 mins AROM 10 mins AROM  10 mins AROM 10 mins L2 10 mins L2     Seated HS stretch          Seated gastroc stretch          Heel slides          Quad set          Heel Prop          Quad set with heel prop          SLR          S/L ABD          Bridge          Ball squeeze          HR/TR x20 ea  x20 ea 2x10 ea D/c     Uni HR     2x5 ea     Standing hip ABD 1x10 ea  x10 ea nv 1x10 ea     Standing SLR 1x10 ea  x10 ea nv 1x10 ea     Standing hip ext  1x10 ea  x10 ea nv 1x10 ea     Side steps Monster Walks          Ther Activity          Squatting 3" x10  3"x10 3" x10 3"x10     FSU   4" 1x10 R 6" 1x10 R D/c     FSU and over   4" 1x10 R 4" 4" 1x10 R     LSU and over    4" 1x10 6" 1x10 ea np     Stair negotiation    4 steps reciprocal ascending np     Sit to stand HEP         Gait Training          Amb with SPC 8 mins  *   2 mins with SPC, 2 mins without SPC     Forward weight shifts at bar          Modalities

## 2021-05-04 ENCOUNTER — OFFICE VISIT (OUTPATIENT)
Dept: PHYSICAL THERAPY | Facility: REHABILITATION | Age: 66
End: 2021-05-04
Payer: MEDICARE

## 2021-05-04 DIAGNOSIS — M17.11 PRIMARY OSTEOARTHRITIS OF RIGHT KNEE: Primary | ICD-10-CM

## 2021-05-04 PROCEDURE — 97530 THERAPEUTIC ACTIVITIES: CPT | Performed by: PHYSICAL THERAPIST

## 2021-05-04 PROCEDURE — 97110 THERAPEUTIC EXERCISES: CPT | Performed by: PHYSICAL THERAPIST

## 2021-05-04 NOTE — PROGRESS NOTES
Daily Note     Today's date: 2021  Patient name: Karissa Doctor  : 1955  MRN: [id number]  Referring provider: Yaa Rodriguez MD  Dx:   Encounter Diagnosis     ICD-10-CM    1  Primary osteoarthritis of right knee  M17 11        Start Time: 1620  Stop Time: 1704  Total time in clinic (min): 44 minutes    Subjective: Pt reports she saw her physician today  She is able to drive and swim and has no restrictions  Objective: See treatment diary below      Assessment: Tolerated treatment well  Patient exhibited good technique with therapeutic exercises  Pt demonstrates improved control with descent off of 4" step with use of 1 rail  Pt also presents with improved SLS and tandem stance with decreased use of UE support to maintain balance  Pt ambulates without SOC with slight lateral lean during RLE stance requiring vc/tc to minimize compensation with heel strike  Plan: Continue per plan of care          Precautions: hx of skin CA, HTN, GERD, diabetes  Daily Treatment Diary    Manuals 4/15 4/20 4/22 4/27 4/29 5/4    Knee PROM          Patellar Mobs                    Re-eval/measurements  30 mins         Neuro Re-Ed          SLS    30"x2 ea 30"x2 ea 30"x2 ea 30"x2 ea    Tandem Stance   1'x1 ea, modified R 1'x1 ea  1'x1 ea 1'x1 ea                                                      Ther Ex          Bike 10 mins AROM 10 mins AROM  10 mins AROM 10 mins L2 10 mins L2 10 mins L2    Uni HR     2x5 ea x10 ea    Standing hip ABD 1x10 ea  x10 ea nv 1x10 ea x15 ea     Standing SLR 1x10 ea  x10 ea nv 1x10 ea D/c    Standing hip ext  1x10 ea  x10 ea nv 1x10 ea D/c    Side steps       *   Monster Walks       *   Ther Activity          Squatting 3" x10  3"x10 3" x10 3"x10 3"x15    FSU and over   4" 1x10 R 4" 4" 1x10 R 4" 1x15 R     LSU and over    4" 1x10 6" 1x10 ea np D/c    Stair negotiatiod/cn    4 steps reciprocal ascending np D/c    Sit to stand HEP         Gait Training          Amb without AD       4 mins    Amb with SPC 8 mins  *   2 mins with SPC, 2 mins without SPC     Forward weight shifts at bar          Modalities

## 2021-05-06 ENCOUNTER — OFFICE VISIT (OUTPATIENT)
Dept: PHYSICAL THERAPY | Facility: REHABILITATION | Age: 66
End: 2021-05-06
Payer: MEDICARE

## 2021-05-06 DIAGNOSIS — M17.11 PRIMARY OSTEOARTHRITIS OF RIGHT KNEE: Primary | ICD-10-CM

## 2021-05-06 PROCEDURE — 97110 THERAPEUTIC EXERCISES: CPT | Performed by: PHYSICAL THERAPIST

## 2021-05-06 PROCEDURE — 97530 THERAPEUTIC ACTIVITIES: CPT | Performed by: PHYSICAL THERAPIST

## 2021-05-06 NOTE — PROGRESS NOTES
Daily Note     Today's date: 2021  Patient name: Alicia Mix  : 1955  MRN: [id number]  Referring provider: Darien Wood MD  Dx:   Encounter Diagnosis     ICD-10-CM    1  Primary osteoarthritis of right knee  M17 11        Start Time: 1558  Stop Time: 1640  Total time in clinic (min): 42 minutes    Subjective: Pt reports no pain today      Objective: See treatment diary below      Assessment: Tolerated treatment well  Patient demonstrated fatigue post treatment  Pt challenged with additional resistance on bike requiring increased rest breaks with standing exercises after bike  Pt will benefit from continued skilled PT to progress LE strength and endurance  Plan: Continue per plan of care          Precautions: hx of skin CA, HTN, GERD, diabetes  Daily Treatment Diary    Manuals         Knee PROM          Patellar Mobs                    Re-eval/measurements          Neuro Re-Ed          SLS       30"x2 ea 30"x2 ea   Tandem Stance      1'x1 ea 1'x1 ea                                                     Ther Ex          Bike      10 mins L2 10 mins L3    Uni HR      x10 ea x10 ea   Standing hip ABD      x15 ea  x20 ea    Side steps       1 lap   Monster Walks          Wall hip ABD/ER gina        nv   Single leg sit to stand           Ther Activity          Squatting      3"x15 3"x15   FSU and over      4" 1x15 R  4" 1x15 R    Sit to stand          Gait Training          Amb without AD       4 mins    Amb with SPC          Forward weight shifts at bar          Modalities

## 2021-05-11 ENCOUNTER — OFFICE VISIT (OUTPATIENT)
Dept: PHYSICAL THERAPY | Facility: REHABILITATION | Age: 66
End: 2021-05-11
Payer: MEDICARE

## 2021-05-11 DIAGNOSIS — M17.11 PRIMARY OSTEOARTHRITIS OF RIGHT KNEE: Primary | ICD-10-CM

## 2021-05-11 PROCEDURE — 97530 THERAPEUTIC ACTIVITIES: CPT | Performed by: PHYSICAL THERAPIST

## 2021-05-11 PROCEDURE — 97110 THERAPEUTIC EXERCISES: CPT | Performed by: PHYSICAL THERAPIST

## 2021-05-11 NOTE — PROGRESS NOTES
Daily Note     Today's date: 2021  Patient name: Megan Mendoza  : 1955  MRN: [id number]  Referring provider: Ace Giles MD  Dx:   Encounter Diagnosis     ICD-10-CM    1  Primary osteoarthritis of right knee  M17 11        Start Time: 1620  Stop Time: 1701  Total time in clinic (min): 41 minutes    Subjective: Pt was at the [employer] 2x this weekend and had increased stiffness form prolonged sitting and the damp weather  Objective: See treatment diary below      Assessment: Tolerated treatment well  Patient demonstrated fatigue post treatment  Pt demonstrated improved control with 4" FSU and over with descent  Progressed to 6" step, pt able to control descent when rail is on contralateral side  Pt will benefit from continued skilled PT to further progress LE strength and endurance  Plan: Continue per plan of care          Precautions: hx of skin CA, HTN, GERD, diabetes  Daily Treatment Diary    Manuals    Knee PROM          Patellar Mobs                    Re-eval/measurements          Neuro Re-Ed          SLS  30"x2 ea     30"x2 ea 30"x2 ea   Tandem Stance np     1'x1 ea 1'x1 ea                                                     Ther Ex          Bike 10 mins L3     10 mins L2 10 mins L3    Uni HR x15 ea     x10 ea x10 ea   Standing hip ABD x20 ea     x15 ea  x20 ea    Side steps 1 lap      1 lap   Monster Walks 1 lap fwd only          Wall hip ABD/ER gina        nv   Single leg sit to stand           Ther Activity          Squatting 3" 1x15     3"x15 3"x15   FSU and over 4" 1x5 R, 6" 1x5 R     4" 1x15 R  4" 1x15 R    Sit to stand          Gait Training          Amb without AD       4 mins    Amb with SPC          Forward weight shifts at bar          Modalities

## 2021-05-13 ENCOUNTER — OFFICE VISIT (OUTPATIENT)
Dept: PHYSICAL THERAPY | Facility: REHABILITATION | Age: 66
End: 2021-05-13
Payer: MEDICARE

## 2021-05-13 DIAGNOSIS — M17.11 PRIMARY OSTEOARTHRITIS OF RIGHT KNEE: Primary | ICD-10-CM

## 2021-05-13 PROCEDURE — 97110 THERAPEUTIC EXERCISES: CPT | Performed by: PHYSICAL THERAPIST

## 2021-05-13 PROCEDURE — 97530 THERAPEUTIC ACTIVITIES: CPT | Performed by: PHYSICAL THERAPIST

## 2021-05-13 NOTE — PROGRESS NOTES
Daily Note     Today's date: 2021  Patient name: Freddy Sanders  : 1955  MRN: [id number]  Referring provider: Ismael Sharma MD  Dx:   Encounter Diagnosis     ICD-10-CM    1  Primary osteoarthritis of right knee  M17 11        Start Time: 1604  Stop Time: 1645  Total time in clinic (min): 41 minutes    Subjective: Pt reports no new c/o       Objective: See treatment diary below      Assessment: Tolerated treatment well  Patient exhibited good technique with therapeutic exercises  Pt continues to require intermittent UE support with SLS on R  Pt demonstrates improved gait mechanics with ambulation without AD with increased knee and hip flexion during swing and improved heel strike  Plan: Continue per plan of care          Precautions: hx of skin CA, HTN, GERD, diabetes  Daily Treatment Diary    Manuals    Knee PROM          Patellar Mobs                    Re-eval/measurements          Neuro Re-Ed          SLS  30"x2 ea 30"x2 ea    30"x2 ea 30"x2 ea   Tandem Stance np     1'x1 ea 1'x1 ea                                                     Ther Ex          Bike 10 mins L3 10 mins L3     10 mins L2 10 mins L3    Uni HR x15 ea x20 ea    x10 ea x10 ea   Standing hip ABD x20 ea x20 ea    x15 ea  x20 ea    Side steps 1 lap 2 laps at bar     1 lap   Monster Walks 1 lap fwd only  2 laps at bar, fwd/bkwd        Wall hip ABD/ER gina   5"x3 ea     nv   Single leg sit to stand           Ther Activity          Squatting 3" 1x15 3" 1x10     3"x15 3"x15   FSU and over 4" 1x5 R, 6" 1x5 R 6" 1x10 R    4" 1x15 R  4" 1x15 R    Sit to stand          Gait Training          Amb without AD       4 mins    Amb with SPC          Forward weight shifts at bar          Modalities

## 2021-05-18 ENCOUNTER — EVALUATION (OUTPATIENT)
Dept: PHYSICAL THERAPY | Facility: REHABILITATION | Age: 66
End: 2021-05-18
Payer: MEDICARE

## 2021-05-18 DIAGNOSIS — M17.11 PRIMARY OSTEOARTHRITIS OF RIGHT KNEE: Primary | ICD-10-CM

## 2021-05-18 PROCEDURE — 97110 THERAPEUTIC EXERCISES: CPT | Performed by: PHYSICAL THERAPIST

## 2021-05-18 PROCEDURE — 97140 MANUAL THERAPY 1/> REGIONS: CPT | Performed by: PHYSICAL THERAPIST

## 2021-05-18 NOTE — LETTER
May 19, 2021    MD Jackie Choe [address] [facility]    Patient: Fabiano Samuel   YOB: 1955   Date of Visit: 2021     Encounter Diagnosis     ICD-10-CM    1  Primary osteoarthritis of right knee  M17 11        Dear Dr Kamryn Grijalva:    Thank you for your recent referral of Fabiano Samuel  Please review the attached evaluation summary from Shannon's recent visit  Please verify that you agree with the plan of care by signing the attached order  If you have any questions or concerns, please do not hesitate to call  I sincerely appreciate the opportunity to share in the care of one of your patients and hope to have another opportunity to work with you in the near future  Sincerely,    Tra Hanks, PT      Referring Provider:      I certify that I have read the below Plan of Care and certify the need for these services furnished under this plan of treatment while under my care  MD Jackie Choe [address] [place], [place] [id number]  Via Fax: [phone]          PT Re-Evaluation     Today's date: 2021  Patient name: Fabiano Samuel  : 1955  MRN: [id number]  Referring provider: Emily Bojorquez MD  Dx:   Encounter Diagnosis     ICD-10-CM    1  Primary osteoarthritis of right knee  M17 11        Start Time: 1545  Stop Time: 1625  Total time in clinic (min): 40 minutes    Assessment  Assessment details: Pt is a 72year old female who presents to outpatient PT s/p R TKA  Pt presents upon re-evaluation with increased knee ROM, increased LE strength, decreased pain intensity and frequency as well as overall improved function with walking, standing, and negotiating steps  Pt still remains with impaired balance, R>L proximal hip weakness   At this time, pt will cont with PT x1 visit to progress and assure independence with HEP, then transition to independent HEP to maintain gains and maximize function  Impairments: abnormal or restricted ROM, activity intolerance, impaired balance, impaired physical strength, lacks appropriate home exercise program and pain with function  Understanding of Dx/Px/POC: good   Prognosis: good    Goals  Impairment Goals 4 weeks  -Pt will demonstrate decreased pain to 5/10 at worst - MET  -Pt will demonstrate increased knee flexion ROM to>90 deg - MET  -Pt will present with knee ext to 0 deg - PARTIALLY MET  -Pt will demonstrated increased LE strength all planes to >4/5 b/l - PARTIALLY MET  Functional Goals 8 weeks  -Pt will demonstrate ability to walk x15 mins without AD -  MET  -Pt will demonstrate ability to stand x30 mins to cook/prep food - PARTIALLY MET  -Pt will demonstrate ability to negotiate 12 steps reciprocally  - MET      Plan  Plan details: Cont per POC x1 visit to assure independence with HEP  Patient would benefit from: skilled physical therapy  Planned modality interventions: thermotherapy: hydrocollator packs and cryotherapy  Planned therapy interventions: manual therapy, joint mobilization, patient education, strengthening, stretching, therapeutic activities, therapeutic exercise, home exercise program, functional ROM exercises, flexibility and balance  Duration in visits: 1  Treatment plan discussed with: patient        Subjective Evaluation    History of Present Illness  Mechanism of injury: Pt reports 80% improvement   Pt reports she negotiates steps reciprocally independently with 1 rail  Walking tolerance without SPC on even surfaces: not limited  If on uneven surfaces, pt has her cane with her and can walk x 1 hour  Standing tolerance: 20 mins  Sitting tolerance:  1 hour  Pt is able to drive   Pt is able to independently don/doff shoes/socks/pants  Pt transfers independently from bed/couch without assistance from non-operated LE    Pt would like to be able to walk comfortably without AD and be able to negotiate steps reciprocally       Pt is taking tylenol as needed but less than before  Pain  Current pain ratin  At best pain ratin  At worst pain ratin  Location: lateral knee   Quality: dull ache  Relieving factors: medications and rest  Progression: improved    Social Support  Steps to enter house: yes  Stairs in house: yes   Lives in: multiple-level home    Employment status: working ( part time )  Treatments  Current treatment: physical therapy  Patient Goals  Patient goal: to walk without walker - MET         Objective     Observations     Right Knee   Positive for incision  Negative for edema  Additional Observation Details  Incision C/D/I    Tenderness   Left Knee   No tenderness in the lateral joint line  Neurological Testing     Sensation     Knee   Left Knee   Intact: light touch    Active Range of Motion   Left Knee   Flexion: 136 degrees     Right Knee   Flexion: 125 degrees     Additional Active Range of Motion Details  L knee 2 deg hyperextension  R knee lacks 1 deg ext   Patellar mobility WFL      Strength/Myotome Testing     Left Knee   Flexion: 4+  Extension: 4+    Right Knee   Flexion: 4+  Extension: 4+  Quadriceps contraction: good    Additional Strength Details  DF; 5/5 R, 5/5 L   Hip IR/ER 4+/5 L, 4+/5 R  Hip flexion  4/5 R, 4/5 L   Hip ABD 3+/5 R, 4/5 L   Hip ext 4/5 R, 4+/5 L    General Comments:      Knee Comments  Able to HR/TR  Able to uni HR x5 b/l  SLS: 3 secs R, 3 secs L     Pt ambulates with SPC with equal stance time and good gait mechanics  Pt ambulates without SPC with decreased stance time on L, and lateral lean during RLE stance         Flowsheet Rows      Most Recent Value   PT/OT G-Codes   Current Score  79   Projected Score  77               Precautions: hx of skin CA, HTN, GERD, diabetes  Daily Treatment Diary    Manuals    Knee PROM          Patellar Mobs                    Re-eval/measurements   30 mins       Neuro Re-Ed          SLS  30"x2 ea 30"x2 ea  *   30"x2 ea Tandem Stance np      1'x1 ea                                                     Ther Ex          Bike 10 mins L3 10 mins L3  10 mins L3  *   10 mins L3              Seated HS stretch    *      SLR    *      S/l ABD    *      Uni HR x15 ea x20 ea  *   x10 ea   Standing hip ABD x20 ea x20 ea     x20 ea    Side steps 1 lap 2 laps at bar  *   1 lap   Monster Walks 1 lap fwd only  2 laps at bar, fwd/bkwd        Wall hip ABD/ER gina   5"x3 ea  *   nv   Single leg sit to stand           Ther Activity          Squatting 3" 1x15 3" 1x10      3"x15   FSU and over 4" 1x5 R, 6" 1x5 R 6" 1x10 R     4" 1x15 R    Sit to stand          Gait Training          Amb without AD           Amb with SPC          Forward weight shifts at bar          Modalities

## 2021-05-18 NOTE — PROGRESS NOTES
PT Re-Evaluation     Today's date: 2021  Patient name: Karissa Duffy  : 1955  MRN: [id number]  Referring provider: Yaa Rodriguez MD  Dx:   Encounter Diagnosis     ICD-10-CM    1  Primary osteoarthritis of right knee  M17 11        Start Time: 1545  Stop Time: 1625  Total time in clinic (min): 40 minutes    Assessment  Assessment details: Pt is a 72year old female who presents to outpatient PT s/p R TKA  Pt presents upon re-evaluation with increased knee ROM, increased LE strength, decreased pain intensity and frequency as well as overall improved function with walking, standing, and negotiating steps  Pt still remains with impaired balance, R>L proximal hip weakness  At this time, pt will cont with PT x1 visit to progress and assure independence with HEP, then transition to independent HEP to maintain gains and maximize function    Impairments: abnormal or restricted ROM, activity intolerance, impaired balance, impaired physical strength, lacks appropriate home exercise program and pain with function  Understanding of Dx/Px/POC: good   Prognosis: good    Goals  Impairment Goals 4 weeks  -Pt will demonstrate decreased pain to 5/10 at worst - MET  -Pt will demonstrate increased knee flexion ROM to>90 deg - MET  -Pt will present with knee ext to 0 deg - PARTIALLY MET  -Pt will demonstrated increased LE strength all planes to >4/5 b/l - PARTIALLY MET  Functional Goals 8 weeks  -Pt will demonstrate ability to walk x15 mins without AD -  MET  -Pt will demonstrate ability to stand x30 mins to cook/prep food - PARTIALLY MET  -Pt will demonstrate ability to negotiate 12 steps reciprocally  - MET      Plan  Plan details: Cont per POC x1 visit to assure independence with HEP  Patient would benefit from: skilled physical therapy  Planned modality interventions: thermotherapy: hydrocollator packs and cryotherapy  Planned therapy interventions: manual therapy, joint mobilization, patient education, strengthening, stretching, therapeutic activities, therapeutic exercise, home exercise program, functional ROM exercises, flexibility and balance  Duration in visits: 1  Treatment plan discussed with: patient        Subjective Evaluation    History of Present Illness  Mechanism of injury: Pt reports 80% improvement   Pt reports she negotiates steps reciprocally independently with 1 rail  Walking tolerance without SPC on even surfaces: not limited  If on uneven surfaces, pt has her cane with her and can walk x 1 hour  Standing tolerance: 20 mins  Sitting tolerance:  1 hour  Pt is able to drive   Pt is able to independently don/doff shoes/socks/pants  Pt transfers independently from bed/couch without assistance from non-operated LE    Pt would like to be able to walk comfortably without AD and be able to negotiate steps reciprocally  Pt is taking tylenol as needed but less than before  Pain  Current pain ratin  At best pain ratin  At worst pain ratin  Location: lateral knee   Quality: dull ache  Relieving factors: medications and rest  Progression: improved    Social Support  Steps to enter house: yes  Stairs in house: yes   Lives in: multiple-level home    Employment status: working ( part time )  Treatments  Current treatment: physical therapy  Patient Goals  Patient goal: to walk without walker - MET         Objective     Observations     Right Knee   Positive for incision  Negative for edema  Additional Observation Details  Incision C/D/I    Tenderness   Left Knee   No tenderness in the lateral joint line       Neurological Testing     Sensation     Knee   Left Knee   Intact: light touch    Active Range of Motion   Left Knee   Flexion: 136 degrees     Right Knee   Flexion: 125 degrees     Additional Active Range of Motion Details  L knee 2 deg hyperextension  R knee lacks 1 deg ext   Patellar mobility [facility]      Strength/Myotome Testing     Left Knee   Flexion: 4+  Extension: 4+    Right Knee   Flexion: 4+  Extension: 4+  Quadriceps contraction: good    Additional Strength Details  DF; 5/5 R, 5/5 L   Hip IR/ER 4+/5 L, 4+/5 R  Hip flexion  4/5 R, 4/5 L   Hip ABD 3+/5 R, 4/5 L   Hip ext 4/5 R, 4+/5 L    General Comments:      Knee Comments  Able to HR/TR  Able to uni HR x5 b/l  SLS: 3 secs R, 3 secs L     Pt ambulates with SPC with equal stance time and good gait mechanics  Pt ambulates without SPC with decreased stance time on L, and lateral lean during RLE stance         Flowsheet Rows      Most Recent Value   PT/OT G-Codes   Current Score  79   Projected Score  77               Precautions: hx of skin CA, HTN, GERD, diabetes  Daily Treatment Diary    Manuals 5/11 5/13 5/18 5/6   Knee PROM          Patellar Mobs                    Re-eval/measurements   30 mins       Neuro Re-Ed          SLS  30"x2 ea 30"x2 ea  *   30"x2 ea   Tandem Stance np      1'x1 ea                                                     Ther Ex          Bike 10 mins L3 10 mins L3  10 mins L3  *   10 mins L3              Seated HS stretch    *      SLR    *      S/l ABD    *      Uni HR x15 ea x20 ea  *   x10 ea   Standing hip ABD x20 ea x20 ea     x20 ea    Side steps 1 lap 2 laps at bar  *   1 lap   Monster Walks 1 lap fwd only  2 laps at bar, fwd/bkwd        Wall hip ABD/ER gina   5"x3 ea  *   nv   Single leg sit to stand           Ther Activity          Squatting 3" 1x15 3" 1x10      3"x15   FSU and over 4" 1x5 R, 6" 1x5 R 6" 1x10 R     4" 1x15 R    Sit to stand          Gait Training          Amb without AD           Amb with SPC          Forward weight shifts at bar          Modalities

## 2021-05-19 ENCOUNTER — TRANSCRIBE ORDERS (OUTPATIENT)
Dept: PHYSICAL THERAPY | Facility: REHABILITATION | Age: 66
End: 2021-05-19

## 2021-05-19 DIAGNOSIS — M17.11 PRIMARY OSTEOARTHRITIS OF RIGHT KNEE: Primary | ICD-10-CM

## 2021-05-20 ENCOUNTER — OFFICE VISIT (OUTPATIENT)
Dept: PHYSICAL THERAPY | Facility: REHABILITATION | Age: 66
End: 2021-05-20
Payer: MEDICARE

## 2021-05-20 DIAGNOSIS — M17.11 PRIMARY OSTEOARTHRITIS OF RIGHT KNEE: Primary | ICD-10-CM

## 2021-05-20 PROCEDURE — 97110 THERAPEUTIC EXERCISES: CPT | Performed by: PHYSICAL THERAPIST

## 2021-05-20 NOTE — PROGRESS NOTES
Discharge     Today's date: 2021  Patient name: Karen Lao  : 1955  MRN: [id number]  Referring provider: Jacey Hardin MD  Dx:   Encounter Diagnosis     ICD-10-CM    1  Primary osteoarthritis of right knee  M17 11        Start Time: 1600  Stop Time: 1642  Total time in clinic (min): 42 minutes    Subjective: Pt reports she is ready for today to be her last day  Objective: See treatment diary below      Assessment: Tolerated treatment well  Patient exhibited good technique with therapeutic exercises  Pt provided verbal understanding and return demonstration of updated HEP  Pt will cont with HEP to maintain gains and progress towards LTG's  Plan: Pt d/c'ed from skilled PT          Precautions: hx of skin CA, HTN, GERD, diabetes  Daily Treatment Diary    Manuals       Knee PROM          Patellar Mobs                    Re-eval/measurements   30 mins       Neuro Re-Ed          SLS  30"x2 ea 30"x2 ea  30"x2 ea      Tandem Stance np                                                           Ther Ex          Bike 10 mins L3 10 mins L3  10 mins L3  10 mins L3                 Seated HS stretch    20"x3 R      SLR    x10 R      S/l ABD    x10 R      Uni HR x15 ea x20 ea  x20 ea       Standing hip ABD x20 ea x20 ea        Side steps 1 lap 2 laps at bar  2 laps at bar      [employer] 1 lap fwd only  2 laps at bar, fwd/bkwd        Wall hip ABD/ER gina   5"x3 ea  5"x5 ea      Single leg sit to stand           Ther Activity          Squatting 3" 1x15 3" 1x10         FSU and over 4" 1x5 R, 6" 1x5 R 6" 1x10 R        Sit to stand          Gait Training          Amb without AD           Amb with SPC          Forward weight shifts at bar          Modalities

## 2021-06-22 ENCOUNTER — OFFICE VISIT (OUTPATIENT)
Dept: FAMILY MEDICINE CLINIC | Facility: CLINIC | Age: 66
End: 2021-06-22
Payer: MEDICARE

## 2021-06-22 VITALS
SYSTOLIC BLOOD PRESSURE: 140 MMHG | HEIGHT: 67 IN | BODY MASS INDEX: 29.41 KG/M2 | WEIGHT: 187.38 LBS | HEART RATE: 64 BPM | DIASTOLIC BLOOD PRESSURE: 80 MMHG | TEMPERATURE: 96.8 F

## 2021-06-22 DIAGNOSIS — J30.9 ALLERGIC RHINITIS, UNSPECIFIED SEASONALITY, UNSPECIFIED TRIGGER: ICD-10-CM

## 2021-06-22 DIAGNOSIS — I10 ESSENTIAL HYPERTENSION: ICD-10-CM

## 2021-06-22 DIAGNOSIS — E11.9 TYPE 2 DIABETES MELLITUS WITHOUT COMPLICATION, WITHOUT LONG-TERM CURRENT USE OF INSULIN (HCC): ICD-10-CM

## 2021-06-22 DIAGNOSIS — L29.9 PRURITUS: ICD-10-CM

## 2021-06-22 DIAGNOSIS — L23.7 ALLERGIC CONTACT DERMATITIS DUE TO PLANTS, EXCEPT FOOD: Primary | ICD-10-CM

## 2021-06-22 PROCEDURE — 99214 OFFICE O/P EST MOD 30 MIN: CPT | Performed by: FAMILY MEDICINE

## 2021-06-22 RX ORDER — BETAMETHASONE DIPROPIONATE 0.5 MG/G
CREAM TOPICAL 2 TIMES DAILY
Qty: 30 G | Refills: 1 | Status: SHIPPED | OUTPATIENT
Start: 2021-06-22 | End: 2022-05-16 | Stop reason: ALTCHOICE

## 2021-06-22 NOTE — ASSESSMENT & PLAN NOTE
Lab Results   Component Value Date    HGBA1C 6 1 (H) 02/23/2021    because of this will not use oral steroids

## 2021-06-22 NOTE — PROGRESS NOTES
Assessment and Plan:  1  Contact/allergic dermatitis, poison ivy, betamethasone cream prescribed  Uses small amount twice daily but not on face breasts or genitalia  2  Pruritus  Patient may continue [place] may add Benadryl at bedtime  Drowsiness discussed  May use Aveeno bath  3  Allergic rhinitis, continue Allegra  4  Hypertension, stable continue present therapy  5  Type 2 diabetes, because of this will not use oral steroids  6  Return in 1 week if still symptoms    Problem List Items Addressed This Visit        Endocrine    Type 2 diabetes mellitus without complication, without long-term current use of insulin ([facility])       Lab Results   Component Value Date    HGBA1C 6 1 (H) 02/23/2021    because of this will not use oral steroids            Respiratory    Allergic rhinitis      Patient is on [place]            Cardiovascular and Mediastinum    Essential hypertension      Stable continue present therapy           Other Visit Diagnoses     Allergic contact dermatitis due to plants, except food    -  Primary    Relevant Medications    betamethasone, augmented, (DIPROLENE-AF) 0 05 % cream    Pruritus        Relevant Medications    betamethasone, augmented, (DIPROLENE-AF) 0 05 % cream                 Diagnoses and all orders for this visit:    Allergic contact dermatitis due to plants, except food  -     betamethasone, augmented, (DIPROLENE-AF) 0 05 % cream; Apply topically 2 (two) times a day    Pruritus    Type 2 diabetes mellitus without complication, without long-term current use of insulin (HCC)    Allergic rhinitis, unspecified seasonality, unspecified trigger    Essential hypertension              Subjective:      Patient ID: Sumi Albert is a 72 y o  female  CC:    Chief Complaint   Patient presents with    Rash     right arm, pt states itchy x 1 week    mgb       HPI:     Patient has a pruritic rash right armor saccular in nature for the past week  It started after pulling weeds last weekend  Patient using over-the-counter hydrocortisone cream with limited relief  The following portions of the patient's history were reviewed and updated as appropriate: allergies, current medications, past family history, past medical history, past social history, past surgical history and problem list       Review of Systems   Constitutional: Negative  HENT: Negative  Eyes: Negative  Respiratory: Negative  Cardiovascular: Negative  Gastrointestinal: Negative  Endocrine:        Patient is diabetic   Genitourinary: Negative  Musculoskeletal: Negative  Skin:        HPI   Allergic/Immunologic: Negative  Neurological: Negative  Hematological: Negative  Psychiatric/Behavioral: Negative  Data to review:       Objective:    Vitals:    06/22/21 1527   BP: 140/80   BP Location: Left arm   Patient Position: Sitting   Cuff Size: Large   Pulse: 64   Temp: (!) 96 8 °F (36 °C)   TempSrc: Temporal   Weight: 85 kg (187 lb 6 oz)   Height: 5' 7" (1 702 m)        Physical Exam  Vitals and nursing note reviewed  Constitutional:       Appearance: Normal appearance  HENT:      Head: Normocephalic and atraumatic  Eyes:      General: No scleral icterus  Cardiovascular:      Rate and Rhythm: Normal rate and regular rhythm  Pulses: Normal pulses  Heart sounds: Normal heart sounds  Pulmonary:      Breath sounds: Normal breath sounds  Musculoskeletal:      Cervical back: Neck supple  No rigidity  Right lower leg: No edema  Left lower leg: No edema  Skin:     General: Skin is warm and dry  Findings: Rash present  Comments: Right upper extremity with vesicular rash approximately 8 in x 4 in   Neurological:      General: No focal deficit present  Mental Status: She is alert     Psychiatric:         Mood and Affect: Mood normal

## 2021-06-22 NOTE — PATIENT INSTRUCTIONS
May continue Allegra it will help itching as well as allergies   May use Benadryl at bedtime for itching  Do not drive or operate machinery until side effect is known  This will cause drowsiness  Patient use betamethasone cream twice daily to area    Do not use on face pressor genitalia   May use Aveeno bath to area for itching   Return in 1 week if still symptoms

## 2021-09-20 DIAGNOSIS — E11.9 TYPE 2 DIABETES MELLITUS WITHOUT COMPLICATION, WITHOUT LONG-TERM CURRENT USE OF INSULIN (HCC): ICD-10-CM

## 2021-09-20 DIAGNOSIS — E78.2 MIXED HYPERLIPIDEMIA: ICD-10-CM

## 2021-09-20 DIAGNOSIS — K21.9 GASTROESOPHAGEAL REFLUX DISEASE WITHOUT ESOPHAGITIS: ICD-10-CM

## 2021-09-20 DIAGNOSIS — I10 HYPERTENSION, UNSPECIFIED TYPE: ICD-10-CM

## 2021-09-20 RX ORDER — PANTOPRAZOLE SODIUM 20 MG/1
20 TABLET, DELAYED RELEASE ORAL DAILY
Qty: 90 TABLET | Refills: 1 | OUTPATIENT
Start: 2021-09-20

## 2021-09-20 RX ORDER — LISINOPRIL 10 MG/1
10 TABLET ORAL DAILY
Qty: 90 TABLET | Refills: 1 | OUTPATIENT
Start: 2021-09-20

## 2021-09-20 RX ORDER — PRAVASTATIN SODIUM 20 MG
20 TABLET ORAL DAILY
Qty: 90 TABLET | Refills: 1 | OUTPATIENT
Start: 2021-09-20

## 2021-09-22 ENCOUNTER — TELEPHONE (OUTPATIENT)
Dept: FAMILY MEDICINE CLINIC | Facility: CLINIC | Age: 66
End: 2021-09-22

## 2021-09-23 DIAGNOSIS — I10 HYPERTENSION, UNSPECIFIED TYPE: ICD-10-CM

## 2021-09-23 DIAGNOSIS — E11.9 TYPE 2 DIABETES MELLITUS WITHOUT COMPLICATION, WITHOUT LONG-TERM CURRENT USE OF INSULIN (HCC): ICD-10-CM

## 2021-09-23 DIAGNOSIS — K21.9 GASTROESOPHAGEAL REFLUX DISEASE WITHOUT ESOPHAGITIS: ICD-10-CM

## 2021-09-23 DIAGNOSIS — E78.2 MIXED HYPERLIPIDEMIA: ICD-10-CM

## 2021-09-23 RX ORDER — LISINOPRIL 10 MG/1
10 TABLET ORAL DAILY
Qty: 90 TABLET | Refills: 1 | Status: SHIPPED | OUTPATIENT
Start: 2021-09-23 | End: 2022-04-14 | Stop reason: SDUPTHER

## 2021-09-23 RX ORDER — PANTOPRAZOLE SODIUM 20 MG/1
20 TABLET, DELAYED RELEASE ORAL DAILY
Qty: 90 TABLET | Refills: 1 | Status: SHIPPED | OUTPATIENT
Start: 2021-09-23 | End: 2022-04-14 | Stop reason: SDUPTHER

## 2021-09-23 RX ORDER — PRAVASTATIN SODIUM 20 MG
20 TABLET ORAL DAILY
Qty: 90 TABLET | Refills: 1 | Status: SHIPPED | OUTPATIENT
Start: 2021-09-23 | End: 2022-04-14 | Stop reason: SDUPTHER

## 2021-09-23 NOTE — TELEPHONE ENCOUNTER
Patient had her BW on 9/18 and scheduled an appointment with Niesha Krueger for 9/29/21 please [facility]

## 2021-09-28 ENCOUNTER — OFFICE VISIT (OUTPATIENT)
Dept: FAMILY MEDICINE CLINIC | Facility: CLINIC | Age: 66
End: 2021-09-28
Payer: MEDICARE

## 2021-09-28 VITALS
HEIGHT: 67 IN | TEMPERATURE: 98.4 F | SYSTOLIC BLOOD PRESSURE: 132 MMHG | WEIGHT: 191 LBS | BODY MASS INDEX: 29.98 KG/M2 | DIASTOLIC BLOOD PRESSURE: 76 MMHG | HEART RATE: 74 BPM

## 2021-09-28 DIAGNOSIS — Z11.4 SCREENING FOR HIV (HUMAN IMMUNODEFICIENCY VIRUS): ICD-10-CM

## 2021-09-28 DIAGNOSIS — Z11.59 NEED FOR HEPATITIS C SCREENING TEST: Primary | ICD-10-CM

## 2021-09-28 DIAGNOSIS — M79.10 MYALGIA: ICD-10-CM

## 2021-09-28 DIAGNOSIS — E11.9 TYPE 2 DIABETES MELLITUS WITHOUT COMPLICATION, WITHOUT LONG-TERM CURRENT USE OF INSULIN (HCC): ICD-10-CM

## 2021-09-28 DIAGNOSIS — E78.2 MIXED HYPERLIPIDEMIA: ICD-10-CM

## 2021-09-28 DIAGNOSIS — D03.9 MELANOMA IN SITU, UNSPECIFIED SITE (HCC): ICD-10-CM

## 2021-09-28 DIAGNOSIS — I10 ESSENTIAL HYPERTENSION: ICD-10-CM

## 2021-09-28 DIAGNOSIS — K21.9 GERD WITHOUT ESOPHAGITIS: ICD-10-CM

## 2021-09-28 PROBLEM — Z02.89 ENCOUNTER FOR PHYSICAL EXAMINATION RELATED TO EMPLOYMENT: Status: RESOLVED | Noted: 2018-09-18 | Resolved: 2021-09-28

## 2021-09-28 PROCEDURE — 99214 OFFICE O/P EST MOD 30 MIN: CPT | Performed by: PHYSICIAN ASSISTANT

## 2021-09-28 NOTE — ASSESSMENT & PLAN NOTE
Patient on Pravachol 20 mg once daily keeping her LDL at goal at 98  Triglycerides are continuing to be elevated to 153 up by 100 points but has gone down each lab since one year ago when it was over 300  Decrease simple carbs such as bread pasta potatoes rice

## 2021-09-28 NOTE — PATIENT INSTRUCTIONS
Problem List Items Addressed This Visit        Digestive    GERD without esophagitis       Stable on PPI  Endocrine    Type 2 diabetes mellitus without complication, without long-term current use of insulin ([facility] [address] )       Patient is on Glucophage 500 mg once daily at breakfast keeping her diabetes under control with a hemoglobin A1c of 6 3 and fasting sugar of 124  Microalbumin normal check 6 months  Continue great job  Foot exam done today  Eye exam needs to be faxed to the office  Lab Results   Component Value Date    HGBA1C 6 3 (H) 09/18/2021            Relevant Orders    Hemoglobin A1C    Comprehensive metabolic panel       Cardiovascular and Mediastinum    Essential hypertension       Stable continue current medication  Other    Mixed hyperlipidemia       Patient on Pravachol 20 mg once daily keeping her LDL at goal at 98  Triglycerides are continuing to be elevated to 153 up by 100 points but has gone down each lab since one year ago when it was over 300  Decrease simple carbs such as bread pasta potatoes rice  Relevant Orders    Lipid Panel with Direct LDL reflex    Melanoma in situ [facility])      Continue dermatology follow-up yearly              Other Visit Diagnoses     Need for hepatitis C screening test    -  Primary    Relevant Orders    Hepatitis C Antibody ([facility])    Screening for HIV (human immunodeficiency virus)        Relevant Orders    HIV 1/2 Antigen/Antibody (4th Generation) w Reflex [facility]    Myalgia

## 2021-09-28 NOTE — PROGRESS NOTES
Assessment and Plan:    Problem List Items Addressed This Visit        Digestive    GERD without esophagitis       Stable on PPI  Endocrine    Type 2 diabetes mellitus without complication, without long-term current use of insulin ([address] )       Patient is on Glucophage 500 mg once daily at breakfast keeping her diabetes under control with a hemoglobin A1c of 6 3 and fasting sugar of 124  Microalbumin normal check 6 months  Continue great job  Foot exam done today  Eye exam needs to be faxed to the office  Lab Results   Component Value Date    HGBA1C 6 3 (H) 09/18/2021            Relevant Orders    Hemoglobin A1C    Comprehensive metabolic panel       Cardiovascular and Mediastinum    Essential hypertension       Stable continue current medication  Other    Mixed hyperlipidemia       Patient on Pravachol 20 mg once daily keeping her LDL at goal at 98  Triglycerides are continuing to be elevated to 153 up by 100 points but has gone down each lab since one year ago when it was over 300  Decrease simple carbs such as bread pasta potatoes rice  Relevant Orders    Lipid Panel with Direct LDL reflex    Melanoma in situ [facility])      Continue dermatology follow-up yearly  Other Visit Diagnoses     Need for hepatitis C screening test    -  Primary    Relevant Orders    Hepatitis C Antibody ([facility])    Screening for HIV (human immunodeficiency virus)        Relevant Orders    HIV 1/2 Antigen/Antibody (4th Generation) w Reflex [facility]    Myalgia                     Diagnoses and all orders for this visit:    Need for hepatitis C screening test  -     Hepatitis C Antibody ([facility]);  Future    Screening for HIV (human immunodeficiency virus)  -     HIV 1/2 Antigen/Antibody (4th Generation) w Reflex [facility]; Future    GERD without esophagitis    Type 2 diabetes mellitus without complication, without long-term current use of insulin (HCC)  -     Hemoglobin A1C; Future  - Comprehensive metabolic panel; Future    Essential hypertension    Melanoma in situ, unspecified site [facility])    Mixed hyperlipidemia  -     Lipid Panel with Direct LDL reflex; Future    Myalgia              Subjective:      Patient ID: Pipe Cain is a 72 y o  female  CC:    Chief Complaint   Patient presents with    Follow-up     b/w f/u     Diabetes       HPI:      Pipe Cain is here for chronic conditions f/u including the diagnosis of No diagnosis found    Pt  states they are taking all medications as directed without complaints or side effects  Pt  had labs done prior to today's visit which included Recent Results (from the past 672 hour(s))  -Hemoglobin A1C  Collection Time: 09/18/21 12:00 AM       Result                      Value             Ref Range           Hemoglobin A1C              6 3                              -Microalbumin / creatinine urine ratio  Collection Time: 09/18/21 12:00 AM       Result                      Value             Ref Range           EXT Creatinine Urine        143 0                                 Microalbum  ,U,Random        1 0                                   EXTERNAL Microalb/Crea*     7 0                              -HEMOGLOBIN A1C  Collection Time: 09/18/21  7:47 AM       Result                      Value             Ref Range           Hemoglobin A1C              6 3 (H)           <5 7 %              eAG, EST AVG Glucose        134               <154 mg/dL           The following portions of the patient's history were reviewed and updated as appropriate: allergies, current medications, past family history, past medical history, past social history, past surgical history and problem list       Review of Systems   Constitutional: Negative  HENT: Negative  Eyes: Negative  Respiratory: Negative  Cardiovascular: Negative  Gastrointestinal: Negative  Endocrine: Negative  Genitourinary: Negative  Musculoskeletal: Negative      Skin: pre-ulcer, no ulcer and no callus                          Toe Exam: ROM and strength within normal limitsno swelling, no tenderness, erythema and  no right toe deformity  Sensory     Proprioception: intact   Monofilament testing: intact  Vascular  Capillary refills: < 3 seconds  The right DP pulse is 2+  The right PT pulse is 2+  Right Toe  - Comprehensive Exam  Ecchymosis: none  Arch: normal  Hammertoes: absent  Claw Toes: absent  Swelling: none   Tenderness: none         Left Foot/Ankle  Left Foot Inspection  Skin Exam: skin normalskin not intact, no dry skin, no warmth, no erythema, no maceration, normal color, no pre-ulcer, no ulcer and no callus                         Toe Exam: ROM and strength within normal limitsno swelling, no tenderness, no erythema and no left toe deformity                   Sensory     Proprioception: intact  Monofilament: intact  Vascular  Capillary refills: < 3 seconds  The left DP pulse is 2+  The left PT pulse is 2+  Left Toe  - Comprehensive Exam  Ecchymosis: none  Arch: normal  Hammertoes: absent  Claw toes: absent  Swelling: none   Tenderness: none       Assign Risk Category:  No deformity present; No loss of protective sensation; No weak pulses       Risk: 0      Depression Screening and Follow-up Plan:   Patient was screened for depression during today's encounter  They screened negative with a PHQ-2 score of 0

## 2021-09-28 NOTE — ASSESSMENT & PLAN NOTE
Patient is on Glucophage 500 mg once daily at breakfast keeping her diabetes under control with a hemoglobin A1c of 6 3 and fasting sugar of 124  Microalbumin normal check 6 months  Continue great job  Foot exam done today  Eye exam needs to be faxed to the office    Lab Results   Component Value Date    HGBA1C 6 3 (H) 09/18/2021

## 2021-09-29 ENCOUNTER — TELEPHONE (OUTPATIENT)
Dept: ADMINISTRATIVE | Facility: OTHER | Age: 66
End: 2021-09-29

## 2021-09-29 NOTE — TELEPHONE ENCOUNTER
Upon review of the In Basket request we were able to locate, review, and update the patient chart as requested for Mammogram     Any additional questions or concerns should be emailed to the Practice Liaisons via [email or web address] email, please do not reply via In Basket      Thank you  Bridget Ambrosio

## 2021-09-29 NOTE — TELEPHONE ENCOUNTER
----- Message from Waldemar De La Cruz MA sent at 9/28/2021  6:31 PM EDT -----  09/28/21 6:31 PM    Hello, our patient Makayla Espino has had Mammogram completed/performed  Please assist in updating the patient chart by pulling the Care Everywhere (CE) document  The date of service is 8/18/21       Thank you,  Waldemar De La Cruz MA  [facility] PRIMARY [facility]ARE

## 2022-03-22 DIAGNOSIS — E78.2 MIXED HYPERLIPIDEMIA: ICD-10-CM

## 2022-03-22 DIAGNOSIS — I10 HYPERTENSION, UNSPECIFIED TYPE: ICD-10-CM

## 2022-03-22 DIAGNOSIS — K21.9 GASTROESOPHAGEAL REFLUX DISEASE WITHOUT ESOPHAGITIS: ICD-10-CM

## 2022-03-22 DIAGNOSIS — E11.9 TYPE 2 DIABETES MELLITUS WITHOUT COMPLICATION, WITHOUT LONG-TERM CURRENT USE OF INSULIN (HCC): ICD-10-CM

## 2022-03-22 RX ORDER — LISINOPRIL 10 MG/1
TABLET ORAL
Qty: 90 TABLET | Refills: 1 | OUTPATIENT
Start: 2022-03-22

## 2022-03-22 RX ORDER — PRAVASTATIN SODIUM 20 MG
TABLET ORAL
Qty: 90 TABLET | Refills: 1 | OUTPATIENT
Start: 2022-03-22

## 2022-03-22 RX ORDER — PANTOPRAZOLE SODIUM 20 MG/1
TABLET, DELAYED RELEASE ORAL
Qty: 90 TABLET | Refills: 1 | OUTPATIENT
Start: 2022-03-22

## 2022-04-14 ENCOUNTER — OFFICE VISIT (OUTPATIENT)
Dept: FAMILY MEDICINE CLINIC | Facility: CLINIC | Age: 67
End: 2022-04-14
Payer: MEDICARE

## 2022-04-14 VITALS
SYSTOLIC BLOOD PRESSURE: 130 MMHG | RESPIRATION RATE: 16 BRPM | HEIGHT: 67 IN | DIASTOLIC BLOOD PRESSURE: 76 MMHG | WEIGHT: 192 LBS | HEART RATE: 72 BPM | BODY MASS INDEX: 30.13 KG/M2

## 2022-04-14 DIAGNOSIS — I10 ESSENTIAL HYPERTENSION: ICD-10-CM

## 2022-04-14 DIAGNOSIS — D03.9 MELANOMA IN SITU, UNSPECIFIED SITE (HCC): ICD-10-CM

## 2022-04-14 DIAGNOSIS — E78.2 MIXED HYPERLIPIDEMIA: ICD-10-CM

## 2022-04-14 DIAGNOSIS — I10 HYPERTENSION, UNSPECIFIED TYPE: ICD-10-CM

## 2022-04-14 DIAGNOSIS — E11.9 TYPE 2 DIABETES MELLITUS WITHOUT COMPLICATION, WITHOUT LONG-TERM CURRENT USE OF INSULIN (HCC): ICD-10-CM

## 2022-04-14 DIAGNOSIS — Z11.59 NEED FOR HEPATITIS C SCREENING TEST: ICD-10-CM

## 2022-04-14 DIAGNOSIS — K21.9 GASTROESOPHAGEAL REFLUX DISEASE WITHOUT ESOPHAGITIS: ICD-10-CM

## 2022-04-14 DIAGNOSIS — Z78.0 ASYMPTOMATIC POSTMENOPAUSAL STATE: ICD-10-CM

## 2022-04-14 DIAGNOSIS — Z12.4 SCREENING FOR CERVICAL CANCER: Primary | ICD-10-CM

## 2022-04-14 DIAGNOSIS — K21.9 GERD WITHOUT ESOPHAGITIS: ICD-10-CM

## 2022-04-14 PROBLEM — Z00.00 WELCOME TO MEDICARE PREVENTIVE VISIT: Status: RESOLVED | Noted: 2021-03-02 | Resolved: 2022-04-14

## 2022-04-14 PROBLEM — Z01.818 PRE-OP EXAMINATION: Status: RESOLVED | Noted: 2019-01-14 | Resolved: 2022-04-14

## 2022-04-14 PROBLEM — Z00.00 HEALTH CARE MAINTENANCE: Status: ACTIVE | Noted: 2022-04-14

## 2022-04-14 PROCEDURE — 99214 OFFICE O/P EST MOD 30 MIN: CPT | Performed by: FAMILY MEDICINE

## 2022-04-14 PROCEDURE — 1123F ACP DISCUSS/DSCN MKR DOCD: CPT | Performed by: FAMILY MEDICINE

## 2022-04-14 PROCEDURE — G0438 PPPS, INITIAL VISIT: HCPCS | Performed by: FAMILY MEDICINE

## 2022-04-14 RX ORDER — PRAVASTATIN SODIUM 20 MG
20 TABLET ORAL DAILY
Qty: 90 TABLET | Refills: 1 | Status: SHIPPED | OUTPATIENT
Start: 2022-04-14

## 2022-04-14 RX ORDER — LISINOPRIL 10 MG/1
10 TABLET ORAL DAILY
Qty: 90 TABLET | Refills: 1 | Status: SHIPPED | OUTPATIENT
Start: 2022-04-14

## 2022-04-14 RX ORDER — PANTOPRAZOLE SODIUM 20 MG/1
20 TABLET, DELAYED RELEASE ORAL DAILY
Qty: 90 TABLET | Refills: 1 | Status: SHIPPED | OUTPATIENT
Start: 2022-04-14

## 2022-04-14 NOTE — PROGRESS NOTES
Assessment and Plan:    1  GERD, stable continue present therapy   2  Type 2 diabetes, stable foot exam completed, will obtain eye exam from him will send eye care  Foot exam was completed  Patient adamantly declines to check blood sugars at home  3  Hypertension, stable continue present therapy   4  Melanoma, sees Dermatology yearly  5  Hyperlipidemia, stable continue present therapy  6  Health care maintenance, a [address] [place] [address] exam completed, hepatitis-C discussed and ordered  Patient referred to gyn for exam   DEXA scan has been ordered  7  Patient return in 6 months for office visit blood work sooner if need       Problem List Items Addressed This Visit        Digestive    GERD without esophagitis     Stable Protonix reordered         Relevant Medications    pantoprazole (PROTONIX) 20 mg tablet    Other Relevant Orders    CBC    Comprehensive metabolic panel    Hemoglobin A1C    Lipid Panel with Direct LDL reflex    Microalbumin / creatinine urine ratio    TSH, 3rd generation with Free T4 reflex    UA (URINE) with reflex to Scope       Endocrine    Type 2 diabetes mellitus without complication, without long-term current use of insulin ([facility])       Lab Results   Component Value Date    HGBA1C 6 3 (H) 04/09/2022   Stable continue present therapy patient declines to check blood sugars at home  Will get eye exam [facility]    Foot exam completed         Relevant Medications    metFORMIN (GLUCOPHAGE) 500 mg tablet    Other Relevant Orders    CBC    Comprehensive metabolic panel    Hemoglobin A1C    Lipid Panel with Direct LDL reflex    Microalbumin / creatinine urine ratio    TSH, 3rd generation with Free T4 reflex    UA (URINE) with reflex to Scope       Cardiovascular and Mediastinum    Essential hypertension     Stable continue present therapy lisinopril refilled         Relevant Medications    lisinopril (ZESTRIL) 10 mg tablet    Other Relevant Orders    CBC    Comprehensive metabolic panel    Hemoglobin A1C Lipid Panel with Direct LDL reflex    Microalbumin / creatinine urine ratio    TSH, 3rd generation with Free T4 reflex    UA (URINE) with reflex to Scope       Other    Mixed hyperlipidemia     Stable pravastatin reordered         Relevant Medications    pravastatin (PRAVACHOL) 20 mg tablet    Other Relevant Orders    CBC    Comprehensive metabolic panel    Hemoglobin A1C    Lipid Panel with Direct LDL reflex    Microalbumin / creatinine urine ratio    TSH, 3rd generation with Free T4 reflex    UA (URINE) with reflex to Scope    Melanoma in situ [facility])     Sees Dermatology yearly         Relevant Orders    CBC    Comprehensive metabolic panel    Hemoglobin A1C    Lipid Panel with Direct LDL reflex    Microalbumin / creatinine urine ratio    TSH, 3rd generation with Free T4 reflex    UA (URINE) with reflex to Scope      Other Visit Diagnoses     Screening for cervical cancer    -  Primary    Relevant Orders    Ambulatory referral to Obstetrics / Gynecology    CBC    Comprehensive metabolic panel    Hemoglobin A1C    Lipid Panel with Direct LDL reflex    Microalbumin / creatinine urine ratio    TSH, 3rd generation with Free T4 reflex    UA (URINE) with reflex to Scope    Need for hepatitis C screening test        Relevant Orders    Hepatitis C Antibody ([facility])    CBC    Comprehensive metabolic panel    Hemoglobin A1C    Lipid Panel with Direct LDL reflex    Microalbumin / creatinine urine ratio    TSH, 3rd generation with Free T4 reflex    UA (URINE) with reflex to Scope    Asymptomatic postmenopausal state        Relevant Orders    DXA bone density spine hip and pelvis    CBC    Comprehensive metabolic panel    Hemoglobin A1C    Lipid Panel with Direct LDL reflex    Microalbumin / creatinine urine ratio    TSH, 3rd generation with Free T4 reflex    UA (URINE) with reflex to Scope    Hypertension, unspecified type        Relevant Medications    lisinopril (ZESTRIL) 10 mg tablet    Gastroesophageal reflux disease without esophagitis        Relevant Medications    pantoprazole (PROTONIX) 20 mg tablet                 Diagnoses and all orders for this visit:    Screening for cervical cancer  -     Ambulatory referral to Obstetrics / Gynecology; Future  -     CBC; Future  -     Comprehensive metabolic panel; Future  -     Hemoglobin A1C; Future  -     Lipid Panel with Direct LDL reflex; Future  -     Microalbumin / creatinine urine ratio; Future  -     TSH, 3rd generation with Free T4 reflex; Future  -     UA (URINE) with reflex to Scope; Future    Need for hepatitis C screening test  -     Hepatitis C Antibody ([facility]); Future  -     CBC; Future  -     Comprehensive metabolic panel; Future  -     Hemoglobin A1C; Future  -     Lipid Panel with Direct LDL reflex; Future  -     Microalbumin / creatinine urine ratio; Future  -     TSH, 3rd generation with Free T4 reflex; Future  -     UA (URINE) with reflex to Scope; Future    Asymptomatic postmenopausal state  -     DXA bone density spine hip and pelvis; Future  -     CBC; Future  -     Comprehensive metabolic panel; Future  -     Hemoglobin A1C; Future  -     Lipid Panel with Direct LDL reflex; Future  -     Microalbumin / creatinine urine ratio; Future  -     TSH, 3rd generation with Free T4 reflex; Future  -     UA (URINE) with reflex to Scope; Future    Type 2 diabetes mellitus without complication, without long-term current use of insulin (HCC)  -     CBC; Future  -     Comprehensive metabolic panel; Future  -     Hemoglobin A1C; Future  -     Lipid Panel with Direct LDL reflex; Future  -     Microalbumin / creatinine urine ratio; Future  -     TSH, 3rd generation with Free T4 reflex; Future  -     UA (URINE) with reflex to Scope; Future  -     metFORMIN (GLUCOPHAGE) 500 mg tablet; Take 1 tablet (500 mg total) by mouth daily with breakfast    GERD without esophagitis  -     CBC; Future  -     Comprehensive metabolic panel;  Future  -     Hemoglobin A1C; Future  -     Lipid Panel with Direct LDL reflex; Future  -     Microalbumin / creatinine urine ratio; Future  -     TSH, 3rd generation with Free T4 reflex; Future  -     UA (URINE) with reflex to Scope; Future    Essential hypertension  -     CBC; Future  -     Comprehensive metabolic panel; Future  -     Hemoglobin A1C; Future  -     Lipid Panel with Direct LDL reflex; Future  -     Microalbumin / creatinine urine ratio; Future  -     TSH, 3rd generation with Free T4 reflex; Future  -     UA (URINE) with reflex to Scope; Future    [facility]anoma in situ, unspecified site [facility] )  -     CBC; Future  -     Comprehensive metabolic panel; Future  -     Hemoglobin A1C; Future  -     Lipid Panel with Direct LDL reflex; Future  -     Microalbumin / creatinine urine ratio; Future  -     TSH, 3rd generation with Free T4 reflex; Future  -     UA (URINE) with reflex to Scope; Future    Mixed hyperlipidemia  -     CBC; Future  -     Comprehensive metabolic panel; Future  -     Hemoglobin A1C; Future  -     Lipid Panel with Direct LDL reflex; Future  -     Microalbumin / creatinine urine ratio; Future  -     TSH, 3rd generation with Free T4 reflex; Future  -     UA (URINE) with reflex to Scope; Future  -     pravastatin (PRAVACHOL) 20 mg tablet; Take 1 tablet (20 mg total) by mouth daily    Hypertension, unspecified type  -     lisinopril (ZESTRIL) 10 mg tablet; Take 1 tablet (10 mg total) by mouth daily    Gastroesophageal reflux disease without esophagitis  -     pantoprazole (PROTONIX) 20 mg tablet; Take 1 tablet (20 mg total) by mouth daily              Subjective:      Patient ID: Igor Barrett is a 77 y o  female  CC:    Chief Complaint   Patient presents with    Follow-up     pt here for a follow up and to review lab results  And rx refill  [facility]  Medicare Wellness Visit       HPI:    Patient doing well without any medical complaints concerns at the present time    Patient does not check blood sugars at home and is absolutely not interested in doing so  Patient does see Optometry twice a year because of macular degeneration  Patient follows up yearly with dermatology for history melanoma      The following portions of the patient's history were reviewed and updated as appropriate: allergies, current medications, past family history, past medical history, past social history, past surgical history and problem list       Review of Systems   Constitutional: Negative  HENT: Negative  Eyes:        HPI   Respiratory: Negative  Cardiovascular: Negative  Gastrointestinal: Negative  Endocrine:        HPI   Genitourinary: Negative  Musculoskeletal: Negative  Skin:        HPI   Allergic/Immunologic: Negative  Neurological: Negative  Hematological: Negative  Psychiatric/Behavioral: Negative  Data to review:       Objective:    Vitals:    04/14/22 1733   BP: 130/76   BP Location: Left arm   Patient Position: Sitting   Cuff Size: Large   Pulse: 72   Resp: 16   Weight: 87 1 kg (192 lb)   Height: 5' 7" (1 702 m)        Physical Exam  Vitals and nursing note reviewed  Constitutional:       Appearance: Normal appearance  HENT:      Head: Normocephalic and atraumatic  Eyes:      General: No scleral icterus  Neck:      Vascular: No carotid bruit  Cardiovascular:      Rate and Rhythm: Normal rate and regular rhythm  Pulses: no weak pulses          Dorsalis pedis pulses are 2+ on the right side and 2+ on the left side  Posterior tibial pulses are 2+ on the right side and 2+ on the left side  Heart sounds: Normal heart sounds  Pulmonary:      Effort: Pulmonary effort is normal       Breath sounds: Normal breath sounds  Abdominal:      General: Bowel sounds are normal       Palpations: Abdomen is soft  Tenderness: There is no abdominal tenderness  Musculoskeletal:      Cervical back: Neck supple  Right lower leg: No edema  Left lower leg: No edema     Feet: Right foot:      Skin integrity: No ulcer, skin breakdown, erythema, warmth, callus or dry skin  Left foot:      Skin integrity: No ulcer, skin breakdown, erythema, warmth, callus or dry skin  Skin:     General: Skin is warm and dry  Neurological:      General: No focal deficit present  Mental Status: She is alert  Psychiatric:         Mood and Affect: Mood normal          Patient's shoes and socks removed  Right Foot/Ankle   Right Foot Inspection  Skin Exam: skin normal and skin intact  No dry skin, no warmth, no callus, no erythema, no maceration, no abnormal color, no pre-ulcer, no ulcer and no callus  Toe Exam: ROM and strength within normal limits  Sensory   Vibration: intact  Proprioception: intact  Monofilament testing: intact    Vascular  Capillary refills: < 3 seconds  The right DP pulse is 2+  The right PT pulse is 2+  Right Toe  - Comprehensive Exam  Arch: normal  Hammertoes: absent  Claw Toes: absent  Swelling: none   Tenderness: none         Left Foot/Ankle  Left Foot Inspection  Skin Exam: skin normal and skin intact  No dry skin, no warmth, no erythema, no maceration, normal color, no pre-ulcer, no ulcer and no callus  Toe Exam: ROM and strength within normal limits  Sensory   Vibration: intact  Proprioception: intact  Monofilament testing: intact    Vascular  Capillary refills: < 3 seconds  The left DP pulse is 2+  The left PT pulse is 2+  Left Toe  - Comprehensive Exam  Arch: normal  Hammertoes: absent  Claw toes: absent  Swelling: none   Tenderness: none           Assign Risk Category  No deformity present  No loss of protective sensation  No weak pulses  Risk: 0      BMI Counseling: Body mass index is 30 07 kg/m²  The BMI is above normal  Nutrition recommendations include moderation in carbohydrate intake and reducing intake of cholesterol  Exercise recommendations include exercising 3-5 times per week   Rationale for BMI follow-up plan is due to patient being overweight or obese  Depression Screening and Follow-up Plan: Patient was screened for depression during today's encounter  They screened negative with a PHQ-2 score of 0

## 2022-04-14 NOTE — PROGRESS NOTES
Assessment and Plan:     Problem List Items Addressed This Visit     Type 2 diabetes mellitus without complication, without long-term current use of insulin ([address] )      Other Visit Diagnoses     Screening for cervical cancer    -  Primary    Need for hepatitis C screening test        Asymptomatic postmenopausal state               Preventive health issues were discussed with patient, and age appropriate screening tests were ordered as noted in patient's After Visit Summary  Personalized health advice and appropriate referrals for health education or preventive services given if needed, as noted in patient's After Visit Summary       History of Present Illness:     Patient presents for Medicare Annual Wellness visit    Patient Care Team:  Wandy Isabel PA-C as PCP - General (Family Medicine)  Wandy Isabel PA-C as PCP - PCP-[facility]  RAJ Orta PA-C     Problem List:     Patient Active Problem List   Diagnosis    Allergic rhinitis    Type 2 diabetes mellitus without complication, without long-term current use of insulin ([address] )    GERD without esophagitis    Mixed hyperlipidemia    Essential hypertension    Melanoma in situ ([address] )    Allergic dermatitis    Pre-op examination    Arthritis of right knee    Osteoarthritis of right knee    Welcome to Medicare preventive visit      Past Medical and Surgical History:     Past Medical History:   Diagnosis Date    Diabetes mellitus ([address] )     NIDDM    Diverticulosis     hx colon resection    GERD (gastroesophageal reflux disease)     Hyperlipidemia     Hypertension     Macular degeneration     Malignant neoplasm of skin     skin cancer right upper arm in past    OA (osteoarthritis)     right knee     Past Surgical History:   Procedure Laterality Date    CATARACT EXTRACTION Bilateral     CHOLANGIOGRAM N/A 7/2/2018    Procedure: CHOLANGIOGRAM;  Surgeon: Aquiles Dominguez MD;  Location: [facility] OR;  Service: General    CHOLECYSTECTOMY LAPAROSCOPIC N/A 7/2/2018    Procedure: CHOLECYSTECTOMY LAPAROSCOPIC W/IOC;  Surgeon: Irish Ambrocio MD;  [facility];  Service: General    COLON SURGERY      resection large intestine (diverticulitis)    COLONOSCOPY      IN LAP, INCISIONAL HERNIA REPAIR,REDUCIBLE N/A 5/30/2019    Procedure: REPAIR HERNIA INCISIONAL LAPAROSCOPIC W/ ROBOTICS WITH MESH PLACEMENT;  Surgeon: Irish Ambrocio MD;  [facility];  Service: General    IN TOTAL KNEE ARTHROPLASTY Right 3/19/2021    Procedure: ARTHROPLASTY KNEE TOTAL;  Surgeon: Yanet Mauricio MD;  [facility];  Service: Orthopedics    TONSILLECTOMY  child      Family History:     Family History   Problem Relation Age of Onset    Alzheimer's disease Mother     Anxiety disorder Mother     Diabetes Mother     Cancer Father     Diabetes Sister     Hypertension Sister     Stroke Sister       Social History:     Social History     Socioeconomic History    Marital status: /Civil Union     Spouse name: None    Number of children: None    Years of education: None    Highest education level: None   Occupational History    Occupation: employed   Tobacco Use    Smoking status: Never Smoker    Smokeless tobacco: Never Used   Vaping Use    Vaping Use: Never used   Substance and Sexual Activity    Alcohol use: Never     Comment: beer/ liquor    Drug use: No    Sexual activity: None   Other Topics Concern    None   Social History Narrative    None     Social Determinants of Health     Financial Resource Strain: Not on file   Food Insecurity: Not on file   Transportation Needs: Not on file   Physical Activity: Not on file   Stress: Not on file   Social Connections: Not on file   Intimate Partner Violence: Not on file   Housing Stability: Not on file      Medications and Allergies:     Current Outpatient Medications   Medication Sig Dispense Refill    acetaminophen (TYLENOL) 500 mg tablet Take 500-1,000 mg by mouth every 6 (six) hours as needed for mild pain      aspirin 325 mg tablet Take 1 tablet (325 mg total) by mouth daily  0    betamethasone, augmented, (DIPROLENE-AF) 0 05 % cream Apply topically 2 (two) times a day 30 g 1    fexofenadine (ALLEGRA) 180 MG tablet Take 180 mg by mouth daily as needed       fluticasone (FLONASE) 50 mcg/act nasal spray 1 spray into each nostril 2 (two) times a day as needed       lisinopril (ZESTRIL) 10 mg tablet Take 1 tablet (10 mg total) by mouth daily 90 tablet 1    metFORMIN (GLUCOPHAGE) 500 mg tablet Take 1 tablet (500 mg total) by mouth daily with breakfast 90 tablet 1    pantoprazole (PROTONIX) 20 mg tablet Take 1 tablet (20 mg total) by mouth daily 90 tablet 1    pravastatin (PRAVACHOL) 20 mg tablet Take 1 tablet (20 mg total) by mouth daily 90 tablet 1    senna (SENOKOT) 8 6 mg Take 1 tablet (8 6 mg total) by mouth daily  0     No current facility-administered medications for this visit       No Known Allergies   Immunizations:     Immunization History   Administered Date(s) Administered    COVID-19 MODERNA VACC 0 5 ML IM 05/14/2021, 06/11/2021    INFLUENZA 11/23/2015, 09/26/2016, 09/18/2018, 11/03/2020    Influenza Quadrivalent Preservative Free 3 years and older IM 09/25/2014, 11/23/2015, 09/26/2016    Influenza, recombinant, quadrivalent,injectable, preservative free 09/18/2018    Influenza, seasonal, injectable 12/20/2011    Pneumococcal Conjugate 13-Valent 03/02/2021    Pneumococcal Polysaccharide PPV23 08/14/2017    Zoster 06/30/2016      Health Maintenance:         Topic Date Due    Hepatitis C Screening  Never done    Cervical Cancer Screening  Never done    Breast Cancer Screening: Mammogram  08/18/2022    Colorectal Cancer Screening  12/10/2028         Topic Date Due    DTaP,Tdap,and Td Vaccines (1 - Tdap) Never done    COVID-19 Vaccine (3 - Booster for Moderna series) 11/11/2021      Medicare Health Risk Assessment:     There were no vitals taken for this visit  Winsome Garvin is here for her Subsequent Wellness visit  Health Risk Assessment:   Patient rates overall health as good  Patient feels that their physical health rating is same  Patient is satisfied with their life  Eyesight was rated as same  Hearing was rated as same  Patient feels that their emotional and mental health rating is same  Patients states they are never, rarely angry  Patient states they are never, rarely unusually tired/fatigued  Pain experienced in the last 7 days has been none  Patient states that she has experienced weight loss or gain in last 6 months  Depression Screening:   PHQ-2 Score: 0      Fall Risk Screening: In the past year, patient has experienced: no history of falling in past year      Urinary Incontinence Screening:   Patient has not leaked urine accidently in the last six months  Home Safety:  Patient does not have trouble with stairs inside or outside of their home  Patient has working smoke alarms and has working carbon monoxide detector  Home safety hazards include: none  Nutrition:   Current diet is Regular and Diabetic  Medications:   Patient is currently taking over-the-counter supplements  OTC medications include: see medication list  Patient is able to manage medications  Activities of Daily Living (ADLs)/Instrumental Activities of Daily Living (IADLs):   Walk and transfer into and out of bed and chair?: Yes  Dress and groom yourself?: Yes    Bathe or shower yourself?: Yes    Feed yourself? Yes  Do your laundry/housekeeping?: Yes  Manage your money, pay your bills and track your expenses?: Yes  Make your own meals?: Yes    Do your own shopping?: Yes    Previous Hospitalizations:   Any hospitalizations or ED visits within the last 12 months?: No      Advance Care Planning:   Living will: Yes    Durable POA for healthcare:  Yes    Advanced directive: Yes    Advanced directive counseling given: Yes    Five wishes given: No    Patient declined ACP directive: Yes    End of Life Decisions reviewed with patient: Yes    Provider agrees with end of life decisions: Yes      Cognitive Screening:   Provider or family/friend/caregiver concerned regarding cognition?: No    PREVENTIVE SCREENINGS      Cardiovascular Screening:    General: Screening Not Indicated and History Lipid Disorder      Diabetes Screening:     General: Screening Not Indicated and History Diabetes      Colorectal Cancer Screening:     General: Screening Current      Breast Cancer Screening:     General: Screening Current      Cervical Cancer Screening:    General: Screening Not Indicated      Osteoporosis Screening:    General: Risks and Benefits Discussed    Due for: DXA Axial      Abdominal Aortic Aneurysm (AAA) Screening:        General: Screening Not Indicated      Lung Cancer Screening:     General: Screening Not Indicated      Hepatitis C Screening:    General: Risks and Benefits Discussed    Hep C Screening Accepted: Yes      Screening, Brief Intervention, and Referral to Treatment (SBIRT)    Screening  Typical number of drinks in a day: 0  Typical number of drinks in a week: 0  Interpretation: Low risk drinking behavior      AUDIT-C Screenin) How often did you have a drink containing alcohol in the past year? never  2) How many drinks did you have on a typical day when you were drinking in the past year? 0  3) How often did you have 6 or more drinks on one occasion in the past year? never    AUDIT-C Score: 0  Interpretation: Score 0-2 (female): Negative screen for alcohol misuse    Single Item Drug Screening:  How often have you used an illegal drug (including marijuana) or a prescription medication for non-medical reasons in the past year? never    Single Item Drug Screen Score: 0  Interpretation: Negative screen for possible drug use disorder      Audi Long, DO

## 2022-04-14 NOTE — ASSESSMENT & PLAN NOTE
Lab Results   Component Value Date    HGBA1C 6 3 (H) 04/09/2022   Stable continue present therapy patient declines to check blood sugars at home  Will get eye exam [facility]    Foot exam completed

## 2022-04-14 NOTE — ASSESSMENT & PLAN NOTE
Medicare a [address] [place] [address] completed hepatitis-C screening discussed and ordered

## 2022-04-14 NOTE — PATIENT INSTRUCTIONS
10% - bad control"> 10% - bad control,Hemoglobin A1c (HbA1c) greater than 10% indicating poor diabetic control,Haemoglobin A1c greater than 10% indicating poor diabetic control">   Diabetes Mellitus Type 2 in Adults, Ambulatory Care   GENERAL INFORMATION:   Diabetes mellitus type 2  is a disease that affects how your body uses glucose (sugar)  Insulin helps move sugar out of the blood so it can be used for energy  Normally, when the blood sugar level increases, the pancreas makes more insulin  Type 2 diabetes develops because either the body cannot make enough insulin, or it cannot use the insulin correctly  After many years, your pancreas may stop making insulin  Common symptoms include the following:   · More hunger or thirst than usual     · Frequent urination     · Weight loss without trying     · Blurred vision  Seek immediate care for the following symptoms:   · Severe abdominal pain, or pain that spreads to your back  You may also be vomiting  · Trouble staying awake or focusing    · Shaking or sweating    · Blurred or double vision    · Breath has a fruity, sweet smell    · Breathing is deep and labored, or rapid and shallow    · Heartbeat is fast and weak  Treatment for diabetes mellitus type 2  includes keeping your blood sugar at a normal level  You must eat the right foods, and exercise regularly  You may also need medicine if you cannot control your blood sugar level with nutrition and exercise  Manage diabetes mellitus type 2:   · Check your blood sugar level  You will be taught how to check a small drop of blood in a glucose monitor  Ask your healthcare provider when and how often to check during the day  Ask your healthcare provider what your blood sugar levels should be when you check them  · Keep track of carbohydrates (sugar and starchy foods)  Your blood sugar level can get too high if you eat too many carbohydrates   Your dietitian will help you plan meals and snacks that have the right amount of carbohydrates  · Eat low-fat foods  Some examples are skinless chicken and low-fat milk  · Eat less sodium (salt)  Some examples of high-sodium foods to limit are soy sauce, potato chips, and soup  Do not add salt to food you cook  Limit your use of table salt  · Eat high-fiber foods  Foods that are a good source of fiber include vegetables, whole grain bread, and beans  · Limit alcohol  Alcohol affects your blood sugar level and can make it harder to manage your diabetes  Women should limit alcohol to 1 drink a day  Men should limit alcohol to 2 drinks a day  A drink of alcohol is 12 ounces of beer, 5 ounces of wine, or 1½ ounces of liquor  · Get regular exercise  Exercise can help keep your blood sugar level steady, decrease your risk of heart disease, and help you lose weight  Exercise for at least 30 minutes, 5 days a week  Include muscle strengthening activities 2 days each week  Work with your healthcare provider to create an exercise plan  · Check your feet each day  for injuries or open sores  Ask your healthcare provider for activities you can do if you have an open sore  · Quit smoking  If you smoke, it is never too late to quit  Smoking can worsen the problems that may occur with diabetes  Ask your healthcare provider for information about how to stop smoking if you are having trouble quitting  · Ask about your weight:  Ask healthcare providers if you need to lose weight, and how much to lose  Ask them to help you with a weight loss program  Even a 10 to 15 pound weight loss can help you manage your blood sugar level  · Carry medical alert identification  Wear medical alert jewelry or carry a card that says you have diabetes  Ask your healthcare provider where to get these items  · Ask about vaccines  Diabetes puts you at risk of serious illness if you get the flu, pneumonia, or hepatitis   Ask your healthcare provider if you should get a flu, pneumonia, or hepatitis B vaccine, and when to get the vaccine  Follow up with your healthcare provider as directed:  Write down your questions so you remember to ask them during your visits  CARE AGREEMENT:   You have the right to help plan your care  Learn about your health condition and how it may be treated  Discuss treatment options with your caregivers to decide what care you want to receive  You always have the right to refuse treatment  The above information is an  only  It is not intended as medical advice for individual conditions or treatments  Talk to your doctor, nurse or pharmacist before following any medical regimen to see if it is safe and effective for you  © 2014 [address] is for End User's use only and may not be sold, redistributed or otherwise used for commercial purposes  All illustrations and images included in CareNotes® are the copyrighted property of A D [employer] M [employer]  or Alexys Whittington        Return in 6 months for office visit blood work sooner if need

## 2022-05-16 ENCOUNTER — OFFICE VISIT (OUTPATIENT)
Dept: FAMILY MEDICINE CLINIC | Facility: CLINIC | Age: 67
End: 2022-05-16
Payer: MEDICARE

## 2022-05-16 VITALS
HEIGHT: 67 IN | OXYGEN SATURATION: 97 % | HEART RATE: 76 BPM | DIASTOLIC BLOOD PRESSURE: 68 MMHG | BODY MASS INDEX: 29.88 KG/M2 | SYSTOLIC BLOOD PRESSURE: 136 MMHG | TEMPERATURE: 98.5 F | WEIGHT: 190.4 LBS

## 2022-05-16 DIAGNOSIS — Z20.822 SUSPECTED COVID-19 VIRUS INFECTION: ICD-10-CM

## 2022-05-16 DIAGNOSIS — I10 ESSENTIAL HYPERTENSION: ICD-10-CM

## 2022-05-16 DIAGNOSIS — J01.40 ACUTE NON-RECURRENT PANSINUSITIS: Primary | ICD-10-CM

## 2022-05-16 PROCEDURE — 87636 SARSCOV2 & INF A&B AMP PRB: CPT | Performed by: NURSE PRACTITIONER

## 2022-05-16 PROCEDURE — 99214 OFFICE O/P EST MOD 30 MIN: CPT | Performed by: NURSE PRACTITIONER

## 2022-05-16 RX ORDER — DEXTROMETHORPHAN HYDROBROMIDE AND PROMETHAZINE HYDROCHLORIDE 15; 6.25 MG/5ML; MG/5ML
5 SOLUTION ORAL 4 TIMES DAILY PRN
Qty: 118 ML | Refills: 0 | Status: SHIPPED | OUTPATIENT
Start: 2022-05-16 | End: 2022-06-06 | Stop reason: ALTCHOICE

## 2022-05-16 RX ORDER — METHYLPREDNISOLONE 4 MG/1
TABLET ORAL
Qty: 21 EACH | Refills: 0 | Status: SHIPPED | OUTPATIENT
Start: 2022-05-16 | End: 2022-06-06 | Stop reason: ALTCHOICE

## 2022-05-16 RX ORDER — AZITHROMYCIN 250 MG/1
TABLET, FILM COATED ORAL
Qty: 6 TABLET | Refills: 0 | Status: SHIPPED | OUTPATIENT
Start: 2022-05-16 | End: 2022-05-21

## 2022-05-16 NOTE — ASSESSMENT & PLAN NOTE
Azithromycin and Medrol Dosepak ordered to treat sinusitis  Patient was advised that transient raise can be seen in her blood sugars while taking steroids  Phenergan DM ordered to be taken as needed for cough  COVID/influenza combo swab performed in the office today

## 2022-05-16 NOTE — PROGRESS NOTES
Assessment and Plan:    Problem List Items Addressed This Visit        Respiratory    Acute non-recurrent pansinusitis - Primary     Azithromycin and Medrol Dosepak ordered to treat sinusitis  Patient was advised that transient raise can be seen in her blood sugars while taking steroids  Phenergan DM ordered to be taken as needed for cough  COVID/influenza combo swab performed in the office today  Relevant Medications    azithromycin (Zithromax) 250 mg tablet    methylPREDNISolone 4 MG tablet therapy pack    Promethazine-DM (PHENERGAN-DM) 6 25-15 mg/5 mL oral syrup       Cardiovascular and Mediastinum    Essential hypertension     Well controlled on current regimen  Other Visit Diagnoses     Suspected COVID-19 virus infection        Relevant Orders    Covid/Flu- Office Collect                 Diagnoses and all orders for this visit:    Acute non-recurrent pansinusitis  -     azithromycin (Zithromax) 250 mg tablet; Take 2 tablets (500 mg total) by mouth daily for 1 day, THEN 1 tablet (250 mg total) daily for 4 days  -     methylPREDNISolone 4 MG tablet therapy pack; Use as directed on package  -     Promethazine-DM (PHENERGAN-DM) 6 25-15 mg/5 mL oral syrup; Take 5 mL by mouth as needed in the morning and 5 mL as needed at noon and 5 mL as needed in the evening and 5 mL as needed before bedtime for cough  Suspected COVID-19 virus infection  -     Covid/Flu- Office Collect    Essential hypertension            Subjective:      Patient ID: Nargis Chavez is a 77 y o  female  CC:    Chief Complaint   Patient presents with    Sinus Problem     Sinus, head congestion, post nasal and runny nose causing cough  Per pt states been going for 3 weeks  HPI:    Sinusitis: The patient reports that over the past 3 weeks she has been having symptoms of non-productive cough, PND, rhinorrhea, nasal congestion, sinus pressure and congestion   She denies sore throat, fevers, chills, SOB, or chest tightness  The patient is fully vaccinated for COVID and denies any known exposures over the past 2 weeks  She has not completed a home COVID test since her symptoms began  Patient does have a history of allergic rhinitis and she is currently taking Flonase and Allegra daily with mild relief of her symptoms  Hypertension:  Well controlled on current dosage of lisinopril  Patient denies any lightheadedness, dizziness, or orthostasis  The following portions of the patient's history were reviewed and updated as appropriate: allergies, current medications, past family history, past medical history, past social history, past surgical history and problem list       Review of Systems   Constitutional: Negative for chills and fever  HENT: Positive for congestion, postnasal drip, rhinorrhea, sinus pressure and sinus pain  Negative for ear pain and sore throat  Eyes: Negative for pain and visual disturbance  Respiratory: Positive for cough (non-productive )  Negative for chest tightness, shortness of breath and wheezing  Cardiovascular: Negative for chest pain, palpitations and leg swelling  Gastrointestinal: Negative for abdominal pain, constipation, diarrhea, nausea and vomiting  Endocrine: Negative for cold intolerance and heat intolerance  Genitourinary: Negative for decreased urine volume, dysuria and hematuria  Musculoskeletal: Negative for arthralgias, back pain and myalgias  Skin: Negative for color change and rash  Allergic/Immunologic: Positive for environmental allergies  Neurological: Negative for dizziness, seizures, syncope, weakness, light-headedness, numbness and headaches  Hematological: Negative for adenopathy  Psychiatric/Behavioral: Negative for confusion  The patient is not nervous/anxious  All other systems reviewed and are negative          Data to review:       Objective:    Vitals:    05/16/22 1719   BP: 136/68   BP Location: Right arm   Patient Position: Sitting   Cuff Size: Standard   Pulse: 76   Temp: 98 5 °F (36 9 °C)   SpO2: 97%   Weight: 86 4 kg (190 lb 6 4 oz)   Height: 5' 7" (1 702 m)        Physical Exam  Vitals and nursing note reviewed  Constitutional:       General: She is not in acute distress  Appearance: Normal appearance  She is well-developed  She is not ill-appearing  HENT:      Head: Normocephalic and atraumatic  Right Ear: Hearing and tympanic membrane normal       Left Ear: Hearing and tympanic membrane normal       Mouth/Throat:      Pharynx: No pharyngeal swelling, oropharyngeal exudate, posterior oropharyngeal erythema or uvula swelling  Tonsils: No tonsillar exudate or tonsillar abscesses  Eyes:      Conjunctiva/sclera: Conjunctivae normal    Cardiovascular:      Rate and Rhythm: Normal rate and regular rhythm  Pulses: Normal pulses  Carotid pulses are 2+ on the right side and 2+ on the left side  Radial pulses are 2+ on the right side and 2+ on the left side  Posterior tibial pulses are 2+ on the right side and 2+ on the left side  Heart sounds: Normal heart sounds  No murmur heard  Pulmonary:      Effort: Pulmonary effort is normal  No respiratory distress  Breath sounds: Normal breath sounds  No wheezing or rhonchi  Abdominal:      General: Abdomen is flat  Bowel sounds are normal  There is no distension  Palpations: Abdomen is soft  Tenderness: There is no abdominal tenderness  There is no guarding  Musculoskeletal:         General: Normal range of motion  Cervical back: Normal range of motion and neck supple  Right lower leg: No edema  Left lower leg: No edema  Skin:     General: Skin is warm and dry  Capillary Refill: Capillary refill takes less than 2 seconds  Neurological:      General: No focal deficit present  Mental Status: She is alert and oriented to person, place, and time     Psychiatric:         Mood and Affect: Mood normal  Behavior: Behavior normal          Thought Content:  Thought content normal          Judgment: Judgment normal

## 2022-05-17 LAB
FLUAV RNA RESP QL NAA+PROBE: NEGATIVE
FLUBV RNA RESP QL NAA+PROBE: NEGATIVE
SARS-COV-2 RNA RESP QL NAA+PROBE: NEGATIVE

## 2022-06-06 ENCOUNTER — OFFICE VISIT (OUTPATIENT)
Dept: FAMILY MEDICINE CLINIC | Facility: CLINIC | Age: 67
End: 2022-06-06
Payer: MEDICARE

## 2022-06-06 VITALS
HEIGHT: 67 IN | DIASTOLIC BLOOD PRESSURE: 60 MMHG | SYSTOLIC BLOOD PRESSURE: 130 MMHG | WEIGHT: 191.8 LBS | HEART RATE: 62 BPM | BODY MASS INDEX: 30.1 KG/M2

## 2022-06-06 DIAGNOSIS — M25.551 ACUTE RIGHT HIP PAIN: Primary | ICD-10-CM

## 2022-06-06 DIAGNOSIS — I10 ESSENTIAL HYPERTENSION: ICD-10-CM

## 2022-06-06 PROCEDURE — 99214 OFFICE O/P EST MOD 30 MIN: CPT | Performed by: NURSE PRACTITIONER

## 2022-06-06 RX ORDER — MELOXICAM 7.5 MG/1
7.5 TABLET ORAL 2 TIMES DAILY
Qty: 60 TABLET | Refills: 0 | Status: SHIPPED | OUTPATIENT
Start: 2022-06-06

## 2022-06-06 NOTE — PROGRESS NOTES
Assessment and Plan:    Problem List Items Addressed This Visit        Cardiovascular and Mediastinum    Essential hypertension     Well controlled on current regimen  Other    Acute right hip pain - Primary     Patient's symptoms are consistent with osteoarthritis of her right hip  X-ray of the right hip was ordered to confirm this and assess for any other abnormalities  Meloxicam 7 5 mg b i d  was ordered to be taken as needed to help with pain  Depending on the results of the x-ray I may have the patient return to [facility] for follow-up regarding her pain  Relevant Medications    meloxicam (MOBIC) 7 5 mg tablet    Other Relevant Orders    XR hip/pelv 2-3 vws right if performed                 Diagnoses and all orders for this visit:    Acute right hip pain  -     XR hip/pelv 2-3 vws right if performed; Future  -     meloxicam (MOBIC) 7 5 mg tablet; Take 1 tablet (7 5 mg total) by mouth 2 (two) times a day    Essential hypertension            Subjective:      Patient ID: Rachid Bender is a 77 y o  female  CC:    Chief Complaint   Patient presents with    Knee Pain     Per pt states she been experiencing right knee pain, pain going on for about 3 weeks ago, starts around hip all way down her legs  Seems to be getting worse  HPI:    RLE pain:  Patient does have a history of a right total knee arthroplasty  Patient did have an x-ray of her right knee completed in March 2021 which showed stable right knee postoperative changes  The patient reports she has been having pain starting in her right hip which radiates down her right leg over the past few weeks  She denies any paraesthesias  She denies any pain in her lower back or in her buttocks  The patient was last seen by [facility] who completed her TKA in April 2022  She denies any recent falls or injuries to this area  Hypertension:  Well controlled on current dosage of lisinopril    Patient denies any lightheadedness, dizziness, or orthostasis  The following portions of the patient's history were reviewed and updated as appropriate: allergies, current medications, past family history, past medical history, past social history, past surgical history and problem list       Review of Systems   Constitutional: Negative for chills and fever  HENT: Negative for ear pain and sore throat  Eyes: Negative for pain and visual disturbance  Respiratory: Negative for cough, chest tightness, shortness of breath and wheezing  Cardiovascular: Negative for chest pain, palpitations and leg swelling  Gastrointestinal: Negative for abdominal pain, constipation, diarrhea, nausea and vomiting  Endocrine: Negative for cold intolerance and heat intolerance  Genitourinary: Negative for decreased urine volume, dysuria and hematuria  Musculoskeletal: Positive for arthralgias (right hip and knee)  Negative for back pain, joint swelling and myalgias  Skin: Negative for color change and rash  Allergic/Immunologic: Negative for environmental allergies  Neurological: Negative for dizziness, seizures, syncope, weakness, light-headedness, numbness and headaches  Hematological: Negative for adenopathy  Psychiatric/Behavioral: Negative for confusion  The patient is not nervous/anxious  All other systems reviewed and are negative  Data to review:       Objective:    Vitals:    06/06/22 1709   BP: 130/60   BP Location: Right arm   Patient Position: Sitting   Cuff Size: Standard   Pulse: 62   Weight: 87 kg (191 lb 12 8 oz)   Height: 5' 7" (1 702 m)        Physical Exam  Vitals and nursing note reviewed  Constitutional:       General: She is not in acute distress  Appearance: Normal appearance  She is well-developed  She is not ill-appearing  HENT:      Head: Normocephalic and atraumatic  Eyes:      Conjunctiva/sclera: Conjunctivae normal    Cardiovascular:      Rate and Rhythm: Normal rate and regular rhythm        Pulses: Carotid pulses are 2+ on the right side and 2+ on the left side  Radial pulses are 2+ on the right side and 2+ on the left side  Posterior tibial pulses are 2+ on the right side and 2+ on the left side  Heart sounds: Normal heart sounds  No murmur heard  Pulmonary:      Effort: Pulmonary effort is normal  No respiratory distress  Breath sounds: Normal breath sounds  No wheezing or rhonchi  Abdominal:      General: Abdomen is flat  Bowel sounds are normal  There is no distension  Palpations: Abdomen is soft  Tenderness: There is no abdominal tenderness  There is no guarding  Musculoskeletal:      Cervical back: Normal range of motion and neck supple  Lumbar back: No swelling, edema, lacerations, spasms, tenderness or bony tenderness  Normal range of motion  Negative right straight leg raise test and negative left straight leg raise test       Right hip: Bony tenderness present  No deformity, tenderness or crepitus  Decreased range of motion  Normal strength  Right knee: No swelling, effusion or bony tenderness  Normal range of motion  No tenderness  Right lower leg: No edema  Left lower leg: No edema  Comments: No abnormalities noted in patient's lumbar spine  No pain was noted with palpation of lumbar paraspinals or right SI notch  Patient had noted tenderness with palpation of the acetabulum area on the right  Patient also had noted pain with adduction and abduction of the right hip  Patient was able to perform range-of-motion exercises  Skin:     General: Skin is warm and dry  Capillary Refill: Capillary refill takes less than 2 seconds  Neurological:      General: No focal deficit present  Mental Status: She is alert and oriented to person, place, and time  Psychiatric:         Mood and Affect: Mood normal          Behavior: Behavior normal          Thought Content:  Thought content normal          Judgment: Judgment normal

## 2022-06-06 NOTE — ASSESSMENT & PLAN NOTE
Patient's symptoms are consistent with osteoarthritis of her right hip  X-ray of the right hip was ordered to confirm this and assess for any other abnormalities  Meloxicam 7 5 mg b i d  was ordered to be taken as needed to help with pain  Depending on the results of the x-ray I may have the patient return to [place] [id number] for follow-up regarding her pain

## 2022-06-09 ENCOUNTER — APPOINTMENT (OUTPATIENT)
Dept: RADIOLOGY | Facility: MEDICAL CENTER | Age: 67
End: 2022-06-09
Payer: MEDICARE

## 2022-06-09 DIAGNOSIS — M25.551 ACUTE RIGHT HIP PAIN: ICD-10-CM

## 2022-06-09 PROCEDURE — 73502 X-RAY EXAM HIP UNI 2-3 VIEWS: CPT

## 2022-06-10 DIAGNOSIS — M87.051 AVASCULAR NECROSIS OF BONE OF RIGHT HIP (HCC): Primary | ICD-10-CM

## 2022-06-23 ENCOUNTER — EVALUATION (OUTPATIENT)
Dept: PHYSICAL THERAPY | Facility: REHABILITATION | Age: 67
End: 2022-06-23
Payer: MEDICARE

## 2022-06-23 DIAGNOSIS — M70.51 OTHER BURSITIS OF KNEE, RIGHT KNEE: Primary | ICD-10-CM

## 2022-06-23 PROCEDURE — 97162 PT EVAL MOD COMPLEX 30 MIN: CPT | Performed by: PHYSICAL THERAPIST

## 2022-06-23 PROCEDURE — 97110 THERAPEUTIC EXERCISES: CPT | Performed by: PHYSICAL THERAPIST

## 2022-06-23 NOTE — LETTER
2022    Karen Erwin MD  [address] [address]    Patient: Betty Pearce   YOB: 1955   Date of Visit: 2022     Encounter Diagnosis     ICD-10-CM    1  Other bursitis of knee, right knee  M70 51        Dear Dr Sotomayor Force:    Thank you for your recent referral of Betty Pearce  Please review the attached evaluation summary from Shannon's recent visit  Please verify that you agree with the plan of care by signing the attached order  If you have any questions or concerns, please do not hesitate to call  I sincerely appreciate the opportunity to share in the care of one of your patients and hope to have another opportunity to work with you in the near future  Sincerely,    Jovany Etienne, PT      Referring Provider:      I certify that I have read the below Plan of Care and certify the need for these services furnished under this plan of treatment while under my care  Karen Erwin MD  [address] [address]  Via Fax: [phone]          PT Evaluation     Today's date: 2022  Patient name: Betty Pearce  : 1955  MRN: [id number]  Referring provider: Adrienne Rich MD  Dx:   Encounter Diagnosis     ICD-10-CM    1  Other bursitis of knee, right knee  M70 51        Start Time: 1450  Stop Time: 1535  Total time in clinic (min): 45 minutes    Assessment  Assessment details: Patient is a 77 y o  female that presents with right lateral leg pain  Patient presents with decreased strength, decreased ROM, and gait/balance abnormalities  Patient has difficulty with sleeping, sitting, negotiation of stairs, ambulation, driving, and transfers such as in her car secondary to impairments  Patient would benefit from skilled physical therapy services to address impairments to maximize function      Impairments: abnormal gait, abnormal or restricted ROM, activity intolerance, impaired balance, impaired physical strength, lacks appropriate home exercise program, pain with function and weight-bearing intolerance  Understanding of Dx/Px/POC: good   Prognosis: good    Goals  Impairment:  1  Patient will reports 50% reduction in pain in 4 weeks to maximize function  2   Patient will improve strength to 4/5 in all planes to maximize function  3   Patient will improve ROM to [facility] in 4 weeks to maximize function  Functional:  1  Patient will improve FOTO by 13 points to 72/100 in 4 weeks to maximize function  2  Patient will be independent with HEP in 4 weeks to maximize function  3  Patient will report no difficulty with sleeping in 4 weeks to maximize function  4  Patient will report no difficulty with ambulation in 4 weeks to maximize function  5  Patient will report no difficulty with sleeping in 4 weeks to maximize function  Plan  Plan details: Patient will be a RE in 4 weeks  Patient would benefit from: skilled physical therapy  Planned modality interventions: cryotherapy  Planned therapy interventions: abdominal trunk stabilization, activity modification, manual therapy, neuromuscular re-education, patient education, strengthening, stretching, therapeutic activities, therapeutic exercise, functional ROM exercises, gait training, home exercise program and balance/weight bearing training  Frequency: 2x week  Duration in visits: 9  Duration in weeks: 4  Treatment plan discussed with: patient        Subjective Evaluation    History of Present Illness  Mechanism of injury: Patient presents with right lateral leg pain from hip to her knee  Patient reports a knee replacement in March of last year  She was doing well at discharge  Patient reports increased activity attending sporting events for her grandchildren over the last two months  She has been walking further distances and on uneven grassy surfaces    Patient reports her pain will travel along the lateral aspect of her leg  Patient reports pain and difficulty with sleeping, sitting, negotiation of stairs, ambulation, and transfers such as in her car  Patient will have difficulty with driving at times  Patient saw a chiropractor about a week and half ago and had some relief with a hip manipulation  Pain  At best pain ratin  At worst pain ratin  Location: R lateral leg  Quality: needle-like  Relieving factors: support and rest  Aggravating factors: standing, walking, sitting and stair climbing  Progression: no change      Diagnostic Tests  X-ray: abnormal (see results)  Treatments  Current treatment: physical therapy  Patient Goals  Patient goals for therapy: increased strength, decreased pain and increased motion  Patient goal: improve walking        Objective     Palpation   Left   No palpable tenderness to the gluteus medius and piriformis  Right   No palpable tenderness to the distal biceps femoris, distal semimembranosus and distal semitendinosus  Tenderness of the gluteus medius and piriformis  Tenderness     Right Hip   Tenderness in the PSIS  No tenderness in the greater trochanter and sacroiliac joint  Right Knee   Tenderness in the ITB  No tenderness in the fibular head, lateral joint line and medial joint line  Passive Range of Motion   Left Hip   Flexion: [facility]  External rotation (90/90): [facility]  Internal rotation (90/90):  WFL    Right Hip   Flexion: 90 degrees with pain  External rotation (90/90): 35 degrees with pain  Internal rotation (90/90): 10 degrees with pain    Additional Passive Range of Motion Details  Hamstring length: mild restriction lois    Strength/Myotome Testing     Left Hip   Planes of Motion   Flexion: 4+  Extension: 4  Abduction: 4-  External rotation: 4+  Internal rotation: 4+    Right Hip   Planes of Motion   Flexion: 4-  Extension: 4-  Abduction: 3+  External rotation: 4  Internal rotation: 4    Left Knee   Flexion: 4+  Extension: 4    Right Knee   Flexion: 4  Extension: 4-    Ambulation   Weight-Bearing Status   Assistive device used: none and single point cane    Observational Gait   Gait: antalgic   Decreased walking speed and stride length  Functional Assessment        Single Leg Stance   Left: 3 seconds  Right single leg stance time: unable        Flowsheet Rows    Flowsheet Row Most Recent Value   PT/OT G-Codes    Current Score 59   Projected Score 72             Precautions: hx DM II, HTN, R TKA      Manuals 6/23            R hip PROM                                                    Neuro Re-Ed             Quad set issued            Glut set issued                                                                             Ther Ex             Recumbent bike             SAQ issued            SLR             sidelying hip abduction             Prone hip extension             SL press                                       Ther Activity                                       Gait Training                                       Modalities             Ice PRN                            Access Code [email or web address]

## 2022-06-23 NOTE — PROGRESS NOTES
PT Evaluation     Today's date: 2022  Patient name: Lisa Barbour  : 1955  MRN: [id number]  Referring provider: Cheli Juarez MD  Dx:   Encounter Diagnosis     ICD-10-CM    1  Other bursitis of knee, right knee  M70 51        Start Time: 1450  Stop Time: 1535  Total time in clinic (min): 45 minutes    Assessment  Assessment details: Patient is a 77 y o  female that presents with right lateral leg pain  Patient presents with decreased strength, decreased ROM, and gait/balance abnormalities  Patient has difficulty with sleeping, sitting, negotiation of stairs, ambulation, driving, and transfers such as in her car secondary to impairments  Patient would benefit from skilled physical therapy services to address impairments to maximize function  Impairments: abnormal gait, abnormal or restricted ROM, activity intolerance, impaired balance, impaired physical strength, lacks appropriate home exercise program, pain with function and weight-bearing intolerance  Understanding of Dx/Px/POC: good   Prognosis: good    Goals  Impairment:  1  Patient will reports 50% reduction in pain in 4 weeks to maximize function  2   Patient will improve strength to 4/5 in all planes to maximize function  3   Patient will improve ROM to [facility] in 4 weeks to maximize function  Functional:  1  Patient will improve FOTO by 13 points to 72/100 in 4 weeks to maximize function  2  Patient will be independent with HEP in 4 weeks to maximize function  3  Patient will report no difficulty with sleeping in 4 weeks to maximize function  4  Patient will report no difficulty with ambulation in 4 weeks to maximize function  5  Patient will report no difficulty with sleeping in 4 weeks to maximize function  Plan  Plan details: Patient will be a RE in 4 weeks      Patient would benefit from: skilled physical therapy  Planned modality interventions: cryotherapy  Planned therapy interventions: abdominal trunk stabilization, activity modification, manual therapy, neuromuscular re-education, patient education, strengthening, stretching, therapeutic activities, therapeutic exercise, functional ROM exercises, gait training, home exercise program and balance/weight bearing training  Frequency: 2x week  Duration in visits: 9  Duration in weeks: 4  Treatment plan discussed with: patient        Subjective Evaluation    History of Present Illness  Mechanism of injury: Patient presents with right lateral leg pain from hip to her knee  Patient reports a knee replacement in March of last year  She was doing well at discharge  Patient reports increased activity attending sporting events for her grandchildren over the last two months  She has been walking further distances and on uneven grassy surfaces  Patient reports her pain will travel along the lateral aspect of her leg  Patient reports pain and difficulty with sleeping, sitting, negotiation of stairs, ambulation, and transfers such as in her car  Patient will have difficulty with driving at times  Patient saw a chiropractor about a week and half ago and had some relief with a hip manipulation  Pain  At best pain ratin  At worst pain ratin  Location: R lateral leg  Quality: needle-like  Relieving factors: support and rest  Aggravating factors: standing, walking, sitting and stair climbing  Progression: no change      Diagnostic Tests  X-ray: abnormal (see results)  Treatments  Current treatment: physical therapy  Patient Goals  Patient goals for therapy: increased strength, decreased pain and increased motion  Patient goal: improve walking        Objective     Palpation   Left   No palpable tenderness to the gluteus medius and piriformis  Right   No palpable tenderness to the distal biceps femoris, distal semimembranosus and distal semitendinosus  Tenderness of the gluteus medius and piriformis  Tenderness     Right Hip   Tenderness in the PSIS   No tenderness in the greater trochanter and sacroiliac joint  Right Knee   Tenderness in the ITB  No tenderness in the fibular head, lateral joint line and medial joint line  Passive Range of Motion   Left Hip   [facility]  External rotation (90/90): [facility]  Internal rotation (90/90): WFL    Right Hip   Flexion: 90 degrees with pain  External rotation (90/90): 35 degrees with pain  Internal rotation (90/90): 10 degrees with pain    Additional Passive Range of Motion Details  Hamstring length: mild restriction lois    Strength/Myotome Testing     Left Hip   Planes of Motion   Flexion: 4+  Extension: 4  Abduction: 4-  External rotation: 4+  Internal rotation: 4+    Right Hip   Planes of Motion   Flexion: 4-  Extension: 4-  Abduction: 3+  External rotation: 4  Internal rotation: 4    Left Knee   Flexion: 4+  Extension: 4    Right Knee   Flexion: 4  Extension: 4-    Ambulation   Weight-Bearing Status   Assistive device used: none and single point cane    Observational Gait   Gait: antalgic   Decreased walking speed and stride length  Functional Assessment        Single Leg Stance   Left: 3 seconds  Right single leg stance time: unable        Flowsheet Rows    Flowsheet Row Most Recent Value   PT/OT G-Codes    Current Score 59   Projected Score 72             Precautions: hx DM II, HTN, R TKA      Manuals 6/23            R hip PROM                                                    Neuro Re-Ed             Quad set issued            Glut set issued                                                                             Ther Ex             Recumbent bike             SAQ issued            SLR             sidelying hip abduction             Prone hip extension             SL press                                       Ther Activity                                       Gait Training                                       Modalities             Ice PRN                            Access Code [email or web address]

## 2022-06-23 NOTE — LETTER
2022    MD Jackie Byrne [address] [place] [address]    Patient: Jian Mccollum   YOB: 1955   Date of Visit: 2022     Encounter Diagnosis     ICD-10-CM    1  Other bursitis of knee, right knee  M70 51        Dear Dr Kaylee Kaye:    Thank you for your recent referral of Jian Mccollum  Please review the attached evaluation summary from Shannon's recent visit  Please verify that you agree with the plan of care by signing the attached order  If you have any questions or concerns, please do not hesitate to call  I sincerely appreciate the opportunity to share in the care of one of your patients and hope to have another opportunity to work with you in the near future  Sincerely,    Cecilia Speaker, PT      Referring Provider:      I certify that I have read the below Plan of Care and certify the need for these services furnished under this plan of treatment while under my care  MD Jackie [address]ne [address] [place] [address]  Via Fax: [phone]          PT Evaluation     Today's date: 2022  Patient name: Jian Mccollum  : 1955  MRN: [id number]  Referring provider: Lianna Snow MD  Dx:   Encounter Diagnosis     ICD-10-CM    1  Other bursitis of knee, right knee  M70 51        Start Time: 1450  Stop Time: 1535  Total time in clinic (min): 45 minutes    Assessment  Assessment details: Patient is a 77 y o  female that presents with right lateral leg pain  Patient presents with decreased strength, decreased ROM, and gait/balance abnormalities  Patient has difficulty with sleeping, sitting, negotiation of stairs, ambulation, driving, and transfers such as in her car secondary to impairments  Patient would benefit from skilled physical therapy services to address impairments to maximize function      Impairments: abnormal gait, abnormal or restricted ROM, activity intolerance, impaired balance, impaired physical strength, lacks appropriate home exercise program, pain with function and weight-bearing intolerance  Understanding of Dx/Px/POC: good   Prognosis: good    Goals  Impairment:  1  Patient will reports 50% reduction in pain in 4 weeks to maximize function  2   Patient will improve strength to 4/5 in all planes to maximize function  3   Patient will improve ROM to [facility] in 4 weeks to maximize function  Functional:  1  Patient will improve FOTO by 13 points to 72/100 in 4 weeks to maximize function  2  Patient will be independent with HEP in 4 weeks to maximize function  3  Patient will report no difficulty with sleeping in 4 weeks to maximize function  4  Patient will report no difficulty with ambulation in 4 weeks to maximize function  5  Patient will report no difficulty with sleeping in 4 weeks to maximize function  Plan  Plan details: Patient will be a RE in 4 weeks  Patient would benefit from: skilled physical therapy  Planned modality interventions: cryotherapy  Planned therapy interventions: abdominal trunk stabilization, activity modification, manual therapy, neuromuscular re-education, patient education, strengthening, stretching, therapeutic activities, therapeutic exercise, functional ROM exercises, gait training, home exercise program and balance/weight bearing training  Frequency: 2x week  Duration in visits: 9  Duration in weeks: 4  Treatment plan discussed with: patient        Subjective Evaluation    History of Present Illness  Mechanism of injury: Patient presents with right lateral leg pain from hip to her knee  Patient reports a knee replacement in March of last year  She was doing well at discharge  Patient reports increased activity attending sporting events for her grandchildren over the last two months  She has been walking further distances and on uneven grassy surfaces    Patient reports her pain will travel along the lateral aspect of her leg  Patient reports pain and difficulty with sleeping, sitting, negotiation of stairs, ambulation, and transfers such as in her car  Patient will have difficulty with driving at times  Patient saw a chiropractor about a week and half ago and had some relief with a hip manipulation  Pain  At best pain ratin  At worst pain ratin  Location: R lateral leg  Quality: needle-like  Relieving factors: support and rest  Aggravating factors: standing, walking, sitting and stair climbing  Progression: no change      Diagnostic Tests  X-ray: abnormal (see results)  Treatments  Current treatment: physical therapy  Patient Goals  Patient goals for therapy: increased strength, decreased pain and increased motion  Patient goal: improve walking        Objective     Palpation   Left   No palpable tenderness to the gluteus medius and piriformis  Right   No palpable tenderness to the distal biceps femoris, distal semimembranosus and distal semitendinosus  Tenderness of the gluteus medius and piriformis  Tenderness     Right Hip   Tenderness in the PSIS  No tenderness in the greater trochanter and sacroiliac joint  Right Knee   Tenderness in the ITB  No tenderness in the fibular head, lateral joint line and medial joint line  Passive Range of Motion   Left Hip   Flexion: [facility]  External rotation (90/90): [facility]  Internal rotation (90/90):  WFL    Right Hip   Flexion: 90 degrees with pain  External rotation (90/90): 35 degrees with pain  Internal rotation (90/90): 10 degrees with pain    Additional Passive Range of Motion Details  Hamstring length: mild restriction lois    Strength/Myotome Testing     Left Hip   Planes of Motion   Flexion: 4+  Extension: 4  Abduction: 4-  External rotation: 4+  Internal rotation: 4+    Right Hip   Planes of Motion   Flexion: 4-  Extension: 4-  Abduction: 3+  External rotation: 4  Internal rotation: 4    Left Knee   Flexion: 4+  Extension: 4    Right Knee   Flexion: 4  Extension: 4-    Ambulation   Weight-Bearing Status   Assistive device used: none and single point cane    Observational Gait   Gait: antalgic   Decreased walking speed and stride length  Functional Assessment        Single Leg Stance   Left: 3 seconds  Right single leg stance time: unable        Flowsheet Rows    Flowsheet Row Most Recent Value   PT/OT G-Codes    Current Score 59   Projected Score 72             Precautions: hx DM II, HTN, R TKA      Manuals 6/23            R hip PROM                                                    Neuro Re-Ed             Quad set issued            Glut set issued                                                                             Ther Ex             Recumbent bike             SAQ issued            SLR             sidelying hip abduction             Prone hip extension             SL press                                       Ther Activity                                       Gait Training                                       Modalities             Ice PRN                            Access Code [email or web address]

## 2022-06-27 ENCOUNTER — OFFICE VISIT (OUTPATIENT)
Dept: PHYSICAL THERAPY | Facility: REHABILITATION | Age: 67
End: 2022-06-27
Payer: MEDICARE

## 2022-06-27 DIAGNOSIS — M70.51 OTHER BURSITIS OF KNEE, RIGHT KNEE: Primary | ICD-10-CM

## 2022-06-27 PROCEDURE — 97110 THERAPEUTIC EXERCISES: CPT

## 2022-06-27 PROCEDURE — 97112 NEUROMUSCULAR REEDUCATION: CPT

## 2022-06-27 NOTE — PROGRESS NOTES
Daily Note     Today's date: 2022  Patient name: Brandee Mcguire  : 1955  MRN: [id number]  Referring provider: Michelle Hooker MD  Dx:   Encounter Diagnosis     ICD-10-CM    1  Other bursitis of knee, right knee  M70 51        Start Time: 1551  Stop Time: 1825  Total time in clinic (min): 42 minutes    Subjective: Pt reports stiffness initially in the morning upon waking and notes experiencing pain for the first hour or so until she showers  Pt also notes experiencing pain while driving  Pt reports compliance with HEP  Objective: See treatment diary below  Precautions: hx DM II, HTN, R TKA      Manuals            R hip PROM  10 min                                                  Neuro Re-Ed             Quad set issued 5"x10           Glut set issued 5"x10                                                                            Ther Ex             Recumbent bike  5 min           SAQ issued 5"x10           SLR  5"x10           sidelying hip abduction  5"x10           Prone hip extension  5"x10           SL press  nv                                     Ther Activity                                       Gait Training                                       Modalities             Ice PRN                                Assessment: Pt presents to PT for first visit since initial evaluation  Initiated TE as noted per PT POC  Tolerated treatment well  Posterior knee discomfort with quad set in full ext, reduces with blue bolster under knee  Pt favors toe out positioning with trial of SLR and has difficulty performing in neutral positioning  Good technique and gluteal fatigue with s/l hip abd, added to HEP  Pt was educated in possible soreness  Assess response to treatment NV  Patient demonstrated fatigue post treatment and would benefit from continued PT      Plan: Progress treatment as tolerated

## 2022-06-30 ENCOUNTER — OFFICE VISIT (OUTPATIENT)
Dept: PHYSICAL THERAPY | Facility: REHABILITATION | Age: 67
End: 2022-06-30
Payer: MEDICARE

## 2022-06-30 DIAGNOSIS — M70.51 OTHER BURSITIS OF KNEE, RIGHT KNEE: Primary | ICD-10-CM

## 2022-06-30 PROCEDURE — 97112 NEUROMUSCULAR REEDUCATION: CPT

## 2022-06-30 PROCEDURE — 97110 THERAPEUTIC EXERCISES: CPT

## 2022-06-30 NOTE — PROGRESS NOTES
Daily Note     Today's date: 2022  Patient name: Rachid Bender  : 1955  MRN: [id number]  Referring provider: Margarita Hoover MD  Dx:   Encounter Diagnosis     ICD-10-CM    1  Other bursitis of knee, right knee  M70 51        Start Time: 07  Stop Time: 1700  Total time in clinic (min): 45 minutes    Subjective: Pt denies soreness after last treatment  Pt reports going to watch her grandchildren play sports on Tuesday, but had more pain and soreness in the hours following  Pt reports limiting amount of time in the car to about 10 minutes secondary to pain with driving further distances  Objective: See treatment diary below  Precautions: hx DM II, HTN, R TKA      Manuals           R hip PROM  10 min 10 min                                                 Neuro Re-Ed             Quad set issued 5"x10 5"x15          Glut set issued 5"x10 5"x15                                                                           Ther Ex             Recumbent bike  5 min 6 min          SAQ issued 5"x10 5"x10          SLR  5"x10 5"x10 min A          sidelying hip abduction  5"x10 5"x10          Prone hip extension  5"x10 5"x10          SL press (Seat 3, plate 7)  nv 46# U78 DL                                    Ther Activity                                       Gait Training                                       Modalities             Ice PRN                                Assessment: Tolerated treatment well  Trial of SL leg press, however patient is unable to perform with R LE secondary to knee pain  Attempted in double leg position encouraging only utilizing L LE as needed with better tolerance  Pt demonstrates decreased stride length, lat lean to L and toe out foot positioning on R ambulating with SPC  Pt requires min A to perform SLR this visit  Patient demonstrated fatigue post treatment and would benefit from continued PT      Plan: Progress treatment as tolerated

## 2022-07-06 ENCOUNTER — OFFICE VISIT (OUTPATIENT)
Dept: PHYSICAL THERAPY | Facility: REHABILITATION | Age: 67
End: 2022-07-06
Payer: MEDICARE

## 2022-07-06 DIAGNOSIS — M70.51 OTHER BURSITIS OF KNEE, RIGHT KNEE: Primary | ICD-10-CM

## 2022-07-06 PROCEDURE — 97110 THERAPEUTIC EXERCISES: CPT

## 2022-07-06 PROCEDURE — 97112 NEUROMUSCULAR REEDUCATION: CPT

## 2022-07-06 NOTE — PROGRESS NOTES
Daily Note     Today's date: 2022  Patient name: Edin Morales  : 1955  MRN: [id number]  Referring provider: Sheree Suarez MD  Dx:   Encounter Diagnosis     ICD-10-CM    1  Other bursitis of knee, right knee  M70 51        Start Time: 0900  Stop Time: 0940  Total time in clinic (min): 40 minutes    Subjective: Pt reports bilat LE soreness for a few hours after last treatment attributed to addition of leg press  Pt notes improvement in R gluteal/hip symptoms but knee pain cont to be intermittently present  Pt reports pain with kneeling and sometimes just laying on her stomach  Objective: See treatment diary below  Precautions: hx DM II, HTN, R TKA      Manuals          R hip PROM  10 min 10 min 10 min                                                Neuro Re-Ed             Quad set issued 5"x10 5"x15 5"x15         Glut set issued 5"x10 5"x15 5"x20                                                                          Ther Ex             Recumbent bike  5 min 6 min 7 min         SAQ issued 5"x10 5"x10 5"x12         SLR  5"x10 5"x10 min A 5"x10 min A         sidelying hip abduction  5"x10 5"x10 5"x12         Prone hip extension  5"x10 5"x10 5"x10         SL press (Seat 3, plate 7)  nv 83# A87 DL 35# x10 DL                                   Ther Activity                                       Gait Training                                       Modalities             Ice PRN                                Assessment: Tolerated treatment well  Pt requires min A with SLR to perform with correct technique, patient favors toe out position and demo's quad lag secondary to weakness  Progressions tolerated as noted and performs all TE to fatigue  Pt is most challenged by SLR and leg press  Patient demonstrated fatigue post treatment and would benefit from continued PT      Plan: Progress treatment as tolerated

## 2022-07-07 ENCOUNTER — OFFICE VISIT (OUTPATIENT)
Dept: PHYSICAL THERAPY | Facility: REHABILITATION | Age: 67
End: 2022-07-07
Payer: MEDICARE

## 2022-07-07 DIAGNOSIS — M70.51 OTHER BURSITIS OF KNEE, RIGHT KNEE: Primary | ICD-10-CM

## 2022-07-07 PROCEDURE — 97110 THERAPEUTIC EXERCISES: CPT | Performed by: PHYSICAL THERAPIST

## 2022-07-07 NOTE — PROGRESS NOTES
Daily Note     Today's date: 2022  Patient name: Karen Lao  : 1955  MRN: [id number]  Referring provider: Teetee Lovell MD  Dx:   Encounter Diagnosis     ICD-10-CM    1  Other bursitis of knee, right knee  M70 51        Start Time: 1700  Stop Time: 1740  Total time in clinic (min): 40 minutes    Subjective: Patient reports minimal soreness following yesterday's treatment session  Patient feels her right lateral hip discomfort is improving overall  Her pain will continue to cross her lateral lower leg just past her knee  Objective: See treatment diary below  Precautions: hx DM II, HTN, R TKA      Manuals         R hip PROM  10 min 10 min 10 min 10 min                                               Neuro Re-Ed             Quad set issued 5"x10 5"x15 5"x15 np        Glut set issued 5"x10 5"x15 5"x20 np                                                                         Ther Ex             Recumbent bike  5 min 6 min 7 min 8 min        SAQ issued 5"x10 5"x10 5"x12 5" 15x R        SLR  5"x10 5"x10 min A 5"x10 min A 5" 10x R        sidelying hip abduction  5"x10 5"x10 5"x12 5" 12x R        Prone hip extension  5"x10 5"x10 5"x10 5" 12x R        SL press (Seat 3, plate 7)  nv 13# K16 DL 35# x10 DL 55 lbs 15 DL, 15 lbs SL 10x R                                  Ther Activity                                       Gait Training                                       Modalities             Ice PRN                                Assessment: Tolerated treatment well  Patient is able to complete SL leg press with 15 lbs for first time this visit  Patient most challenged by SLR  Patient demonstrated fatigue post treatment, exhibited good technique with therapeutic exercises and would benefit from continued PT  Hip PROM flexion limited to about 100 degrees, IR and ER approaching WFL  Progress strengthening as able  Plan: Progress treatment as tolerated

## 2022-07-12 ENCOUNTER — OFFICE VISIT (OUTPATIENT)
Dept: PHYSICAL THERAPY | Facility: REHABILITATION | Age: 67
End: 2022-07-12
Payer: MEDICARE

## 2022-07-12 DIAGNOSIS — M70.51 OTHER BURSITIS OF KNEE, RIGHT KNEE: Primary | ICD-10-CM

## 2022-07-12 PROCEDURE — 97112 NEUROMUSCULAR REEDUCATION: CPT

## 2022-07-12 PROCEDURE — 97110 THERAPEUTIC EXERCISES: CPT

## 2022-07-12 NOTE — PROGRESS NOTES
Daily Note     Today's date: 2022  Patient name: Shanika Nielsen  : 1955  MRN: [id number]  Referring provider: Mulu Morrison MD  Dx:   Encounter Diagnosis     ICD-10-CM    1  Other bursitis of knee, right knee  M70 51        Start Time:   Stop Time: 1525  Total time in clinic (min): 40 minutes    Subjective: Patient denies muscle soreness after last treatment and reports compliance with HEP  Pt reports driving is getting easier  Objective: See treatment diary below  Precautions: hx DM II, HTN, R TKA      Manuals        R hip PROM  10 min 10 min 10 min 10 min 10 min                                              Neuro Re-Ed             Quad set issued 5"x10 5"x15 5"x15 np 5"x15       Glut set issued 5"x10 5"x15 5"x20 np np                                                                        Ther Ex             Recumbent bike  5 min 6 min 7 min 8 min 10 min       SAQ issued 5"x10 5"x10 5"x12 5" 15x R 5"x15 R       SLR  5"x10 5"x10 min A 5"x10 min A 5" 10x R 5"x10 R       sidelying hip abduction  5"x10 5"x10 5"x12 5" 12x R 5"x12 R       Prone hip extension  5"x10 5"x10 5"x10 5" 12x R 5"x12 R       SL press (Seat 3, plate 7)  nv 97# [id number] DL 35# x10 DL 55 lbs 15 DL, 15 lbs SL 10x R 55# x20DL 15# SL x10 R                                 Ther Activity                                       Gait Training                                       Modalities             Ice PRN                                Assessment: Tolerated treatment well  Patient was progressed as noted performing exercises to tolerance  Pt has the most difficulty with SLR and is unable to maintain TKE during eccentric phase  Good control demonstrated with SAQ  Patient demonstrated fatigue post treatment and would benefit from continued PT  Plan: Progress treatment as tolerated

## 2022-07-14 ENCOUNTER — OFFICE VISIT (OUTPATIENT)
Dept: PHYSICAL THERAPY | Facility: REHABILITATION | Age: 67
End: 2022-07-14
Payer: MEDICARE

## 2022-07-14 DIAGNOSIS — M70.51 OTHER BURSITIS OF KNEE, RIGHT KNEE: Primary | ICD-10-CM

## 2022-07-14 PROCEDURE — 97112 NEUROMUSCULAR REEDUCATION: CPT

## 2022-07-14 PROCEDURE — 97110 THERAPEUTIC EXERCISES: CPT

## 2022-07-14 NOTE — PROGRESS NOTES
Daily Note     Today's date: 2022  Patient name: Swati Mitchell  : 1955  MRN: [id number]  Referring provider: Lily Knox MD  Dx:   Encounter Diagnosis     ICD-10-CM    1  Other bursitis of knee, right knee  M70 51        Start Time: 0900  Stop Time: 09  Total time in clinic (min): 45 minutes    Subjective: Patient reports minimal muscle soreness after last treatment, but notes waking in the middle of the night Tuesday night with more knee pain  Pt notes Wednesday was okay and denies pain upon arrival to PT this visit, however has some discomfort/crepitus during bike w/u  Objective: See treatment diary below  Precautions: hx DM II, HTN, R TKA      Manuals       R hip PROM  10 min 10 min 10 min 10 min 10 min 10 min                                             Neuro Re-Ed             Quad set issued 5"x10 5"x15 5"x15 np 5"x15 5"x15      Glut set issued 5"x10 5"x15 5"x20 np np np                                                                       Ther Ex             Recumbent bike  5 min 6 min 7 min 8 min 10 min 10 min      SAQ issued 5"x10 5"x10 5"x12 5" 15x R 5"x15 R 5"x15 R      SLR  5"x10 5"x10 min A 5"x10 min A 5" 10x R 5"x10 R 5"x10 R      sidelying hip abduction  5"x10 5"x10 5"x12 5" 12x R 5"x12 R 5"x15 R      Prone hip extension  5"x10 5"x10 5"x10 5" 12x R 5"x12 R 5"x12 R      SL press (Seat 3, plate 7)  nv 98# L86 DL 35# x10 DL 55 lbs 15 DL, 15 lbs SL 10x R 55# x20DL 15# SL x10 R 55# x20 DL, 15# SL x10 R                                Ther Activity                                       Gait Training                                       Modalities             Ice PRN                                Assessment: Tolerated treatment well  Discomfort/crepitus during bike w/u and SL leg press, but was able to perform as noted  No increase in knee pain with remaining exercises    Pt is most limited with hip flex PROM and cont to have greatest difficulty performing SLR without lag  Patient demonstrated fatigue post treatment and would benefit from continued PT  Plan: Progress treatment as tolerated  Pt was treated via unsupervised time from 9:00 - 9:05 am and was 1:1 with treating clinician for rest of session

## 2022-07-18 ENCOUNTER — OFFICE VISIT (OUTPATIENT)
Dept: PHYSICAL THERAPY | Facility: REHABILITATION | Age: 67
End: 2022-07-18
Payer: MEDICARE

## 2022-07-18 DIAGNOSIS — M70.51 OTHER BURSITIS OF KNEE, RIGHT KNEE: Primary | ICD-10-CM

## 2022-07-18 PROCEDURE — 97110 THERAPEUTIC EXERCISES: CPT

## 2022-07-18 NOTE — PROGRESS NOTES
Daily Note     Today's date: 2022  Patient name: Jarek Rosales  : 1955  MRN: [id number]  Referring provider: Rula Forde MD  Dx:   Encounter Diagnosis     ICD-10-CM    1  Other bursitis of knee, right knee  M70 51        Start Time:   Stop Time: 1530  Total time in clinic (min): 45 minutes    Subjective: Patient reports mild muscle soreness after last treatment, notes some good days and bad days with knee pain  Pt reports symptoms were worse this morning upon waking, but feels better now  Pt reports difficulty with getting into a car and utilizing brake while driving  Objective: See treatment diary below  Precautions: hx DM II, HTN, R TKA      Manuals      R hip PROM  10 min 10 min 10 min 10 min 10 min 10 min 10 min                                            Neuro Re-Ed             Quad set issued 5"x10 5"x15 5"x15 np 5"x15 5"x15 5"x15     Glut set issued 5"x10 5"x15 5"x20 np np np np                                                                      Ther Ex             Recumbent bike  5 min 6 min 7 min 8 min 10 min 10 min 10 min     SAQ issued 5"x10 5"x10 5"x12 5" 15x R 5"x15 R 5"x15 R 5"x20 R     SLR  5"x10 5"x10 min A 5"x10 min A 5" 10x R 5"x10 R 5"x10 R 5"x10 R     sidelying hip abduction  5"x10 5"x10 5"x12 5" 12x R 5"x12 R 5"x15 R 5"x15 R     Prone hip extension  5"x10 5"x10 5"x10 5" 12x R 5"x12 R 5"x12 R 5"x15 R     SL press (Seat 3, plate 7)  nv 73# L15 DL 35# x10 DL 55 lbs 15 DL, 15 lbs SL 10x R 55# x20DL 15# SL x10 R 55# x20 DL, 15# SL x10 R 65# x15 DL, 15# SL x10 R                               Ther Activity                                       Gait Training                                       Modalities             Ice PRN                                Assessment: Tolerated treatment well  Progressed weight on leg press with increased fatigue  No increase in pain present throughout treatment    Quad lag continues to be present during eccentric phase of SLR and performs all TE to fatigue  Plan to RE next visit  Patient demonstrated fatigue post treatment and would benefit from continued PT  Plan: Progress treatment as tolerated

## 2022-07-21 ENCOUNTER — EVALUATION (OUTPATIENT)
Dept: PHYSICAL THERAPY | Facility: REHABILITATION | Age: 67
End: 2022-07-21
Payer: MEDICARE

## 2022-07-21 DIAGNOSIS — M70.51 OTHER BURSITIS OF KNEE, RIGHT KNEE: Primary | ICD-10-CM

## 2022-07-21 PROCEDURE — 97140 MANUAL THERAPY 1/> REGIONS: CPT | Performed by: PHYSICAL THERAPIST

## 2022-07-21 PROCEDURE — 97110 THERAPEUTIC EXERCISES: CPT | Performed by: PHYSICAL THERAPIST

## 2022-07-21 NOTE — PROGRESS NOTES
PT Re-Evaluation     Today's date: 2022  Patient name: Dennis Jaffe  : 1955  MRN: [id number]  Referring provider: Jesica Brown MD  Dx:   Encounter Diagnosis     ICD-10-CM    1  Other bursitis of knee, right knee  M70 51        Start Time: 0501  Stop Time: 0902  Total time in clinic (min): 45 minutes    Assessment  Assessment details: Patient is a 77 y o  female that presents with right lateral leg pain  Patient reports improvement with skilled physical therapy services  Patient's FOTO 52/100  Patient reports improvement with sitting tolerance, sleeping, and car transfers  Patient reports continued difficulty with sleeping, sitting, negotiation of stairs, ambulation, and transfers such as in her car  Patient has made good progress towards goals established for physical therapy  Patient would benefit from continued skilled physical therapy services for continued strengthening, balance, and gait training to maximize function  Impairments: abnormal gait, abnormal or restricted ROM, activity intolerance, impaired balance, impaired physical strength and pain with function  Understanding of Dx/Px/POC: good   Prognosis: good    Goals  Impairment:  1  Patient will reports 50% reduction in pain in 4 weeks to maximize function  -PARTIALLY MET  2  Patient will improve strength to 4/5 in all planes to maximize function  -PARTIALLY MET  3  Patient will improve ROM to [facility] in 4 weeks to maximize function  -PARTIALLY MET    Functional:  1  Patient will improve FOTO by 13 points to 72/100 in 4 weeks to maximize function  -NOT MET  2  Patient will be independent with HEP in 4 weeks to maximize function  -MET  3  Patient will report no difficulty with negotiation of stairs in 4 weeks to maximize function  -NOT MET  4  Patient will report no difficulty with ambulation in 4 weeks to maximize function  -PARTIALLY MET  5   Patient will report no difficulty with sleeping in 4 weeks to maximize function  -PARTIALLY MET        Plan  Plan details: Patient will be a RE in 4 weeks  Patient would benefit from: skilled physical therapy  Planned modality interventions: cryotherapy  Planned therapy interventions: abdominal trunk stabilization, activity modification, manual therapy, neuromuscular re-education, patient education, strengthening, stretching, therapeutic activities, therapeutic exercise, functional ROM exercises, gait training, home exercise program and balance/weight bearing training  Frequency: 2x week  Duration in visits: 8  Duration in weeks: 4  Treatment plan discussed with: patient        Subjective Evaluation    History of Present Illness  Mechanism of injury: Patient presents with right lateral leg pain from hip to her knee  Patient reports a knee replacement in March of last year  She was doing well at discharge  Patient reports increased activity attending sporting events for her grandchildren over the last two months  She has been walking further distances and on uneven grassy surfaces  Patient reports her pain will travel along the lateral aspect of her leg  Patient reports pain and difficulty with sleeping, sitting, negotiation of stairs, ambulation, and transfers such as in her car  Patient will have difficulty with driving at times  Patient saw a chiropractor about a week and half ago and had some relief with a hip manipulation      Pain  At best pain ratin  At worst pain rating: 10  Location: R lateral leg  Quality: sharp  Relieving factors: support and rest  Aggravating factors: standing, walking, sitting and stair climbing  Progression: improved      Diagnostic Tests  X-ray: abnormal (see results)  Treatments  Current treatment: physical therapy  Patient Goals  Patient goals for therapy: increased strength, decreased pain and increased motion  Patient goal: improve walking-PARTIALLY MET        Objective     Palpation   Left   No palpable tenderness to the gluteus medius and piriformis  Right   No palpable tenderness to the distal biceps femoris, distal semimembranosus, distal semitendinosus, gluteus medius and piriformis  Tenderness     Right Hip   Tenderness in the PSIS  No tenderness in the greater trochanter and sacroiliac joint  Right Knee   Tenderness in the ITB  No tenderness in the fibular head, lateral joint line and medial joint line  Passive Range of Motion   Left Hip   Flexion: [facility]  [facility]90[facility]  Internal rotation (90/90): WFL    Right Hip   Flexion: 100 degrees with pain  External rotation (90/90): 40 degrees with pain  Internal rotation (90/90): 15 degrees with pain    Additional Passive Range of Motion Details  Hamstring length: mild restriction lois    Strength/Myotome Testing     Left Hip   Planes of Motion   Flexion: 4+  Extension: 4-  Abduction: 4-  External rotation: 4+  Internal rotation: 4+    Right Hip   Planes of Motion   Flexion: 4  Extension: 4-  Abduction: 3+  External rotation: 4-  Internal rotation: 4+    Left Knee   Flexion: 4  Extension: 4    Right Knee   Flexion: 4  Extension: 4-    Ambulation   Weight-Bearing Status   Assistive device used: none and single point cane    Observational Gait   Gait: antalgic   Decreased walking speed and stride length  Quality of Movement During Gait   Trunk    Trunk (Right): Positive lateral lean over stance limb  Functional Assessment        Single Leg Stance   Left: 3 seconds  Right single leg stance time: unable               Precautions: hx DM II, HTN, R TKA        Manuals 6/23 6/27 6/30 7/6 7/7 7/12 7/14 7/18  7/21     R hip PROM   10 min 10 min 10 min 10 min 10 min 10 min 10 min 5 min     measurements                 20 min                                                     Neuro Re-Ed                       Quad set issued 5"x10 5"x15 5"x15 np 5"x15 5"x15 5"x15       Glut set issued 5"x10 5"x15 5"x20 np np np np       SLS with lois UE support                  NV      mini squat                  NV                                                                             Ther Ex                       Recumbent bike   5 min 6 min 7 min 8 min 10 min 10 min 10 min  10 min     SAQ issued 5"x10 5"x10 5"x12 5" 15x R 5"x15 R 5"x15 R 5"x20 R       SLR   5"x10 5"x10 min A 5"x10 min A 5" 10x R 5"x10 R 5"x10 R 5"x10 R  lb NV     sidelying hip abduction   5"x10 5"x10 5"x12 5" 12x R 5"x12 R 5"x15 R 5"x15 R  lb NV     Prone hip extension   5"x10 5"x10 5"x10 5" 12x R 5"x12 R 5"x12 R 5"x15 R  lb NV     SL press (Seat 3, plate 7)   nv 94# Q97 DL 35# x10 DL 55 lbs 15 DL, 15 lbs SL 10x R 55# x20DL 15# SL x10 R 55# x20 DL, 15# SL x10 R 65# x15 DL, 15# SL x10 R  75 lbs DL, 17 lbs 17x                                                     Ther Activity                                                                       Gait Training                                                                       Modalities                       Ice PRN                                                  Un-supervised 9:00-9:02 am

## 2022-07-21 NOTE — LETTER
2022    Alonso Cutler MD  [address] [address]    Patient: Sumi Albert   YOB: 1955   Date of Visit: 2022     Encounter Diagnosis     ICD-10-CM    1  Other bursitis of knee, right knee  M70 51        Dear Dr Ahmet Dickerson:    Thank you for your recent referral of Sumi Albert  Please review the attached evaluation summary from Shannon's recent visit  Please verify that you agree with the plan of care by signing the attached order  If you have any questions or concerns, please do not hesitate to call  I sincerely appreciate the opportunity to share in the care of one of your patients and hope to have another opportunity to work with you in the near future  Sincerely,    Valdez Pedraza, PT      Referring Provider:      I certify that I have read the below Plan of Care and certify the need for these services furnished under this plan of treatment while under my care  Alonso Cutler MD  [address] [address]  Via Fax: [phone]          PT Re-Evaluation     Today's date: 2022  Patient name: Sumi Albert  : 1955  MRN: [id number]  Referring provider: Martine Garay MD  Dx:   Encounter Diagnosis     ICD-10-CM    1  Other bursitis of knee, right knee  M70 51        Start Time:   Stop Time: 902  Total time in clinic (min): 45 minutes    Assessment  Assessment details: Patient is a 77 y o  female that presents with right lateral leg pain  Patient reports improvement with skilled physical therapy services  Patient's FOTO 52/100  Patient reports improvement with sitting tolerance, sleeping, and car transfers  Patient reports continued difficulty with sleeping, sitting, negotiation of stairs, ambulation, and transfers such as in her car  Patient has made good progress towards goals established for physical therapy    Patient would benefit from continued skilled physical therapy services for continued strengthening, balance, and gait training to maximize function  Impairments: abnormal gait, abnormal or restricted ROM, activity intolerance, impaired balance, impaired physical strength and pain with function  Understanding of Dx/Px/POC: good   Prognosis: good    Goals  Impairment:  1  Patient will reports 50% reduction in pain in 4 weeks to maximize function  -PARTIALLY MET  2  Patient will improve strength to 4/5 in all planes to maximize function  -PARTIALLY MET  3  Patient will improve ROM to [facility] in 4 weeks to maximize function  -PARTIALLY MET    Functional:  1  Patient will improve FOTO by 13 points to 72/100 in 4 weeks to maximize function  -NOT MET  2  Patient will be independent with HEP in 4 weeks to maximize function  -MET  3  Patient will report no difficulty with negotiation of stairs in 4 weeks to maximize function  -NOT MET  4  Patient will report no difficulty with ambulation in 4 weeks to maximize function  -PARTIALLY MET  5  Patient will report no difficulty with sleeping in 4 weeks to maximize function  -PARTIALLY MET        Plan  Plan details: Patient will be a RE in 4 weeks  Patient would benefit from: skilled physical therapy  Planned modality interventions: cryotherapy  Planned therapy interventions: abdominal trunk stabilization, activity modification, manual therapy, neuromuscular re-education, patient education, strengthening, stretching, therapeutic activities, therapeutic exercise, functional ROM exercises, gait training, home exercise program and balance/weight bearing training  Frequency: 2x week  Duration in visits: 8  Duration in weeks: 4  Treatment plan discussed with: patient        Subjective Evaluation    History of Present Illness  Mechanism of injury: Patient presents with right lateral leg pain from hip to her knee  Patient reports a knee replacement in March of last year  She was doing well at discharge    Patient reports increased activity attending sporting events for her grandchildren over the last two months  She has been walking further distances and on uneven grassy surfaces  Patient reports her pain will travel along the lateral aspect of her leg  Patient reports pain and difficulty with sleeping, sitting, negotiation of stairs, ambulation, and transfers such as in her car  Patient will have difficulty with driving at times  Patient saw a chiropractor about a week and half ago and had some relief with a hip manipulation  Pain  At best pain ratin  At worst pain rating: 10  Location: R lateral leg  Quality: sharp  Relieving factors: support and rest  Aggravating factors: standing, walking, sitting and stair climbing  Progression: improved      Diagnostic Tests  X-ray: abnormal (see results)  Treatments  Current treatment: physical therapy  Patient Goals  Patient goals for therapy: increased strength, decreased pain and increased motion  Patient goal: improve walking-PARTIALLY MET        Objective     Palpation   Left   No palpable tenderness to the gluteus medius and piriformis  Right   No palpable tenderness to the distal biceps femoris, distal semimembranosus, distal semitendinosus, gluteus medius and piriformis  Tenderness     Right Hip   Tenderness in the PSIS  No tenderness in the greater trochanter and sacroiliac joint  Right Knee   Tenderness in the ITB  No tenderness in the fibular head, lateral joint line and medial joint line  Passive Range of Motion   Left Hip   Flexion: [facility]  [facility]90[facility]90[facility]  Internal rotation (90/90):  WFL    Right Hip   Flexion: 100 degrees with pain  External rotation (90/90): 40 degrees with pain  Internal rotation (90/90): 15 degrees with pain    Additional Passive Range of Motion Details  Hamstring length: mild restriction lois    Strength/Myotome Testing     Left Hip   Planes of Motion   Flexion: 4+  Extension: 4-  Abduction: 4-  External rotation: 4+  Internal rotation: 4+    Right Hip   Planes of Motion   Flexion: 4  Extension: 4-  Abduction: 3+  External rotation: 4-  Internal rotation: 4+    Left Knee   Flexion: 4  Extension: 4    Right Knee   Flexion: 4  Extension: 4-    Ambulation   Weight-Bearing Status   Assistive device used: none and single point cane    Observational Gait   Gait: antalgic   Decreased walking speed and stride length  Quality of Movement During Gait   Trunk    Trunk (Right): Positive lateral lean over stance limb  Functional Assessment        Single Leg Stance   Left: 3 seconds  Right single leg stance time: unable               Precautions: hx DM II, HTN, R TKA        Manuals 6/23 6/27 6/30 7/6 7/7 7/12 7/14 7/18 7/21     R hip PROM   10 min 10 min 10 min 10 min 10 min 10 min 10 min 5 min     measurements                 20 min                                                     Neuro Re-Ed                       Quad set issued 5"x10 5"x15 5"x15 np 5"x15 5"x15 5"x15       Glut set issued 5"x10 5"x15 5"x20 np np np np       SLS with lois UE support                  NV      mini squat                  NV                                                                             Ther Ex                       Recumbent bike   5 min 6 min 7 min 8 min 10 min 10 min 10 min  10 min     SAQ issued 5"x10 5"x10 5"x12 5" 15x R 5"x15 R 5"x15 R 5"x20 R       SLR   5"x10 5"x10 min A 5"x10 min A 5" 10x R 5"x10 R 5"x10 R 5"x10 R  lb NV     sidelying hip abduction   5"x10 5"x10 5"x12 5" 12x R 5"x12 R 5"x15 R 5"x15 R  lb NV     Prone hip extension   5"x10 5"x10 5"x10 5" 12x R 5"x12 R 5"x12 R 5"x15 R  lb NV     SL press (Seat 3, plate 7)   nv 03# L88 DL 35# x10 DL 55 lbs 15 DL, 15 lbs SL 10x R 55# x20DL 15# SL x10 R 55# x20 DL, 15# SL x10 R 65# x15 DL, 15# SL x10 R  75 lbs DL, 17 lbs 17x                                                     Ther Activity                                                                       Gait Training                                                                       Modalities                       Ice PRN                                                  Un-supervised 9:00-9:02 am

## 2022-07-27 ENCOUNTER — HOSPITAL ENCOUNTER (OUTPATIENT)
Dept: BONE DENSITY | Facility: MEDICAL CENTER | Age: 67
Discharge: HOME | End: 2022-07-27

## 2022-07-27 ENCOUNTER — OFFICE VISIT (OUTPATIENT)
Dept: PHYSICAL THERAPY | Facility: REHABILITATION | Age: 67
End: 2022-07-27
Payer: MEDICARE

## 2022-07-27 DIAGNOSIS — Z78.0 ASYMPTOMATIC POSTMENOPAUSAL STATE: ICD-10-CM

## 2022-07-27 DIAGNOSIS — M70.51 OTHER BURSITIS OF KNEE, RIGHT KNEE: Primary | ICD-10-CM

## 2022-07-27 PROCEDURE — 97110 THERAPEUTIC EXERCISES: CPT

## 2022-07-27 PROCEDURE — 97112 NEUROMUSCULAR REEDUCATION: CPT

## 2022-07-27 NOTE — PROGRESS NOTES
Daily Note     Today's date: 2022  Patient name: Zeb Aj  : 1955  MRN: [id number]  Referring provider: Barb Salcedo MD  Dx:   Encounter Diagnosis     ICD-10-CM    1  Other bursitis of knee, right knee  M70 51        Start Time: 0900  Stop Time: 0945  Total time in clinic (min): 45 minutes    Subjective: Pt denies soreness after last treatment  Pt denies pain arriving to PT this visit, reports most amount of discomfort at night when trying to sleep        Objective: See treatment diary below  Precautions: hx DM II, HTN, R TKA        Manuals    R hip PROM   10 min 10 min 10 min 10 min 10 min 10 min 10 min 5 min  5 min   measurements                 20 min                                                     Neuro Re-Ed                       Quad set issued 5"x10 5"x15 5"x15 np 5"x15 5"x15 5"x15    5"x15   Glut set issued 5"x10 5"x15 5"x20 np np np np       SLS with lois UE support                  NV  15"x3    mini squat                  NV  x10                                                                           Ther Ex                       Recumbent bike   5 min 6 min 7 min 8 min 10 min 10 min 10 min  10 min 10 min   SAQ issued 5"x10 5"x10 5"x12 5" 15x R 5"x15 R 5"x15 R 5"x20 R       SLR   5"x10 5"x10 min A 5"x10 min A 5" 10x R 5"x10 R 5"x10 R 5"x10 R  lb NV  5"x12 R   sidelying hip abduction   5"x10 5"x10 5"x12 5" 12x R 5"x12 R 5"x15 R 5"x15 R  lb NV  1# 5"x10 R   Prone hip extension   5"x10 5"x10 5"x10 5" 12x R 5"x12 R 5"x12 R 5"x15 R  lb NV  1# 5"x10 R   SL press (Seat 3, plate 7)   nv 11# L79 DL 35# x10 DL 55 lbs 15 DL, 15 lbs SL 10x R 55# x20DL 15# SL x10 R 55# x20 DL, 15# SL x10 R 65# x15 DL, 15# SL x10 R  75 lbs DL, 17 lbs 17x  75# x20 DL, 17# x20 R                                                   Ther Activity                                                                       Gait Training                                                                       Modalities                       Ice PRN                                                   Assessment: Tolerated treatment well  Pt is challenged with balance in SLS on R requiring significant HHA in order to perform, demo's lean towards L requiring cueing to correct  Extensive cueing provided with addition of mini squats to correct excessive anterior knee translation and favoring L side  Pt cont to be challenged maintaining TKE and neutral LE positioning with SLR    Patient demonstrated fatigue post treatment and would benefit from continued PT      Plan: Progress treatment as tolerated

## 2022-08-02 ENCOUNTER — OFFICE VISIT (OUTPATIENT)
Dept: PHYSICAL THERAPY | Facility: REHABILITATION | Age: 67
End: 2022-08-02
Payer: MEDICARE

## 2022-08-02 DIAGNOSIS — M70.51 OTHER BURSITIS OF KNEE, RIGHT KNEE: Primary | ICD-10-CM

## 2022-08-02 PROCEDURE — 97110 THERAPEUTIC EXERCISES: CPT | Performed by: PHYSICAL THERAPIST

## 2022-08-02 PROCEDURE — 97112 NEUROMUSCULAR REEDUCATION: CPT | Performed by: PHYSICAL THERAPIST

## 2022-08-02 NOTE — PROGRESS NOTES
Daily Note     Today's date: 2022  Patient name: Jacquelyn Gonzalez  : 1955  MRN: [id number]  Referring provider: Emy Sears MD  Dx:   Encounter Diagnosis     ICD-10-CM    1  Other bursitis of knee, right knee  M70 51        Start Time: 497  Stop Time: 1525  Total time in clinic (min): 40 minutes    Subjective: Patient reports that she is doing well  She tries to keep moving as she tends to have more discomfort with being sedentary  Her right hip tends to be more bothersome than her right knee at this time         Objective: See treatment diary below  Precautions: hx DM II, HTN, R TKA        Manuals    R hip PROM 10 min 10 min 10 min 10 min 10 min 10 min 10 min 10 min 5 min  5 min   measurements                 20 min                                                     Neuro Re-Ed                       Quad set  5"x10 5"x15 5"x15 np 5"x15 5"x15 5"x15    5"x15   Glut set  5"x10 5"x15 5"x20 np np np np       SLS with lois UE support                  NV  15"x3    mini squat  20x biodex                NV  x10    weight bearing on biodex  8 min (66% on L naturally)                                                                     Ther Ex                       Recumbent bike  10 min 5 min 6 min 7 min 8 min 10 min 10 min 10 min  10 min 10 min   SAQ  5"x10 5"x10 5"x12 5" 15x R 5"x15 R 5"x15 R 5"x20 R       SLR   5"x10 5"x10 min A 5"x10 min A 5" 10x R 5"x10 R 5"x10 R 5"x10 R  lb NV  5"x12 R   sidelying hip abduction   5"x10 5"x10 5"x12 5" 12x R 5"x12 R 5"x15 R 5"x15 R  lb NV  1# 5"x10 R   Prone hip extension   5"x10 5"x10 5"x10 5" 12x R 5"x12 R 5"x12 R 5"x15 R  lb NV  1# 5"x10 R   SL press (Seat 3, plate 7)  14 lbs 24M, 25 lbs 20x R nv 35# x10 DL 35# x10 DL 55 lbs 15 DL, 15 lbs SL 10x R 55# x20DL 15# SL x10 R 55# x20 DL, 15# SL x10 R 65# x15 DL, 15# SL x10 R  75 lbs DL, 17 lbs 17x  75# x20 DL, 17# x20 R                                                   Ther Activity                                                                       Gait Training                                                                       Modalities                       Ice PRN                                                   Assessment: Tolerated treatment well  Patient demonstrated fatigue post treatment and would benefit from continued PT  Patient presents with gait compensation with increased EDITH with right LE  Mini squats with compensation with left lateral lean  Placed patient on biodex and naturally WB about 66% of weight on left LE  Difficulty maintaining equal WB wit squatting  Asked patient to practice WB at home with activities such as washing dishes  Therex np secondary to soreness and discomfort following biodex          Plan: Progress treatment as tolerated

## 2022-08-04 ENCOUNTER — OFFICE VISIT (OUTPATIENT)
Dept: PHYSICAL THERAPY | Facility: REHABILITATION | Age: 67
End: 2022-08-04
Payer: MEDICARE

## 2022-08-04 DIAGNOSIS — M70.51 OTHER BURSITIS OF KNEE, RIGHT KNEE: Primary | ICD-10-CM

## 2022-08-04 PROCEDURE — 97110 THERAPEUTIC EXERCISES: CPT

## 2022-08-04 PROCEDURE — 97112 NEUROMUSCULAR REEDUCATION: CPT

## 2022-08-04 NOTE — PROGRESS NOTES
Daily Note     Today's date: 2022  Patient name: Usha Goodson  : 1955  MRN: [id number]  Referring provider: Alicia Alonzo MD  Dx:   Encounter Diagnosis     ICD-10-CM    1  Other bursitis of knee, right knee  M70 51        Start Time: 1042  Stop Time: 467  Total time in clinic (min): 40 minutes    Subjective: Patient reports doing well initially after last treatment with no c/o soreness, but notes onset of stiffness overnight  Pt reports today has been a busy day and she has been going since 7:00 this morning          Objective: See treatment diary below  Precautions: hx DM II, HTN, R TKA        Manuals    R hip PROM 10 min 10 min 10 min 10 min 10 min 10 min 10 min 10 min 5 min  5 min   measurements                 20 min                                                     Neuro Re-Ed                       Quad set   5"x15 5"x15 np 5"x15 5"x15 5"x15    5"x15   Glut set   5"x15 5"x20 np np np np       SLS with lois UE support    15"x3              NV  15"x3    mini squat  20x biodex  x10              NV  x10    weight bearing on biodex  8 min (66% on L naturally)                                                                     Ther Ex                       Recumbent bike  10 min 8 min 6 min 7 min 8 min 10 min 10 min 10 min  10 min 10 min   SAQ   5"x10 5"x12 5" 15x R 5"x15 R 5"x15 R 5"x20 R       SLR    5"x12 R 5"x10 min A 5"x10 min A 5" 10x R 5"x10 R 5"x10 R 5"x10 R  lb NV  5"x12 R   sidelying hip abduction   1# 5"x12 R 5"x10 5"x12 5" 12x R 5"x12 R 5"x15 R 5"x15 R  lb NV  1# 5"x10 R   Prone hip extension   1# 5"x12 R 5"x10 5"x10 5" 12x R 5"x12 R 5"x12 R 5"x15 R  lb NV  1# 5"x10 R   SL press (Seat 3, plate 7)  95 lbs 88X, 25 lbs 20x R 75# x20, 25# x20 R 35# x10 DL 35# x10 DL 55 lbs 15 DL, 15 lbs SL 10x R 55# x20DL 15# SL x10 R 55# x20 DL, 15# SL x10 R 65# x15 DL, 15# SL x10 R  75 lbs DL, 17 lbs 17x  75# x20 DL, 17# x20 R                                                   Ther Activity                                                                       Gait Training                                                                       Modalities                       Ice PRN                                                   Assessment: Tolerated treatment well  Stiffness initially with most TE this visit, however with repetition stiffness reduces  Pt fatigues by 8 minutes on bike  Pt cont to be challenged with weight bearing on R LE  Provided tactile cueing for positioning with balance in SLS and mini squats to ensure appropriate weight bearing on R LE  Limited progressions secondary to fatigue levels this visit  Patient demonstrated fatigue post treatment and would benefit from continued PT        Plan: Progress treatment as tolerated

## 2022-08-09 ENCOUNTER — OFFICE VISIT (OUTPATIENT)
Dept: PHYSICAL THERAPY | Facility: REHABILITATION | Age: 67
End: 2022-08-09
Payer: MEDICARE

## 2022-08-09 DIAGNOSIS — M70.51 OTHER BURSITIS OF KNEE, RIGHT KNEE: Primary | ICD-10-CM

## 2022-08-09 PROCEDURE — 97110 THERAPEUTIC EXERCISES: CPT

## 2022-08-09 PROCEDURE — 97112 NEUROMUSCULAR REEDUCATION: CPT

## 2022-08-09 NOTE — PROGRESS NOTES
Daily Note     Today's date: 2022  Patient name: Edin Morales  : 1955  MRN: [id number]  Referring provider: Sheree Suarez MD  Dx:   Encounter Diagnosis     ICD-10-CM    1  Other bursitis of knee, right knee  M70 51        Start Time: 1700  Stop Time: 1743  Total time in clinic (min): 43 minutes    Subjective: Patient reports having more soreness and discomfort that lasted into the following day after last treatment  Pt notes the last few days have been better, but notes stiffness returned with driving to therapy today secondary to utilizing R LE on accelerator  Pt attributes increased symptoms to working longer days, but notes she returns back to her normal schedule soon          Objective: See treatment diary below  Precautions: hx DM II, HTN, R TKA        Manuals    R hip PROM 10 min 10 min 10 min 10 min 10 min 10 min 10 min 10 min 5 min  5 min   measurements                 20 min                                                     Neuro Re-Ed                       Quad set    5"x15 np 5"x15 5"x15 5"x15    5"x15   Glut set    5"x20 np np np np       SLS with lois UE support    15"x3  15"x2 ea            NV  15"x3    mini squat  20x biodex  x10  x15            NV  x10    weight bearing on biodex  8 min (66% on L naturally)                                                                     Ther Ex                       Recumbent bike  10 min 8 min 10 min 7 min 8 min 10 min 10 min 10 min  10 min 10 min   SAQ    5"x12 5" 15x R 5"x15 R 5"x15 R 5"x20 R       SLR    5"x12 R 5"x12 R 5"x10 min A 5" 10x R 5"x10 R 5"x10 R 5"x10 R  lb NV  5"x12 R   sidelying hip abduction   1# 5"x12 R 1# 5"x12 R 5"x12 5" 12x R 5"x12 R 5"x15 R 5"x15 R  lb NV  1# 5"x10 R   Prone hip extension   1# 5"x12 R 1# 5"x12 5"x10 5" 12x R 5"x12 R 5"x12 R 5"x15 R  lb NV  1# 5"x10 R   SL press (Seat 3, plate 7)  41 lbs 99N, 25 lbs 20x R 75# x20, 25# x20 R 75# x20, 25# x15 R 85#? 55 lbs 15 DL, 15 lbs SL 10x R 55# x20DL 15# SL x10 R 55# x20 DL, 15# SL x10 R 65# x15 DL, 15# SL x10 R  75 lbs DL, 17 lbs 17x  75# x20 DL, 17# x20 R                                                   Ther Activity                                                                       Gait Training                                                                       Modalities                       Ice PRN                                                   Assessment: Tolerated treatment well  TE performed as noted to patient tolerance  Pt cont to be most challenged with squats relying on HHA and favoring L LE   V&T cueing provided for positioning also with SLS balance secondary to significant lean and compenastory movement  Patient may benefit from progression of weight during DL leg press NV if appropriate  Patient demonstrated fatigue post treatment and would benefit from continued PT        Plan: Progress treatment as tolerated

## 2022-08-11 ENCOUNTER — OFFICE VISIT (OUTPATIENT)
Dept: PHYSICAL THERAPY | Facility: REHABILITATION | Age: 67
End: 2022-08-11
Payer: MEDICARE

## 2022-08-11 DIAGNOSIS — M70.51 OTHER BURSITIS OF KNEE, RIGHT KNEE: Primary | ICD-10-CM

## 2022-08-11 PROCEDURE — 97110 THERAPEUTIC EXERCISES: CPT

## 2022-08-11 PROCEDURE — 97112 NEUROMUSCULAR REEDUCATION: CPT

## 2022-08-11 NOTE — PROGRESS NOTES
Daily Note     Today's date: 2022  Patient name: Jose Rafael Mcintosh  : 1955  MRN: [id number]  Referring provider: Megan Santana MD  Dx:   Encounter Diagnosis     ICD-10-CM    1  Other bursitis of knee, right knee  M70 51                   Subjective: Patient noted that she has increased pain in her R superior glute noting sometimes its not as bad and other times she notices it more  Patient noted that after performing SLS possibly contributing to pain in superior R glute  Objective: See treatment diary below      Assessment: Tolerated treatment fair  Patient performed exercises to her tolerance today  Did not perform SLS today instead as per patient request performed Biodex weight shifting for patient to feel the difference between putting different amounts of weight through ea LE  Worked on equally distributing her weight throughout MAME LE with biodex weight shifting  Consider increasing weight on leg press nv as per patient tolerance  Patient would benefit from continued PT      Plan: Continue per plan of care        Precautions: hx DM II, HTN, R TKA        Manuals    R hip PROM 10 min 10 min 10 min 10 min  10 min 10 min 10 min 5 min  5 min   measurements               20 min                                                 Neuro Re-Ed                     Quad set      5"x15 5"x15 5"x15    5"x15   Glut set      np np np       SLS with mame UE support    15"x3  15"x2 ea NP         NV  15"x3    mini squat  20x biodex  x10  x15 X 15         NV  x10    weight bearing on biodex  8 min (66% on L naturally)                                                               Ther Ex                     Recumbent bike  10 min 8 min 10 min 10 min  10 min 10 min 10 min  10 min 10 min   SAQ      5"x15 R 5"x15 R 5"x20 R       SLR    5"x12 R 5"x12 R 5" x 12  5"x10 R 5"x10 R 5"x10 R  lb NV  5"x12 R   sidelying hip abduction   1# 5"x12 R 1# 5"x12 R 1# 5"x12 R  5"x12 R 5"x15 R 5"x15 R  lb NV  1# 5"x10 R   Prone hip extension   1# 5"x12 R 1# 5"x12 1# 5"x12  5"x12 R 5"x12 R 5"x15 R  lb NV  1# 5"x10 R   SL press (Seat 3, plate 7)  89 lbs 77H, 25 lbs 20x R 75# x20, 25# x20 R 75# x20, 25# x15 R 75# x 20,  25# x 15 R   55# x20DL 15# SL x10 R 55# x20 DL, 15# SL x10 R 65# x15 DL, 15# SL x10 R  75 lbs DL, 17 lbs 17x  75# x20 DL, 17# x20 R                                               Ther Activity                                                                 Gait Training                                                                 Modalities                       Ice PRN

## 2022-08-23 ENCOUNTER — EVALUATION (OUTPATIENT)
Dept: PHYSICAL THERAPY | Facility: REHABILITATION | Age: 67
End: 2022-08-23
Payer: MEDICARE

## 2022-08-23 DIAGNOSIS — M70.51 OTHER BURSITIS OF KNEE, RIGHT KNEE: Primary | ICD-10-CM

## 2022-08-23 PROCEDURE — 97112 NEUROMUSCULAR REEDUCATION: CPT | Performed by: PHYSICAL THERAPIST

## 2022-08-23 PROCEDURE — 97110 THERAPEUTIC EXERCISES: CPT | Performed by: PHYSICAL THERAPIST

## 2022-08-23 PROCEDURE — 97140 MANUAL THERAPY 1/> REGIONS: CPT | Performed by: PHYSICAL THERAPIST

## 2022-08-23 NOTE — PROGRESS NOTES
PT Discharge    Today's date: 2022  Patient name: Addy Shields  : 1955  MRN: [id number]  Referring provider: Marilyn Beaulieu MD  Dx:   Encounter Diagnosis     ICD-10-CM    1  Other bursitis of knee, right knee  M70 51        Start Time: 1750  Stop Time: 1830  Total time in clinic (min): 40 minutes    Assessment  Assessment details: Patient is a 77 y o  female that presents with right lateral leg pain  Patient's FOTO 52/100  Patient has been having increased difficulty with ambulation, hip flexion AROM, and negotiation of stairs  She has also been reporting more audible cracking from her leg  Patient's pain has bene increasing in right groin  At RE, patient demonstrates reduced pain free PROM and general hip strength  Recommended she follow up with ortho to discuss plan of care  Discharged to [facility] at this time  Impairments: abnormal gait, abnormal or restricted ROM, activity intolerance, impaired balance, impaired physical strength and pain with function  Understanding of Dx/Px/POC: good   Prognosis: good    Goals  Impairment:  1  Patient will reports 50% reduction in pain in 4 weeks to maximize function  -PARTIALLY MET  2  Patient will improve strength to 4/5 in all planes to maximize function  -PARTIALLY MET  3  Patient will improve ROM to [facility] in 4 weeks to maximize function  -PARTIALLY MET    Functional:  1  Patient will improve FOTO by 13 points to 72/100 in 4 weeks to maximize function  -NOT MET  2  Patient will be independent with [facility] in 4 weeks to maximize function  -MET  3  Patient will report no difficulty with negotiation of stairs in 4 weeks to maximize function  -NOT MET  4  Patient will report no difficulty with ambulation in 4 weeks to maximize function  -PARTIALLY MET  5  Patient will report no difficulty with sleeping in 4 weeks to maximize function  -PARTIALLY MET        Plan  Plan details: Patient will be discharged to [facility] at this time    Recommended following up with ortho to discuss plan of care  Planned modality interventions: cryotherapy  Planned therapy interventions: abdominal trunk stabilization, activity modification, manual therapy, neuromuscular re-education, patient education, strengthening, stretching, therapeutic activities, therapeutic exercise, functional ROM exercises, gait training, home exercise program and balance/weight bearing training  Treatment plan discussed with: patient        Subjective Evaluation    History of Present Illness  Mechanism of injury: Patient presents with right lateral leg pain from hip to her knee  Patient reports a knee replacement in March of last year  She was doing well at discharge  Patient reports increased activity attending sporting events for her grandchildren over the last two months  She has been walking further distances and on uneven grassy surfaces  Patient reports her pain will travel along the lateral aspect of her leg  Patient reports pain and difficulty with sleeping, sitting, negotiation of stairs, ambulation, and transfers such as in her car  Patient will have difficulty with driving at times  Patient saw a chiropractor about a week and half ago and had some relief with a hip manipulation  Pain  At best pain ratin  At worst pain ratin  Location: R lateral leg  Quality: sharp  Relieving factors: support and rest  Aggravating factors: standing, walking, sitting and stair climbing  Progression: improved      Diagnostic Tests  X-ray: abnormal (see results)  Treatments  Current treatment: physical therapy  Patient Goals  Patient goals for therapy: increased strength, decreased pain and increased motion  Patient goal: improve walking-PARTIALLY MET        Objective     Palpation   Left   No palpable tenderness to the gluteus medius and piriformis  Right   No palpable tenderness to the distal biceps femoris, distal semimembranosus, distal semitendinosus, gluteus medius and piriformis       Tenderness Right Hip   Tenderness in the PSIS  No tenderness in the greater trochanter and sacroiliac joint  Right Knee   Tenderness in the ITB  No tenderness in the fibular head, lateral joint line and medial joint line  Passive Range of Motion   Left Hip   Flexion: [facility]  External [facility]90/90[facility]  Internal rotation (90/90): WFL    Right Hip   Flexion: 100 degrees with pain  External rotation (90/90): 40 degrees with pain  Internal rotation (90/90): 15 degrees with pain    Additional Passive Range of Motion Details  Hamstring length: mild restriction lois    Strength/Myotome Testing     Left Hip   Planes of Motion   Flexion: 4+  Extension: 4-  Abduction: 4-  External rotation: 4+  Internal rotation: 4+    Right Hip   Planes of Motion   Flexion: 3  Extension: 4-  Abduction: 3+  External rotation: 3+  Internal rotation: 3+    Left Knee   Flexion: 4  Extension: 4    Right Knee   Flexion: 4-  Extension: 4-    Ambulation   Weight-Bearing Status   Assistive device used: single point cane    Observational Gait   Gait: antalgic   Decreased walking speed and stride length  Quality of Movement During Gait   Trunk    Trunk (Right): Positive lateral lean over stance limb  Functional Assessment        Single Leg Stance   Right single leg stance time: unable        Flowsheet Rows    Flowsheet Row Most Recent Value   PT/OT G-Codes    Current Score 52   Projected Score 72             Precautions: hx DM II, HTN, R TKA        Manuals 8/2 8/4 8/9 8/11 8/23 7/12 7/14 7/18 7/21 7/27   R hip PROM 10 min 10 min 10 min 10 min   10 min 10 min 10 min 5 min  5 min   measurements          10 min       20 min                                                     Neuro Re-Ed                       Quad set           5"x15 5"x15 5"x15    5"x15   Glut set           np np np       SLS with lois UE support    15"x3  15"x2 ea NP          NV  15"x3    mini squat  20x biodex  x10  x15 X 15          NV  x10    weight bearing on biodex  8 min (66% on L naturally)        10 min                                                             Ther Ex                       Recumbent bike  10 min 8 min 10 min 10 min 8 min AROM 10 min 10 min 10 min  10 min 10 min   SAQ           5"x15 R 5"x15 R 5"x20 R       SLR    5"x12 R 5"x12 R 5" x 12   5"x10 R 5"x10 R 5"x10 R  lb NV  5"x12 R   sidelying hip abduction   1# 5"x12 R 1# 5"x12 R 1# 5"x12 R   5"x12 R 5"x15 R 5"x15 R  lb NV  1# 5"x10 R   Prone hip extension   1# 5"x12 R 1# 5"x12 1# 5"x12   5"x12 R 5"x12 R 5"x15 R  lb NV  1# 5"x10 R   SL press (Seat 3, plate 7)  96 lbs 83Y, 25 lbs 20x R 75# x20, 25# x20 R 75# x20, 25# x15 R 75# x 20,  25# x 15 R   75 lbs 20x 55# x20DL 15# SL x10 R 55# x20 DL, 15# SL x10 R 65# x15 DL, 15# SL x10 R  75 lbs DL, 17 lbs 17x  75# x20 DL, 17# x20 R    pt edu          10 min                                     Ther Activity                                                                       Gait Training                                                                       Modalities                       Ice PRN

## 2022-08-24 ENCOUNTER — TELEPHONE (OUTPATIENT)
Dept: FAMILY MEDICINE CLINIC | Facility: CLINIC | Age: 67
End: 2022-08-24

## 2022-08-24 NOTE — TELEPHONE ENCOUNTER
Can we please follow-up with the patient and ask her why the MRI of her hip has not been completed or scheduled yet?

## 2022-08-24 NOTE — TELEPHONE ENCOUNTER
Spoke with patient she will be making an appointment with [facility] to get that done   Thank you

## 2022-08-25 ENCOUNTER — APPOINTMENT (OUTPATIENT)
Dept: PHYSICAL THERAPY | Facility: REHABILITATION | Age: 67
End: 2022-08-25
Payer: MEDICARE

## 2022-08-29 LAB
LEFT EYE DIABETIC RETINOPATHY: NORMAL
RIGHT EYE DIABETIC RETINOPATHY: NORMAL
SEVERITY (EYE EXAM): NORMAL

## 2022-08-30 ENCOUNTER — APPOINTMENT (OUTPATIENT)
Dept: PHYSICAL THERAPY | Facility: REHABILITATION | Age: 67
End: 2022-08-30
Payer: MEDICARE

## 2022-09-07 ENCOUNTER — TELEPHONE (OUTPATIENT)
Dept: FAMILY MEDICINE CLINIC | Facility: CLINIC | Age: 67
End: 2022-09-07

## 2022-09-07 NOTE — TELEPHONE ENCOUNTER
Patient dropped off  Staff Health Assessment Form for Dr Baudilio Mcmahon to fill out  Form handed to [facility] to place in Dr Kimi Coleman folder  Thank you

## 2022-09-23 ENCOUNTER — APPOINTMENT (OUTPATIENT)
Dept: RADIOLOGY | Facility: MEDICAL CENTER | Age: 67
End: 2022-09-23
Payer: MEDICARE

## 2022-09-23 ENCOUNTER — OFFICE VISIT (OUTPATIENT)
Dept: OBGYN CLINIC | Facility: MEDICAL CENTER | Age: 67
End: 2022-09-23
Payer: MEDICARE

## 2022-09-23 VITALS
WEIGHT: 184.2 LBS | SYSTOLIC BLOOD PRESSURE: 151 MMHG | HEIGHT: 67 IN | HEART RATE: 68 BPM | BODY MASS INDEX: 28.91 KG/M2 | DIASTOLIC BLOOD PRESSURE: 69 MMHG

## 2022-09-23 DIAGNOSIS — M16.11 PRIMARY OSTEOARTHRITIS OF RIGHT HIP: Primary | ICD-10-CM

## 2022-09-23 DIAGNOSIS — Z01.818 PREOPERATIVE TESTING: Primary | ICD-10-CM

## 2022-09-23 DIAGNOSIS — Z01.818 PREOPERATIVE TESTING: ICD-10-CM

## 2022-09-23 DIAGNOSIS — M87.051 AVASCULAR NECROSIS OF HIP, RIGHT (HCC): ICD-10-CM

## 2022-09-23 DIAGNOSIS — M16.11 PRIMARY OSTEOARTHRITIS OF RIGHT HIP: ICD-10-CM

## 2022-09-23 PROCEDURE — 99204 OFFICE O/P NEW MOD 45 MIN: CPT | Performed by: ORTHOPAEDIC SURGERY

## 2022-09-23 PROCEDURE — 73502 X-RAY EXAM HIP UNI 2-3 VIEWS: CPT

## 2022-09-23 RX ORDER — FOLIC ACID 1 MG/1
1 TABLET ORAL DAILY
Qty: 30 TABLET | Refills: 1 | Status: SHIPPED | OUTPATIENT
Start: 2022-09-23 | End: 2022-09-26 | Stop reason: SDUPTHER

## 2022-09-23 RX ORDER — CHLORHEXIDINE GLUCONATE 4 G/100ML
SOLUTION TOPICAL DAILY PRN
OUTPATIENT
Start: 2022-09-23

## 2022-09-23 RX ORDER — ASCORBIC ACID 500 MG
500 TABLET ORAL DAILY
Qty: 30 TABLET | Refills: 1 | Status: SHIPPED | OUTPATIENT
Start: 2022-09-23 | End: 2022-09-26 | Stop reason: SDUPTHER

## 2022-09-23 RX ORDER — GABAPENTIN 300 MG/1
300 CAPSULE ORAL ONCE
OUTPATIENT
Start: 2022-09-23 | End: 2022-09-23

## 2022-09-23 RX ORDER — SODIUM CHLORIDE 9 MG/ML
75 INJECTION, SOLUTION INTRAVENOUS CONTINUOUS
OUTPATIENT
Start: 2022-09-23

## 2022-09-23 RX ORDER — CEFAZOLIN SODIUM 2 G/50ML
2000 SOLUTION INTRAVENOUS ONCE
OUTPATIENT
Start: 2022-09-23 | End: 2022-09-23

## 2022-09-23 RX ORDER — DICLOFENAC SODIUM 75 MG/1
75 TABLET, DELAYED RELEASE ORAL 2 TIMES DAILY
Qty: 60 TABLET | Refills: 1 | Status: SHIPPED | OUTPATIENT
Start: 2022-09-23

## 2022-09-23 RX ORDER — MULTIVITAMIN
1 TABLET ORAL DAILY
Qty: 30 TABLET | Refills: 1 | Status: SHIPPED | OUTPATIENT
Start: 2022-09-23 | End: 2022-09-26 | Stop reason: SDUPTHER

## 2022-09-23 RX ORDER — CHLORHEXIDINE GLUCONATE 0.12 MG/ML
15 RINSE ORAL ONCE
OUTPATIENT
Start: 2022-09-23 | End: 2022-09-23

## 2022-09-23 RX ORDER — FERROUS SULFATE TAB EC 324 MG (65 MG FE EQUIVALENT) 324 (65 FE) MG
324 TABLET DELAYED RESPONSE ORAL
Qty: 30 TABLET | Refills: 1 | Status: SHIPPED | OUTPATIENT
Start: 2022-09-23 | End: 2022-09-26 | Stop reason: SDUPTHER

## 2022-09-23 RX ORDER — ACETAMINOPHEN 325 MG/1
975 TABLET ORAL ONCE
OUTPATIENT
Start: 2022-09-23 | End: 2022-09-23

## 2022-09-23 NOTE — LETTER
September 23, 2022     Ghassanmelody Guerra, [facility]  [address] [facility]  [place] [address]   [address]  [id number]    Patient: Addie Oneil   YOB: 1955   Date of Visit: 9/23/2022       Dear Dr Karey Franklin: Thank you for referring Sana Maier to me for evaluation  Below are my notes for this consultation  If you have questions, please do not hesitate to call me  I look forward to following your patient along with you  Sincerely,        Es Villafana,         CC: No Recipients  Renea Morris, DO  9/23/2022  5:15 PM  Sign when Signing Visit   Assessment/Plan     1  Primary osteoarthritis of right hip    2  Avascular necrosis of hip, right ([address] )    3  Preoperative testing      Orders Placed This Encounter   Procedures    Comprehensive metabolic panel    Hemoglobin A1C W/EAG Estimation    CBC and differential    Anemia Panel w/Reflex    Ambulatory referral to [address] [facility] Ambulatory referral to Physical Therapy    Ambulatory referral to Cardiology    Type and screen     Dossie Kiley  has severe right hip arthritis  she has tried conservative treatment without adequate relief  We discussed treatment options as well as risks and benefits of treatment options  The patient would like to proceed with a right ANTERIOR  total hip arthroplasty  The risks of surgery include, but are not limited to infection, blood clot, wound healing problems, blood loss, damage to blood vessels and nerves, persistent pain and stiffness, dislocation, fracture, leg-length discrepancy, need for additional surgery, need for revision surgery, failure of hardware, heart attack, stroke, death  The patient understood and agreed to by oral and written consent  I answered all questions regarding surgery    She is a potential SAME day discharge  The patient will be on  BID for DVT prophylaxis for 6 weeks   · The patient will obtain clearance from their PCP and cardiology   Prescribed patient Diclofenac prn pain, medications warnings were reviewed with patient  She is aware she will have to stop the medication 5 days before surgery   She will be on vitamins 30 days before surgery       Return for postop appt  I answered all of the patient's questions during the visit and provided education of the patient's condition during the visit  The patient verbalized understanding of the information given and agrees with the plan  This note was dictated using [email or web address] software  It may contain errors including improperly dictated words  Please contact physician directly for any questions  History of Present Illness   Chief complaint:   Chief Complaint   Patient presents with    Right Hip - Pain       HPI: Dennis Jaffe is a 77 y o  female that c/o right hip pain  She was referred by PCPs office Marilin Blair PA-C  She states she has been having right hip pain since June 2022, denies any falls or trauma  She was previously treating at 0  and was ordered physical therapy for two months and prescribed meloxicam 7 5 mg  She went to physical therapy for greater trochanteric bursitis and states she had no relief from the sessions  She was seen by her PCP and x-rays taken of the right hip and also had MRI ordered of the right hip  She states she is having achy pain that comes and goes in the right buttock region radiating to posterior lateral hip and down to the lateral knee  She does state occasional groin pain  She denies any length discrepancy  She denies any numbness or tingling  Pain is worse with getting in and out of the bath tub, sitting, prolonged walking and lying down  She has been taking Advil as needed for pain with some relief  She recently started using a cane for assistance when ambulating She also tried using Voltaren gel for pain relief  She does have history of a right total knee arthroplasty in March of 2021 by Dr Merril Peabody    She has no history having injections or surgeries on the right hip  History of MRSA: no  History of blood clots: no  Family history of blood clots: no  History of Hepatitis C:no  History of HIV: no  Are you a smoker:no  Are you diabetic:yes/last hemoglobin A1c 04/09/2022 was 6 3  Do you see a cardiologist: no  Have you been vaccinated for COVID:yes  Have you seen a dentist in the past year: yes  Do you have a leg length discrepancy: denies LLD     ROS:    See [facility] for musculoskeletal review     All other systems reviewed are negative     Historical Information   Past Medical History:   Diagnosis Date    Diabetes mellitus (HCC)     NIDDM    Diverticulosis     hx colon resection    GERD (gastroesophageal reflux disease)     Hyperlipidemia     Hypertension     Macular degeneration     Malignant neoplasm of skin     skin cancer right upper arm in past    OA (osteoarthritis)     right knee     Past Surgical History:   Procedure Laterality Date    CATARACT EXTRACTION Bilateral     CHOLANGIOGRAM N/A 7/2/2018    Procedure: CHOLANGIOGRAM;  Surgeon: Vesta Kawasaki, MD;  [facility];  Service: General    CHOLECYSTECTOMY LAPAROSCOPIC N/A 7/2/2018    Procedure: CHOLECYSTECTOMY LAPAROSCOPIC W/IOC;  Surgeon: Vesta Kawasaki, MD;  [facility];  Service: General    COLON SURGERY      resection large intestine (diverticulitis)    COLONOSCOPY      OK LAP, INCISIONAL HERNIA REPAIR,REDUCIBLE N/A 5/30/2019    Procedure: REPAIR HERNIA INCISIONAL LAPAROSCOPIC W/ ROBOTICS WITH MESH PLACEMENT;  Surgeon: Vesta Kawasaki, MD;  [facility];  Service: General    OK TOTAL KNEE ARTHROPLASTY Right 3/19/2021    Procedure: ARTHROPLASTY KNEE TOTAL;  Surgeon: Meg Posada MD;  [facility];  Service: Orthopedics    TONSILLECTOMY  child     Social History   Social History     Substance and Sexual Activity   Alcohol Use Never    Comment: beer/ liquor     Social History     Substance and Sexual Activity   Drug Use No     Social History Tobacco Use   Smoking Status Never Smoker   Smokeless Tobacco Never Used     Family History:   Family History   Problem Relation Age of Onset    Alzheimer's disease Mother     Anxiety disorder Mother     Diabetes Mother     Cancer Father     Diabetes Sister     Hypertension Sister     Stroke Sister        Current Outpatient Medications on File Prior to Visit   Medication Sig Dispense Refill    acetaminophen (TYLENOL) 500 mg tablet Take 500-1,000 mg by mouth every 6 (six) hours as needed for mild pain      aspirin 325 mg tablet Take 1 tablet (325 mg total) by mouth daily (Patient taking differently: Take 325 mg by mouth daily Baby aspirin 81 mg)  0    fexofenadine (ALLEGRA) 180 MG tablet Take 180 mg by mouth daily as needed       fluticasone (FLONASE) 50 mcg/act nasal spray 1 spray into each nostril 2 (two) times a day as needed       lisinopril (ZESTRIL) 10 mg tablet TAKE 1 TABLET DAILY 90 tablet 1    meloxicam (MOBIC) 7 5 mg tablet Take 1 tablet (7 5 mg total) by mouth 2 (two) times a day 60 tablet 0    metFORMIN (GLUCOPHAGE) 500 mg tablet TAKE 1 TABLET DAILY WITH   BREAKFAST 90 tablet 1    pantoprazole (PROTONIX) 20 mg tablet TAKE 1 TABLET DAILY 90 tablet 1    pravastatin (PRAVACHOL) 20 mg tablet TAKE 1 TABLET DAILY 90 tablet 1     No current facility-administered medications on file prior to visit  No Known Allergies    Objective   Vitals: Blood pressure 151/69, pulse 68, height 5' 7" (1 702 m), weight 83 6 kg (184 lb 3 2 oz), not currently breastfeeding  ,Body mass index is 28 85 kg/m²      PE:  AAOx 3  WDWN  Hearing intact, no drainage from eyes  Regular rate  no audible wheezing  no abdominal distension  LE compartments soft, skin intact    right hip:   No dislocation/deformity  ROM: FF 5-90  Flexion contracture 5 degrees  IR 0-15  ER 0-25   No TTP over greater trochanter    Leg Lengths: right 5-10 mm shorter but has 5 degree flexion contracture       bilateralLE:    LLE: EHL/AT/GS/quads/hamstrings/iliopsoas 5/5, sensation grossly intact L4, L5, S1, palpable pedal pulse    RLE:  EHL/AT/GS/quads/hamstrings/iliopsoas 5/5, sensation grossly intact L4, L5, S1, palpable pedal pulse    Imaging Studies: I have personally reviewed pertinent films in PACS   XR righthip:  Severe degenerative changes with AVN       Scribe Attestation    I,:  Nicolas Wynn am acting as a scribe while in the presence of the attending physician :       I,:  Bing Larsen, DO personally performed the services described in this documentation    as scribed in my presence :

## 2022-09-23 NOTE — PROGRESS NOTES
Assessment/Plan     1  Primary osteoarthritis of right hip    2  Avascular necrosis of hip, right ([address] )    3  Preoperative testing      Orders Placed This Encounter   Procedures    Comprehensive metabolic panel    Hemoglobin A1C W/EAG Estimation    CBC and differential    Anemia Panel w/Reflex    Ambulatory referral to [address] [facility] Ambulatory referral to Physical Therapy    Ambulatory referral to Cardiology    Type and screen     Maria Elena Magdaleno  has severe right hip arthritis  she has tried conservative treatment without adequate relief  We discussed treatment options as well as risks and benefits of treatment options  The patient would like to proceed with a right ANTERIOR  total hip arthroplasty  The risks of surgery include, but are not limited to infection, blood clot, wound healing problems, blood loss, damage to blood vessels and nerves, persistent pain and stiffness, dislocation, fracture, leg-length discrepancy, need for additional surgery, need for revision surgery, failure of hardware, heart attack, stroke, death  The patient understood and agreed to by oral and written consent  I answered all questions regarding surgery  She is a potential SAME day discharge  The patient will be on  BID for DVT prophylaxis for 6 weeks   · The patient will obtain clearance from their PCP and cardiology   Prescribed patient Diclofenac prn pain, medications warnings were reviewed with patient  She is aware she will have to stop the medication 5 days before surgery   She will be on vitamins 30 days before surgery       Return for postop appt  I answered all of the patient's questions during the visit and provided education of the patient's condition during the visit  The patient verbalized understanding of the information given and agrees with the plan  This note was dictated using [employer] software  It may contain errors including improperly dictated words    Please contact physician directly for any questions  History of Present Illness   Chief complaint:   Chief Complaint   Patient presents with    Right Hip - Pain       HPI: Shanika Nielsen is a 77 y o  female that c/o right hip pain  She was referred by PCPs office London Olivas PA-C  She states she has been having right hip pain since June 2022, denies any falls or trauma  She was previously treating at [facility] and was ordered physical therapy for two months and prescribed meloxicam 7 5 mg  She went to physical therapy for greater trochanteric bursitis and states she had no relief from the sessions  She was seen by her PCP and x-rays taken of the right hip and also had MRI ordered of the right hip  She states she is having achy pain that comes and goes in the right buttock region radiating to posterior lateral hip and down to the lateral knee  She does state occasional groin pain  She denies any length discrepancy  She denies any numbness or tingling  Pain is worse with getting in and out of the bath tub, sitting, prolonged walking and lying down  She has been taking Advil as needed for pain with some relief  She recently started using a cane for assistance when ambulating She also tried using Voltaren gel for pain relief  She does have history of a right total knee arthroplasty in March of 2021 by Dr Brian Briscoe  She has no history having injections or surgeries on the right hip  History of MRSA: no  History of blood clots: no  Family history of blood clots: no  History of Hepatitis C:no  History of HIV: no  Are you a smoker:no  Are you diabetic:yes/last hemoglobin A1c 04/09/2022 was 6 3  Do you see a cardiologist: no  Have you been vaccinated for COVID:yes  Have you seen a dentist in the past year: yes  Do you have a leg length discrepancy: denies LLD     ROS:    See HPI for musculoskeletal review     All other systems reviewed are negative     Historical Information   Past Medical History:   Diagnosis Date    Diabetes mellitus ([facility] )     NIDDM    Diverticulosis     hx colon resection    GERD (gastroesophageal reflux disease)     Hyperlipidemia     Hypertension     Macular degeneration     Malignant neoplasm of skin     skin cancer right upper arm in past    OA (osteoarthritis)     right knee     Past Surgical History:   Procedure Laterality Date    CATARACT EXTRACTION Bilateral     CHOLANGIOGRAM N/A 7/2/2018    Procedure: CHOLANGIOGRAM;  Surgeon: Tasha Marlow MD;  [facility];  Service: General    CHOLECYSTECTOMY LAPAROSCOPIC N/A 7/2/2018    Procedure: CHOLECYSTECTOMY LAPAROSCOPIC W/IOC;  Surgeon: Tasha Marlow MD;  [facility];  Service: General    COLON SURGERY      resection large intestine (diverticulitis)    COLONOSCOPY      [place] LAP, INCISIONAL HERNIA REPAIR,REDUCIBLE N/A 5/30/2019    Procedure: REPAIR HERNIA INCISIONAL LAPAROSCOPIC W/ ROBOTICS [address];  Surgeon: Tasha Marlow MD;  [facility];  Service: General    MT TOTAL KNEE ARTHROPLASTY Right 3/19/2021    Procedure: ARTHROPLASTY KNEE TOTAL;  Surgeon: Atif Gurrola MD;  [facility];  Service: Orthopedics    TONSILLECTOMY  child     Social History   Social History     Substance and Sexual Activity   Alcohol Use Never    Comment: beer/ liquor     Social History     Substance and Sexual Activity   Drug Use No     Social History     Tobacco Use   Smoking Status Never Smoker   Smokeless Tobacco Never Used     Family History:   Family History   Problem Relation Age of Onset    Alzheimer's disease Mother     Anxiety disorder Mother     Diabetes Mother     Cancer Father     Diabetes Sister     Hypertension Sister     Stroke Sister        Current Outpatient Medications on File Prior to Visit   Medication Sig Dispense Refill    acetaminophen (TYLENOL) 500 mg tablet Take 500-1,000 mg by mouth every 6 (six) hours as needed for mild pain      aspirin 325 mg tablet Take 1 tablet (325 mg total) by mouth daily (Patient taking differently: Take 325 mg by mouth daily Baby aspirin 81 mg)  0    fexofenadine (ALLEGRA) 180 MG tablet Take 180 mg by mouth daily as needed       fluticasone (FLONASE) 50 mcg/act nasal spray 1 spray into each nostril 2 (two) times a day as needed       lisinopril (ZESTRIL) 10 mg tablet TAKE 1 TABLET DAILY 90 tablet 1    meloxicam (MOBIC) 7 5 mg tablet Take 1 tablet (7 5 mg total) by mouth 2 (two) times a day 60 tablet 0    metFORMIN (GLUCOPHAGE) 500 mg tablet TAKE 1 TABLET DAILY WITH   BREAKFAST 90 tablet 1    pantoprazole (PROTONIX) 20 mg tablet TAKE 1 TABLET DAILY 90 tablet 1    pravastatin (PRAVACHOL) 20 mg tablet TAKE 1 TABLET DAILY 90 tablet 1     No current facility-administered medications on file prior to visit  No Known Allergies    Objective   Vitals: Blood pressure 151/69, pulse 68, height 5' 7" (1 702 m), weight 83 6 kg (184 lb 3 2 oz), not currently breastfeeding  ,Body mass index is 28 85 kg/m²      PE:  AAOx 3  WDWN  Hearing intact, no drainage from eyes  Regular rate  no audible wheezing  no abdominal distension  LE compartments soft, skin intact    right hip:   No dislocation/deformity  ROM: FF 5-90  Flexion contracture 5 degrees  IR 0-15  ER 0-25   No TTP over greater trochanter    Leg Lengths: right 5-10 mm shorter but has 5 degree flexion contracture       bilateralLE:    LLE:  EHL/AT/GS/quads/hamstrings/iliopsoas 5/5, sensation grossly intact L4, L5, S1, palpable pedal pulse    RLE:  EHL/AT/GS/quads/hamstrings/iliopsoas 5/5, sensation grossly intact L4, L5, S1, palpable pedal pulse    Imaging Studies: I have personally reviewed pertinent films in PACS   XR righthip:  Severe degenerative changes with AVN       Scribe Attestation    I,:  Shahla Wynn am acting as a scribe while in the presence of the attending physician :       I,:  Nicolas May, DO personally performed the services described in this documentation    as scribed in my presence :

## 2022-09-26 ENCOUNTER — TELEPHONE (OUTPATIENT)
Dept: OBGYN CLINIC | Facility: MEDICAL CENTER | Age: 67
End: 2022-09-26

## 2022-09-26 DIAGNOSIS — M16.11 PRIMARY OSTEOARTHRITIS OF RIGHT HIP: ICD-10-CM

## 2022-09-26 RX ORDER — FOLIC ACID 1 MG/1
1 TABLET ORAL DAILY
Qty: 30 TABLET | Refills: 1 | Status: SHIPPED | OUTPATIENT
Start: 2022-09-26

## 2022-09-26 RX ORDER — ASCORBIC ACID 500 MG
500 TABLET ORAL DAILY
Qty: 30 TABLET | Refills: 1 | Status: SHIPPED | OUTPATIENT
Start: 2022-09-26

## 2022-09-26 RX ORDER — MULTIVITAMIN
1 TABLET ORAL DAILY
Qty: 30 TABLET | Refills: 1 | Status: SHIPPED | OUTPATIENT
Start: 2022-09-26

## 2022-09-26 RX ORDER — FERROUS SULFATE TAB EC 324 MG (65 MG FE EQUIVALENT) 324 (65 FE) MG
324 TABLET DELAYED RESPONSE ORAL
Qty: 30 TABLET | Refills: 1 | Status: SHIPPED | OUTPATIENT
Start: 2022-09-26

## 2022-09-26 NOTE — TELEPHONE ENCOUNTER
Patient called me and said she never received her pre op vitamins to the pharmacy  When I checked they were sent to the wrong the pharmacy  Can you please resend to [facility] in [place]? Patient would like a call back when this is done  Thank you

## 2022-10-07 ENCOUNTER — TELEPHONE (OUTPATIENT)
Dept: OBGYN CLINIC | Facility: HOSPITAL | Age: 67
End: 2022-10-07

## 2022-10-07 NOTE — TELEPHONE ENCOUNTER
Caller: Patient    Doctor: Zorita Claude    Reason for call: returning missed call, surgery scheduling    Call back#: [phone] 
I called and spoke with the patient 
NO IV

## 2022-10-11 PROBLEM — J01.40 ACUTE NON-RECURRENT PANSINUSITIS: Status: RESOLVED | Noted: 2019-06-17 | Resolved: 2022-10-11

## 2022-10-11 PROBLEM — Z00.00 HEALTH CARE MAINTENANCE: Status: RESOLVED | Noted: 2022-04-14 | Resolved: 2022-10-11

## 2022-10-15 LAB
CREAT ?TM UR-SCNC: 132 UMOL/L
EXT MICROALBUMIN URINE RANDOM: 1.8
HBA1C MFR BLD HPLC: 6 %
HCV AB SER-ACNC: NEGATIVE
MICROALBUMIN/CREAT UR: 13.6 MG/G{CREAT}

## 2022-10-26 ENCOUNTER — TELEPHONE (OUTPATIENT)
Dept: FAMILY MEDICINE CLINIC | Facility: CLINIC | Age: 67
End: 2022-10-26

## 2022-10-26 ENCOUNTER — OFFICE VISIT (OUTPATIENT)
Dept: FAMILY MEDICINE CLINIC | Facility: CLINIC | Age: 67
End: 2022-10-26
Payer: MEDICARE

## 2022-10-26 VITALS
HEART RATE: 72 BPM | SYSTOLIC BLOOD PRESSURE: 134 MMHG | RESPIRATION RATE: 16 BRPM | WEIGHT: 185 LBS | HEIGHT: 67 IN | DIASTOLIC BLOOD PRESSURE: 68 MMHG | BODY MASS INDEX: 29.03 KG/M2

## 2022-10-26 DIAGNOSIS — Z78.0 ASYMPTOMATIC POSTMENOPAUSAL STATE: ICD-10-CM

## 2022-10-26 DIAGNOSIS — Z12.4 SCREENING FOR CERVICAL CANCER: ICD-10-CM

## 2022-10-26 DIAGNOSIS — Z12.31 ENCOUNTER FOR SCREENING MAMMOGRAM FOR BREAST CANCER: ICD-10-CM

## 2022-10-26 PROCEDURE — 99214 OFFICE O/P EST MOD 30 MIN: CPT | Performed by: PHYSICIAN ASSISTANT

## 2022-10-26 NOTE — PATIENT INSTRUCTIONS
Assessment/plan:  Type 2 diabetes -presently stable with hemoglobin A1c of 6 0 on metformin therapy, no medication changes  2  Mixed hyperlipidemia- cholesterol numbers have gone up a little bit  Patient does continue pravastatin  Encourage healthy diet and exercise efforts yet  Reassess in 6 months and if continues to rise consider switching to a stronger statin  3   GERD-stable on Protonix 40 mg daily  4   Benign essential hypertension -stable on lisinopril 10 mg daily  Flu vaccine given today

## 2022-10-26 NOTE — PROGRESS NOTES
Name: Lizeth Clancy      : 1955      MRN: [id number]  Encounter Provider: Charles Wheat PA-C  Encounter Date: 10/26/2022   Encounter department: [address] [place] [address] PRIMARY CARE    Assessment & Plan     Patient Instructions     Assessment/plan:  Type 2 diabetes -presently stable with hemoglobin A1c of 6 0 on metformin therapy, no medication changes  2  Mixed hyperlipidemia- cholesterol numbers have gone up a little bit  Patient does continue pravastatin  Encourage healthy diet and exercise efforts yet  Reassess in 6 months and if continues to rise consider switching to a stronger statin  3   GERD-stable on Protonix 40 mg daily  4   Benign essential hypertension -stable on lisinopril 10 mg daily  Flu vaccine given today  1  Screening for cervical cancer  -     Ambulatory referral to Obstetrics / Gynecology; Future    2  Asymptomatic postmenopausal state  -     DXA bone density spine hip and pelvis; Future; Expected date: 2023    3  Encounter for screening mammogram for breast cancer  -     Mammo screening bilateral w 3d & cad; Future; Expected date: 10/26/2022      Depression Screening and Follow-up Plan: Patient was screened for depression during today's encounter  They screened negative with a PHQ-2 score of 0  Subjective        HPI:  This is a 51-year-old female who presents to the office for follow-up of chronic health conditions including type 2 diabetes, hypertension, and increased lipids  She is here to review recent blood work that she had completed  She has been trying to watch her diet and do some regular walking and exercise  She does continue metformin therapy for her diabetes  She does also have a history of esophageal reflux disease and continues on Protonix  She has very seldom symptoms as long she takes the medication  Her blood pressure has been stable on lisinopril and she does continue pravastatin for her cholesterol    She does work with the orthopedic specialist for ongoing hip pain and is planning to have hip replacement surgery scheduled in December  Review of Systems   Constitutional: Negative for chills, fatigue and fever  HENT: Negative for congestion, ear pain and sinus pressure  Eyes: Negative for visual disturbance  Respiratory: Negative for cough, chest tightness and shortness of breath  Cardiovascular: Negative for chest pain and palpitations  Gastrointestinal: Negative for diarrhea, nausea and vomiting  Endocrine: Negative for polyuria  Genitourinary: Negative for dysuria and frequency  Musculoskeletal: Negative for arthralgias and myalgias  Skin: Negative for pallor and rash  Neurological: Negative for dizziness, weakness, light-headedness, numbness and headaches  Psychiatric/Behavioral: Negative for agitation, behavioral problems and sleep disturbance  All other systems reviewed and are negative        Current Outpatient Medications on File Prior to Visit   Medication Sig   • acetaminophen (TYLENOL) 500 mg tablet Take 500-1,000 mg by mouth every 6 (six) hours as needed for mild pain   • ascorbic acid (VITAMIN C) 500 MG tablet Take 1 tablet (500 mg total) by mouth daily Start to take 30 days before surgery   • aspirin 325 mg tablet Take 1 tablet (325 mg total) by mouth daily (Patient taking differently: Take 325 mg by mouth daily Baby aspirin 81 mg)   • diclofenac (VOLTAREN) 75 mg EC tablet Take 1 tablet (75 mg total) by mouth 2 (two) times a day PRN for pain   • ferrous sulfate 324 (65 Fe) mg Take 1 tablet (324 mg total) by mouth daily before breakfast Start to take 30 days before surgery   • fexofenadine (ALLEGRA) 180 MG tablet Take 180 mg by mouth daily as needed    • fluticasone (FLONASE) 50 mcg/act nasal spray 1 spray into each nostril 2 (two) times a day as needed    • folic acid (KP Folic Acid) 1 mg tablet Take 1 tablet (1 mg total) by mouth daily Start to take 30 days before surgery   • lisinopril (ZESTRIL) 10 mg tablet TAKE 1 TABLET DAILY   • meloxicam (MOBIC) 7 5 mg tablet Take 1 tablet (7 5 mg total) by mouth 2 (two) times a day   • metFORMIN (GLUCOPHAGE) 500 mg tablet TAKE 1 TABLET DAILY WITH   BREAKFAST   • Multiple Vitamin (multivitamin) tablet Take 1 tablet by mouth daily Start to take 30 days before surgery   • pantoprazole (PROTONIX) 20 mg tablet TAKE 1 TABLET DAILY   • pravastatin (PRAVACHOL) 20 mg tablet TAKE 1 TABLET DAILY       Objective     /68 (BP Location: Left arm, Patient Position: Sitting, Cuff Size: Large)   Pulse 72   Resp 16   Ht 5' 7" (1 702 m)   Wt 83 9 kg (185 lb)   BMI 28 98 kg/m²     Physical Exam  Vitals and nursing note reviewed  Constitutional:       General: She is not in acute distress  Appearance: She is well-developed  HENT:      Head: Normocephalic and atraumatic  Right Ear: External ear normal       Left Ear: External ear normal       Nose: Nose normal       Mouth/Throat:      Pharynx: No oropharyngeal exudate  Eyes:      Conjunctiva/sclera: Conjunctivae normal       Pupils: Pupils are equal, round, and reactive to light  Neck:      Thyroid: No thyromegaly  Trachea: No tracheal deviation  Cardiovascular:      Rate and Rhythm: Normal rate and regular rhythm  Heart sounds: Normal heart sounds  No murmur heard  No friction rub  Pulmonary:      Effort: Pulmonary effort is normal  No respiratory distress  Breath sounds: Normal breath sounds  No wheezing or rales  Abdominal:      General: Bowel sounds are normal  There is no distension  Palpations: Abdomen is soft  Tenderness: There is no abdominal tenderness  There is no guarding or rebound  Musculoskeletal:         General: No tenderness  Normal range of motion  Cervical back: Normal range of motion and neck supple  Lymphadenopathy:      Cervical: No cervical adenopathy  Skin:     General: Skin is warm and dry  Findings: No erythema or rash  Neurological:      Mental Status: She is alert and oriented to person, place, and time  Cranial Nerves: No cranial nerve deficit  Coordination: Coordination normal    Psychiatric:         Behavior: Behavior normal          Thought Content:  Thought content normal        Jessie Oscar PA-C

## 2022-10-27 ENCOUNTER — TELEPHONE (OUTPATIENT)
Dept: ADMINISTRATIVE | Facility: OTHER | Age: 67
End: 2022-10-27

## 2022-10-27 NOTE — LETTER
Diabetic Eye Exam Form    Date Requested: 10/27/22  Patient: Lisa Barbour  Patient : 1955   Referring Provider: Sendy Zamorano PA-C    DIABETIC Eye Exam Date _______________________________    Type of Exam MUST be documented for Diabetic Eye Exams  Please CHECK ONE  Retinal Exam       Dilated Retinal Exam       OCT       Optomap-Iris Exam      Fundus Photography     Left Eye - Please check Retinopathy AND Type or No Retinopathy      Exam did show retinopathy    Exam did not show retinopathy         Mild     Proliferative           Moderate    Severe            None         Right Eye - Please check Retinopathy AND Type or No Retinopathy     Exam did show retinopathy    Exam did not show retinopathy         Mild     Proliferative        Moderate    Severe        None       Comments __________________________________________________________    Practice Providing Exam ______________________________________________    Exam Performed By (print name) _______________________________________      Provider Signature ___________________________________________________    These reports are needed for  compliance  Please fax this completed form and a copy of the Diabetic Eye Exam report to our office located at [place] [address] as soon as possible via [phone] attention Shivani: Phone [phone]  We thank you for your assistance in treating our mutual patient

## 2022-10-27 NOTE — TELEPHONE ENCOUNTER
----- Message from Jaya Toscano sent at 10/26/2022  3:39 PM EDT -----  Regarding: eye exam  10/26/22 3:40 PM    Hello, our patient Amanda Rivera has had Diabetic Eye Exam completed/performed  Please assist in updating the patient chart by making an External outreach to [facility] located [facility] [place] [facility] [place]  The date of service is during the summer    Thank you,  Venkata [facility] [address]

## 2022-10-27 NOTE — TELEPHONE ENCOUNTER
Upon review of the In Basket request and the patient's chart, initial outreach has been made via fax to facility  , please see Contacts section for details       Thank you  Aleena Metz

## 2022-11-07 NOTE — TELEPHONE ENCOUNTER
Upon review of the In Basket request we were able to locate, review, and update the patient chart as requested for Diabetic Eye Exam     Any additional questions or concerns should be emailed to the Practice Liaisons via the appropriate education email address, please do not reply via In Basket      Thank you  Dipti Lobo

## 2022-11-15 ENCOUNTER — CONSULT (OUTPATIENT)
Dept: CARDIOLOGY CLINIC | Facility: CLINIC | Age: 67
End: 2022-11-15

## 2022-11-15 ENCOUNTER — TELEPHONE (OUTPATIENT)
Dept: OBGYN CLINIC | Facility: HOSPITAL | Age: 67
End: 2022-11-15

## 2022-11-15 VITALS
HEART RATE: 61 BPM | HEIGHT: 67 IN | BODY MASS INDEX: 28.85 KG/M2 | SYSTOLIC BLOOD PRESSURE: 138 MMHG | WEIGHT: 183.8 LBS | DIASTOLIC BLOOD PRESSURE: 74 MMHG

## 2022-11-15 DIAGNOSIS — M16.11 PRIMARY OSTEOARTHRITIS OF RIGHT HIP: ICD-10-CM

## 2022-11-15 DIAGNOSIS — E78.2 MIXED HYPERLIPIDEMIA: ICD-10-CM

## 2022-11-15 DIAGNOSIS — Z01.810 PRE-OPERATIVE CARDIOVASCULAR EXAMINATION: Primary | ICD-10-CM

## 2022-11-15 DIAGNOSIS — M87.051 AVASCULAR NECROSIS OF HIP, RIGHT (HCC): ICD-10-CM

## 2022-11-15 DIAGNOSIS — E11.9 TYPE 2 DIABETES MELLITUS WITHOUT COMPLICATION, WITHOUT LONG-TERM CURRENT USE OF INSULIN (HCC): ICD-10-CM

## 2022-11-15 DIAGNOSIS — Z01.818 PREOPERATIVE TESTING: ICD-10-CM

## 2022-11-15 DIAGNOSIS — I10 ESSENTIAL HYPERTENSION: ICD-10-CM

## 2022-11-15 NOTE — PROGRESS NOTES
Cardiology Consultation   MD Raymundo Dotson MD Mayford Martinez, DO, FELICITAS Gallego MD Andree Bales, DO, Rylee Reddy DO, [facility]  -------------------------------------------------------------------  [facility]  [address] [place], [place] [id number] [phone]  [phone]  11/15/22  Swati Mitchell  YOB: 1955   MRN: [id number]      Referring Physician: Jas Lee DO  [address]  [facility],  [address] [facility]     HPI:  I am seeing this patient in cardiology consultation for:  Pre op    Swati Mitchell is a 77 y o  female with:   Diabetes  Hypertension  Dyslipidemia  Osteoarthritis    Never smoked  Possibly 1 drink alcohol per month  No drugs  No history of premature coronary disease in the family    She presents today for evaluation with Cardiology for preoperative cardiac risk assessment prior to hip replacement surgery  She states that she has severe right-sided hip pain which significantly affects her ability to ambulate  She never was diagnosed with coronary disease or any other type of heart disease  She denies any symptoms of some heart disease or anything to suggest underlying heart disease  She never gets chest pain or significant shortness of breath or palpitations  Her electrocardiogram today is totally normal     Review of Systems   Constitutional: Negative for chills and fever  HENT: Negative for facial swelling and sore throat  Eyes: Negative for visual disturbance  Respiratory: Negative for cough, chest tightness, shortness of breath and wheezing  Cardiovascular: Negative for chest pain, palpitations and leg swelling  Gastrointestinal: Negative for abdominal pain, blood in stool, constipation, diarrhea, nausea and vomiting  Endocrine: Negative for cold intolerance and heat intolerance     Genitourinary: Negative for decreased urine volume, difficulty urinating, dysuria and hematuria  Musculoskeletal: Positive for arthralgias and gait problem  Negative for back pain and myalgias  Skin: Negative for rash  Neurological: Negative for dizziness, syncope, weakness and numbness  Psychiatric/Behavioral: Negative for agitation, behavioral problems and confusion  The patient is not nervous/anxious  OBJECTIVE  Vitals:    11/15/22 1519   BP: 138/74   Pulse: 61       Physical Exam   Constitutional: awake, alert and oriented, in no acute distress, no obvious deformities  Head: Normocephalic, without obvious abnormality, atraumatic  Eyes: conjunctivae clear and moist  Sclera anicteric  No xanthelasmas  Pupils equal bilaterally  Extraocular motions are full  Ear nose mouth and throat: ears are symmetrical bilaterally, hearing appears to be equal bilaterally, no nasal discharge or epistaxis, oropharynx is clear with moist mucous membranes  Neck:  Trachea is midline, neck is supple, no thyromegaly or significant lymphadenopathy, there is full range of motion  Lungs: clear to auscultation bilaterally, no wheezes, no rales, no rhonchi, no accessory muscle use, breathing is nonlabored  Heart: regular rate and rhythm, S1, S2 normal, no murmur, no click, no rub and no gallop, no lower extremity edema  Abdomen: soft, non-tender; bowel sounds normal; no masses,  no organomegaly  Psychiatric:  Patient is oriented to time, place, person, mood/affect is negative for depression, anxiety, agitation, appears to have appropriate insight  Skin: Skin is warm, dry, intact  No obvious rashes or lesions on exposed extremities  Nail beds are pink with no cyanosis or clubbing      EKG:  Results for orders placed or performed in visit on 11/15/22   POCT ECG    Impression    Normal sinus rhythm, normal ECG        The ASCVD Risk score (Tello Jimenez et al , 2013) failed to calculate for the following reasons:    Cannot find a previous HDL lab    Cannot find a previous total cholesterol lab    IMPRESSION:  Preoperative risk assessment  Diabetes  Hypertension  Dyslipidemia  Osteoarthritis    DISCUSSION/RECOMMENDATIONS:  Her Duke activity status index score is 18 95 which predicts the ability do 6 52 metabolic equivalents of activity  She presents today for preoperative cardiac risk assessment  Her EKG is normal  Blood pressure is controlled  She has no known history of coronary disease or any symptoms to suggest coronary disease  There is no murmur on physical exam to suggest underlying structural heart disease  She has no history of kidney disease  Her revised cardiac risk index score predicts a 0 4% risk of cardiac complication with surgery which is considered a very low risk  Considering all the above may proceed as planned with surgery without further testing    Manus DO Ad[place], [facility]  --------------------------------------------------------------------------------  TREADMILL STRESS  No results found for this or any previous visit      ----------------------------------------------------------------------------------------------  NUCLEAR STRESS TEST: No results found for this or any previous visit  No results found for this or any previous visit       --------------------------------------------------------------------------------  CATH:  No results found for this or any previous visit     --------------------------------------------------------------------------------  ECHO:   No results found for this or any previous visit  No results found for this or any previous visit     --------------------------------------------------------------------------------  HOLTER  No results found for this or any previous visit     --------------------------------------------------------------------------------  CAROTIDS  No results found for this or any previous visit         Diagnoses and all orders for this visit:    Pre-operative cardiovascular examination  -     POCT ECG    Primary osteoarthritis of right hip  -     Ambulatory referral to Cardiology  -     POCT ECG    Avascular necrosis of hip, right ([facility] )  -     Ambulatory referral to Cardiology  -     POCT ECG    Preoperative testing  -     Ambulatory referral to Cardiology    Essential hypertension    Mixed hyperlipidemia    Type 2 diabetes mellitus without complication, without long-term current use of insulin (HCC)     ======================================================    Past Medical History:   Diagnosis Date   • Diabetes mellitus ([facility] )     NIDDM   • Diverticulosis     hx colon resection   • GERD (gastroesophageal reflux disease)    • Hyperlipidemia    • Hypertension    • Macular degeneration    • Malignant neoplasm of skin     skin cancer right upper arm in past   • OA (osteoarthritis)     right knee     Past Surgical History:   Procedure Laterality Date   • CATARACT EXTRACTION Bilateral    • CHOLANGIOGRAM N/A 7/2/2018    Procedure: CHOLANGIOGRAM;  Surgeon: Darcy Perez MD;  [facility]: [facility];  Service: General   • CHOLECYSTECTOMY LAPAROSCOPIC N/A 7/2/2018    Procedure: CHOLECYSTECTOMY LAPAROSCOPIC W/IOC;  Surgeon: Darcy Perez MD;  [facility];  Service: General   • COLON SURGERY      resection large intestine (diverticulitis)   • COLONOSCOPY     • GA LAP, INCISIONAL HERNIA REPAIR,REDUCIBLE N/A 5/30/2019    Procedure: REPAIR HERNIA INCISIONAL LAPAROSCOPIC W/ ROBOTICS WITH MESH PLACEMENT;  Surgeon: Darcy Perez MD;  [facility];  Service: General   • GA TOTAL KNEE ARTHROPLASTY Right 3/19/2021    Procedure: ARTHROPLASTY KNEE TOTAL;  Surgeon: Renata Reyes MD;  Location: [facility];  Service: Orthopedics   • TONSILLECTOMY  child         Medications  Current Outpatient Medications   Medication Sig Dispense Refill   • acetaminophen (TYLENOL) 500 mg tablet Take 500-1,000 mg by mouth every 6 (six) hours as needed for mild pain     • ascorbic acid (VITAMIN C) 500 MG tablet Take 1 tablet (500 mg total) by mouth daily Start to take 30 days before surgery 30 tablet 1   • aspirin 325 mg tablet Take 1 tablet (325 mg total) by mouth daily (Patient taking differently: Take 325 mg by mouth daily Baby aspirin 81 mg)  0   • diclofenac (VOLTAREN) 75 mg EC tablet Take 1 tablet (75 mg total) by mouth 2 (two) times a day PRN for pain 60 tablet 1   • ferrous sulfate 324 (65 Fe) mg Take 1 tablet (324 mg total) by mouth daily before breakfast Start to take 30 days before surgery 30 tablet 1   • fexofenadine (ALLEGRA) 180 MG tablet Take 180 mg by mouth daily as needed      • fluticasone (FLONASE) 50 mcg/act nasal spray 1 spray into each nostril 2 (two) times a day as needed      • folic acid (KP Folic Acid) 1 mg tablet Take 1 tablet (1 mg total) by mouth daily Start to take 30 days before surgery 30 tablet 1   • lisinopril (ZESTRIL) 10 mg tablet TAKE 1 TABLET DAILY 90 tablet 1   • metFORMIN (GLUCOPHAGE) 500 mg tablet TAKE 1 TABLET DAILY WITH   BREAKFAST 90 tablet 1   • Multiple Vitamin (multivitamin) tablet Take 1 tablet by mouth daily Start to take 30 days before surgery 30 tablet 1   • pantoprazole (PROTONIX) 20 mg tablet TAKE 1 TABLET DAILY 90 tablet 1   • pravastatin (PRAVACHOL) 20 mg tablet TAKE 1 TABLET DAILY 90 tablet 1   • meloxicam (MOBIC) 7 5 mg tablet Take 1 tablet (7 5 mg total) by mouth 2 (two) times a day 60 tablet 0     No current facility-administered medications for this visit          No Known Allergies    Social History     Socioeconomic History   • Marital status: /Civil Union     Spouse name: Not on file   • Number of children: Not on file   • Years of education: Not on file   • Highest education level: Not on file   Occupational History   • Occupation: employed   Tobacco Use   • Smoking status: Never Smoker   • Smokeless tobacco: Never Used   Vaping Use   • Vaping Use: Never used   Substance and Sexual Activity   • Alcohol use: Never     Comment: beer/ liquor   • Drug use: No   • Sexual activity: Not on file   Other Topics Concern   • Not on file   Social History Narrative   • Not on file     Social Determinants of Health     Financial Resource Strain: Not on file   Food Insecurity: Not on file   Transportation Needs: Not on file   Physical Activity: Not on file   Stress: Not on file   Social Connections: Not on file   Intimate Partner Violence: Not on file   Housing Stability: Not on file        Family History   Problem Relation Age of Onset   • Alzheimer's disease Mother    • Anxiety disorder Mother    • Diabetes Mother    • Cancer Father    • Diabetes Sister    • Hypertension Sister    • Stroke Sister        Lab Results   Component Value Date    WBC 6 75 02/23/2021    HGB 14 4 02/23/2021    HCT 43 8 02/23/2021    MCV 89 02/23/2021     02/23/2021      Lab Results   Component Value Date    SODIUM 140 03/20/2021    K 4 1 03/20/2021     03/20/2021    CO2 26 03/20/2021    BUN 11 03/20/2021    CREATININE 0 75 03/20/2021    GLUC 156 (H) 03/20/2021    CALCIUM 8 6 03/20/2021      Lab Results   Component Value Date    HGBA1C 6 0 (H) 10/15/2022      Lab Results   Component Value Date    CHOL 157 11/02/2015    CHOL 142 04/03/2015    CHOL 167 07/16/2014     Lab Results   Component Value Date    HDL 35 (L) 11/24/2018    HDL 38 11/02/2015    HDL 42 04/03/2015     Lab Results   Component Value Date    LDLCALC 95 11/24/2018    LDLCALC 85 11/02/2015    LDLCALC 73 04/03/2015     Lab Results   Component Value Date    TRIG 272 (H) 11/24/2018    TRIG 169 11/02/2015    TRIG 134 04/03/2015     No results found for: [place], [place]   Lab Results   Component Value Date    INR 0 93 02/23/2021    PROTIME 12 5 02/23/2021          Patient Active Problem List    Diagnosis Date Noted   • Acute right hip pain 06/06/2022   • Osteoarthritis of right knee 02/09/2021   • Arthritis of right knee 05/20/2020   • Allergic dermatitis 10/03/2018   • Type 2 diabetes mellitus without complication, without long-term current use of insulin ([facility] ) 04/11/2017   • Melanoma in situ ([facility] ) 08/15/2016   • GERD without esophagitis 12/26/2013   • Allergic rhinitis 11/26/2012   • Mixed hyperlipidemia 11/26/2012   • Essential hypertension 11/26/2012       Portions of the record may have been created with voice recognition software  Occasional wrong word or "sound a like" substitutions may have occurred due to the inherent limitations of voice recognition software  Read the chart carefully and recognize, using context, where substitutions have occurred      Dano Diaz DO, [facility] [place]  11/15/2022 3:52 PM

## 2022-11-17 NOTE — TELEPHONE ENCOUNTER
Preoperative Elective Admission Assessment- spoke to patient    Medicare- [facility]    Living Situation:    Who does pt live with: spouse  What kind of home: multi-level  How do they enter the home: front  How many levels in home: 2   # of steps to enter home: 2 to enter   # of steps to second floor: 12 to the 2nd floor   Are there handrails: Yes  Are there landings: No  Sleeping arrangement: first/entry floor  Where is Bathroom: 1/2 bath   Where is the tub or shower: Full bathroom on the 2nd floor step in tub      First Floor Setup:   Is there a bathroom: Yes  Where would pt sleep: couch     DME: frame walker, cane and 3-in-1 bedside commode     Patient's Current Level of Function: Ambulates with cane and ADLs: Independent    Post-op Caregiver: spouse  Caregiver Name and phone number for Inpatient discharge needs: Max, spouse-- [phone]  Currently receive any HHC/aides/community supports No     Post-op Transport: spouse  To/from hospital: spouse  To/from PT 2-3x/week: spouse  Uses community transport now: No     Outpatient Physical Therapy Site:  Site: [facility]- needs PT   pre and post-op appts scheduled? No     Medication Management: self  Preferred Pharmacy for Post-op Medications: [facility]  Blood Management Vitamin Regimen: Pt confirms taking as prescribed  Post-op anticoagulant: to be determined by surgical team postoperatively     DC Plan: Pt plans to be discharged home  Pt educated that our goal, if at all possible, is to appropriately discharge patient based off their post-op function while striving to maintain maximal independence  If possible, the goal is to discharge patient to home and for them to attend outpatient physical therapy      Barriers to DC identified preoperatively: none identified    BMI: 28 79    Enrolled in [employer]     Patient Education:  Pt educated on post-op pain, early mobilization (POD0), Average inpt LOS, OP PT goal   Patient educated that our goal is to appropriately discharge patient based off their post-op function while striving to maintain maximal independence  The goal is to discharge patient to home and for them to attend outpatient physical therapy

## 2022-11-19 ENCOUNTER — APPOINTMENT (OUTPATIENT)
Dept: LAB | Facility: MEDICAL CENTER | Age: 67
End: 2022-11-19

## 2022-11-19 DIAGNOSIS — Z78.0 ASYMPTOMATIC POSTMENOPAUSAL STATE: ICD-10-CM

## 2022-11-19 DIAGNOSIS — E78.2 MIXED HYPERLIPIDEMIA: ICD-10-CM

## 2022-11-19 DIAGNOSIS — K21.9 GERD WITHOUT ESOPHAGITIS: ICD-10-CM

## 2022-11-19 DIAGNOSIS — D03.9 MELANOMA IN SITU, UNSPECIFIED SITE (HCC): ICD-10-CM

## 2022-11-19 DIAGNOSIS — M87.051 AVASCULAR NECROSIS OF HIP, RIGHT (HCC): ICD-10-CM

## 2022-11-19 DIAGNOSIS — E11.9 TYPE 2 DIABETES MELLITUS WITHOUT COMPLICATION, WITHOUT LONG-TERM CURRENT USE OF INSULIN (HCC): ICD-10-CM

## 2022-11-19 DIAGNOSIS — Z12.4 SCREENING FOR CERVICAL CANCER: ICD-10-CM

## 2022-11-19 DIAGNOSIS — Z01.818 PREOPERATIVE TESTING: Primary | ICD-10-CM

## 2022-11-19 DIAGNOSIS — Z11.59 NEED FOR HEPATITIS C SCREENING TEST: ICD-10-CM

## 2022-11-19 DIAGNOSIS — I10 ESSENTIAL HYPERTENSION: ICD-10-CM

## 2022-11-19 DIAGNOSIS — M16.11 PRIMARY OSTEOARTHRITIS OF RIGHT HIP: ICD-10-CM

## 2022-11-19 LAB
ABO GROUP BLD: NORMAL
ALBUMIN SERPL BCP-MCNC: 3.8 G/DL (ref 3.5–5)
ALP SERPL-CCNC: 80 U/L (ref 46–116)
ALT SERPL W P-5'-P-CCNC: 31 U/L (ref 12–78)
AMORPH URATE CRY URNS QL MICRO: ABNORMAL
ANION GAP SERPL CALCULATED.3IONS-SCNC: 7 MMOL/L (ref 4–13)
APTT PPP: 27 SECONDS (ref 23–37)
AST SERPL W P-5'-P-CCNC: 17 U/L (ref 5–45)
BACTERIA UR QL AUTO: ABNORMAL /HPF
BASOPHILS # BLD AUTO: 0.04 THOUSANDS/ÂΜL (ref 0–0.1)
BASOPHILS NFR BLD AUTO: 1 % (ref 0–1)
BILIRUB SERPL-MCNC: 0.93 MG/DL (ref 0.2–1)
BILIRUB UR QL STRIP: NEGATIVE
BLD GP AB SCN SERPL QL: NEGATIVE
BUN SERPL-MCNC: 20 MG/DL (ref 5–25)
CALCIUM SERPL-MCNC: 10 MG/DL (ref 8.3–10.1)
CHLORIDE SERPL-SCNC: 106 MMOL/L (ref 96–108)
CLARITY UR: ABNORMAL
CO2 SERPL-SCNC: 26 MMOL/L (ref 21–32)
COLOR UR: ABNORMAL
CREAT SERPL-MCNC: 0.82 MG/DL (ref 0.6–1.3)
CREAT UR-MCNC: 76.1 MG/DL
EOSINOPHIL # BLD AUTO: 0.12 THOUSAND/ÂΜL (ref 0–0.61)
EOSINOPHIL NFR BLD AUTO: 2 % (ref 0–6)
ERYTHROCYTE [DISTWIDTH] IN BLOOD BY AUTOMATED COUNT: 12.7 % (ref 11.6–15.1)
EST. AVERAGE GLUCOSE BLD GHB EST-MCNC: 120 MG/DL
GFR SERPL CREATININE-BSD FRML MDRD: 74 ML/MIN/1.73SQ M
GLUCOSE P FAST SERPL-MCNC: 136 MG/DL (ref 65–99)
GLUCOSE UR STRIP-MCNC: NEGATIVE MG/DL
HBA1C MFR BLD: 5.8 %
HCT VFR BLD AUTO: 44.7 % (ref 34.8–46.1)
HCV AB SER QL: NORMAL
HGB BLD-MCNC: 14.3 G/DL (ref 11.5–15.4)
HGB UR QL STRIP.AUTO: NEGATIVE
IMM GRANULOCYTES # BLD AUTO: 0.01 THOUSAND/UL (ref 0–0.2)
IMM GRANULOCYTES NFR BLD AUTO: 0 % (ref 0–2)
INR PPP: 0.87 (ref 0.84–1.19)
KETONES UR STRIP-MCNC: NEGATIVE MG/DL
LEUKOCYTE ESTERASE UR QL STRIP: ABNORMAL
LYMPHOCYTES # BLD AUTO: 1.59 THOUSANDS/ÂΜL (ref 0.6–4.47)
LYMPHOCYTES NFR BLD AUTO: 30 % (ref 14–44)
MCH RBC QN AUTO: 28.9 PG (ref 26.8–34.3)
MCHC RBC AUTO-ENTMCNC: 32 G/DL (ref 31.4–37.4)
MCV RBC AUTO: 90 FL (ref 82–98)
MICROALBUMIN UR-MCNC: 6.4 MG/L (ref 0–20)
MICROALBUMIN/CREAT 24H UR: 8 MG/G CREATININE (ref 0–30)
MONOCYTES # BLD AUTO: 0.5 THOUSAND/ÂΜL (ref 0.17–1.22)
MONOCYTES NFR BLD AUTO: 10 % (ref 4–12)
NEUTROPHILS # BLD AUTO: 3 THOUSANDS/ÂΜL (ref 1.85–7.62)
NEUTS SEG NFR BLD AUTO: 57 % (ref 43–75)
NITRITE UR QL STRIP: NEGATIVE
NON-SQ EPI CELLS URNS QL MICRO: ABNORMAL /HPF
NRBC BLD AUTO-RTO: 0 /100 WBCS
PH UR STRIP.AUTO: 7 [PH]
PLATELET # BLD AUTO: 293 THOUSANDS/UL (ref 149–390)
PMV BLD AUTO: 10.5 FL (ref 8.9–12.7)
POTASSIUM SERPL-SCNC: 4.4 MMOL/L (ref 3.5–5.3)
PROT SERPL-MCNC: 8 G/DL (ref 6.4–8.4)
PROT UR STRIP-MCNC: NEGATIVE MG/DL
PROTHROMBIN TIME: 12 SECONDS (ref 11.6–14.5)
RBC # BLD AUTO: 4.95 MILLION/UL (ref 3.81–5.12)
RBC #/AREA URNS AUTO: ABNORMAL /HPF
RH BLD: POSITIVE
SODIUM SERPL-SCNC: 139 MMOL/L (ref 135–147)
SP GR UR STRIP.AUTO: 1.01 (ref 1–1.03)
SPECIMEN EXPIRATION DATE: NORMAL
T4 FREE SERPL-MCNC: 1.02 NG/DL (ref 0.76–1.46)
TSH SERPL DL<=0.05 MIU/L-ACNC: 4.75 UIU/ML (ref 0.45–4.5)
UROBILINOGEN UR STRIP-ACNC: <2 MG/DL
WBC # BLD AUTO: 5.26 THOUSAND/UL (ref 4.31–10.16)
WBC #/AREA URNS AUTO: ABNORMAL /HPF

## 2022-11-21 ENCOUNTER — RA CDI HCC (OUTPATIENT)
Dept: OTHER | Facility: HOSPITAL | Age: 67
End: 2022-11-21

## 2022-11-21 NOTE — PROGRESS NOTES
[facility] [address]  coding opportunities       Chart reviewed, no opportunity found: CHART REVIEWED, NO OPPORTUNITY FOUND        Patients Insurance     Medicare Insurance: Medicare

## 2022-11-23 NOTE — PRE-PROCEDURE INSTRUCTIONS
Pre-Surgery Instructions:   Medication Instructions   • acetaminophen (TYLENOL) 500 mg tablet Uses PRN- OK to take day of surgery   • ascorbic acid (VITAMIN C) 500 MG tablet Hold day of surgery  • aspirin 325 mg tablet Stop taking 7 days prior to surgery  asa (81mg)   • diclofenac (VOLTAREN) 75 mg EC tablet Stop taking 3 days prior to surgery  • fexofenadine (ALLEGRA) 180 MG tablet Uses PRN- OK to take day of surgery   • fluticasone (FLONASE) 50 mcg/act nasal spray Uses PRN- OK to take day of surgery   • folic acid ( Folic Acid) 1 mg tablet Hold day of surgery  • lisinopril (ZESTRIL) 10 mg tablet Hold day of surgery  • metFORMIN (GLUCOPHAGE) 500 mg tablet Hold day of surgery  • pantoprazole (PROTONIX) 20 mg tablet Take day of surgery  • pravastatin (PRAVACHOL) 20 mg tablet Hold day of surgery  Ortho class completed   Patient has walker, cane  Lives:     IS discussed and encouraged preoperatively  PT evaluation completed or in place  Attire addressed regarding particular surgery  Encouraged healthy lifestyle, instructed to notify surgeon of any changes in health pre-op  Answered questions and addresses concerns  Call to be received before surgery with arrival time

## 2022-11-28 ENCOUNTER — CONSULT (OUTPATIENT)
Dept: FAMILY MEDICINE CLINIC | Facility: CLINIC | Age: 67
End: 2022-11-28

## 2022-11-28 VITALS
SYSTOLIC BLOOD PRESSURE: 131 MMHG | HEIGHT: 67 IN | WEIGHT: 187 LBS | HEART RATE: 70 BPM | BODY MASS INDEX: 29.35 KG/M2 | DIASTOLIC BLOOD PRESSURE: 72 MMHG | RESPIRATION RATE: 18 BRPM | TEMPERATURE: 97.9 F | OXYGEN SATURATION: 99 %

## 2022-11-28 DIAGNOSIS — K21.9 GERD WITHOUT ESOPHAGITIS: ICD-10-CM

## 2022-11-28 DIAGNOSIS — I10 ESSENTIAL HYPERTENSION: ICD-10-CM

## 2022-11-28 DIAGNOSIS — E03.8 TSH DEFICIENCY: ICD-10-CM

## 2022-11-28 DIAGNOSIS — R79.89 ABNORMAL TSH: ICD-10-CM

## 2022-11-28 DIAGNOSIS — M16.11 PRIMARY OSTEOARTHRITIS OF RIGHT HIP: ICD-10-CM

## 2022-11-28 DIAGNOSIS — Z01.818 PRE-OP EXAM: Primary | ICD-10-CM

## 2022-11-28 DIAGNOSIS — D03.9 MELANOMA IN SITU, UNSPECIFIED SITE (HCC): ICD-10-CM

## 2022-11-28 DIAGNOSIS — E11.9 TYPE 2 DIABETES MELLITUS WITHOUT COMPLICATION, WITHOUT LONG-TERM CURRENT USE OF INSULIN (HCC): ICD-10-CM

## 2022-11-28 DIAGNOSIS — E78.2 MIXED HYPERLIPIDEMIA: ICD-10-CM

## 2022-11-28 NOTE — H&P (VIEW-ONLY)
Presurgical Evaluation     Subjective:      Patient ID: Alf Aburto is a 77 y o  female  Chief Complaint   Patient presents with   • Pre-op Exam       Here for pre op PE R hip replacemetn  The following portions of the patient's history were reviewed and updated as appropriate: allergies, current medications, past family history, past medical history, past social history, past surgical history and problem list     Procedure date: 12/12/22    Surgeon:  Devin Guerrero  Planned procedure: Total right hip replacement anterior approach  Diagnosis for procedure:  Right hip arthritis    Prior anesthesia: Yes   General; Complications:  None / Tolerated well    CAD History: None   NOTE: Patient should see Cardiology if time available before surgery, and if appropriate  Pulmonary History: None    Renal history: None    Diabetes History:  Type 2  Controlled     Neurological History: None     On Immunosuppressant meds/biologics: No      Review of Systems   Constitutional: Negative  HENT: Negative  Eyes: Negative  Respiratory: Negative  Cardiovascular: Negative  Gastrointestinal: Negative  Endocrine: Negative  Genitourinary: Negative  Musculoskeletal:        R hip pain   Skin: Negative  Allergic/Immunologic: Negative  Neurological: Negative  Hematological: Negative  Psychiatric/Behavioral: Negative            Current Outpatient Medications   Medication Sig Dispense Refill   • acetaminophen (TYLENOL) 500 mg tablet Take 500-1,000 mg by mouth every 6 (six) hours as needed for mild pain     • ascorbic acid (VITAMIN C) 500 MG tablet Take 1 tablet (500 mg total) by mouth daily Start to take 30 days before surgery 30 tablet 1   • aspirin 325 mg tablet Take 1 tablet (325 mg total) by mouth daily (Patient taking differently: Take 325 mg by mouth daily Baby aspirin 81 mg, last dose 12/4)  0   • diclofenac (VOLTAREN) 75 mg EC tablet Take 1 tablet (75 mg total) by mouth 2 (two) times a day PRN for pain 60 tablet 1   • ferrous sulfate 324 (65 Fe) mg Take 1 tablet (324 mg total) by mouth daily before breakfast Start to take 30 days before surgery 30 tablet 1   • fexofenadine (ALLEGRA) 180 MG tablet Take 180 mg by mouth daily as needed      • fluticasone (FLONASE) 50 mcg/act nasal spray 1 spray into each nostril 2 (two) times a day as needed      • folic acid (KP Folic Acid) 1 mg tablet Take 1 tablet (1 mg total) by mouth daily Start to take 30 days before surgery 30 tablet 1   • lisinopril (ZESTRIL) 10 mg tablet TAKE 1 TABLET DAILY 90 tablet 1   • meloxicam (MOBIC) 7 5 mg tablet Take 1 tablet (7 5 mg total) by mouth 2 (two) times a day 60 tablet 0   • metFORMIN (GLUCOPHAGE) 500 mg tablet TAKE 1 TABLET DAILY WITH   BREAKFAST 90 tablet 1   • Multiple Vitamin (multivitamin) tablet Take 1 tablet by mouth daily Start to take 30 days before surgery 30 tablet 1   • pantoprazole (PROTONIX) 20 mg tablet TAKE 1 TABLET DAILY 90 tablet 1   • pravastatin (PRAVACHOL) 20 mg tablet TAKE 1 TABLET DAILY 90 tablet 1     No current facility-administered medications for this visit  Allergies on file:   Patient has no known allergies      Patient Active Problem List   Diagnosis   • Allergic rhinitis   • Type 2 diabetes mellitus without complication, without long-term current use of insulin ([facility] )   • GERD without esophagitis   • Mixed hyperlipidemia   • Essential hypertension   • Melanoma in situ ([facility] )   • Allergic dermatitis   • Pre-op exam   • Arthritis of right knee   • Osteoarthritis of right knee   • Primary osteoarthritis of right hip        Past Medical History:   Diagnosis Date   • Diabetes mellitus ([facility] )     NIDDM   • Diverticulosis     hx colon resection   • GERD (gastroesophageal reflux disease)    • Hyperlipidemia    • Hypertension    • Macular degeneration    • Malignant neoplasm of skin     skin cancer right upper arm in past   • OA (osteoarthritis)     right knee       Past Surgical History:   Procedure Laterality Date   • CATARACT EXTRACTION Bilateral    • CHOLANGIOGRAM N/A 7/2/2018    Procedure: CHOLANGIOGRAM;  Surgeon: Kenn Humphries MD;  [facility];  Service: General   • CHOLECYSTECTOMY LAPAROSCOPIC N/A 7/2/2018    Procedure: CHOLECYSTECTOMY LAPAROSCOPIC W/IOC;  Surgeon: Kenn Humphries MD;  [facility];  Service: General   • COLON SURGERY      resection large intestine (diverticulitis)   • COLONOSCOPY     • GA LAP, INCISIONAL HERNIA REPAIR,REDUCIBLE N/A 5/30/2019    Procedure: REPAIR HERNIA INCISIONAL LAPAROSCOPIC W/ ROBOTICS WITH MESH PLACEMENT;  Surgeon: Kenn Humphries MD;  [facility];  Service: General   • GA TOTAL KNEE ARTHROPLASTY Right 3/19/2021    Procedure: ARTHROPLASTY KNEE TOTAL;  Surgeon: Zack Barlow MD;  [facility];  Service: Orthopedics   • TONSILLECTOMY  child       Family History   Problem Relation Age of Onset   • Alzheimer's disease Mother    • Anxiety disorder Mother    • Diabetes Mother    • Cancer Father    • Diabetes Sister    • Hypertension Sister    • Stroke Sister        Social History     Tobacco Use   • Smoking status: Never   • Smokeless tobacco: Never   Vaping Use   • Vaping Use: Never used   Substance Use Topics   • Alcohol use: Never     Comment: beer/ liquor, once a month   • Drug use: No       Objective:    Vitals:    11/28/22 1524   BP: 131/72   BP Location: Right arm   Patient Position: Sitting   Cuff Size: Standard   Pulse: 70   Resp: 18   Temp: 97 9 °F (36 6 °C)   TempSrc: Tympanic   SpO2: 99%   Weight: 84 8 kg (187 lb)   Height: 5' 7" (1 702 m)        Physical Exam  Vitals and nursing note reviewed  Constitutional:       General: She is not in acute distress  Appearance: Normal appearance  She is well-developed and well-nourished  She is not diaphoretic  HENT:      Head: Normocephalic and atraumatic        Right Ear: Hearing, tympanic membrane, ear canal and external ear normal       Left Ear: Hearing, tympanic membrane, ear canal and external ear normal       Nose: Nose normal       Mouth/Throat:      Mouth: Oropharynx is clear and moist and mucous membranes are normal       Pharynx: Uvula midline  No oropharyngeal exudate  Eyes:      General: Lids are normal  No scleral icterus  Right eye: No discharge  Left eye: No discharge  Extraocular Movements: EOM normal       Conjunctiva/sclera: Conjunctivae normal       Pupils: Pupils are equal, round, and reactive to light  Neck:      Thyroid: No thyroid mass or thyromegaly  Vascular: No carotid bruit  Cardiovascular:      Rate and Rhythm: Normal rate and regular rhythm  Pulses: Normal pulses  Heart sounds: Normal heart sounds  No murmur heard  No friction rub  No gallop  Pulmonary:      Effort: Pulmonary effort is normal  No respiratory distress  Breath sounds: Normal breath sounds  No wheezing, rhonchi or rales  Abdominal:      General: Bowel sounds are normal  There is no distension or abdominal bruit  Palpations: Abdomen is soft  There is no hepatosplenomegaly or mass  Tenderness: There is no abdominal tenderness  There is no guarding or rebound  Hernia: No hernia is present  Musculoskeletal:         General: Normal range of motion  Cervical back: Normal range of motion and neck supple  Comments: PT ambulating with a cane and pain to R hip  Lymphadenopathy:      Cervical: No cervical adenopathy  Skin:     General: Skin is warm, dry and intact  Neurological:      Mental Status: She is alert and oriented to person, place, and time  She is not disoriented  Sensory: No sensory deficit  Motor: Motor strength is normal  No abnormal muscle tone  Coordination: Coordination normal       Gait: Gait normal       Deep Tendon Reflexes: Reflexes are normal and symmetric     Psychiatric:         Mood and Affect: Mood and affect normal          Speech: Speech normal          Behavior: Behavior normal  Thought Content: Thought content normal          Cognition and Memory: Cognition and memory normal          Judgment: Judgment normal            Preop labs/testing available and reviewed: yes    eGFR   Date Value Ref Range Status   2022 74 ml/min/1 73sq m Final     WBC   Date Value Ref Range Status   2022 5 26 4 31 - 10 16 Thousand/uL Final     INR   Date Value Ref Range Status   2022 0 87 0 84 - 1 19 Final       EKG yes    Echo no    Stress test/cath no    PFT/Mukund no    Functional capacity: Shovel snow                          6 Mets   Pick the highest level patient can comfortably perform   4 mets or greater for surgery    RCRI  High Risk surgery? 1 Point  CAD History:         1 Point   MI; Positive Stress Test; CP due to Mi;  Nitrate Usage to control Angina; Pathologic Q wave on EKG  CHF Active:         1 Point   Pulm Edema; Paroxysmal Nocturnal Dyspnea;  Bibasilar Rales (crackles);S3; CHF on CXR  Cerebrovascular Disease (TIA or CVA):     1 Point  DM on Insulin:        1 Point  Serum Creat >2 0 mg/dl:       1 Point          Total Points: 0     Scorin: Class I, Very Low Risk (0 4%)     1: Class II, Low risk (0 9%)     2: Class III Moderate (6 6%)     3: Class IV High (>11%)      MINGO Risk:  GFR:   eGFR   Date Value Ref Range Status   2022 74 ml/min/1 73sq m Final         For PCP:  If GFR>60, Hold ACE/ARB/Diuretic on the day of surgery, and NSAIDS 10 days before  If GFR<45, Consider PRE and POST op Nephrology Consult  If 46 <GFR> 59 : Has Patient had MINGO in last 6 Months? no   If YES: Preop Nephrology consult   If No:  [facility] Nephrology consult  Assessment/Plan:    Patient is medically optimized (cleared) for the planned procedure  Further testing/evaluation is not required  Postop concerns: no    Problem List Items Addressed This Visit        Digestive    GERD without esophagitis     Stable on PPI              Endocrine    Type 2 diabetes mellitus without complication, without long-term current use of insulin (HCC)     A1c is stable at 5 8 able to go through hip replacement surgery  Recheck 6 months  Continues without medication at this time  Lab Results   Component Value Date    HGBA1C 5 8 (H) 11/19/2022            Relevant Orders    Comprehensive metabolic panel    Hemoglobin A1C    Microalbumin / creatinine urine ratio       Cardiovascular and Mediastinum    Essential hypertension     Stable  Continue Zestril  Musculoskeletal and Integument    Primary osteoarthritis of right hip       Other    Mixed hyperlipidemia     Continue Pravachol check lipids in 6 months  Relevant Orders    Lipid Panel with Direct LDL reflex    Melanoma in situ [facility])     Continue dermatology  Pre-op exam - Primary     Patient is cleared to have anterior right hip replacement on 12/12 with Dr Gisele Claude  A1c is stable at 5 8, EKG and blood work acceptable/normal/no acute changes  Other Visit Diagnoses     Abnormal TSH        Relevant Orders    TSH, 3rd generation with Free T4 reflex    TSH deficiency        Relevant Orders    TSH, 3rd generation with Free T4 reflex           Diagnoses and all orders for this visit:    Pre-op exam    Primary osteoarthritis of right hip    Mixed hyperlipidemia  -     Lipid Panel with Direct LDL reflex; Future    Type 2 diabetes mellitus without complication, without long-term current use of insulin (HCC)  -     Comprehensive metabolic panel; Future  -     Hemoglobin A1C; Future  -     Microalbumin / creatinine urine ratio; Future    Abnormal TSH  -     TSH, 3rd generation with Free T4 reflex; Future    TSH deficiency  -     TSH, 3rd generation with Free T4 reflex;  Future    Essential hypertension    GERD without esophagitis    Melanoma in situ, unspecified site [facility])        Pre-Surgery Instructions:   Medication Instructions   • acetaminophen (TYLENOL) 500 mg tablet per anesthesia guidelines    • ascorbic acid (VITAMIN C) 500 MG tablet per anesthesia guidelines    • aspirin 325 mg tablet per anesthesia guidelines    • diclofenac (VOLTAREN) 75 mg EC tablet per anesthesia guidelines    • ferrous sulfate 324 (65 Fe) mg per anesthesia guidelines    • fexofenadine (ALLEGRA) 180 MG tablet per anesthesia guidelines    • fluticasone (FLONASE) 50 mcg/act nasal spray per anesthesia guidelines    • folic acid (KP Folic Acid) 1 mg tablet per anesthesia guidelines    • lisinopril (ZESTRIL) 10 mg tablet per anesthesia guidelines    • meloxicam (MOBIC) 7 5 mg tablet per anesthesia guidelines    • metFORMIN (GLUCOPHAGE) 500 mg tablet per anesthesia guidelines    • Multiple Vitamin (multivitamin) tablet per anesthesia guidelines    • pantoprazole (PROTONIX) 20 mg tablet per anesthesia guidelines    • pravastatin (PRAVACHOL) 20 mg tablet per anesthesia guidelines         NOTE: Please use the above to review important meds for your specialty, the remainder "per anesthesia Guidelines "    NOTE: Please place an Inbasket message for "[facility]" pool for complicated patients

## 2022-11-28 NOTE — ASSESSMENT & PLAN NOTE
A1c is stable at 5 8 able to go through hip replacement surgery  Recheck 6 months  Continues without medication at this time    Lab Results   Component Value Date    HGBA1C 5 8 (H) 11/19/2022

## 2022-11-28 NOTE — PATIENT INSTRUCTIONS
1  Pre-op exam  Assessment & Plan:  Patient is cleared to have anterior right hip replacement on 12/12 with Dr Gwenlyn Lefort  A1c is stable at 5 8, EKG and blood work acceptable/normal/no acute changes  2  Primary osteoarthritis of right hip    3  Mixed hyperlipidemia  Assessment & Plan:  Continue Pravachol check lipids in 6 months  Orders:  -     Lipid Panel with Direct LDL reflex; Future; Expected date: 05/28/2023    4  Type 2 diabetes mellitus without complication, without long-term current use of insulin (HCC)  Assessment & Plan:  A1c is stable at 5 8 able to go through hip replacement surgery  Recheck 6 months  Continues without medication at this time  Lab Results   Component Value Date    HGBA1C 5 8 (H) 11/19/2022       Orders:  -     Comprehensive metabolic panel; Future; Expected date: 05/28/2023  -     Hemoglobin A1C; Future; Expected date: 05/28/2023  -     Microalbumin / creatinine urine ratio; Future; Expected date: 05/28/2023    5  Abnormal TSH  -     TSH, 3rd generation with Free T4 reflex; Future; Expected date: 05/28/2023    6  TSH deficiency  -     TSH, 3rd generation with Free T4 reflex; Future; Expected date: 05/28/2023    7  Essential hypertension  Assessment & Plan:  Stable  Continue Zestril  8  GERD without esophagitis  Assessment & Plan:  Stable on PPI  9  Melanoma in situ, unspecified site [facility])  Assessment & Plan:  Continue dermatology

## 2022-11-28 NOTE — ASSESSMENT & PLAN NOTE
Patient is cleared to have anterior right hip replacement on 12/12 with Dr Aquiles Andrews  A1c is stable at 5 8, EKG and blood work acceptable/normal/no acute changes

## 2022-11-28 NOTE — LETTER
November 28, 2022     Meliza DO Carolyn Yousif [address]    Patient: Stephanie Linda   YOB: 1955   Date of Visit: 11/28/2022       Dear Dr Marcia Quick    Thank you for referring Jerry Way to me for evaluation  Below are my notes for this consultation  If you have questions, please do not hesitate to call me  I look forward to following your patient along with you  Sincerely,        Anthony Mckenzie PA-C        CC: No Recipients  Anthony Mckenzie PA-C  11/28/2022  4:26 PM  Incomplete  Presurgical Evaluation     Subjective:     Patient ID: Stephanie Linda is a 77 y o  female  Chief Complaint   Patient presents with   • Pre-op Exam       Here for pre op PE R hip replacemetn  The following portions of the patient's history were reviewed and updated as appropriate: allergies, current medications, past family history, past medical history, past social history, past surgical history and problem list     Procedure date: 12/12/22    Surgeon:  Melba Hernandez  Planned procedure: Total right hip replacement anterior approach  Diagnosis for procedure:  Right hip arthritis    Prior anesthesia: Yes   General; Complications:  None / Tolerated well    CAD History: None   NOTE: Patient should see Cardiology if time available before surgery, and if appropriate  Pulmonary History: None    Renal history: None    Diabetes History:  Type 2  Controlled     Neurological History: None     On Immunosuppressant meds/biologics: No      Review of Systems   Constitutional: Negative  HENT: Negative  Eyes: Negative  Respiratory: Negative  Cardiovascular: Negative  Gastrointestinal: Negative  Endocrine: Negative  Genitourinary: Negative  Musculoskeletal:        R hip pain   Skin: Negative  Allergic/Immunologic: Negative  Neurological: Negative  Hematological: Negative  Psychiatric/Behavioral: Negative           Current Outpatient Medications   Medication Sig Dispense Refill   • acetaminophen (TYLENOL) 500 mg tablet Take 500-1,000 mg by mouth every 6 (six) hours as needed for mild pain     • ascorbic acid (VITAMIN C) 500 MG tablet Take 1 tablet (500 mg total) by mouth daily Start to take 30 days before surgery 30 tablet 1   • aspirin 325 mg tablet Take 1 tablet (325 mg total) by mouth daily (Patient taking differently: Take 325 mg by mouth daily Baby aspirin 81 mg, last dose 12/4)  0   • diclofenac (VOLTAREN) 75 mg EC tablet Take 1 tablet (75 mg total) by mouth 2 (two) times a day PRN for pain 60 tablet 1   • ferrous sulfate 324 (65 Fe) mg Take 1 tablet (324 mg total) by mouth daily before breakfast Start to take 30 days before surgery 30 tablet 1   • fexofenadine (ALLEGRA) 180 MG tablet Take 180 mg by mouth daily as needed      • fluticasone (FLONASE) 50 mcg/act nasal spray 1 spray into each nostril 2 (two) times a day as needed      • folic acid (KP Folic Acid) 1 mg tablet Take 1 tablet (1 mg total) by mouth daily Start to take 30 days before surgery 30 tablet 1   • lisinopril (ZESTRIL) 10 mg tablet TAKE 1 TABLET DAILY 90 tablet 1   • meloxicam (MOBIC) 7 5 mg tablet Take 1 tablet (7 5 mg total) by mouth 2 (two) times a day 60 tablet 0   • metFORMIN (GLUCOPHAGE) 500 mg tablet TAKE 1 TABLET DAILY WITH   BREAKFAST 90 tablet 1   • Multiple Vitamin (multivitamin) tablet Take 1 tablet by mouth daily Start to take 30 days before surgery 30 tablet 1   • pantoprazole (PROTONIX) 20 mg tablet TAKE 1 TABLET DAILY 90 tablet 1   • pravastatin (PRAVACHOL) 20 mg tablet TAKE 1 TABLET DAILY 90 tablet 1     No current facility-administered medications for this visit  Allergies on file:   Patient has no known allergies      Patient Active Problem List   Diagnosis   • Allergic rhinitis   • Type 2 diabetes mellitus without complication, without long-term current use of insulin ([facility] )   • GERD without esophagitis   • Mixed hyperlipidemia   • Essential hypertension   • [facility] ([facility]   • Allergic dermatitis   • Pre-op exam   • Arthritis of right knee   • Osteoarthritis of right knee   • Primary osteoarthritis of right hip        Past Medical History:   Diagnosis Date   • Diabetes mellitus ([facility] )     NIDDM   • Diverticulosis     hx colon resection   • GERD (gastroesophageal reflux disease)    • Hyperlipidemia    • Hypertension    • Macular degeneration    • Malignant neoplasm of skin     skin cancer right upper arm in past   • OA (osteoarthritis)     right knee       Past Surgical History:   Procedure Laterality Date   • CATARACT EXTRACTION Bilateral    • CHOLANGIOGRAM N/A 7/2/2018    Procedure: CHOLANGIOGRAM;  Surgeon: Elli Gustafson MD;  [facility];  Service: General   • CHOLECYSTECTOMY LAPAROSCOPIC N/A 7/2/2018    Procedure: CHOLECYSTECTOMY LAPAROSCOPIC W/IOC;  Surgeon: Elli Gustafson MD;  [facility];  Service: General   • COLON SURGERY      resection large intestine (diverticulitis)   • COLONOSCOPY     • [place] LAP, INCISIONAL HERNIA REPAIR,REDUCIBLE N/A 5/30/2019    Procedure: REPAIR HERNIA INCISIONAL LAPAROSCOPIC W/ ROBOTICS WITH MESH PLACEMENT;  Surgeon: Elli Gustafson MD;  [facility];  Service: General   • [place] TOTAL KNEE ARTHROPLASTY Right 3/19/2021    Procedure: ARTHROPLASTY KNEE TOTAL;  Surgeon: Yolanda Payan MD;  [facility];  Service: Orthopedics   • TONSILLECTOMY  child       Family History   Problem Relation Age of Onset   • Alzheimer's disease Mother    • Anxiety disorder Mother    • Diabetes Mother    • Cancer Father    • Diabetes Sister    • Hypertension Sister    • Stroke Sister        Social History     Tobacco Use   • Smoking status: Never   • Smokeless tobacco: Never   Vaping Use   • Vaping Use: Never used   Substance Use Topics   • Alcohol use: Never     Comment: beer/ liquor, once a month   • Drug use: No       Objective:    Vitals:    11/28/22 1524   BP: 131/72   BP Location: Right arm   Patient Position: Sitting   Cuff Size: Standard   Pulse: 70   Resp: 18   Temp: 97 9 °F (36 6 °C)   TempSrc: Tympanic   SpO2: 99%   Weight: 84 8 kg (187 lb)   Height: 5' 7" (1 702 m)       Physical Exam  Vitals and nursing note reviewed  Constitutional:       General: She is not in acute distress  Appearance: Normal appearance  She is well-developed and well-nourished  She is not diaphoretic  HENT:      Head: Normocephalic and atraumatic  Right Ear: Hearing, tympanic membrane, ear canal and external ear normal       Left Ear: Hearing, tympanic membrane, ear canal and external ear normal       Nose: Nose normal       Mouth/Throat:      Mouth: Oropharynx is clear and moist and mucous membranes are normal       Pharynx: Uvula midline  No oropharyngeal exudate  Eyes:      General: Lids are normal  No scleral icterus  Right eye: No discharge  Left eye: No discharge  Extraocular Movements: EOM normal       Conjunctiva/sclera: Conjunctivae normal       Pupils: Pupils are equal, round, and reactive to light  Neck:      Thyroid: No thyroid mass or thyromegaly  Vascular: No carotid bruit  Cardiovascular:      Rate and Rhythm: Normal rate and regular rhythm  Pulses: Normal pulses  Heart sounds: Normal heart sounds  No murmur heard  No friction rub  No gallop  Pulmonary:      Effort: Pulmonary effort is normal  No respiratory distress  Breath sounds: Normal breath sounds  No wheezing, rhonchi or rales  Abdominal:      General: Bowel sounds are normal  There is no distension or abdominal bruit  Palpations: Abdomen is soft  There is no hepatosplenomegaly or mass  Tenderness: There is no abdominal tenderness  There is no guarding or rebound  Hernia: No hernia is present  Musculoskeletal:         General: Normal range of motion  Cervical back: Normal range of motion and neck supple  Comments: PT ambulating with a cane and pain to R hip  Lymphadenopathy:      Cervical: No cervical adenopathy  Skin:     General: Skin is warm, dry and intact  Neurological:      Mental Status: She is alert and oriented to person, place, and time  She is not disoriented  Sensory: No sensory deficit  Motor: Motor strength is normal  No abnormal muscle tone  Coordination: Coordination normal       Gait: Gait normal       Deep Tendon Reflexes: Reflexes are normal and symmetric  Psychiatric:         Mood and Affect: Mood and affect normal          Speech: Speech normal          Behavior: Behavior normal          Thought Content: Thought content normal          Cognition and Memory: Cognition and memory normal          Judgment: Judgment normal           Preop labs/testing available and reviewed: yes    eGFR   Date Value Ref Range Status   2022 74 ml/min/1 73sq m Final     WBC   Date Value Ref Range Status   2022 5 26 4 31 - 10 16 Thousand/uL Final     INR   Date Value Ref Range Status   2022 0 87 0 84 - 1 19 Final       EKG yes    Echo no    Stress test/cath no    PFT/[place] no    Functional capacity: Shovel snow                          6 Mets   Pick the highest level patient can comfortably perform   4 mets or greater for surgery    RCRI  High Risk surgery? 1 Point  CAD History:         1 Point   MI; Positive Stress Test; CP due to Mi;  Nitrate Usage to control Angina;  Pathologic Q wave on EKG  CHF Active:         1 Point   Pulm Edema; Paroxysmal Nocturnal Dyspnea;  Bibasilar Rales (crackles);S3; CHF on CXR  Cerebrovascular Disease (TIA or CVA):     1 Point  DM on Insulin:        1 Point  Serum Creat >2 0 mg/dl:       1 Point          Total Points: 0     Scorin: Class I, Very Low Risk (0 4%)     1: Class II, Low risk (0 9%)     2: Class III Moderate (6 6%)     3: Class IV High (>11%)      MINGO Risk:  GFR:   eGFR   Date Value Ref Range Status   2022 74 ml/min/1 73sq m Final         For PCP:  If GFR>60, Hold ACE/ARB/Diuretic on the day of surgery, and NSAIDS 10 days before  If GFR<45, Consider PRE and POST op Nephrology Consult  If 46 <GFR> 59 : Has Patient had MINGO in last 6 Months? no   If YES: Preop Nephrology consult   If No:  Hipolito Perez Nephrology consult  Assessment/Plan:    Patient is medically optimized (cleared) for the planned procedure  Further testing/evaluation is not required  Postop concerns: no    Problem List Items Addressed This Visit        Digestive    GERD without esophagitis     Stable on PPI  Endocrine    Type 2 diabetes mellitus without complication, without long-term current use of insulin (HCC)     A1c is stable at 5 8 able to go through hip replacement surgery  Recheck 6 months  Continues without medication at this time  Lab Results   Component Value Date    HGBA1C 5 8 (H) 11/19/2022            Relevant Orders    Comprehensive metabolic panel    Hemoglobin A1C    Microalbumin / creatinine urine ratio       Cardiovascular and Mediastinum    Essential hypertension     Stable  Continue Zestril  Musculoskeletal and Integument    Primary osteoarthritis of right hip       Other    Mixed hyperlipidemia     Continue Pravachol check lipids in 6 months  Relevant Orders    Lipid Panel with Direct LDL reflex    Melanoma in situ [facility])     Continue dermatology  Pre-op exam - Primary     Patient is cleared to have anterior right hip replacement on 12/12 with Dr Marcello Pleayo  A1c is stable at 5 8, EKG and blood work acceptable/normal/no acute changes  Other Visit Diagnoses     Abnormal TSH        Relevant Orders    TSH, 3rd generation with Free T4 reflex    TSH deficiency        Relevant Orders    TSH, 3rd generation with Free T4 reflex          Diagnoses and all orders for this visit:    Pre-op exam    Primary osteoarthritis of right hip    Mixed hyperlipidemia  -     Lipid Panel with Direct LDL reflex;  Future    Type 2 diabetes mellitus without complication, without long-term current use of insulin (HCC)  -     Comprehensive metabolic panel; Future  -     Hemoglobin A1C; Future  -     Microalbumin / creatinine urine ratio; Future    Abnormal TSH  -     TSH, 3rd generation with Free T4 reflex; Future    TSH deficiency  -     TSH, 3rd generation with Free T4 reflex; Future    Essential hypertension    GERD without esophagitis    Melanoma in situ, unspecified site [facility])         Pre-Surgery Instructions:   Medication Instructions   • acetaminophen (TYLENOL) 500 mg tablet per anesthesia guidelines    • ascorbic acid (VITAMIN C) 500 MG tablet per anesthesia guidelines    • aspirin 325 mg tablet per anesthesia guidelines    • diclofenac (VOLTAREN) 75 mg EC tablet per anesthesia guidelines    • ferrous sulfate 324 (65 Fe) mg per anesthesia guidelines    • fexofenadine (ALLEGRA) 180 MG tablet per anesthesia guidelines    • fluticasone (FLONASE) 50 mcg/act nasal spray per anesthesia guidelines    • folic acid (KP Folic Acid) 1 mg tablet per anesthesia guidelines    • lisinopril (ZESTRIL) 10 mg tablet per anesthesia guidelines    • meloxicam (MOBIC) 7 5 mg tablet per anesthesia guidelines    • metFORMIN (GLUCOPHAGE) 500 mg tablet per anesthesia guidelines    • Multiple Vitamin (multivitamin) tablet per anesthesia guidelines    • pantoprazole (PROTONIX) 20 mg tablet per anesthesia guidelines    • pravastatin (PRAVACHOL) 20 mg tablet per anesthesia guidelines         NOTE: Please use the above to review important meds for your specialty, the remainder "per anesthesia Guidelines "    NOTE: Please place an Inbasket message for "[facility]" pool for complicated patients  Cristina Martins PA-C  11/28/2022  4:18 PM  Sign when Signing Visit  Presurgical Evaluation     Subjective:     Patient ID: Daniel Cronin is a 77 y o  female  Chief Complaint   Patient presents with   • Pre-op Exam       Here for pre op        The following portions of the patient's history were reviewed and updated as appropriate: allergies, current medications, past family history, past medical history, past social history, past surgical history and problem list     Procedure date: 12/12/22    Surgeon:  Melba Hernandez  Planned procedure: Total right hip replacement anterior approach  Diagnosis for procedure:  Right hip arthritis    Prior anesthesia: Yes   General; Complications:  None / Tolerated well    CAD History: None   NOTE: Patient should see Cardiology if time available before surgery, and if appropriate  Pulmonary History: None    Renal history: None    Diabetes History:  Type 2  Controlled     Neurological History: None     On Immunosuppressant meds/biologics: No      Review of Systems   Constitutional: Negative  HENT: Negative  Eyes: Negative  Respiratory: Negative  Cardiovascular: Negative  Gastrointestinal: Negative  Endocrine: Negative  Genitourinary: Negative  Musculoskeletal: Negative  Skin: Negative  Allergic/Immunologic: Negative  Neurological: Negative  Hematological: Negative  Psychiatric/Behavioral: Negative           Current Outpatient Medications   Medication Sig Dispense Refill   • acetaminophen (TYLENOL) 500 mg tablet Take 500-1,000 mg by mouth every 6 (six) hours as needed for mild pain     • ascorbic acid (VITAMIN C) 500 MG tablet Take 1 tablet (500 mg total) by mouth daily Start to take 30 days before surgery 30 tablet 1   • aspirin 325 mg tablet Take 1 tablet (325 mg total) by mouth daily (Patient taking differently: Take 325 mg by mouth daily Baby aspirin 81 mg, last dose 12/4)  0   • diclofenac (VOLTAREN) 75 mg EC tablet Take 1 tablet (75 mg total) by mouth 2 (two) times a day PRN for pain 60 tablet 1   • ferrous sulfate 324 (65 Fe) mg Take 1 tablet (324 mg total) by mouth daily before breakfast Start to take 30 days before surgery 30 tablet 1   • fexofenadine (ALLEGRA) 180 MG tablet Take 180 mg by mouth daily as needed      • fluticasone (FLONASE) 50 mcg/act nasal spray 1 spray into each nostril 2 (two) times a day as needed      • folic acid (KP Folic Acid) 1 mg tablet Take 1 tablet (1 mg total) by mouth daily Start to take 30 days before surgery 30 tablet 1   • lisinopril (ZESTRIL) 10 mg tablet TAKE 1 TABLET DAILY 90 tablet 1   • meloxicam (MOBIC) 7 5 mg tablet Take 1 tablet (7 5 mg total) by mouth 2 (two) times a day 60 tablet 0   • metFORMIN (GLUCOPHAGE) 500 mg tablet TAKE 1 TABLET DAILY WITH   BREAKFAST 90 tablet 1   • Multiple Vitamin (multivitamin) tablet Take 1 tablet by mouth daily Start to take 30 days before surgery 30 tablet 1   • pantoprazole (PROTONIX) 20 mg tablet TAKE 1 TABLET DAILY 90 tablet 1   • pravastatin (PRAVACHOL) 20 mg tablet TAKE 1 TABLET DAILY 90 tablet 1     No current facility-administered medications for this visit  Allergies on file:   Patient has no known allergies      Patient Active Problem List   Diagnosis   • Allergic rhinitis   • Type 2 diabetes mellitus without complication, without long-term current use of insulin ([facility] )   • GERD without esophagitis   • Mixed hyperlipidemia   • Essential hypertension   • Melanoma in situ ([facility] )   • Allergic dermatitis   • Arthritis of right knee   • Osteoarthritis of right knee   • Acute right hip pain        Past Medical History:   Diagnosis Date   • Diabetes mellitus ([facility] )     NIDDM   • Diverticulosis     hx colon resection   • GERD (gastroesophageal reflux disease)    • Hyperlipidemia    • Hypertension    • Macular degeneration    • Malignant neoplasm of skin     skin cancer right upper arm in past   • OA (osteoarthritis)     right knee       Past Surgical History:   Procedure Laterality Date   • CATARACT EXTRACTION Bilateral    • CHOLANGIOGRAM N/A 7/2/2018    Procedure: CHOLANGIOGRAM;  Surgeon: Gillian Melendez MD;  [facility];  Service: General   • CHOLECYSTECTOMY LAPAROSCOPIC N/A 7/2/2018    Procedure: CHOLECYSTECTOMY LAPAROSCOPIC W/IOC;  Surgeon: Lee Hamman Radha Flores MD;  [facility];  Service: General   • COLON SURGERY      resection large intestine (diverticulitis)   • COLONOSCOPY     • [place] LAP, INCISIONAL HERNIA REPAIR,REDUCIBLE N/A 5/30/2019    Procedure: REPAIR HERNIA INCISIONAL LAPAROSCOPIC W/ ROBOTICS WITH MESH PLACEMENT;  Surgeon: Jamal Villafana MD;  [facility];  Service: General   • [place] TOTAL KNEE ARTHROPLASTY Right 3/19/2021    Procedure: ARTHROPLASTY KNEE TOTAL;  Surgeon: Inge Hammans, MD;  [facility];  Service: Orthopedics   • TONSILLECTOMY  child       Family History   Problem Relation Age of Onset   • Alzheimer's disease Mother    • Anxiety disorder Mother    • Diabetes Mother    • Cancer Father    • Diabetes Sister    • Hypertension Sister    • Stroke Sister        Social History     Tobacco Use   • Smoking status: Never   • Smokeless tobacco: Never   Vaping Use   • Vaping Use: Never used   Substance Use Topics   • Alcohol use: Never     Comment: beer/ liquor, once a month   • Drug use: No       Objective:    Vitals:    11/28/22 1524   BP: 131/72   BP Location: Right arm   Patient Position: Sitting   Cuff Size: Standard   Pulse: 70   Resp: 18   Temp: 97 9 °F (36 6 °C)   TempSrc: Tympanic   SpO2: 99%   Weight: 84 8 kg (187 lb)   Height: 5' 7" (1 702 m)       Physical Exam  Vitals and nursing note reviewed  Constitutional:       General: She is not in acute distress  Appearance: Normal appearance  She is well-developed and well-nourished  She is not diaphoretic  HENT:      Head: Normocephalic and atraumatic  Right Ear: Hearing, tympanic membrane, ear canal and external ear normal       Left Ear: Hearing, tympanic membrane, ear canal and external ear normal       Nose: Nose normal       Mouth/Throat:      Mouth: Oropharynx is clear and moist and mucous membranes are normal       Pharynx: Uvula midline  No oropharyngeal exudate  Eyes:      General: Lids are normal  No scleral icterus          Right eye: No discharge  Left eye: No discharge  Extraocular Movements: EOM normal       Conjunctiva/sclera: Conjunctivae normal       Pupils: Pupils are equal, round, and reactive to light  Neck:      Thyroid: No thyroid mass or thyromegaly  Vascular: No carotid bruit  Cardiovascular:      Rate and Rhythm: Normal rate and regular rhythm  Pulses: Normal pulses  Heart sounds: Normal heart sounds  No murmur heard  No friction rub  No gallop  Pulmonary:      Effort: Pulmonary effort is normal  No respiratory distress  Breath sounds: Normal breath sounds  No wheezing, rhonchi or rales  Abdominal:      General: Bowel sounds are normal  There is no distension or abdominal bruit  Palpations: Abdomen is soft  There is no hepatosplenomegaly or mass  Tenderness: There is no abdominal tenderness  There is no guarding or rebound  Hernia: No hernia is present  Musculoskeletal:         General: Normal range of motion  Cervical back: Normal range of motion and neck supple  Lymphadenopathy:      Cervical: No cervical adenopathy  Skin:     General: Skin is warm, dry and intact  Neurological:      Mental Status: She is alert and oriented to person, place, and time  She is not disoriented  Sensory: No sensory deficit  Motor: Motor strength is normal  No abnormal muscle tone  Coordination: Coordination normal       Gait: Gait normal       Deep Tendon Reflexes: Reflexes are normal and symmetric  Psychiatric:         Mood and Affect: Mood and affect normal          Speech: Speech normal          Behavior: Behavior normal          Thought Content:  Thought content normal          Cognition and Memory: Cognition and memory normal          Judgment: Judgment normal           Preop labs/testing available and reviewed: yes    eGFR   Date Value Ref Range Status   11/19/2022 74 ml/min/1 73sq m Final     WBC   Date Value Ref Range Status   11/19/2022 5 26 4 31 - 10 16 Thousand/uL Final     INR   Date Value Ref Range Status   2022 0 87 0 84 - 1 19 Final       EKG yes    Echo no    Stress test/cath no    PFT/[place] no    Functional capacity: Shovel snow                          6 Mets   Pick the highest level patient can comfortably perform   4 mets or greater for surgery    RCRI  High Risk surgery? 1 Point  CAD History:         1 Point   MI; Positive Stress Test; CP due to Mi;  Nitrate Usage to control Angina; Pathologic Q wave on EKG  CHF Active:         1 Point   Pulm Edema; Paroxysmal Nocturnal Dyspnea;  Bibasilar Rales (crackles);S3; CHF on CXR  Cerebrovascular Disease (TIA or CVA):     1 Point  DM on Insulin:        1 Point  Serum Creat >2 0 mg/dl:       1 Point          Total Points: 0     Scorin: Class I, Very Low Risk (0 4%)     1: Class II, Low risk (0 9%)     2: Class III Moderate (6 6%)     3: Class IV High (>11%)      MINGO Risk:  GFR:   eGFR   Date Value Ref Range Status   2022 74 ml/min/1 73sq m Final         For PCP:  If GFR>60, Hold ACE/ARB/Diuretic on the day of surgery, and NSAIDS 10 days before  If GFR<45, Consider PRE and POST op Nephrology Consult  If 46 <GFR> 59 : Has Patient had MINGO in last 6 Months? no   If YES: Preop Nephrology consult   If No:  Hipolito Perez Nephrology consult  Assessment/Plan:    Patient is medically optimized (cleared) for the planned procedure  Further testing/evaluation is not required      Postop concerns: no    Problem List Items Addressed This Visit        Endocrine    Type 2 diabetes mellitus without complication, without long-term current use of insulin (HCC)    Relevant Orders    Comprehensive metabolic panel    Hemoglobin A1C    Microalbumin / creatinine urine ratio       Other    Mixed hyperlipidemia    Relevant Orders    Lipid Panel with Direct LDL reflex   Other Visit Diagnoses     Pre-op exam    -  Primary    Abnormal TSH        Relevant Orders    TSH, 3rd generation with Free T4 reflex TSH deficiency        Relevant Orders    TSH, 3rd generation with Free T4 reflex          Diagnoses and all orders for this visit:    Pre-op exam    Mixed hyperlipidemia  -     Lipid Panel with Direct LDL reflex; Future    Type 2 diabetes mellitus without complication, without long-term current use of insulin (HCC)  -     Comprehensive metabolic panel; Future  -     Hemoglobin A1C; Future  -     Microalbumin / creatinine urine ratio; Future    Abnormal TSH  -     TSH, 3rd generation with Free T4 reflex; Future    TSH deficiency  -     TSH, 3rd generation with Free T4 reflex; Future       Pre-Surgery Instructions:   Medication Instructions   • acetaminophen (TYLENOL) 500 mg tablet per anesthesia guidelines    • ascorbic acid (VITAMIN C) 500 MG tablet per anesthesia guidelines    • aspirin 325 mg tablet per anesthesia guidelines    • diclofenac (VOLTAREN) 75 mg EC tablet per anesthesia guidelines    • ferrous sulfate 324 (65 Fe) mg per anesthesia guidelines    • fexofenadine (ALLEGRA) 180 MG tablet per anesthesia guidelines    • fluticasone (FLONASE) 50 mcg/act nasal spray per anesthesia guidelines    • folic acid (KP Folic Acid) 1 mg tablet per anesthesia guidelines    • lisinopril (ZESTRIL) 10 mg tablet per anesthesia guidelines    • meloxicam (MOBIC) 7 5 mg tablet per anesthesia guidelines    • metFORMIN (GLUCOPHAGE) 500 mg tablet per anesthesia guidelines    • Multiple Vitamin (multivitamin) tablet per anesthesia guidelines    • pantoprazole (PROTONIX) 20 mg tablet per anesthesia guidelines    • pravastatin (PRAVACHOL) 20 mg tablet per anesthesia guidelines         NOTE: Please use the above to review important meds for your specialty, the remainder "per anesthesia Guidelines "    NOTE: Please place an Inbasket message for "[facility]" pool for complicated patients

## 2022-11-28 NOTE — PROGRESS NOTES
Presurgical Evaluation     Subjective:      Patient ID: Igor Barrett is a 77 y o  female  Chief Complaint   Patient presents with   • Pre-op Exam       Here for pre op PE R hip replacemetn  The following portions of the patient's history were reviewed and updated as appropriate: allergies, current medications, past family history, past medical history, past social history, past surgical history and problem list     Procedure date: 12/12/22    Surgeon:  Jordyn Calderon  Planned procedure: Total right hip replacement anterior approach  Diagnosis for procedure:  Right hip arthritis    Prior anesthesia: Yes   General; Complications:  None / Tolerated well    CAD History: None   NOTE: Patient should see Cardiology if time available before surgery, and if appropriate  Pulmonary History: None    Renal history: None    Diabetes History:  Type 2  Controlled     Neurological History: None     On Immunosuppressant meds/biologics: No      Review of Systems   Constitutional: Negative  HENT: Negative  Eyes: Negative  Respiratory: Negative  Cardiovascular: Negative  Gastrointestinal: Negative  Endocrine: Negative  Genitourinary: Negative  Musculoskeletal:        R hip pain   Skin: Negative  Allergic/Immunologic: Negative  Neurological: Negative  Hematological: Negative  Psychiatric/Behavioral: Negative            Current Outpatient Medications   Medication Sig Dispense Refill   • acetaminophen (TYLENOL) 500 mg tablet Take 500-1,000 mg by mouth every 6 (six) hours as needed for mild pain     • ascorbic acid (VITAMIN C) 500 MG tablet Take 1 tablet (500 mg total) by mouth daily Start to take 30 days before surgery 30 tablet 1   • aspirin 325 mg tablet Take 1 tablet (325 mg total) by mouth daily (Patient taking differently: Take 325 mg by mouth daily Baby aspirin 81 mg, last dose 12/4)  0   • diclofenac (VOLTAREN) 75 mg EC tablet Take 1 tablet (75 mg total) by mouth 2 (two) times a day PRN for pain 60 tablet 1   • ferrous sulfate 324 (65 Fe) mg Take 1 tablet (324 mg total) by mouth daily before breakfast Start to take 30 days before surgery 30 tablet 1   • fexofenadine (ALLEGRA) 180 MG tablet Take 180 mg by mouth daily as needed      • fluticasone (FLONASE) 50 mcg/act nasal spray 1 spray into each nostril 2 (two) times a day as needed      • folic acid (KP Folic Acid) 1 mg tablet Take 1 tablet (1 mg total) by mouth daily Start to take 30 days before surgery 30 tablet 1   • lisinopril (ZESTRIL) 10 mg tablet TAKE 1 TABLET DAILY 90 tablet 1   • meloxicam (MOBIC) 7 5 mg tablet Take 1 tablet (7 5 mg total) by mouth 2 (two) times a day 60 tablet 0   • metFORMIN (GLUCOPHAGE) 500 mg tablet TAKE 1 TABLET DAILY WITH   BREAKFAST 90 tablet 1   • Multiple Vitamin (multivitamin) tablet Take 1 tablet by mouth daily Start to take 30 days before surgery 30 tablet 1   • pantoprazole (PROTONIX) 20 mg tablet TAKE 1 TABLET DAILY 90 tablet 1   • pravastatin (PRAVACHOL) 20 mg tablet TAKE 1 TABLET DAILY 90 tablet 1     No current facility-administered medications for this visit  Allergies on file:   Patient has no known allergies      Patient Active Problem List   Diagnosis   • Allergic rhinitis   • Type 2 diabetes mellitus without complication, without long-term current use of insulin ([facility] )   • GERD without esophagitis   • Mixed hyperlipidemia   • Essential hypertension   • Melanoma in situ ([facility] )   • Allergic dermatitis   • Pre-op exam   • Arthritis of right knee   • Osteoarthritis of right knee   • Primary osteoarthritis of right hip        Past Medical History:   Diagnosis Date   • Diabetes mellitus ([facility] )     NIDDM   • Diverticulosis     hx colon resection   • GERD (gastroesophageal reflux disease)    • Hyperlipidemia    • Hypertension    • Macular degeneration    • Malignant neoplasm of skin     skin cancer right upper arm in past   • OA (osteoarthritis)     right knee       Past Surgical History:   Procedure Laterality Date   • CATARACT EXTRACTION Bilateral    • CHOLANGIOGRAM N/A 7/2/2018    Procedure: CHOLANGIOGRAM;  Surgeon: Steen Carrel, MD;  [facility];  Service: General   • CHOLECYSTECTOMY LAPAROSCOPIC N/A 7/2/2018    Procedure: CHOLECYSTECTOMY LAPAROSCOPIC W/IOC;  Surgeon: Steen Carrel, MD;  [facility];  Service: General   • COLON SURGERY      resection large intestine (diverticulitis)   • COLONOSCOPY     • MT LAP, INCISIONAL HERNIA REPAIR,REDUCIBLE N/A 5/30/2019    Procedure: REPAIR HERNIA INCISIONAL LAPAROSCOPIC W/ ROBOTICS WITH MESH PLACEMENT;  Surgeon: Steen Carrel, MD;  [facility];  Service: General   • MT TOTAL KNEE ARTHROPLASTY Right 3/19/2021    Procedure: ARTHROPLASTY KNEE TOTAL;  Surgeon: Kerry Adams MD;  [facility];  Service: Orthopedics   • TONSILLECTOMY  child       Family History   Problem Relation Age of Onset   • Alzheimer's disease Mother    • Anxiety disorder Mother    • Diabetes Mother    • Cancer Father    • Diabetes Sister    • Hypertension Sister    • Stroke Sister        Social History     Tobacco Use   • Smoking status: Never   • Smokeless tobacco: Never   Vaping Use   • Vaping Use: Never used   Substance Use Topics   • Alcohol use: Never     Comment: beer/ liquor, once a month   • Drug use: No       Objective:    Vitals:    11/28/22 1524   BP: 131/72   BP Location: Right arm   Patient Position: Sitting   Cuff Size: Standard   Pulse: 70   Resp: 18   Temp: 97 9 °F (36 6 °C)   TempSrc: Tympanic   SpO2: 99%   Weight: 84 8 kg (187 lb)   Height: 5' 7" (1 702 m)        Physical Exam  Vitals and nursing note reviewed  Constitutional:       General: She is not in acute distress  Appearance: Normal appearance  She is well-developed and well-nourished  She is not diaphoretic  HENT:      Head: Normocephalic and atraumatic        Right Ear: Hearing, tympanic membrane, ear canal and external ear normal       Left Ear: Hearing, tympanic membrane, ear canal and external ear normal       Nose: Nose normal       Mouth/Throat:      Mouth: Oropharynx is clear and moist and mucous membranes are normal       Pharynx: Uvula midline  No oropharyngeal exudate  Eyes:      General: Lids are normal  No scleral icterus  Right eye: No discharge  Left eye: No discharge  Extraocular Movements: EOM normal       Conjunctiva/sclera: Conjunctivae normal       Pupils: Pupils are equal, round, and reactive to light  Neck:      Thyroid: No thyroid mass or thyromegaly  Vascular: No carotid bruit  Cardiovascular:      Rate and Rhythm: Normal rate and regular rhythm  Pulses: Normal pulses  Heart sounds: Normal heart sounds  No murmur heard  No friction rub  No gallop  Pulmonary:      Effort: Pulmonary effort is normal  No respiratory distress  Breath sounds: Normal breath sounds  No wheezing, rhonchi or rales  Abdominal:      General: Bowel sounds are normal  There is no distension or abdominal bruit  Palpations: Abdomen is soft  There is no hepatosplenomegaly or mass  Tenderness: There is no abdominal tenderness  There is no guarding or rebound  Hernia: No hernia is present  Musculoskeletal:         General: Normal range of motion  Cervical back: Normal range of motion and neck supple  Comments: PT ambulating with a cane and pain to R hip  Lymphadenopathy:      Cervical: No cervical adenopathy  Skin:     General: Skin is warm, dry and intact  Neurological:      Mental Status: She is alert and oriented to person, place, and time  She is not disoriented  Sensory: No sensory deficit  Motor: Motor strength is normal  No abnormal muscle tone  Coordination: Coordination normal       Gait: Gait normal       Deep Tendon Reflexes: Reflexes are normal and symmetric     Psychiatric:         Mood and Affect: Mood and affect normal          Speech: Speech normal          Behavior: Behavior normal  Thought Content: Thought content normal          Cognition and Memory: Cognition and memory normal          Judgment: Judgment normal            Preop labs/testing available and reviewed: yes    eGFR   Date Value Ref Range Status   2022 74 ml/min/1 73sq m Final     WBC   Date Value Ref Range Status   2022 5 26 4 31 - 10 16 Thousand/uL Final     INR   Date Value Ref Range Status   2022 0 87 0 84 - 1 19 Final       EKG yes    Echo no    Stress test/cath no    PFT/Mukund no    Functional capacity: Shovel snow                          6 Mets   Pick the highest level patient can comfortably perform   4 mets or greater for surgery    RCRI  High Risk surgery? 1 Point  CAD History:         1 Point   MI; Positive Stress Test; CP due to Mi;  Nitrate Usage to control Angina; Pathologic Q wave on EKG  CHF Active:         1 Point   Pulm Edema; Paroxysmal Nocturnal Dyspnea;  Bibasilar Rales (crackles);S3; CHF on CXR  Cerebrovascular Disease (TIA or CVA):     1 Point  DM on Insulin:        1 Point  Serum Creat >2 0 mg/dl:       1 Point          Total Points: 0     Scorin: Class I, Very Low Risk (0 4%)     1: Class II, Low risk (0 9%)     2: Class III Moderate (6 6%)     3: Class IV High (>11%)      MINGO Risk:  GFR:   eGFR   Date Value Ref Range Status   2022 74 ml/min/1 73sq m Final         For PCP:  If GFR>60, Hold ACE/ARB/Diuretic on the day of surgery, and NSAIDS 10 days before  If GFR<45, Consider PRE and POST op Nephrology Consult  If 46 <GFR> 59 : Has Patient had MINGO in last 6 Months? no   If YES: Preop Nephrology consult   If No:  [facility] Nephrology consult  Assessment/Plan:    Patient is medically optimized (cleared) for the planned procedure  Further testing/evaluation is not required  Postop concerns: no    Problem List Items Addressed This Visit        Digestive    GERD without esophagitis     Stable on PPI              Endocrine    Type 2 diabetes mellitus without complication, without long-term current use of insulin (HCC)     A1c is stable at 5 8 able to go through hip replacement surgery  Recheck 6 months  Continues without medication at this time  Lab Results   Component Value Date    HGBA1C 5 8 (H) 11/19/2022            Relevant Orders    Comprehensive metabolic panel    Hemoglobin A1C    Microalbumin / creatinine urine ratio       Cardiovascular and Mediastinum    Essential hypertension     Stable  Continue Zestril  Musculoskeletal and Integument    Primary osteoarthritis of right hip       Other    Mixed hyperlipidemia     Continue Pravachol check lipids in 6 months  Relevant Orders    Lipid Panel with Direct LDL reflex    Melanoma in situ [facility])     Continue dermatology  Pre-op exam - Primary     Patient is cleared to have anterior right hip replacement on 12/12 with Dr Abebe Cope  A1c is stable at 5 8, EKG and blood work acceptable/normal/no acute changes  Other Visit Diagnoses     Abnormal TSH        Relevant Orders    TSH, 3rd generation with Free T4 reflex    TSH deficiency        Relevant Orders    TSH, 3rd generation with Free T4 reflex           Diagnoses and all orders for this visit:    Pre-op exam    Primary osteoarthritis of right hip    Mixed hyperlipidemia  -     Lipid Panel with Direct LDL reflex; Future    Type 2 diabetes mellitus without complication, without long-term current use of insulin (HCC)  -     Comprehensive metabolic panel; Future  -     Hemoglobin A1C; Future  -     Microalbumin / creatinine urine ratio; Future    Abnormal TSH  -     TSH, 3rd generation with Free T4 reflex; Future    TSH deficiency  -     TSH, 3rd generation with Free T4 reflex;  Future    Essential hypertension    GERD without esophagitis    Melanoma in situ, unspecified site [facility])        Pre-Surgery Instructions:   Medication Instructions   • acetaminophen (TYLENOL) 500 mg tablet per anesthesia guidelines    • ascorbic acid (VITAMIN C) 500 MG tablet per anesthesia guidelines    • aspirin 325 mg tablet per anesthesia guidelines    • diclofenac (VOLTAREN) 75 mg EC tablet per anesthesia guidelines    • ferrous sulfate 324 (65 Fe) mg per anesthesia guidelines    • fexofenadine (ALLEGRA) 180 MG tablet per anesthesia guidelines    • fluticasone (FLONASE) 50 mcg/act nasal spray per anesthesia guidelines    • folic acid (KP Folic Acid) 1 mg tablet per anesthesia guidelines    • lisinopril (ZESTRIL) 10 mg tablet per anesthesia guidelines    • meloxicam (MOBIC) 7 5 mg tablet per anesthesia guidelines    • metFORMIN (GLUCOPHAGE) 500 mg tablet per anesthesia guidelines    • Multiple Vitamin (multivitamin) tablet per anesthesia guidelines    • pantoprazole (PROTONIX) 20 mg tablet per anesthesia guidelines    • pravastatin (PRAVACHOL) 20 mg tablet per anesthesia guidelines         NOTE: Please use the above to review important meds for your specialty, the remainder "per anesthesia Guidelines "    NOTE: Please place an Inbasket message for "[facility]" pool for complicated patients

## 2022-12-08 ENCOUNTER — ANESTHESIA EVENT (OUTPATIENT)
Dept: PERIOP | Facility: HOSPITAL | Age: 67
End: 2022-12-08

## 2022-12-11 NOTE — ANESTHESIA PREPROCEDURE EVALUATION
Procedure:  ARTHROPLASTY HIP TOTAL ANTERIOR, potential same day discharge (Right: Hip)    Relevant Problems   CARDIO   (+) Essential hypertension   (+) Mixed hyperlipidemia      ENDO   (+) Type 2 diabetes mellitus without complication, without long-term current use of insulin (HCC)      GI/HEPATIC   (+) GERD without esophagitis      MUSCULOSKELETAL   (+) Arthritis of right knee   (+) Osteoarthritis of right knee   (+) Primary osteoarthritis of right hip        Physical Exam    Airway    Mallampati score: II  TM Distance: >3 FB  Neck ROM: full     Dental       Cardiovascular  Cardiovascular exam normal    Pulmonary  Pulmonary exam normal     Other Findings        Anesthesia Plan  ASA Score- 2     Anesthesia Type- spinal with ASA Monitors  Additional Monitors:   Airway Plan:           Plan Factors-Exercise tolerance (METS): >4 METS  Chart reviewed  EKG reviewed  Existing labs reviewed  Patient summary reviewed  Patient is not a current smoker  Patient not instructed to abstain from smoking on day of procedure  Patient did not smoke on day of surgery  Induction-     Postoperative Plan- Plan for postoperative opioid use  Informed Consent- Anesthetic plan and risks discussed with patient and spouse  I personally reviewed this patient with the CRNA  Discussed and agreed on the Anesthesia Plan with the CRNA             Lab Results   Component Value Date    HGBA1C 5 8 (H) 11/19/2022       Lab Results   Component Value Date     11/02/2015    K 4 4 11/19/2022     11/19/2022    CO2 26 11/19/2022    ANIONGAP 5 11/02/2015    BUN 20 11/19/2022    CREATININE 0 82 11/19/2022    GLUCOSE 110 11/02/2015    GLUF 136 (H) 11/19/2022    CALCIUM 10 0 11/19/2022    AST 17 11/19/2022    ALT 31 11/19/2022    ALKPHOS 80 11/19/2022    PROT 7 4 11/02/2015    BILITOT 0 58 11/02/2015    EGFR 74 11/19/2022       Lab Results   Component Value Date    WBC 5 26 11/19/2022    HGB 14 3 11/19/2022    HCT 44 7 11/19/2022    MCV 90 11/19/2022     11/19/2022    ekg NSR

## 2022-12-12 ENCOUNTER — APPOINTMENT (OUTPATIENT)
Dept: RADIOLOGY | Facility: HOSPITAL | Age: 67
End: 2022-12-12

## 2022-12-12 ENCOUNTER — ANESTHESIA (OUTPATIENT)
Dept: PERIOP | Facility: HOSPITAL | Age: 67
End: 2022-12-12

## 2022-12-12 ENCOUNTER — HOSPITAL ENCOUNTER (OUTPATIENT)
Facility: HOSPITAL | Age: 67
Setting detail: OUTPATIENT SURGERY
Discharge: HOME | End: 2022-12-12
Attending: ORTHOPAEDIC SURGERY | Admitting: ORTHOPAEDIC SURGERY

## 2022-12-12 VITALS
DIASTOLIC BLOOD PRESSURE: 60 MMHG | WEIGHT: 180 LBS | SYSTOLIC BLOOD PRESSURE: 115 MMHG | OXYGEN SATURATION: 95 % | HEART RATE: 62 BPM | RESPIRATION RATE: 18 BRPM | HEIGHT: 67 IN | TEMPERATURE: 96.6 F | BODY MASS INDEX: 28.25 KG/M2

## 2022-12-12 DIAGNOSIS — Z96.641 STATUS POST TOTAL HIP REPLACEMENT, RIGHT: ICD-10-CM

## 2022-12-12 DIAGNOSIS — E11.9 TYPE 2 DIABETES MELLITUS WITHOUT COMPLICATION, WITHOUT LONG-TERM CURRENT USE OF INSULIN (HCC): ICD-10-CM

## 2022-12-12 DIAGNOSIS — I10 ESSENTIAL HYPERTENSION: ICD-10-CM

## 2022-12-12 DIAGNOSIS — L23.9 ALLERGIC DERMATITIS: Primary | ICD-10-CM

## 2022-12-12 DIAGNOSIS — K21.9 GERD WITHOUT ESOPHAGITIS: ICD-10-CM

## 2022-12-12 DIAGNOSIS — E78.2 MIXED HYPERLIPIDEMIA: ICD-10-CM

## 2022-12-12 LAB
GLUCOSE SERPL-MCNC: 104 MG/DL (ref 65–140)
GLUCOSE SERPL-MCNC: 119 MG/DL (ref 65–140)

## 2022-12-12 DEVICE — PINNACLE CANCELLOUS BONE SCREW 6.5MM X 15MM
Type: IMPLANTABLE DEVICE | Site: HIP | Status: FUNCTIONAL
Brand: PINNACLE

## 2022-12-12 DEVICE — BIOLOX DELTA CERAMIC FEMORAL HEAD 32MM DIA +5.0 12/14 TAPER
Type: IMPLANTABLE DEVICE | Site: HIP | Status: FUNCTIONAL
Brand: BIOLOX DELTA

## 2022-12-12 DEVICE — PINNACLE CANCELLOUS BONE SCREW 6.5MM X 25MM
Type: IMPLANTABLE DEVICE | Site: HIP | Status: FUNCTIONAL
Brand: PINNACLE

## 2022-12-12 DEVICE — PINNACLE POROCOAT ACETABULAR SHELL SECTOR II 50MM OD
Type: IMPLANTABLE DEVICE | Site: HIP | Status: FUNCTIONAL
Brand: PINNACLE POROCOAT

## 2022-12-12 DEVICE — ACTIS DUOFIX HIP PROSTHESIS (FEMORAL STEM 12/14 TAPER CEMENTLESS SIZE 7 HIGH COLLAR)  CE
Type: IMPLANTABLE DEVICE | Site: HIP | Status: FUNCTIONAL
Brand: ACTIS

## 2022-12-12 DEVICE — PINNACLE HIP SOLUTIONS ALTRX POLYETHYLENE ACETABULAR LINER NEUTRAL 32MM ID 50MM OD
Type: IMPLANTABLE DEVICE | Site: HIP | Status: FUNCTIONAL
Brand: PINNACLE ALTRX

## 2022-12-12 RX ORDER — SODIUM CHLORIDE 9 MG/ML
75 INJECTION, SOLUTION INTRAVENOUS CONTINUOUS
Status: DISCONTINUED | OUTPATIENT
Start: 2022-12-12 | End: 2022-12-12

## 2022-12-12 RX ORDER — LIDOCAINE HYDROCHLORIDE 10 MG/ML
INJECTION, SOLUTION EPIDURAL; INFILTRATION; INTRACAUDAL; PERINEURAL AS NEEDED
Status: DISCONTINUED | OUTPATIENT
Start: 2022-12-12 | End: 2022-12-12

## 2022-12-12 RX ORDER — CEFAZOLIN SODIUM 2 G/50ML
2000 SOLUTION INTRAVENOUS ONCE
Status: COMPLETED | OUTPATIENT
Start: 2022-12-12 | End: 2022-12-12

## 2022-12-12 RX ORDER — PROPOFOL 10 MG/ML
INJECTION, EMULSION INTRAVENOUS AS NEEDED
Status: DISCONTINUED | OUTPATIENT
Start: 2022-12-12 | End: 2022-12-12

## 2022-12-12 RX ORDER — PRAVASTATIN SODIUM 20 MG
20 TABLET ORAL DAILY
Status: DISCONTINUED | OUTPATIENT
Start: 2022-12-12 | End: 2022-12-12 | Stop reason: HOSPADM

## 2022-12-12 RX ORDER — HYDROMORPHONE HCL/PF 1 MG/ML
0.5 SYRINGE (ML) INJECTION EVERY 4 HOURS PRN
Status: DISCONTINUED | OUTPATIENT
Start: 2022-12-12 | End: 2022-12-12 | Stop reason: HOSPADM

## 2022-12-12 RX ORDER — BISACODYL 10 MG
10 SUPPOSITORY, RECTAL RECTAL DAILY PRN
Status: DISCONTINUED | OUTPATIENT
Start: 2022-12-12 | End: 2022-12-12 | Stop reason: HOSPADM

## 2022-12-12 RX ORDER — OXYCODONE HYDROCHLORIDE 10 MG/1
10 TABLET ORAL EVERY 4 HOURS PRN
Status: DISCONTINUED | OUTPATIENT
Start: 2022-12-12 | End: 2022-12-12 | Stop reason: HOSPADM

## 2022-12-12 RX ORDER — ASPIRIN 81 MG/1
81 TABLET, CHEWABLE ORAL DAILY
COMMUNITY
End: 2022-12-12

## 2022-12-12 RX ORDER — CHLORHEXIDINE GLUCONATE 0.12 MG/ML
15 RINSE ORAL ONCE
Status: COMPLETED | OUTPATIENT
Start: 2022-12-12 | End: 2022-12-12

## 2022-12-12 RX ORDER — BUPIVACAINE HYDROCHLORIDE 7.5 MG/ML
INJECTION, SOLUTION INTRASPINAL AS NEEDED
Status: DISCONTINUED | OUTPATIENT
Start: 2022-12-12 | End: 2022-12-12

## 2022-12-12 RX ORDER — GABAPENTIN 300 MG/1
300 CAPSULE ORAL ONCE
Status: COMPLETED | OUTPATIENT
Start: 2022-12-12 | End: 2022-12-12

## 2022-12-12 RX ORDER — ASCORBIC ACID 500 MG
500 TABLET ORAL DAILY
Status: DISCONTINUED | OUTPATIENT
Start: 2022-12-12 | End: 2022-12-12 | Stop reason: HOSPADM

## 2022-12-12 RX ORDER — SODIUM CHLORIDE, SODIUM LACTATE, POTASSIUM CHLORIDE, CALCIUM CHLORIDE 600; 310; 30; 20 MG/100ML; MG/100ML; MG/100ML; MG/100ML
125 INJECTION, SOLUTION INTRAVENOUS CONTINUOUS
Status: DISCONTINUED | OUTPATIENT
Start: 2022-12-12 | End: 2022-12-12 | Stop reason: HOSPADM

## 2022-12-12 RX ORDER — MAGNESIUM HYDROXIDE 1200 MG/15ML
LIQUID ORAL AS NEEDED
Status: DISCONTINUED | OUTPATIENT
Start: 2022-12-12 | End: 2022-12-12 | Stop reason: HOSPADM

## 2022-12-12 RX ORDER — OXYCODONE HYDROCHLORIDE 5 MG/1
5 TABLET ORAL EVERY 4 HOURS PRN
Status: DISCONTINUED | OUTPATIENT
Start: 2022-12-12 | End: 2022-12-12 | Stop reason: HOSPADM

## 2022-12-12 RX ORDER — SODIUM CHLORIDE 9 MG/ML
INJECTION, SOLUTION INTRAVENOUS AS NEEDED
Status: DISCONTINUED | OUTPATIENT
Start: 2022-12-12 | End: 2022-12-12 | Stop reason: HOSPADM

## 2022-12-12 RX ORDER — FENTANYL CITRATE 50 UG/ML
INJECTION, SOLUTION INTRAMUSCULAR; INTRAVENOUS AS NEEDED
Status: DISCONTINUED | OUTPATIENT
Start: 2022-12-12 | End: 2022-12-12

## 2022-12-12 RX ORDER — CELECOXIB 200 MG/1
200 CAPSULE ORAL DAILY
Qty: 30 CAPSULE | Refills: 1 | Status: SHIPPED | OUTPATIENT
Start: 2022-12-12

## 2022-12-12 RX ORDER — FLUTICASONE PROPIONATE 50 MCG
1 SPRAY, SUSPENSION (ML) NASAL 2 TIMES DAILY PRN
Status: DISCONTINUED | OUTPATIENT
Start: 2022-12-12 | End: 2022-12-12 | Stop reason: HOSPADM

## 2022-12-12 RX ORDER — CEFAZOLIN SODIUM 2 G/50ML
2000 SOLUTION INTRAVENOUS EVERY 8 HOURS
Status: DISCONTINUED | OUTPATIENT
Start: 2022-12-12 | End: 2022-12-12 | Stop reason: HOSPADM

## 2022-12-12 RX ORDER — SENNOSIDES 8.6 MG
1 TABLET ORAL DAILY
Status: DISCONTINUED | OUTPATIENT
Start: 2022-12-12 | End: 2022-12-12 | Stop reason: HOSPADM

## 2022-12-12 RX ORDER — CEPHALEXIN 500 MG/1
500 CAPSULE ORAL EVERY 12 HOURS SCHEDULED
Qty: 2 CAPSULE | Refills: 0 | Status: SHIPPED | OUTPATIENT
Start: 2022-12-12 | End: 2022-12-13

## 2022-12-12 RX ORDER — FOLIC ACID 1 MG/1
1 TABLET ORAL DAILY
Status: DISCONTINUED | OUTPATIENT
Start: 2022-12-12 | End: 2022-12-12 | Stop reason: HOSPADM

## 2022-12-12 RX ORDER — ENOXAPARIN SODIUM 100 MG/ML
40 INJECTION SUBCUTANEOUS DAILY
Status: DISCONTINUED | OUTPATIENT
Start: 2022-12-12 | End: 2022-12-12 | Stop reason: HOSPADM

## 2022-12-12 RX ORDER — ACETAMINOPHEN 325 MG/1
975 TABLET ORAL ONCE
Status: COMPLETED | OUTPATIENT
Start: 2022-12-12 | End: 2022-12-12

## 2022-12-12 RX ORDER — SODIUM CHLORIDE, SODIUM LACTATE, POTASSIUM CHLORIDE, CALCIUM CHLORIDE 600; 310; 30; 20 MG/100ML; MG/100ML; MG/100ML; MG/100ML
50 INJECTION, SOLUTION INTRAVENOUS CONTINUOUS
Status: DISCONTINUED | OUTPATIENT
Start: 2022-12-12 | End: 2022-12-12

## 2022-12-12 RX ORDER — GABAPENTIN 100 MG/1
100 CAPSULE ORAL 3 TIMES DAILY
Qty: 90 CAPSULE | Refills: 0 | Status: SHIPPED | OUTPATIENT
Start: 2022-12-12

## 2022-12-12 RX ORDER — ACETAMINOPHEN 325 MG/1
975 TABLET ORAL EVERY 8 HOURS SCHEDULED
Status: DISCONTINUED | OUTPATIENT
Start: 2022-12-12 | End: 2022-12-12 | Stop reason: HOSPADM

## 2022-12-12 RX ORDER — DOCUSATE SODIUM 100 MG/1
100 CAPSULE, LIQUID FILLED ORAL 2 TIMES DAILY
Qty: 20 CAPSULE | Refills: 0 | Status: SHIPPED | OUTPATIENT
Start: 2022-12-12

## 2022-12-12 RX ORDER — CALCIUM CARBONATE 200(500)MG
1000 TABLET,CHEWABLE ORAL DAILY PRN
Status: DISCONTINUED | OUTPATIENT
Start: 2022-12-12 | End: 2022-12-12 | Stop reason: HOSPADM

## 2022-12-12 RX ORDER — LORATADINE 10 MG/1
10 TABLET ORAL DAILY
Status: DISCONTINUED | OUTPATIENT
Start: 2022-12-12 | End: 2022-12-12 | Stop reason: HOSPADM

## 2022-12-12 RX ORDER — OXYCODONE HYDROCHLORIDE 10 MG/1
10 TABLET ORAL EVERY 4 HOURS PRN
Qty: 42 TABLET | Refills: 0 | Status: SHIPPED | OUTPATIENT
Start: 2022-12-12

## 2022-12-12 RX ORDER — ACETAMINOPHEN 500 MG
1000 TABLET ORAL EVERY 8 HOURS
Refills: 0
Start: 2022-12-12

## 2022-12-12 RX ORDER — PROPOFOL 10 MG/ML
INJECTION, EMULSION INTRAVENOUS CONTINUOUS PRN
Status: DISCONTINUED | OUTPATIENT
Start: 2022-12-12 | End: 2022-12-12

## 2022-12-12 RX ORDER — CHLORHEXIDINE GLUCONATE 4 G/100ML
SOLUTION TOPICAL DAILY PRN
Status: DISCONTINUED | OUTPATIENT
Start: 2022-12-12 | End: 2022-12-12 | Stop reason: HOSPADM

## 2022-12-12 RX ORDER — EPHEDRINE SULFATE 50 MG/ML
INJECTION INTRAVENOUS AS NEEDED
Status: DISCONTINUED | OUTPATIENT
Start: 2022-12-12 | End: 2022-12-12

## 2022-12-12 RX ORDER — DOCUSATE SODIUM 100 MG/1
100 CAPSULE, LIQUID FILLED ORAL 2 TIMES DAILY
Status: DISCONTINUED | OUTPATIENT
Start: 2022-12-12 | End: 2022-12-12 | Stop reason: HOSPADM

## 2022-12-12 RX ORDER — ASPIRIN 325 MG
325 TABLET, DELAYED RELEASE (ENTERIC COATED) ORAL 2 TIMES DAILY
Qty: 84 TABLET | Refills: 0 | Status: SHIPPED | OUTPATIENT
Start: 2022-12-12

## 2022-12-12 RX ORDER — PANTOPRAZOLE SODIUM 20 MG/1
20 TABLET, DELAYED RELEASE ORAL
Status: DISCONTINUED | OUTPATIENT
Start: 2022-12-12 | End: 2022-12-12 | Stop reason: HOSPADM

## 2022-12-12 RX ORDER — GABAPENTIN 100 MG/1
100 CAPSULE ORAL EVERY 8 HOURS
Status: DISCONTINUED | OUTPATIENT
Start: 2022-12-12 | End: 2022-12-12 | Stop reason: HOSPADM

## 2022-12-12 RX ORDER — ONDANSETRON 4 MG/1
4 TABLET, FILM COATED ORAL EVERY 8 HOURS PRN
Qty: 6 TABLET | Refills: 0 | Status: SHIPPED | OUTPATIENT
Start: 2022-12-12

## 2022-12-12 RX ORDER — DEXMEDETOMIDINE HYDROCHLORIDE 100 UG/ML
INJECTION, SOLUTION INTRAVENOUS AS NEEDED
Status: DISCONTINUED | OUTPATIENT
Start: 2022-12-12 | End: 2022-12-12

## 2022-12-12 RX ORDER — MIDAZOLAM HYDROCHLORIDE 2 MG/2ML
INJECTION, SOLUTION INTRAMUSCULAR; INTRAVENOUS AS NEEDED
Status: DISCONTINUED | OUTPATIENT
Start: 2022-12-12 | End: 2022-12-12

## 2022-12-12 RX ORDER — ONDANSETRON 2 MG/ML
4 INJECTION INTRAMUSCULAR; INTRAVENOUS EVERY 6 HOURS PRN
Status: DISCONTINUED | OUTPATIENT
Start: 2022-12-12 | End: 2022-12-12 | Stop reason: HOSPADM

## 2022-12-12 RX ADMIN — GABAPENTIN 100 MG: 100 CAPSULE ORAL at 15:34

## 2022-12-12 RX ADMIN — PHENYLEPHRINE HYDROCHLORIDE 50 MCG/MIN: 10 INJECTION INTRAVENOUS at 09:25

## 2022-12-12 RX ADMIN — PRAVASTATIN SODIUM 20 MG: 20 TABLET ORAL at 12:11

## 2022-12-12 RX ADMIN — FENTANYL CITRATE 50 MCG: 50 INJECTION, SOLUTION INTRAMUSCULAR; INTRAVENOUS at 07:30

## 2022-12-12 RX ADMIN — SODIUM CHLORIDE, SODIUM LACTATE, POTASSIUM CHLORIDE, AND CALCIUM CHLORIDE 125 ML/HR: .6; .31; .03; .02 INJECTION, SOLUTION INTRAVENOUS at 10:41

## 2022-12-12 RX ADMIN — CEFAZOLIN SODIUM 2000 MG: 2 SOLUTION INTRAVENOUS at 16:17

## 2022-12-12 RX ADMIN — PANTOPRAZOLE SODIUM 20 MG: 20 TABLET, DELAYED RELEASE ORAL at 12:11

## 2022-12-12 RX ADMIN — BUPIVACAINE HYDROCHLORIDE IN DEXTROSE 1.8 ML: 7.5 INJECTION, SOLUTION SUBARACHNOID at 07:42

## 2022-12-12 RX ADMIN — SODIUM CHLORIDE, SODIUM LACTATE, POTASSIUM CHLORIDE, AND CALCIUM CHLORIDE 1000 ML: .6; .31; .03; .02 INJECTION, SOLUTION INTRAVENOUS at 11:57

## 2022-12-12 RX ADMIN — LORATADINE 10 MG: 10 TABLET ORAL at 12:11

## 2022-12-12 RX ADMIN — LIDOCAINE HYDROCHLORIDE 50 ML: 10 INJECTION, SOLUTION EPIDURAL; INFILTRATION; INTRACAUDAL; PERINEURAL at 07:45

## 2022-12-12 RX ADMIN — DEXMEDETOMIDINE HYDROCHLORIDE 8 MCG: 100 INJECTION, SOLUTION INTRAVENOUS at 08:10

## 2022-12-12 RX ADMIN — KETOROLAC TROMETHAMINE: 30 INJECTION, SOLUTION INTRAMUSCULAR; INTRAVENOUS at 07:30

## 2022-12-12 RX ADMIN — EPHEDRINE SULFATE 10 MG: 50 INJECTION, SOLUTION INTRAVENOUS at 08:47

## 2022-12-12 RX ADMIN — DEXMEDETOMIDINE HYDROCHLORIDE 12 MCG: 100 INJECTION, SOLUTION INTRAVENOUS at 08:00

## 2022-12-12 RX ADMIN — PROPOFOL 50 MG: 10 INJECTION, EMULSION INTRAVENOUS at 07:44

## 2022-12-12 RX ADMIN — DOCUSATE SODIUM 100 MG: 100 CAPSULE, LIQUID FILLED ORAL at 17:14

## 2022-12-12 RX ADMIN — SODIUM CHLORIDE, POTASSIUM CHLORIDE, SODIUM LACTATE AND CALCIUM CHLORIDE 50 ML/HR: 600; 310; 30; 20 INJECTION, SOLUTION INTRAVENOUS at 06:17

## 2022-12-12 RX ADMIN — EPHEDRINE SULFATE 10 MG: 50 INJECTION, SOLUTION INTRAVENOUS at 08:23

## 2022-12-12 RX ADMIN — EPHEDRINE SULFATE 10 MG: 50 INJECTION, SOLUTION INTRAVENOUS at 08:03

## 2022-12-12 RX ADMIN — SODIUM CHLORIDE 1000 MG: 9 INJECTION, SOLUTION INTRAVENOUS at 08:00

## 2022-12-12 RX ADMIN — ACETAMINOPHEN 975 MG: 325 TABLET ORAL at 14:05

## 2022-12-12 RX ADMIN — OXYCODONE HYDROCHLORIDE AND ACETAMINOPHEN 500 MG: 500 TABLET ORAL at 12:11

## 2022-12-12 RX ADMIN — IRON SUCROSE 300 MG: 20 INJECTION, SOLUTION INTRAVENOUS at 13:15

## 2022-12-12 RX ADMIN — DOCUSATE SODIUM 100 MG: 100 CAPSULE, LIQUID FILLED ORAL at 12:11

## 2022-12-12 RX ADMIN — EPHEDRINE SULFATE 10 MG: 50 INJECTION, SOLUTION INTRAVENOUS at 08:29

## 2022-12-12 RX ADMIN — CEFAZOLIN SODIUM 2000 MG: 2 SOLUTION INTRAVENOUS at 07:29

## 2022-12-12 RX ADMIN — SENNOSIDES 8.6 MG: 8.6 TABLET, FILM COATED ORAL at 12:10

## 2022-12-12 RX ADMIN — EPHEDRINE SULFATE 10 MG: 50 INJECTION, SOLUTION INTRAVENOUS at 08:53

## 2022-12-12 RX ADMIN — PROPOFOL 100 MCG/KG/MIN: 10 INJECTION, EMULSION INTRAVENOUS at 07:45

## 2022-12-12 RX ADMIN — GABAPENTIN 300 MG: 300 CAPSULE ORAL at 06:08

## 2022-12-12 RX ADMIN — CHLORHEXIDINE GLUCONATE 0.12% ORAL RINSE 15 ML: 1.2 LIQUID ORAL at 06:08

## 2022-12-12 RX ADMIN — MIDAZOLAM 2 MG: 1 INJECTION INTRAMUSCULAR; INTRAVENOUS at 07:30

## 2022-12-12 RX ADMIN — ACETAMINOPHEN 975 MG: 325 TABLET ORAL at 06:08

## 2022-12-12 RX ADMIN — FOLIC ACID 1 MG: 1 TABLET ORAL at 12:11

## 2022-12-12 NOTE — OP NOTE
OPERATIVE REPORT  PATIENT NAME: Jarek Rosales    :  1955  MRN: [id number]  Pt Location:  OR ROOM 12    SURGERY DATE: 2022    Surgeon(s) and Role:     * Herber Bhatt, DO - Primary     * Reta Bernal PA-C - Assisting     * Stephenie Castañeda ATC - Assisting    Preop Diagnosis:  Primary osteoarthritis of right hip [M16 11]    Post-Op Diagnosis Codes:     * Primary osteoarthritis of right hip [M16 11]    Procedure(s) (LRB):  ARTHROPLASTY HIP TOTAL ANTERIOR (Right)    Specimen(s):  * No specimens in log *    Estimated Blood Loss:   Minimal    Drains:  * No LDAs found *    Anesthesia Type:   Spinal    Operative Indications:  Primary osteoarthritis of right hip [M16 11]    Operative Findings:  See below    Complications:   None    Procedure and Technique:  Implants: Depuy  Drury acetabular sector II cup size 50mm  Actis high offset stem size 7  Neutral acetabular liner  6 5 x 15mm screw, 6 5 x 25mm screw  32mm +5 Biolox delta ceramic femoral head    INDICATIONS FOR PROCEDURE:  The patients is a 77years old female who presented to the office with right hip arthritis  Conservative treatments were tried and failed  The patient had debilitating pain and decreased quality of life from their end-stage arthritis  Surgery was then recommended  Extensive counseling in regards to the reasons for surgical intervention as well as the risks and benefits of surgery were reviewed  The risks include, but are not limited to infection, extensive blood clots, blood clots, wound healing problems, damage to blood vessels and nerves, dislocation, leg length discrepancy, fracture, the need for further surgery  The patient understood and agreed to by oral and written consent      OPERATIVE PROCEDURE:  The patient was identified as Jarek Rosales by Her ID bracelet by the surgical staff in the preoperative area at [address] [place] [address]   The patient was wheeled back to the surgical room and placed on the operative table  Preoperative antibiotics were given  Anesthesia was administered  The patient was placed in the supine position on the hana table and all bony prominences were carefully protected  The right leg was then prepped and draped in the usual sterile fashion  A timeout was performed where the patient’s name and surgical site were once again identified  An incision was made over  the anterior aspect of the patient’s right hip  Dissection was made through the skin and subcutaneous tissue down to the fascia over the tensor fascia kimberlee  The fascia was incised and the interval between the tensor fascia kimberlee and the sartorius was identified  Retractors were placed  Bleeders were cauterized  We dissected down to the capsule  A capsulotomy was made and the capsule was tagged with suture  We released the capsule from the neck  The femoral neck cut was made and the femoral head was removed  Retractors were placed around the acetabulum  The labrum was removed  Sequential reaming took place and a size 50mm acetabular shell was found to be the best fit  XRs were taken to evaluate the position of the cup  The shell was impacted into place  One 6 5 x 15 mm screw and one 6 5 x 25mm screw was placed through the cup and the final liner was impacted into placed  The liner was checked and found to be secure  Attention was then paid to the femur  The leg was manipulated for exposure of the femur proximally  Soft tissue dissection was done to reveal the greater trochanter  A canal finder were used to open the femoral canal and excess bone was removed laterally  Sequential broaching took place and it was found that a size 7 femoral broach to be the best fit  The broach was left in place and a size 32mm +1 femoral head with a high offset neck were then trialed  XRs were taken  The leg lengths were checked and the leg was taken through a ROM to evaluate for stability  Stability was acceptable but we felt we needed a little more leg length  We then removed trials  The final femoral stem was impacted into place  We then retrialed with a 32mm +5 femoral head  The leg lengths were checked and the leg was taken through a ROM to evaluate for stability  Both the stability and leg lengths were found to be acceptable  The final 32mm +5 femoral head was impacted securely into place  The acetabulum was irrigated and the hip was carefully reduced  Copious amounts of irrigation was used to irrigate the hip  An irrisept wash followed by more irrigation was performed  The capsule was repaired with a #5 Ethibond suture  Irrigation was used throughout the closure  The tensor fascia was repaired with #1-0 running vicryl suture  The subcutaneous fat was closed with a running #1-0 vicryl suture  The skin was closed with #2-0 vicryl and #3-0 monocryl subcutaneously  Dermabond was placed on the incision and a mepilex dressing was placed  The patient was transferred onto a hospital bed and awakened without difficulty  I attest that I was present and performed this procedure  Romy Burkett PA-C  and Daisy Dior ATC were present for the entire procedure and provided essential assistance with limb position, patient prepping, and retraction    A qualified resident physician was not available    Patient Disposition:  hemodynamically stable        SIGNATURE: Irving Nava DO  DATE: December 12, 2022  TIME: 10:09 AM

## 2022-12-12 NOTE — NURSING NOTE
Patient arrived from PACU  Oriented to room and unit  IV fluids placed on pump and frequent VS initiated  Will continue to monitor

## 2022-12-12 NOTE — INTERVAL H&P NOTE
H&P reviewed  After examining the patient I find no changes in the patients condition since the H&P had been written    Heart:  regular rate  Lungs:  no audible wheezing  Abd:  nondistended  RLE:  EHL/AT/GS intact, sensation grossly intact L4, L5, S1, palpable pedal pulse      Vitals:    12/12/22 0551   BP: 149/96   Pulse: 69   Resp: 18   Temp: 97 7 °F (36 5 °C)   SpO2: 96%

## 2022-12-12 NOTE — NURSING NOTE
IV removed with tip intact  Pressure held to control bleeding  Discharge instructions discussed with Roland Barrett and her , Mahamed  Both verbalize understanding  Patient left with all her belongings, prescriptions and discharge instructions

## 2022-12-12 NOTE — PROGRESS NOTES
Patient is status post total hip arthroplasty  Episodes of hypotension postoperatively and patient states she feels a bit lightheaded  IV fluid bolus initiated followed by continuous infusion of lactated Ringer's at 125 cc/h  Internal Medicine will continue to follow until patient is discharged from the hospital   Hemodynamically stable and maintaining MAPs > 65 mmHg  Saturating well on room air  Home medications have been appropriately ordered by primary service

## 2022-12-12 NOTE — ANESTHESIA POSTPROCEDURE EVALUATION
Post-Op Assessment Note    CV Status:  Stable  Pain Score: 0    Pain management: adequate     Mental Status:  Awake   Hydration Status:  Euvolemic   PONV Controlled:  Controlled   Airway Patency:  Patent      Post Op Vitals Reviewed: Yes      Staff: CRNA         No notable events documented      /63 (12/12/22 1009)    Temp (!) 97 1 °F (36 2 °C) (12/12/22 1009)    Pulse 89 (12/12/22 1009)   Resp 20 (12/12/22 1009)    SpO2 98 % (12/12/22 1009)

## 2022-12-12 NOTE — NURSING NOTE
Prescribed bolus started for hypotension  Patient complained of lightheadedness  Will continue to monitor

## 2022-12-12 NOTE — DISCHARGE INSTRUCTIONS
ANTERIOR TOTAL HIP REPLACEMENT DISCHARGE INSTRUCTIONS    Surgical Dressing: You may remove your dressing 7 days from the date of your surgery then change your dressing daily until drainage stops  Do not put any lotions or creams on your incision  If there are any signs of infection such as drainage persisting beyond a few days, unusual looking drainage (yellow, green), increased redness around the incision, or fever/chills let your doctor know  Medications:  Upon discharge you will be given a prescription for an anticoagulant (i e  Ecotrin (Aspirin), Coumadin (Warfarin), Lovenox (Enoxaparin)) and a narcotic pain reliever  Do not take any over the counter NSAIDs (i e  Ibuprofen, Motrin, Advil, Naprosyn, Aleve) while on your anticoagulation medication  Narcotic pain relievers cannot be refilled over the phone  Please be mindful of the number of pills you have left so you can  a prescription for a refill if needed during office hours  If you are on Coumadin (Warfarin) you will need your blood drawn every Monday and Thursday once you are home  Initially your visiting nurse will draw your blood  Later you will go to an outpatient lab  You will receive a phone call the next day and your Coumadin (warfarin) will be dosed based on these results  Take this dosage daily until you hear from us next  Walking:  Use two crutches or a walker for EVERY step  Gradually increase your walking daily  You can progress to a cane as tolerated once advised by your physical therapist       Sleeping Positions: You may not sleep on your stomach  Bathing:  No tub baths  Do not submerge your incision  You may shower  You may sit on a shower chair or stand and shower briefly  Use your crutches/walker to get in and out of the shower  Sexual Relations:  Resume according to your comfort  Swimming:  No swimming or hot tubs until approved by your physician    Swimming will be allowed once your incision is well healed  Driving: You may ride as a passenger now  No driving until your follow-up appointment  To get into the car use the front passenger seat with the seat pushed back as far as possible  Scoot yourself back in the seat  Use your hands to assist your legs into the car  Physical therapy:  When you have finished with home visiting PT, you may begin outpatient PT  Call your surgeon’s office if you have not already received a prescription  A prescription can be faxed to the outpatient center of your choice  Special considerations: To minimize swelling, stiffness, and decrease pain use cold as needed, but not heat  Ice 20 minutes at a time with a cloth between your skin and the ice  Ice after walking, when you have pain, or after you have completed your exercises  Limit your sitting to 60 minutes at one time  Continue to wear your LINDA stockings at all times for 2 weeks after surgery, except when washing  You can wear them as needed after that  Follow up:  Call [phone] to make an appointment to see your surgeon within 2 weeks of your surgery if you haven’t done so already  If you have any questions during business hours please call the direct office phone number [phone]  Otherwise please call [phone] for any concerns  For the future:  Prior to any dental work, including routine cleanings, call the office for a prescription for antibiotics to be taken prior to your dental appointment  For any invasive procedures (i e  endoscopy, colonoscopy, etc ) inform the doctor performing the procedure that you have a total knee replacement and will antibiotics just prior to the procedure to protect it

## 2022-12-12 NOTE — ANESTHESIA PROCEDURE NOTES
Spinal Block    Patient location during procedure: OR  Start time: 12/12/2022 7:42 AM  Reason for block: procedure for pain and at surgeon's request  Staffing  Performed: Anesthesiologist   Anesthesiologist: Liz Wiley MD  Preanesthetic Checklist  Completed: patient identified, IV checked, site marked, risks and benefits discussed, surgical consent, monitors and equipment checked, pre-op evaluation and timeout performed  Spinal Block  Patient position: sitting  Prep: ChloraPrep  Patient monitoring: cardiac monitor and frequent blood pressure checks  Approach: midline  Location: L3-4  Injection technique: single-shot  Needle  Needle type: pencil-tip   Needle gauge: 25 G  Needle length: 10 cm  Assessment  Sensory level: T4  Events: cerebrospinal fluid  Injection Assessment:  negative aspiration for heme, no paresthesia on injection and positive aspiration for clear CSF    Post-procedure:  adhesive bandage applied, pressure dressing applied, secured with tape, site cleaned and sterile dressing applied  Additional Notes  VSS easy spinal  No complications

## 2022-12-13 LAB
ABO GROUP BLD BPU: NORMAL
ABO GROUP BLD BPU: NORMAL
BPU ID: NORMAL
BPU ID: NORMAL
CROSSMATCH: NORMAL
CROSSMATCH: NORMAL
UNIT DISPENSE STATUS: NORMAL
UNIT DISPENSE STATUS: NORMAL
UNIT PRODUCT CODE: NORMAL
UNIT PRODUCT CODE: NORMAL
UNIT PRODUCT VOLUME: 300 ML
UNIT PRODUCT VOLUME: 350 ML
UNIT RH: NORMAL
UNIT RH: NORMAL

## 2022-12-13 NOTE — PHYSICAL THERAPY NOTE
Physical Therapy Evaluation    Patient's Name: Aniceto Cousins    Admitting Diagnosis  Primary osteoarthritis of right hip [M16 11]    Problem List  Patient Active Problem List   Diagnosis    Allergic rhinitis    Type 2 diabetes mellitus without complication, without long-term current use of insulin ([facility] )    GERD without esophagitis    Mixed hyperlipidemia    Essential hypertension    Melanoma in situ ([facility] )    Allergic dermatitis    Pre-op exam    Arthritis of right knee    Osteoarthritis of right knee    Primary osteoarthritis of right hip    Status post total hip replacement, right       Past Medical History  Past Medical History:   Diagnosis Date    Diabetes mellitus ([facility] )     NIDDM    Diverticulosis     hx colon resection    GERD (gastroesophageal reflux disease)     Hyperlipidemia     Hypertension     Macular degeneration     Malignant neoplasm of skin     skin cancer right upper arm in past    OA (osteoarthritis)     right knee       Past Surgical History  Past Surgical History:   Procedure Laterality Date    CATARACT EXTRACTION Bilateral     CHOLANGIOGRAM N/A 7/2/2018    Procedure: CHOLANGIOGRAM;  Surgeon: Brad Cloud MD;  [facility];  Service: General    CHOLECYSTECTOMY LAPAROSCOPIC N/A 7/2/2018    Procedure: CHOLECYSTECTOMY LAPAROSCOPIC W/IOC;  Surgeon: Brad Cloud MD;  [facility];  Service: General    COLON SURGERY      resection large intestine (diverticulitis)    COLONOSCOPY      ME ARTHRP KNE CONDYLE&PLATU MEDIAL&LAT COMPARTMENTS Right 3/19/2021    Procedure: ARTHROPLASTY KNEE TOTAL;  Surgeon: Jeannette Gaxiola MD;  [facility];  Service: Orthopedics    ME LAP RPR HRNA XCPT INCAL/INGUN NCRC8/STRANGULATED N/A 5/30/2019    Procedure: REPAIR HERNIA INCISIONAL LAPAROSCOPIC W/ ROBOTICS WITH MESH PLACEMENT;  Surgeon: Brad Cloud MD;  [facility];  Service: General    TONSILLECTOMY  child       Recent Imaging  XR hip/pelv 1 vw right if performed   Final Result by Мария Samuels MD (12/12 1050)      Single AP view of right hip    -Expected postsurgical changes of right total hip arthroplasty  No acute immediate hardware complication on this single AP view  Workstation performed: [id number]         XR hip/pelv 2-3 vws right if performed    (Results Pending)       Recent Vital Signs  Vitals:    12/12/22 1300 12/12/22 1317 12/12/22 1407 12/12/22 1500   BP: 98/60 111/61 120/68 115/60   BP Location: Left arm Right arm Left arm Left arm   Pulse: 67 61 61 62   Resp: 18  18 18   Temp: (!) 96 1 °F (35 6 °C)  (!) 96 4 °F (35 8 °C) (!) 96 6 °F (35 9 °C)   TempSrc: Temporal  Temporal Temporal   SpO2: 99%  94% 95%   Weight:       Height:            12/12/22 1320   PT Last Visit   PT Visit Date 12/12/22   Note Type   Note type Evaluation   Pain Assessment   Pain Assessment Tool 0-10   Pain Score 2   Pain Location/Orientation Orientation: Right;Location: Hip   Restrictions/Precautions   Weight Bearing Precautions Per Order No   Other Precautions Pain;THR   Home Living   Type of [address] level; Able to live on main level with bedroom/bathroom;Stairs to enter with rails   Bathroom Shower/Tub Tub/shower unit   [address] Walker;Cane   Prior Function   Level of [place] Independent with ADLs; Independent with functional mobility; Independent with IADLS   Lives With Spouse   Receives Help From Family   IADLs Independent with meal prep; Independent with driving   Falls in the last 6 months 0   General   Family/Caregiver Present Yes   Cognition   Overall Cognitive Status WFL   Arousal/Participation Alert   Orientation Level Oriented X4   Memory Within functional limits   Following Commands Follows all commands and directions without difficulty   RLE Assessment   RLE Assessment   (3-/5)   LLE Assessment   LLE Assessment   (4/5)   Coordination   Movements are Fluid and Coordinated 0   Coordination and Movement Description antaligic RLE   Sensation WFL   Light Touch   RLE Light Touch Grossly intact   LLE Light Touch Grossly intact   Bed Mobility   Supine to Sit 6  Modified independent   Additional items Increased time required   Sit to Supine 6  Modified independent   Additional items Increased time required   Transfers   Sit to Stand 5  Supervision   Additional items Increased time required   Stand to Sit 5  Supervision   Additional items Increased time required   Additional Comments with RW   Ambulation/Elevation   Gait pattern Step through pattern;Decreased toe off;Decreased heel strike;Decreased foot clearance;Decreased R stance; Improper Weight shift   Gait Assistance 5  Supervision   Additional items Verbal cues   Assistive Device Rolling walker   Distance 100ft x3   Stair Management Assistance 5  Supervision   Additional items Assist x 1;Verbal cues; Tactile cues; Increased time required   Stair Management Technique Step to pattern; Foreward; With cane   Number of Stairs 6  (x2)   Balance   Static Sitting Fair +   Dynamic Sitting Fair +   Static Standing Fair   Dynamic Standing Fair -   Ambulatory Fair -   Endurance Deficit   Endurance Deficit Yes   Endurance Deficit Description limited due to pain and fatigue   Activity Tolerance   Activity Tolerance Patient tolerated treatment well   Medical Staff Made Aware spoke to CM   Nurse Made Aware spoke to RN   Assessment   Prognosis Good   Problem List Decreased strength;Decreased range of motion;Decreased endurance; Impaired balance;Decreased mobility; Decreased coordination;Pain;Orthopedic restrictions   Barriers to Discharge Inaccessible home environment;Decreased caregiver support   Goals   Patient Goals to return home   STG Expiration Date 12/18/22   Short Term Goal #1 see eval note   Plan   Treatment/Interventions ADL retraining;Functional transfer training;LE strengthening/ROM; Elevations; Therapeutic exercise; Endurance training;Gait training;Bed mobility; Patient/family training;Equipment eval/education;Spoke to nursing;Spoke to case management;OT   PT Frequency Twice a day   Recommendation   PT Discharge Recommendation Home with outpatient rehabilitation   Equipment Recommended [facility] Recommended Wheeled walker   [address]   Turning in Bed Without Bedrails 4   Lying on Back to Sitting on Edge of Flat Bed 4   Moving Bed to Chair 3   Standing Up From Chair 3   Walk in Room 3   Climb 3-5 Stairs 3   Basic Mobility Inpatient Raw Score 20   Basic Mobility Standardized Score 43 99   Highest Level Of Mobility   JH-HLM Goal 6: Walk 10 steps or more   JH-HLM Achieved 8: Walk 250 feet ot more   End of Consult   Patient Position at End of Consult Supine; All needs within reach         ASSESSMENT                                                                                                                     Roopa Beckman is a 77 y o  female admitted to [facility] on [facility]12/2022 for Status post total hip replacement, right  Pt  has a past medical history of Diabetes mellitus ([address] ), Diverticulosis, GERD (gastroesophageal reflux disease), Hyperlipidemia, Hypertension, Macular degeneration, Malignant neoplasm of skin, and OA (osteoarthritis)    PT was consulted and pt was seen on 12/13/2022 for mobility assessment and d/c planning  Pt presents supine in bed alert and agreeable to therapy  Impairments limiting pt at this time include decreased ROM, impaired balance, decreased endurance, decreased coordination, new onset of impairment of functional mobility, decreased IADLS, pain, and decreased strength  Pt is currently functioning at a modified independent assistance level for bed mobility, supervision assistance x1 level for transfers, supervision assistance x1 level for ambulation with Rolling Walker, and supervision assistance x1 for elevations  The patient's AM-PAC Basic Mobility Inpatient Short Form Raw Score is 20   A Raw score of greater than 16 suggests the patient may benefit from discharge to home  Please also refer to the recommendation of the Physical Therapist for safe discharge planning  Goals                                                                                                                                    1) Bed mobility skills with modified independent assistance to facilitate safe return to previous living environment 2) Functional transfers with modified independent assistance to facilitate safe return to previous living environment  3) Ambulation with least restrictive AD modified independent assistance without LOB and stable vitals for safe ambulation home/ community distances  4) Stair training up/down flight 12 step/s with appropriate rail/s  and modified independent assistance for safe access to previous living environment  5) Improve balance grades to fair + to reduce risk of falls  6)Improve LE strength grades by 1 to increase independence w/ transfers and gait  7) PT for ongoing pt and family education; DME needs and D/C planning to promote highest level of function in least restrictive environment  Recommendations                                                                                                              Pt will benefit from continued skilled IP PT to address the above mentioned impairments in order to maximize recovery and increase functional independence when completing mobility and ADLs  See flow sheet for goals and POC       DME: [facility]    Discharge Disposition:  Home with Outpatient Physical Therapy       Rojas Ford PT, DPT

## 2022-12-14 ENCOUNTER — TELEPHONE (OUTPATIENT)
Dept: OBGYN CLINIC | Facility: HOSPITAL | Age: 67
End: 2022-12-14

## 2022-12-15 NOTE — TELEPHONE ENCOUNTER
Pt contacted to complete a postoperative follow up call assessment, she reports  "feeling pretty good" and 0/10 with rest, and 5/10 with movement  She reports ambulating with the RW and the swelling is not too noticeable, she was able to shower with assistance yesterday (into the bath tub)  She has not been icing, and was educated to do so  She has outpatient PT on Monday  Her dressing remains dry with no drainage  We reviewed her AVS medication list  She has not taken oxycodone yet today, took only one tablet yesterday  She is taking Celebrex 200mg Daily, gabapentin 100mg TID, Colace 100mg BID (had a BM) and ASA 325mg BID  She is taking Tylenol 1000mg TID for pain  She denies any chest pain, SOB, dizziness, fever or calf pain  She denies questions or issues  pt encouraged to call me with questions, concerns or issues  No further action required at this time, patient is ready for CM handoff

## 2022-12-19 ENCOUNTER — EVALUATION (OUTPATIENT)
Dept: PHYSICAL THERAPY | Facility: REHABILITATION | Age: 67
End: 2022-12-19

## 2022-12-19 DIAGNOSIS — M16.11 PRIMARY OSTEOARTHRITIS OF RIGHT HIP: Primary | ICD-10-CM

## 2022-12-19 DIAGNOSIS — M87.051 AVASCULAR NECROSIS OF HIP, RIGHT (HCC): ICD-10-CM

## 2022-12-19 DIAGNOSIS — M25.551 RIGHT HIP PAIN: ICD-10-CM

## 2022-12-19 NOTE — PROGRESS NOTES
PT Evaluation     Today's date: 2022  Patient name: Esperanza Snatiago  : 1955  MRN: [id number]  Referring provider: Bilyl Puga  Dx:   Encounter Diagnosis     ICD-10-CM    1  Primary osteoarthritis of right hip  M16 11 Ambulatory referral to Physical Therapy      2  Avascular necrosis of hip, right ([facility] )  M87 [id number] Ambulatory referral to Physical Therapy      3  Right hip pain  M25 551           Start Time: 1530  Stop Time: 1615  Total time in clinic (min): 45 minutes    Assessment  Assessment details: Patient is a 77 y o  female presenting s/p R NILTON with date of surgery 22  Resulting postoperative impairments include R hip AROM deficits, R hip strength deficits in all major muscle groups, and gait dysfunction  As a result of impairments patient experiences limitations with functional/daily activities including tub transfers, car transfers, sleeping, ambulating with RW, and step to step stair navigation  Precautions, surgical protocol, and signs/symptoms of infection were reviewed with patient who expressed verbal understanding  Patient has the above listed impairments and will benefit from skilled PT to improve deficits to return to prior level of function      Impairments: abnormal gait, abnormal muscle firing, abnormal or restricted ROM, abnormal movement, activity intolerance, impaired physical strength and pain with function     Prognosis: good    Goals  Impairment Goals: 4-6 weeks  - Patient to decrease pain to 2/10  - Patient to improve hip PROM to [facility]  - Patient to increase knee strength to 4/5 throughout  - Patient to increase hip strength to 4/5 throughout    Functional Goals: by discharge  - Patient to discharge to [facility]  - Patient to return to prior level of function  - Patient to improve hip AROM to equal to uninvolved side  - Patient to ambulate in home and community without increased pain or difficulty   - Patient to ascend and descend stairs without increased pain or difficulty  - Patient to complete sit to stand transfers without increased pain or difficulty      Plan  Patient would benefit from: skilled physical therapy  Planned modality interventions: cryotherapy, TENS and thermotherapy: hydrocollator packs  Planned therapy interventions: flexibility, home exercise program, joint mobilization, manual therapy, neuromuscular re-education, patient education, strengthening, stretching, therapeutic activities, therapeutic exercise and functional ROM exercises  Frequency: 2x week  Duration in weeks: 8  Treatment plan discussed with: patient        Subjective Evaluation    History of Present Illness  Mechanism of injury: HISTORY OF PRESENT ILLNESS: Patient presents s/p R NILTON via anterior approach 22  Patient was discharged home same day  She has been ambulating with RW since    PRIOR TREATMENT: preoperative PT  AGGRAVATING FACTORS: swinging leg over on sofa, sleeping, bed mobility  EASING FACTORS: ice  WORK:   IMAGING: preoperative x-rays  FUNCTIONAL LIMITATIONS: tub transfers, car transfers, sleeping, ambulating with RW, step to step stair navigation  SUBJECTIVE FUNCTIONAL LEVEL: 50%  PATIENT GOAL: walk without an AD    Pain  Current pain ratin  At best pain ratin  At worst pain ratin  Location: R hip          Objective     Neurological Testing     Sensation     Lumbar   Left   Intact: light touch    Right   Intact: light touch    Active Range of Motion   Left Hip   Flexion: 99 degrees   External rotation (90/90): 38 degrees   Internal rotation (90/90): 30 degrees     Additional Active Range of Motion Details  DNT R LE due to surgical acuity    Passive Range of Motion     Right Hip   Flexion: 81 degrees   External rotation (90/90): 45 degrees   Internal rotation (90/90): 20 degrees     Strength/Myotome Testing     Left Hip   Planes of Motion   Flexion: 4  External rotation: 4-  Internal rotation: 4    Left Knee   Flexion: 4+  Extension: 4+    Left Ankle/Foot   Dorsiflexion: 5    Right Ankle/Foot   Dorsiflexion: 4+    Additional Strength Details  DNT R LE strength due to surgical acuity    Ambulation     Comments   Ambulating with RW      Flowsheet Rows    Flowsheet Row Most Recent Value   PT/OT G-Codes    Current Score 41   Projected Score 63             Diagnosis: s/p R NILTON 12/12/22   Precautions: anterior approach, HTN, DM, h/o cancer   Primary impairments: R hip AROM deficits, R hip strength deficits in all major muscle groups, and gait dysfunction   *asterisks by exercise = given for [facility]    12/19       Manuals        R hip PROM                                        There Ex        Rec bike        Hip flex slides *  x 10       Hip abd slides *  x 10                                                       Neuro Re-Ed        Glute sets *  5" x 10       Quad sets        Hip abd iso        Hip add iso        Retro step        [employer]        Heel/toe raises                                                                Re-evaluation             Ther Act/Gait                                         Modalities             CP prn

## 2022-12-20 ENCOUNTER — APPOINTMENT (OUTPATIENT)
Dept: RADIOLOGY | Facility: MEDICAL CENTER | Age: 67
End: 2022-12-20

## 2022-12-20 ENCOUNTER — OFFICE VISIT (OUTPATIENT)
Dept: OBGYN CLINIC | Facility: MEDICAL CENTER | Age: 67
End: 2022-12-20

## 2022-12-20 VITALS
WEIGHT: 179 LBS | SYSTOLIC BLOOD PRESSURE: 130 MMHG | HEART RATE: 78 BPM | DIASTOLIC BLOOD PRESSURE: 74 MMHG | HEIGHT: 67 IN | BODY MASS INDEX: 28.09 KG/M2

## 2022-12-20 DIAGNOSIS — Z96.641 STATUS POST TOTAL HIP REPLACEMENT, RIGHT: Primary | ICD-10-CM

## 2022-12-20 DIAGNOSIS — Z96.641 STATUS POST TOTAL HIP REPLACEMENT, RIGHT: ICD-10-CM

## 2022-12-20 NOTE — PROGRESS NOTES
Assessment/Plan:  1  Status post total hip replacement, right      Orders Placed This Encounter   Procedures   • XR hip/pelv 2-3 vws right if performed       · Patient is doing very well 1 week status post R anterior NILTON  · Transition to outpatient PT   · Pain control prn- celebrex, gabapentin,and tylenol  · Continue with  BID for DVT prophylaxis  · LINDA stockings as needed for swelling  · May shower and let water run over incision, do not submerge incision  · Continue with anterior hip precautions  · Patient should call ahead for abx prior to dental appts  Return in about 4 weeks (around 1/17/2023) for R ant NILTON post op 2  I answered all of the patient's questions during the visit and provided education of the patient's condition during the visit  The patient verbalized understanding of the information given and agrees with the plan  This note was dictated using [employer] software  It may contain errors including improperly dictated words  Please contact physician directly for any questions  Subjective   Chief Complaint:   Chief Complaint   Patient presents with   • Right Hip - Post-op       Heriberto Huizar is a 77 y o  female who presents for 1 week follow up s/p right anterior NILTON  Patient states she is doing well  Patient is taking tylenol, celebrex, and gabapentin for pain  She reports that she stopped the oxycodone and is doing well without it  Patient reports she is compliant with ASA DVT prophylaxis  Patient started outpatient PT yesterday  She is using a walker for assistance ambulating  Patient denies leg length discrepancy, fevers, chills, distal numbness, or tingling  Review of Systems  ROS:    See HPI for musculoskeletal review     All other systems reviewed are negative     History:  Past Medical History:   Diagnosis Date   • Diabetes mellitus ([facility] [address] )     NIDDM   • Diverticulosis     hx colon resection   • GERD (gastroesophageal reflux disease)    • Hyperlipidemia    • Hypertension    • Macular degeneration    • Malignant neoplasm of skin     skin cancer right upper arm in past   • OA (osteoarthritis)     right knee     Past Surgical History:   Procedure Laterality Date   • CATARACT EXTRACTION Bilateral    • CHOLANGIOGRAM N/A 7/2/2018    Procedure: CHOLANGIOGRAM;  Surgeon: Elvi Ferguson MD;  Location: [facility];  Service: General   • CHOLECYSTECTOMY LAPAROSCOPIC N/A 7/2/2018    Procedure: CHOLECYSTECTOMY LAPAROSCOPIC W/IOC;  Surgeon: Elvi Ferguson MD;  [facility];  Service: General   • COLON SURGERY      resection large intestine (diverticulitis)   • COLONOSCOPY     • NJ LAP, INCISIONAL HERNIA REPAIR,REDUCIBLE N/A 5/30/2019    Procedure: REPAIR HERNIA INCISIONAL LAPAROSCOPIC W/ ROBOTICS WITH MESH PLACEMENT;  Surgeon: Elvi Ferguson MD;  Location[facility];  Service: General   • NJ TOTAL HIP ARTHROPLASTY Right 12/12/2022    Procedure: ARTHROPLASTY HIP TOTAL ANTERIOR;  Surgeon: Brandi Han DO;  Location: [address] [place] [address] MAIN OR;  Service: Orthopedics   • NJ TOTAL KNEE ARTHROPLASTY Right 3/19/2021    Procedure: ARTHROPLASTY KNEE TOTAL;  Surgeon: Hallie Fu MD;  Location: [facility];  Service: Orthopedics   • TONSILLECTOMY  child     Social History   Social History     Substance and Sexual Activity   Alcohol Use Never    Comment: beer/ liquor, once a month     Social History     Substance and Sexual Activity   Drug Use Never     Social History     Tobacco Use   Smoking Status Never   Smokeless Tobacco Never     Family History:   Family History   Problem Relation Age of Onset   • Alzheimer's disease Mother    • Anxiety disorder Mother    • Diabetes Mother    • Cancer Father    • Diabetes Sister    • Hypertension Sister    • Stroke Sister        Current Outpatient Medications on File Prior to Visit   Medication Sig Dispense Refill   • acetaminophen (TYLENOL) 500 mg tablet Take 2 tablets (1,000 mg total) by mouth every 8 (eight) hours  0   • ascorbic acid (VITAMIN C) 500 MG tablet Take 1 tablet (500 mg total) by mouth daily Start to take 30 days before surgery 30 tablet 1   • aspirin (ECOTRIN) 325 mg EC tablet Take 1 tablet (325 mg total) by mouth 2 (two) times a day Take with food 84 tablet 0   • celecoxib (CeleBREX) 200 mg capsule Take 1 capsule (200 mg total) by mouth daily Do not take at the same time as aspirin 30 capsule 1   • docusate sodium (COLACE) 100 mg capsule Take 1 capsule (100 mg total) by mouth 2 (two) times a day 20 capsule 0   • ferrous sulfate 324 (65 Fe) mg Take 1 tablet (324 mg total) by mouth daily before breakfast Start to take 30 days before surgery 30 tablet 1   • fexofenadine (ALLEGRA) 180 MG tablet Take 180 mg by mouth daily as needed      • fluticasone (FLONASE) 50 mcg/act nasal spray 1 spray into each nostril 2 (two) times a day as needed      • folic acid (KP Folic Acid) 1 mg tablet Take 1 tablet (1 mg total) by mouth daily Start to take 30 days before surgery 30 tablet 1   • gabapentin (NEURONTIN) 100 mg capsule Take 1 capsule (100 mg total) by mouth 3 (three) times a day 90 capsule 0   • lisinopril (ZESTRIL) 10 mg tablet TAKE 1 TABLET DAILY 90 tablet 1   • metFORMIN (GLUCOPHAGE) 500 mg tablet TAKE 1 TABLET DAILY WITH   BREAKFAST 90 tablet 1   • Multiple Vitamins-Minerals (PRESERVISION AREDS PO) Take by mouth     • ondansetron (ZOFRAN) 4 mg tablet Take 1 tablet (4 mg total) by mouth every 8 (eight) hours as needed for nausea or vomiting 6 tablet 0   • oxyCODONE (ROXICODONE) 10 MG TABS Take 1 tablet (10 mg total) by mouth every 4 (four) hours as needed for severe pain Or take 1/2 tablet by mouth every 4 hours as needed for moderate pain Max Daily Amount: 60 mg 42 tablet 0   • pantoprazole (PROTONIX) 20 mg tablet TAKE 1 TABLET DAILY 90 tablet 1   • pravastatin (PRAVACHOL) 20 mg tablet TAKE 1 TABLET DAILY 90 tablet 1     No current facility-administered medications on file prior to visit       No Known Allergies     Objective     /74   Pulse 78   Ht 5' 7" (1 702 m)   Wt 81 2 kg (179 lb)   BMI 28 04 kg/m²      PE:  AAOx 3  WDWN  Hearing intact, no drainage from eyes  no audible wheezing  no abdominal distension  LE compartments soft, AT/GS intact    Ortho Exam:  right hip:   INC: C/D/I, No erythema, mild swelling  No pain with ROM    Imaging Studies: I have personally reviewed pertinent films in PACS  XR right hip:  S/p NILTON, adequate alignment

## 2022-12-21 ENCOUNTER — OFFICE VISIT (OUTPATIENT)
Dept: PHYSICAL THERAPY | Facility: REHABILITATION | Age: 67
End: 2022-12-21

## 2022-12-21 DIAGNOSIS — M16.11 PRIMARY OSTEOARTHRITIS OF RIGHT HIP: Primary | ICD-10-CM

## 2022-12-21 DIAGNOSIS — M25.551 RIGHT HIP PAIN: ICD-10-CM

## 2022-12-21 DIAGNOSIS — M87.051 AVASCULAR NECROSIS OF HIP, RIGHT (HCC): ICD-10-CM

## 2022-12-21 NOTE — PROGRESS NOTES
Daily Note     Today's date: 2022  Patient name: Izabela Dejesus  : 1955  MRN: [id number]  Referring provider: Josh Hannah  Dx:   Encounter Diagnosis     ICD-10-CM    1  Primary osteoarthritis of right hip  M16 11       2  Avascular necrosis of hip, right (HCC)  M87 051       3  Right hip pain  M25 551           Start Time: 1615  Stop Time: 1700  Total time in clinic (min): 45 minutes    Subjective: Patient felt fine after the initial examination with no significant soreness  She followed up with surgeon who feels she is doing well      Objective: See treatment diary below      Assessment: Tolerated treatment well with initial progressions  Most significant hip PROM limitations noted in IR  Patient challenged with hip abduction slides in supine secondary to strength deficits  Patient exhibited good technique with therapeutic exercises and would benefit from continued PT      Plan: Continue per plan of care        Diagnosis: s/p R NILTON 22   Precautions: anterior approach, HTN, DM, h/o cancer   Primary impairments: R hip AROM deficits, R hip strength deficits in all major muscle groups, and gait dysfunction   *asterisks by exercise = given for HEP          Manuals        R hip PROM   10'                                      There Ex        Rec bike   / revs x 8'      Hip flex slides *  x 10  x 10      Hip abd slides *  x 10  x 10                                                      Neuro Re-Ed        Glute sets *  5" x 10  5" x 10      Quad sets   5" x 10      Hip abd iso   5" x 10      Hip add iso   5" x 10      Retro step   x 15 ea      Lutheran pews        Heel/toe raises   x 15 ea                                                              Re-evaluation             Ther Act/Gait                                         Modalities             CP prn    deferred

## 2022-12-22 ENCOUNTER — APPOINTMENT (OUTPATIENT)
Dept: PHYSICAL THERAPY | Facility: REHABILITATION | Age: 67
End: 2022-12-22

## 2022-12-29 ENCOUNTER — OFFICE VISIT (OUTPATIENT)
Dept: PHYSICAL THERAPY | Facility: REHABILITATION | Age: 67
End: 2022-12-29

## 2022-12-29 DIAGNOSIS — M87.051 AVASCULAR NECROSIS OF HIP, RIGHT (HCC): ICD-10-CM

## 2022-12-29 DIAGNOSIS — M25.551 RIGHT HIP PAIN: ICD-10-CM

## 2022-12-29 DIAGNOSIS — M16.11 PRIMARY OSTEOARTHRITIS OF RIGHT HIP: Primary | ICD-10-CM

## 2022-12-29 NOTE — PROGRESS NOTES
Daily Note     Today's date: 2022  Patient name: Duran Rice  : 1955  MRN: [id number]  Referring provider: Sea Veliz  Dx:   Encounter Diagnosis     ICD-10-CM    1  Primary osteoarthritis of right hip  M16 11       2  Avascular necrosis of hip, right (HCC)  M87 051       3  Right hip pain  M25 551           Start Time: 1530  Stop Time: 1615  Total time in clinic (min): 45 minutes    Subjective: Patient has been feeling okay for the most part however she had pain overnight last night with no known cause  She reports compliance with HEP      Objective: See treatment diary below      Assessment: Very good exercise recall noted  Quadriceps fatigue with retro step and Confucianist pew exercises  Improving hip PROM noted in all planes during manual therapy  Therapist assistance required to complete sidelying hip abduction  Patient demonstrated fatigue post treatment and would benefit from continued PT      Plan: Continue per plan of care        Diagnosis: s/p R NILTON 22   Precautions: anterior approach, HTN, DM, h/o cancer   Primary impairments: R hip AROM deficits, R hip strength deficits in all major muscle groups, and gait dysfunction   *asterisks by exercise = given for HEP         Manuals        R hip PROM   10'  10'                                     There Ex        Rec bike   1/2 revs x 8'  1/2 revs x 8'     Hip flex slides *  x 10  x 10  x 10     Hip abd slides *  x 10  x 10  x 10                                                     Neuro Re-Ed        Glute sets *  5" x 10  5" x 10  HEP     Quad sets   5" x 10      Hip abd iso   5" x 10  5" x 10     Hip add iso   5" x 10  5" x 10     S/L hip abd    x 10 AAROM     S/L clamshells    x 10     Retro step   x 15 ea  x 15 ea     Buddhist pews    x 15     Heel/toe raises   x 15 ea  x 15 ea     Mini squats    x 10                                                     Re-evaluation             Ther Act/Gait Modalities             CP prn    deferred

## 2023-01-03 ENCOUNTER — OFFICE VISIT (OUTPATIENT)
Dept: PHYSICAL THERAPY | Facility: REHABILITATION | Age: 68
End: 2023-01-03

## 2023-01-03 DIAGNOSIS — M87.051 AVASCULAR NECROSIS OF HIP, RIGHT (HCC): ICD-10-CM

## 2023-01-03 DIAGNOSIS — M16.11 PRIMARY OSTEOARTHRITIS OF RIGHT HIP: Primary | ICD-10-CM

## 2023-01-03 DIAGNOSIS — M25.551 RIGHT HIP PAIN: ICD-10-CM

## 2023-01-03 NOTE — PROGRESS NOTES
Daily Note     Today's date: 1/3/2023  Patient name: Destiny Jameson  : 1955  MRN: [id number]  Referring provider: Krystal Mcgregor  Dx:   Encounter Diagnosis     ICD-10-CM    1  Primary osteoarthritis of right hip  M16 11       2  Avascular necrosis of hip, right (HCC)  M87 051       3  Right hip pain  M25 551           Start Time: 1200  Stop Time: 1245  Total time in clinic (min): 45 minutes    Subjective: Patient graduated herself to the cane over the weekend and has felt good walking with it      Objective: See treatment diary below      Assessment: Initiated forward step ups and sidestepping with fatigue noted following  Patient requires therapist assistance to complete supine and sidelying straight leg raises  Functional gait pattern with SPC  Instructed patient to utilize RW as needed during winter with snowy/icy conditions  Patient exhibited good technique with therapeutic exercises and would benefit from continued PT      Plan: Continue per plan of care        Diagnosis: s/p R NILTON 22   Precautions: anterior approach, HTN, DM, h/o cancer   Primary impairments: R hip AROM deficits, R hip strength deficits in all major muscle groups, and gait dysfunction   *asterisks by exercise = given for HEP    12/19 12/21 12/29 1/3    Manuals        R hip PROM   10'  10'  10'                                    There Ex        Rec bike   1/2 revs x 8'  1/2 revs x 8'  L1 x 8'    Hip flex slides *  x 10  x 10  x 10  HEP    Hip abd slides *  x 10  x 10  x 10  HEP                                                    Neuro Re-Ed        Glute sets *  5" x 10  5" x 10  HEP     Quad sets   5" x 10      Hip abd iso   5" x 10  5" x 10  5" x 10    Hip add iso   5" x 10  5" x 10  5" x 10    Supine SLR     x 10 AAROM    S/L hip abd    x 10 AAROM  x 10 AAROM    S/L clamshells    x 10     Retro step   x 15 ea  x 15 ea  D/C    Hoahaoism pews    x 15  D/C    Heel/toe raises   x 15 ea  x 15 ea  x 20 ea    Mini squats    x 10  x 15    Forward step ups     0R x 15    Lat step ups        Sidestepping     x 2 laps                            Re-evaluation             Ther Act/Gait                                         Modalities             CP prn    deferred

## 2023-01-05 ENCOUNTER — OFFICE VISIT (OUTPATIENT)
Dept: PHYSICAL THERAPY | Facility: REHABILITATION | Age: 68
End: 2023-01-05

## 2023-01-05 DIAGNOSIS — M87.051 AVASCULAR NECROSIS OF HIP, RIGHT (HCC): ICD-10-CM

## 2023-01-05 DIAGNOSIS — M16.11 PRIMARY OSTEOARTHRITIS OF RIGHT HIP: Primary | ICD-10-CM

## 2023-01-05 DIAGNOSIS — M25.551 RIGHT HIP PAIN: ICD-10-CM

## 2023-01-05 NOTE — PROGRESS NOTES
Daily Note     Today's date: 2023  Patient name: Jacquelyn Gonzalez  : 1955  MRN: [id number]  Referring provider: Remberto Simons  Dx:   Encounter Diagnosis     ICD-10-CM    1  Primary osteoarthritis of right hip  M16 11       2  Avascular necrosis of hip, right (HCC)  M87 051       3  Right hip pain  M25 551           Start Time: 1200  Stop Time: 1245  Total time in clinic (min): 45 minutes    Subjective: Patient denies soreness following last treatment, but reports feeling fatigued by the end of the night after going to [facility] and therapy  Pt offers no new complaints otherwise arriving to PT this visit          Objective: See treatment diary below      Diagnosis: s/p R NILTON 22   Precautions: anterior approach, HTN, DM, h/o cancer   Primary impairments: R hip AROM deficits, R hip strength deficits in all major muscle groups, and gait dysfunction   *asterisks by exercise = given for HEP    12/19 12/21 12/29 1/3 1   Manuals        R hip PROM   10'  10'  10'  10'                                   There Ex        Rec bike   1/2 revs x 8'  1/2 revs x 8'  L1 x 8'  L1 x8'   Hip flex slides *  x 10  x 10  x 10  HEP    Hip abd slides *  x 10  x 10  x 10  HEP                                                    Neuro Re-Ed        Glute sets *  5" x 10  5" x 10  HEP     Quad sets   5" x 10      Hip abd iso   5" x 10  5" x 10  5" x 10  5"x15   Hip add iso   5" x 10  5" x 10  5" x 10  5"x15   Supine SLR     x 10 AAROM  x10   S/L hip abd    x 10 AAROM  x 10 AAROM  x10   S/L clamshells    x 10     Retro step   x 15 ea  x 15 ea  D/C    Samaritan pews    x 15  D/C    Heel/toe raises   x 15 ea  x 15 ea  x 20 ea  x20 ea   Mini squats    x 10  x 15  x15   Forward step ups     0R x 15  0R x15   Lat step ups        Sidestepping     x 2 laps  2 laps                           Re-evaluation             Ther Act/Gait                                         Modalities             CP prn    deferred Assessment: Patient tolerated treatment well  Pt is challenged by mini squats requiring cueing to prevent excessive fwd translation of bilat knees as well as maintaining equal weightbearing between LE's as patient favors lean to L  Gluteal fatigue with side stepping and is more challenged stabilizing through R LE to step L  Minimal assistance required with both SLR and s/l hip abd this date and fatigues with both exercises  Patient exhibited good technique with therapeutic exercises and would benefit from continued PT      Plan: Continue per plan of care

## 2023-01-10 ENCOUNTER — OFFICE VISIT (OUTPATIENT)
Dept: PHYSICAL THERAPY | Facility: REHABILITATION | Age: 68
End: 2023-01-10

## 2023-01-10 DIAGNOSIS — M25.551 RIGHT HIP PAIN: ICD-10-CM

## 2023-01-10 DIAGNOSIS — M87.051 AVASCULAR NECROSIS OF HIP, RIGHT (HCC): ICD-10-CM

## 2023-01-10 DIAGNOSIS — M16.11 PRIMARY OSTEOARTHRITIS OF RIGHT HIP: Primary | ICD-10-CM

## 2023-01-10 NOTE — PROGRESS NOTES
Daily Note     Today's date: 1/10/2023  Patient name: Maximus Camara  : 1955  MRN: [id number]  Referring provider: Scott Downey  Dx:   Encounter Diagnosis     ICD-10-CM    1  Primary osteoarthritis of right hip  M16 11       2  Avascular necrosis of hip, right (HCC)  M87 051       3  Right hip pain  M25 551           Start Time: 1030  Stop Time: 1115  Total time in clinic (min): 45 minutes    Subjective: Patient felt good after last visit and has been walking in her kitchen without SPC a bit      Objective: See treatment diary below      Assessment: Patient able to complete supine straight leg raises with slight extension lag present  R Trendelenburg gait noted during ambulation in clinic without AD  Instructed patient to continue to utilize [facility] in order to avoid reinforcing abnormal gait pattern  Hip abduction strength deficits remain with difficulty achieving full range during sidelying hip abduction  Cueing to prevent hip flexor compensation during sidelying hip abduction  Patient demonstrated fatigue post treatment and would benefit from continued PT      Plan: Continue per plan of care         Diagnosis: s/p R NILTON 22   Precautions: anterior approach, HTN, DM, h/o cancer   Primary impairments: R hip AROM deficits, R hip strength deficits in all major muscle groups, and gait dysfunction   *asterisks by exercise = given for HEP    1/10 12/21 12/29 1/3 1/5   Manuals        R hip PROM  10'  10'  10'  10'  10'                                   There Ex        Rec bike  L1 x 8'  1/2 revs x 8'  1/2 revs x 8'  L1 x 8'  L1 x8'   Hip flex slides *   x 10  x 10  HEP    Hip abd slides *   x 10  x 10  HEP                                                    Neuro Re-Ed        Glute sets *   5" x 10  HEP     Quad sets  D/C  5" x 10      Hip abd iso   5" x 10  5" x 10  5" x 10  5"x15   Supine SLR  x 10    x 10 AAROM  x10   S/L hip abd *  x 12   x 10 AAROM  x 10 AAROM  x10   S/L clamshells  2 x 10   x 10     Mini squats  x 15   x 10  x 15  x15   Forward step ups  1R x 15 with SPC    0R x 15  0R x15   Lat step ups  0R x 15       Sidestepping  x 2 laps    x 2 laps  2 laps   Sit to stand slow  x 15                       Re-evaluation            Ther Act/Gait                                       Modalities             CP prn    deferred

## 2023-01-12 ENCOUNTER — OFFICE VISIT (OUTPATIENT)
Dept: PHYSICAL THERAPY | Facility: REHABILITATION | Age: 68
End: 2023-01-12

## 2023-01-12 DIAGNOSIS — M16.11 PRIMARY OSTEOARTHRITIS OF RIGHT HIP: Primary | ICD-10-CM

## 2023-01-12 DIAGNOSIS — M25.551 RIGHT HIP PAIN: ICD-10-CM

## 2023-01-12 DIAGNOSIS — M87.051 AVASCULAR NECROSIS OF HIP, RIGHT (HCC): ICD-10-CM

## 2023-01-12 NOTE — PROGRESS NOTES
Daily Note     Today's date: 2023  Patient name: Shashank Triplett  : 1955  MRN: [id number]  Referring provider: Kartik Krishnamurthy  Dx:   Encounter Diagnosis     ICD-10-CM    1  Primary osteoarthritis of right hip  M16 11       2  Avascular necrosis of hip, right (HCC)  M87 051       3  Right hip pain  M25 551           Start Time: 1115  Stop Time: 1200  Total time in clinic (min): 45 minutes    Subjective: Patient notes she felt okay after last visit  She feels good walking with SPC but still feels unsteady if she takes steps without it      Objective: See treatment diary below      Assessment: R Trendelenburg gait noted during trial of ambulation in clinic without AD  Instructed patient to continue to utilize [facility] to achieve most functional gait pattern until hip abduction strength improves  Patient able to move throughout increased range during sidelying hip abduction this visit compared to prior visits  Patient demonstrated fatigue post treatment, exhibited good technique with therapeutic exercises and would benefit from continued PT      Plan: Continue per plan of care        Diagnosis: s/p R NILTON 22   Precautions: anterior approach, HTN, DM, h/o cancer   Primary impairments: R hip AROM deficits, R hip strength deficits in all major muscle groups, and gait dysfunction   *asterisks by exercise = given for HEP    1/10 1/12 12/29 1/3 1/5   Manuals        R hip PROM  10'  10'  10'  10'  10'                                   There Ex        Rec bike  L1 x 8'  L1 x 8'  1 revs x 8'  L1 x 8'  L1 x8'   Hip flex slides *    x 10  HEP    Hip abd slides *    x 10  HEP                                                    Neuro Re-Ed        Glute sets *    HEP     Quad sets  D/C       Hip abd iso *    5" x 10  5" x 10  5"x15   Supine SLR  x 10  x 15   x 10 AAROM  x10   S/L hip abd *  x 12  x 15  x 10 AAROM  x 10 AAROM  x10   S/L clamshells  2 x 10  2 x 10  x 10     Mini squats  x 15  D/C  x 10  x 15 x15   Forward step ups  1R x 15 with SPC  1R x 15 with SPC   0R x 15  0R x15   Lat step ups  0R x 15  0R x 15      Sidestepping  x 2 laps  x 2 laps   x 2 laps  2 laps   Sit to stand slow  x 15  x 15 chair with foam      SLS   NV                              Re-evaluation           Ther Act/Gait                                    Modalities            CP prn

## 2023-01-17 ENCOUNTER — OFFICE VISIT (OUTPATIENT)
Dept: OBGYN CLINIC | Facility: MEDICAL CENTER | Age: 68
End: 2023-01-17

## 2023-01-17 ENCOUNTER — EVALUATION (OUTPATIENT)
Dept: PHYSICAL THERAPY | Facility: REHABILITATION | Age: 68
End: 2023-01-17

## 2023-01-17 VITALS
HEIGHT: 67 IN | DIASTOLIC BLOOD PRESSURE: 73 MMHG | SYSTOLIC BLOOD PRESSURE: 120 MMHG | WEIGHT: 185.6 LBS | BODY MASS INDEX: 29.13 KG/M2 | HEART RATE: 88 BPM

## 2023-01-17 DIAGNOSIS — Z96.641 STATUS POST TOTAL HIP REPLACEMENT, RIGHT: Primary | ICD-10-CM

## 2023-01-17 DIAGNOSIS — M16.11 PRIMARY OSTEOARTHRITIS OF RIGHT HIP: Primary | ICD-10-CM

## 2023-01-17 DIAGNOSIS — M25.551 RIGHT HIP PAIN: ICD-10-CM

## 2023-01-17 DIAGNOSIS — M87.051 AVASCULAR NECROSIS OF HIP, RIGHT (HCC): ICD-10-CM

## 2023-01-17 NOTE — PROGRESS NOTES
Assessment/Plan:  1  Status post total hip replacement, right      No orders of the defined types were placed in this encounter  · Patient is doing very well 5 weeks status post right anterior NILTON  · Continue PT    · Pain control prn- tylenol prn  · She will complete 6 weeks of ASA DVT prophylaxis  · Patient should call ahead for abx prior to dental appts  · Patient may drive  Return in about 4 weeks (around 2/14/2023) for R ant NILTON post op 3  I answered all of the patient's questions during the visit and provided education of the patient's condition during the visit  The patient verbalized understanding of the information given and agrees with the plan  This note was dictated using TrackBill software  It may contain errors including improperly dictated words  Please contact physician directly for any questions  Subjective   Chief Complaint:   Chief Complaint   Patient presents with   • Right Hip - Post-op, Follow-up       Nunu Page is a 79 y o  female who presents for 5 week follow up s/p right anterior NILTON  Patient states she is doing well  She has no pain in the groin  She notes some tingling sensation on the lateral hip which has been improving over time  She is taking Tylenol as needed for pain  She is completed the gabapentin and discontinued oxycodone weeks ago  She reports being compliant with aspirin DVT prophylaxis  Patient is attending outpatient physical therapy  She is weaned from the walker to the cane  Denies leg length discrepancy, fevers, chills, distal numbness  Review of Systems  ROS:    See HPI for musculoskeletal review     All other systems reviewed are negative     History:  Past Medical History:   Diagnosis Date   • Diabetes mellitus ([facility] [address] )     NIDDM   • Diverticulosis     hx colon resection   • GERD (gastroesophageal reflux disease)    • Hyperlipidemia    • Hypertension    • Macular degeneration    • Malignant neoplasm of skin     skin cancer right upper arm in past   • OA (osteoarthritis)     right knee     Past Surgical History:   Procedure Laterality Date   • CATARACT EXTRACTION Bilateral    • CHOLANGIOGRAM N/A 7/2/2018    Procedure: CHOLANGIOGRAM;  Surgeon: Marsi Rodriguez MD;  Location: [facility];  Service: General   • CHOLECYSTECTOMY LAPAROSCOPIC N/A 7/2/2018    Procedure: CHOLECYSTECTOMY LAPAROSCOPIC W/IOC;  Surgeon: Maris Rodriguez MD;  Location: [facility];  Service: General   • COLON SURGERY      resection large intestine (diverticulitis)   • COLONOSCOPY     • ID ARTHRP ACETBLR/PROX FEM PROSTC AGRFT/ALGRFT Right 12/12/2022    Procedure: ARTHROPLASTY HIP TOTAL ANTERIOR;  Surgeon: Leonel Knott DO;  Location:  [facility];  Service: Orthopedics   • ID ARTHRP KNE CONDYLE&PLATU MEDIAL&LAT COMPARTMENTS Right 3/19/2021    Procedure: ARTHROPLASTY KNEE TOTAL;  Surgeon: Naya Henson MD;  Location: [facility];  Service: Orthopedics   • ID LAP RPR HRNA XCPT INCAL/INGUN NCRC8/STRANGULATED N/A 5/30/2019    Procedure: REPAIR HERNIA INCISIONAL LAPAROSCOPIC W/ ROBOTICS WITH MESH PLACEMENT;  Surgeon: Maris Rodriguez MD;  Location: [facility];  Service: General   • TONSILLECTOMY  child     Social History   Social History     Substance and Sexual Activity   Alcohol Use Never    Comment: beer/ liquor, once a month     Social History     Substance and Sexual Activity   Drug Use Never     Social History     Tobacco Use   Smoking Status Never   Smokeless Tobacco Never     Family History:   Family History   Problem Relation Age of Onset   • Alzheimer's disease Mother    • Anxiety disorder Mother    • Diabetes Mother    • Cancer Father    • Diabetes Sister    • Hypertension Sister    • Stroke Sister        Current Outpatient Medications on File Prior to Visit   Medication Sig Dispense Refill   • acetaminophen (TYLENOL) 500 mg tablet Take 2 tablets (1,000 mg total) by mouth every 8 (eight) hours  0   • ascorbic acid (VITAMIN C) 500 MG tablet Take 1 tablet (500 mg total) by mouth daily Start to take 30 days before surgery 30 tablet 1   • aspirin (ECOTRIN) 325 mg EC tablet Take 1 tablet (325 mg total) by mouth 2 (two) times a day Take with food 84 tablet 0   • celecoxib (CeleBREX) 200 mg capsule Take 1 capsule (200 mg total) by mouth daily Do not take at the same time as aspirin 30 capsule 1   • docusate sodium (COLACE) 100 mg capsule Take 1 capsule (100 mg total) by mouth 2 (two) times a day 20 capsule 0   • ferrous sulfate 324 (65 Fe) mg Take 1 tablet (324 mg total) by mouth daily before breakfast Start to take 30 days before surgery 30 tablet 1   • fexofenadine (ALLEGRA) 180 MG tablet Take 180 mg by mouth daily as needed      • fluticasone (FLONASE) 50 mcg/act nasal spray 1 spray into each nostril 2 (two) times a day as needed      • folic acid (KP Folic Acid) 1 mg tablet Take 1 tablet (1 mg total) by mouth daily Start to take 30 days before surgery 30 tablet 1   • gabapentin (NEURONTIN) 100 mg capsule Take 1 capsule (100 mg total) by mouth 3 (three) times a day 90 capsule 0   • lisinopril (ZESTRIL) 10 mg tablet TAKE 1 TABLET DAILY 90 tablet 1   • metFORMIN (GLUCOPHAGE) 500 mg tablet TAKE 1 TABLET DAILY WITH   BREAKFAST 90 tablet 1   • Multiple Vitamins-Minerals (PRESERVISION AREDS PO) Take by mouth     • ondansetron (ZOFRAN) 4 mg tablet Take 1 tablet (4 mg total) by mouth every 8 (eight) hours as needed for nausea or vomiting 6 tablet 0   • oxyCODONE (ROXICODONE) 10 MG TABS Take 1 tablet (10 mg total) by mouth every 4 (four) hours as needed for severe pain Or take 1/2 tablet by mouth every 4 hours as needed for moderate pain Max Daily Amount: 60 mg 42 tablet 0   • pantoprazole (PROTONIX) 20 mg tablet TAKE 1 TABLET DAILY 90 tablet 1   • pravastatin (PRAVACHOL) 20 mg tablet TAKE 1 TABLET DAILY 90 tablet 1     No current facility-administered medications on file prior to visit       No Known Allergies     Objective     /73   Pulse 88   Ht 5' 7" (1 702 m)   Wt 84 2 kg (185 lb 9 6 oz)   BMI 29 07 kg/m²      PE:  AAOx 3  WDWN  Hearing intact, no drainage from eyes  no audible wheezing  no abdominal distension  LE compartments soft, AT/GS intact    Ortho Exam:  right hip:   INC: healed, No erythema, mild swelling  No pain with ROM

## 2023-01-17 NOTE — PROGRESS NOTES
PT Re-Evaluation     Today's date: 2023  Patient name: Marcia Wlilis  : 1955  MRN: [id number]  Referring provider: Saurabh Perez  Dx:   Encounter Diagnosis     ICD-10-CM    1  Primary osteoarthritis of right hip  M16 11       2  Avascular necrosis of hip, right (HCC)  M87 051       3  Right hip pain  M25 551           Start Time: 1030  Stop Time: 1120  Total time in clinic (min): 50 minutes    Assessment  Assessment details: Patient is a 79 y o  female presenting s/p R NILTON with date of surgery 22  Patient exhibits good progress toward objective and functional goals at time of reexamination  Patient exhibits improvements with sleeping, bed mobility, tub transfers, car transfers, and sit to stand transfers since initiating PT however continues to have limitations compared to prior level of function  Remaining functional limitations include inability to ambulate without AD, step to step stair navigation, and limited community mobility  Hip abduction strength deficits remain which impact gait  As a result of impairments patient has continued limitations with daily and functional activities and would benefit from continued skilled PT interventions to address these impairments in order to maximize function         Impairments: abnormal gait, abnormal muscle firing, abnormal or restricted ROM, abnormal movement, activity intolerance, impaired physical strength and pain with function     Prognosis: good    Goals  Impairment Goals: 4-6 weeks  - Patient to decrease pain to 2/10 - MET  - Patient to improve hip PROM to [facility] - MET  - Patient to increase knee strength to 4/5 throughout - MET  - Patient to increase hip strength to 4/5 throughout - PROGRESSING    Functional Goals: by discharge  - Patient to discharge to [facility] - PROGRESSING  - Patient to return to prior level of function - PROGRESSING  - Patient to improve hip AROM to equal to uninvolved side - MET  - Patient to ambulate in home and community without increased pain or difficulty - PROGRESSING  - Patient to ascend and descend stairs without increased pain or difficulty - PROGRESSING  - Patient to complete sit to stand transfers without increased pain or difficulty - MET      Plan  Patient would benefit from: skilled physical therapy  Planned modality interventions: cryotherapy, TENS and thermotherapy: hydrocollator packs  Planned therapy interventions: flexibility, home exercise program, joint mobilization, manual therapy, neuromuscular re-education, patient education, strengthening, stretching, therapeutic activities, therapeutic exercise and functional ROM exercises  Frequency: 2x week  Duration in weeks: 4  Treatment plan discussed with: patient        Subjective Evaluation    History of Present Illness  Mechanism of injury: HISTORY OF PRESENT ILLNESS: Patient presents s/p R NILTON via anterior approach 22  Patient was discharged home same day  Patient notes she is improving with interventions  Patient is able to ambulate with [facility] and is able to navigate stairs with handrail and step to step pattern     PRIOR TREATMENT: preoperative PT  AGGRAVATING FACTORS: none  EASING FACTORS: ice  WORK:   IMAGING: preoperative x-rays  FUNCTIONAL LIMITATIONS: ambulating with SPC, step to step stair navigation, community mobility  IMPROVEMENTS: sleeping, bed mobility, tub transfers, car transfers, sit to stand transfers  SUBJECTIVE FUNCTIONAL LEVEL: 75%  PATIENT GOAL: walk without an AD    Pain  Current pain ratin  At best pain ratin  At worst pain ratin  Location: R hip          Objective     Neurological Testing     Sensation     Lumbar   Left   Intact: light touch    Right   Intact: light touch    Active Range of Motion   Left Hip   Flexion: 99 degrees   External rotation (90/90): 38 degrees   Internal rotation (90/90): 30 degrees     Right Hip   Flexion: 111 degrees   External rotation (90/90): 37 degrees   Internal rotation (90/90): 32 degrees     Passive Range of Motion     Right Hip   Normal passive range of motion    Strength/Myotome Testing     Left Hip   Planes of Motion   Flexion: 4+  External rotation: 4+  Internal rotation: 4+    Right Hip   Planes of Motion   Flexion: 4-  Abduction: 3-  External rotation: 3+  Internal rotation: 4    Left Knee   Flexion: 4+  Extension: 4+    Right Knee   Flexion: 4+  Extension: 4    Left Ankle/Foot   Dorsiflexion: 5    Right Ankle/Foot   Dorsiflexion: 5    Ambulation     Comments   Ambulating with SPC      Flowsheet Rows    Flowsheet Row Most Recent Value   PT/OT G-Codes    Current Score 66   Projected Score 63             Diagnosis: s/p R NILTON 12/12/22   Precautions: anterior approach, HTN, DM, h/o cancer   Primary impairments: R hip AROM deficits, R hip strength deficits in all major muscle groups, and gait dysfunction   *asterisks by exercise = given for HEP    1/10 1/12 1/17 1/3 1/5   Manuals        R hip PROM  10'  10'  D/C  10'  10'                                   There Ex        Rec bike  L1 x 8'  L1 x 8'  L1 x 8'  L1 x 8'  L1 x8'   Hip flex slides *     HEP    Hip abd slides *     HEP                                                    Neuro Re-Ed        Glute sets *        Quad sets  D/C       Hip abd iso     5" x 10  5"x15   Supine SLR  x 10  x 15   x 10 AAROM  x10   S/L hip abd *  x 12  x 15   x 10 AAROM  x10   S/L clamshells  2 x 10  2 x 10      Mini squats  x 15  D/C   x 15  x15   Forward step ups  1R x 15 with SPC  1R x 15 with SPC  1R x 15  0R x 15  0R x15   Lat step ups  0R x 15  0R x 15  1R x 15     Sidestepping  x 2 laps  x 2 laps   x 2 laps  2 laps   Sit to stand slow  x 15  x 15 chair with foam      SLS   NV  10" x 3 B     U/L leg press    L: 55 lbs 2 x 10    R: 35 lbs 2 x 10                     Re-evaluation    CM       Ther Act/Gait                                 Modalities           CP prn

## 2023-01-19 ENCOUNTER — OFFICE VISIT (OUTPATIENT)
Dept: PHYSICAL THERAPY | Facility: REHABILITATION | Age: 68
End: 2023-01-19

## 2023-01-19 DIAGNOSIS — M87.051 AVASCULAR NECROSIS OF HIP, RIGHT (HCC): ICD-10-CM

## 2023-01-19 DIAGNOSIS — M16.11 PRIMARY OSTEOARTHRITIS OF RIGHT HIP: Primary | ICD-10-CM

## 2023-01-19 DIAGNOSIS — M25.551 RIGHT HIP PAIN: ICD-10-CM

## 2023-01-19 NOTE — PROGRESS NOTES
Daily Note     Today's date: 2023  Patient name: Lane Swift  : 1955  MRN: [id number]  Referring provider: Brian Nielsen  Dx:   Encounter Diagnosis     ICD-10-CM    1  Primary osteoarthritis of right hip  M16 11       2  Avascular necrosis of hip, right (HCC)  M87 051       3  Right hip pain  M25 551           Start Time: 1030  Stop Time: 1115  Total time in clinic (min): 45 minutes    Subjective: Patient has had some distal ITB soreness since her last visit and she is unsure why  Pain is localized to lateral knee in region of ITB insertion      Objective: See treatment diary below      Assessment: Tolerated treatment well however deferred single leg stance due to lateral knee pain  Updated HEP to include slow sit to stand  Initiated ITB manual stretching which patient responded well to and reproduced familiar soreness  Patient demonstrated fatigue post treatment, exhibited good technique with therapeutic exercises and would benefit from continued PT      Plan: Continue per plan of care        Diagnosis: s/p R NILTON 22   Precautions: anterior approach, HTN, DM, h/o cancer   Primary impairments: R hip AROM deficits, R hip strength deficits in all major muscle groups, and gait dysfunction   *asterisks by exercise = given for HEP    1/10 1/12 1/17 1/19 1/5   Manuals        R hip PROM  10'  10'  D/C   10'   R ITB stretching     8'                            There Ex        Rec bike  L1 x 8'  L1 x 8'  L1 x 8'  L1 x 8'  L1 x8'   Hip flex slides *        Hip abd slides *                                                        Neuro Re-Ed        Glute sets *        Quad sets  D/C       Hip abd iso     10" x 10  5"x15   Supine SLR *  x 10  x 15   2 x 10  x10   S/L hip abd *  x 12  x 15   x 15  x10   S/L clamshells  2 x 10  2 x 10   x 15    Mini squat  x 15  D/C    x15   Forward step ups  1R x 15 with SPC  1R x 15 with SPC  1R x 15  1R x 15  0R x15   Lat step ups  0R x 15  0R x 15  1R x 15  1R x 15    Sidestepping  x 2 laps  x 2 laps   x 2 laps  x 2 laps   Sit to stand slow *  x 15  x 15 chair with foam   x 15 chair with foam    SLS   NV  10" x 3 B  deferred    U/L leg press    L: 55 lbs 2 x 10    R: 35 lbs 2 x 10  L: 55 lbs 2 x 10    R: 35 lbs 2 x 10                    Re-evaluation    CM      Ther Act/Gait                               Modalities           CP prn

## 2023-01-24 ENCOUNTER — OFFICE VISIT (OUTPATIENT)
Dept: PHYSICAL THERAPY | Facility: REHABILITATION | Age: 68
End: 2023-01-24

## 2023-01-24 DIAGNOSIS — M87.051 AVASCULAR NECROSIS OF HIP, RIGHT (HCC): ICD-10-CM

## 2023-01-24 DIAGNOSIS — M25.551 RIGHT HIP PAIN: ICD-10-CM

## 2023-01-24 DIAGNOSIS — M16.11 PRIMARY OSTEOARTHRITIS OF RIGHT HIP: Primary | ICD-10-CM

## 2023-01-24 NOTE — PROGRESS NOTES
Daily Note     Today's date: 2023  Patient name: Vishal Bedoya  : 1955  MRN: [id number]  Referring provider: Avelina Javier  Dx:   Encounter Diagnosis     ICD-10-CM    1  Primary osteoarthritis of right hip  M16 11       2  Avascular necrosis of hip, right (HCC)  M87 051       3  Right hip pain  M25 551           Start Time: 1030  Stop Time: 1122  Total time in clinic (min): 52 minutes    Subjective: Patient notes she felt fine after her last visit  She reports compliance with HEP and has been steady with the cane  She continues to have stiffness first thing in the morning      Objective: See treatment diary below      Assessment: Patient demonstrates lateral trunk lean during forward and lateral step ups due to gluteus medius strength deficits  Improved control with supine and sidelying straight leg raises compared to last visit  Instructed patient to continue to utilize [facility] for ambulation until hip abduction strength is adequate  Patient demonstrated fatigue post treatment and would benefit from continued PT      Plan: Continue per plan of care        Diagnosis: s/p R NILTON 22   Precautions: anterior approach, HTN, DM, h/o cancer   Primary impairments: R hip AROM deficits, R hip strength deficits in all major muscle groups, and gait dysfunction   *asterisks by exercise = given for HEP    1/10 1/12 1/17 1/19 1/24   Manuals        R hip PROM  10'  10'  D/C     R ITB stretching     8'  8'                           There Ex        Rec bike  L1 x 8'  L1 x 8'  L1 x 8'  L1 x 8'  L1 x 8'   Hip flex slides *        Hip abd slides *                                                        Neuro Re-Ed        Glute sets *        Quad sets  D/C       Hip abd iso     10" x 10    Supine SLR *  x 10  x 15   2 x 10  2 x 10   S/L hip abd *  x 12  x 15   x 15  2 x 10   S/L clamshells  2 x 10  2 x 10   x 15  2 x 10   Forward step ups  1R x 15 with SPC  1R x 15 with SPC  1R x 15  1R x 15  1R x 20   Lat step ups  0R x 15  0R x 15  1R x 15  1R x 15  1R x 20   Sidestepping  x 2 laps  x 2 laps   x 2 laps  x 2 laps   Sit to stand slow *  x 15  x 15 chair with foam   x 15 chair with foam  x 15 chair with foam   SLS   NV  10" x 3 B  deferred    U/L leg press    L: 55 lbs 2 x 10    R: 35 lbs 2 x 10  L: 55 lbs 2 x 10    R: 35 lbs 2 x 10  L: 55 lbs 2 x 10    R: 35 lbs 2 x 10                   Re-evaluation    CM     Ther Act/Gait                            Modalities          CP prn

## 2023-01-26 ENCOUNTER — APPOINTMENT (OUTPATIENT)
Dept: PHYSICAL THERAPY | Facility: REHABILITATION | Age: 68
End: 2023-01-26

## 2023-01-30 ENCOUNTER — OFFICE VISIT (OUTPATIENT)
Dept: FAMILY MEDICINE CLINIC | Facility: CLINIC | Age: 68
End: 2023-01-30

## 2023-01-30 VITALS
TEMPERATURE: 98 F | WEIGHT: 183 LBS | HEIGHT: 67 IN | BODY MASS INDEX: 28.72 KG/M2 | HEART RATE: 72 BPM | SYSTOLIC BLOOD PRESSURE: 140 MMHG | DIASTOLIC BLOOD PRESSURE: 78 MMHG

## 2023-01-30 DIAGNOSIS — B34.9 VIRAL INFECTION: Primary | ICD-10-CM

## 2023-01-30 DIAGNOSIS — I10 ESSENTIAL HYPERTENSION: ICD-10-CM

## 2023-01-30 NOTE — PROGRESS NOTES
Name: Marly Chavez      : 1955      MRN: [id number]  Encounter Provider: Marj Murdock PA-C  Encounter Date: 2023   Encounter department: [facility] PRIMARY CARE    Assessment & Plan     1  Viral infection  Comments:  Characteristic of a viral infection  Recommended Coricidin for the mucous production and Benadryl as a sleep aid  Told to stay hydrated and wear masks to events  Assessment & Plan:  Characteristic of a viral infection so will have pt treat her symptoms and she does not need antibiotic  Recommended Coricidin for the mucous production and Benadryl as a sleep aid  Told to stay hydrated and wear masks to events  Afriin nasal OTC for no more than 4 days  Stop decongestant  Pt looking to get codeine cough med to help her sleep however no longer available  Recommended benadryl as needed  2  Essential hypertension  Assessment & Plan:  Stable  Stop decongestant  BMI Counseling: Body mass index is 28 66 kg/m²  The BMI is above normal  Nutrition recommendations include decreasing portion sizes, encouraging healthy choices of fruits and vegetables, decreasing fast food intake, consuming healthier snacks and limiting drinks that contain sugar  Exercise recommendations include exercising 3-5 times per week  No pharmacotherapy was ordered  Rationale for BMI follow-up plan is due to patient being overweight or obese  Subjective      Patient is a 78 y/o female complaining of a head cold  Started on Tuesday night after going to multiple athletic events to cheer for her granddaughters on the days prior  Says that it started out as a wet cough and thinks she's swallowing the phlegm  She can feel it draining down the back of her throat which causes the cough and constant clearing of the throat  Says that she has more energy now compared to the first few days where she didn't get off the couch   She has been taking Advil Cold/Sinus which she says has been keeping her head clear  She is most concerned about the lack of sleep she is getting  States she hasn't been able to fall asleep since getting sick and needs something to knock her out at night  Never tested for Covid or flu  PTs  now sick with similar  Pt admits to not wearing a mask to these sporting events even when sick  Review of Systems   Constitutional: Positive for activity change ( low energy ), appetite change ( didn't have appetite in the first few days) and fever (subjective fever, never checked )  HENT: Positive for postnasal drip, sneezing and voice change ( nasaly sounding voice)  Negative for congestion, ear pain, rhinorrhea, sinus pressure, sinus pain and sore throat  Eyes: Negative for discharge, redness and itching  Respiratory: Positive for cough  Negative for chest tightness and shortness of breath  Cardiovascular: Negative for chest pain and palpitations  Gastrointestinal: Negative for abdominal pain, diarrhea, nausea and vomiting  Musculoskeletal: Negative for arthralgias  Neurological: Negative for dizziness, light-headedness and headaches  Psychiatric/Behavioral: Positive for sleep disturbance ( can't fall asleep)         Current Outpatient Medications on File Prior to Visit   Medication Sig   • fexofenadine (ALLEGRA) 180 MG tablet Take 180 mg by mouth daily as needed    • fluticasone (FLONASE) 50 mcg/act nasal spray 1 spray into each nostril 2 (two) times a day as needed    • lisinopril (ZESTRIL) 10 mg tablet TAKE 1 TABLET DAILY   • metFORMIN (GLUCOPHAGE) 500 mg tablet TAKE 1 TABLET DAILY WITH   BREAKFAST   • Multiple Vitamins-Minerals (PRESERVISION AREDS PO) Take by mouth   • pantoprazole (PROTONIX) 20 mg tablet TAKE 1 TABLET DAILY   • pravastatin (PRAVACHOL) 20 mg tablet TAKE 1 TABLET DAILY   • [DISCONTINUED] acetaminophen (TYLENOL) 500 mg tablet Take 2 tablets (1,000 mg total) by mouth every 8 (eight) hours   • [DISCONTINUED] ascorbic acid (VITAMIN C) 500 MG tablet Take 1 tablet (500 mg total) by mouth daily Start to take 30 days before surgery   • [DISCONTINUED] aspirin (ECOTRIN) 325 mg EC tablet Take 1 tablet (325 mg total) by mouth 2 (two) times a day Take with food   • [DISCONTINUED] celecoxib (CeleBREX) 200 mg capsule Take 1 capsule (200 mg total) by mouth daily Do not take at the same time as aspirin   • [DISCONTINUED] docusate sodium (COLACE) 100 mg capsule Take 1 capsule (100 mg total) by mouth 2 (two) times a day   • [DISCONTINUED] ferrous sulfate 324 (65 Fe) mg Take 1 tablet (324 mg total) by mouth daily before breakfast Start to take 30 days before surgery   • [DISCONTINUED] folic acid (KP Folic Acid) 1 mg tablet Take 1 tablet (1 mg total) by mouth daily Start to take 30 days before surgery   • [DISCONTINUED] gabapentin (NEURONTIN) 100 mg capsule Take 1 capsule (100 mg total) by mouth 3 (three) times a day   • [DISCONTINUED] ondansetron (ZOFRAN) 4 mg tablet Take 1 tablet (4 mg total) by mouth every 8 (eight) hours as needed for nausea or vomiting   • [DISCONTINUED] oxyCODONE (ROXICODONE) 10 MG TABS Take 1 tablet (10 mg total) by mouth every 4 (four) hours as needed for severe pain Or take 1/2 tablet by mouth every 4 hours as needed for moderate pain Max Daily Amount: 60 mg       Objective     /78   Pulse 72   Temp 98 °F (36 7 °C)   Ht 5' 7" (1 702 m)   Wt 83 kg (183 lb)   BMI 28 66 kg/m²     Physical Exam  Vitals and nursing note reviewed  Constitutional:       Appearance: Normal appearance  She is well-developed and well-groomed  She is obese  HENT:      Head: Normocephalic  Right Ear: Tympanic membrane, ear canal and external ear normal       Left Ear: Tympanic membrane, ear canal and external ear normal       Nose: Nose normal       Mouth/Throat:      Mouth: Mucous membranes are moist       Pharynx: Oropharynx is clear  Cardiovascular:      Rate and Rhythm: Normal rate and regular rhythm  Pulses: Normal pulses        Heart sounds: Normal heart sounds  Pulmonary:      Effort: Pulmonary effort is normal       Breath sounds: Normal breath sounds  Musculoskeletal:         General: Normal range of motion  Cervical back: Normal range of motion and neck supple  Lymphadenopathy:      Cervical: No cervical adenopathy  Skin:     General: Skin is warm and dry  Neurological:      General: No focal deficit present  Mental Status: She is alert and oriented to person, place, and time  Psychiatric:         Mood and Affect: Mood normal          Behavior: Behavior normal  Behavior is cooperative  Thought Content:  Thought content normal          Judgment: Judgment normal        Veto RAJ Ramos

## 2023-01-30 NOTE — PROGRESS NOTES
Name: Lane Swift      : 1955      MRN: [id number]  Encounter Provider: Author Natividad PA-C  Encounter Date: 2023   Encounter department: [facility] PRIMARY CARE    Assessment & Plan     1  Viral infection  Comments:  Characteristic of a viral infection  Recommended Coricidin for the mucous production and Benadryl as a sleep aid   Told to stay hydrated and wear masks to events           Subjective      HPI  Review of Systems    Current Outpatient Medications on File Prior to Visit   Medication Sig   • fexofenadine (ALLEGRA) 180 MG tablet Take 180 mg by mouth daily as needed    • fluticasone (FLONASE) 50 mcg/act nasal spray 1 spray into each nostril 2 (two) times a day as needed    • lisinopril (ZESTRIL) 10 mg tablet TAKE 1 TABLET DAILY   • metFORMIN (GLUCOPHAGE) 500 mg tablet TAKE 1 TABLET DAILY WITH   BREAKFAST   • Multiple Vitamins-Minerals (PRESERVISION AREDS PO) Take by mouth   • pantoprazole (PROTONIX) 20 mg tablet TAKE 1 TABLET DAILY   • pravastatin (PRAVACHOL) 20 mg tablet TAKE 1 TABLET DAILY   • [DISCONTINUED] acetaminophen (TYLENOL) 500 mg tablet Take 2 tablets (1,000 mg total) by mouth every 8 (eight) hours   • [DISCONTINUED] ascorbic acid (VITAMIN C) 500 MG tablet Take 1 tablet (500 mg total) by mouth daily Start to take 30 days before surgery   • [DISCONTINUED] aspirin (ECOTRIN) 325 mg EC tablet Take 1 tablet (325 mg total) by mouth 2 (two) times a day Take with food   • [DISCONTINUED] celecoxib (CeleBREX) 200 mg capsule Take 1 capsule (200 mg total) by mouth daily Do not take at the same time as aspirin   • [DISCONTINUED] docusate sodium (COLACE) 100 mg capsule Take 1 capsule (100 mg total) by mouth 2 (two) times a day   • [DISCONTINUED] ferrous sulfate 324 (65 Fe) mg Take 1 tablet (324 mg total) by mouth daily before breakfast Start to take 30 days before surgery   • [DISCONTINUED] folic acid (KP Folic Acid) 1 mg tablet Take 1 tablet (1 mg total) by mouth daily Start to take 30 days before surgery   • [DISCONTINUED] gabapentin (NEURONTIN) 100 mg capsule Take 1 capsule (100 mg total) by mouth 3 (three) times a day   • [DISCONTINUED] ondansetron (ZOFRAN) 4 mg tablet Take 1 tablet (4 mg total) by mouth every 8 (eight) hours as needed for nausea or vomiting   • [DISCONTINUED] oxyCODONE (ROXICODONE) 10 MG TABS Take 1 tablet (10 mg total) by mouth every 4 (four) hours as needed for severe pain Or take 1/2 tablet by mouth every 4 hours as needed for moderate pain Max Daily Amount: 60 mg       Objective     /78   Pulse 72   Temp 98 °F (36 7 °C)   Ht 5' 7" (1 702 m)   Wt 83 kg (183 lb)   BMI 28 66 kg/m²     Physical Exam  Annie Wilson PA-C

## 2023-01-30 NOTE — ASSESSMENT & PLAN NOTE
Characteristic of a viral infection so will have pt treat her symptoms and she does not need antibiotic  Recommended Coricidin for the mucous production and Benadryl as a sleep aid  Told to stay hydrated and wear masks to events  Afriin nasal OTC for no more than 4 days  Stop decongestant  Pt looking to get codeine cough med to help her sleep however no longer available  Recommended benadryl as needed

## 2023-01-30 NOTE — PATIENT INSTRUCTIONS
1  Viral infection  Comments:  Characteristic of a viral infection  Recommended Coricidin for the mucous production and Benadryl as a sleep aid  Told to stay hydrated and wear masks to events  Assessment & Plan:  Characteristic of a viral infection so will have pt treat her symptoms and she does not need antibiotic  Recommended Coricidin for the mucous production and Benadryl as a sleep aid  Told to stay hydrated and wear masks to events  Afriin nasal OTC for no more than 4 days  Stop decongestant  Pt looking to get codeine cough med to help her sleep however no longer available  Recommended benadryl as needed  2  Essential hypertension  Assessment & Plan:  Stable  Stop decongestant

## 2023-01-31 ENCOUNTER — OFFICE VISIT (OUTPATIENT)
Dept: PHYSICAL THERAPY | Facility: REHABILITATION | Age: 68
End: 2023-01-31

## 2023-01-31 DIAGNOSIS — M25.551 RIGHT HIP PAIN: ICD-10-CM

## 2023-01-31 DIAGNOSIS — M16.11 PRIMARY OSTEOARTHRITIS OF RIGHT HIP: Primary | ICD-10-CM

## 2023-01-31 DIAGNOSIS — M87.051 AVASCULAR NECROSIS OF HIP, RIGHT (HCC): ICD-10-CM

## 2023-01-31 NOTE — PROGRESS NOTES
Daily Note     Today's date: 2023  Patient name: Fabio Condon  : 1955  MRN: [id number]  Referring provider: Elis Menchaca  Dx:   Encounter Diagnosis     ICD-10-CM    1  Primary osteoarthritis of right hip  M16 11       2  Avascular necrosis of hip, right (HCC)  M87 051       3  Right hip pain  M25 551           Start Time: 0945  Stop Time: 1030  Total time in clinic (min): 45 minutes    Subjective: Patient is starting to feel better from her cold last week  She is doing well with respect to her hip      Objective: See treatment diary below      Assessment: Reduced dependence on UE support with forward and lateral step ups  Patient continues to exhibit Trendelenburg gait during R stance without AD  Plan to continue to progress hip abduction strength exercises as appropriate  Patient demonstrated fatigue post treatment, exhibited good technique with therapeutic exercises and would benefit from continued PT      Plan: Continue per plan of care        Diagnosis: s/p R NILTON 22   Precautions: anterior approach, HTN, DM, h/o cancer   Primary impairments: R hip AROM deficits, R hip strength deficits in all major muscle groups, and gait dysfunction   *asterisks by exercise = given for HEP       Manuals        R hip PROM   10'  D/C     R ITB stretching  8'    8'  8'                           There Ex        Rec bike  L1 x 8'  L1 x 8'  L1 x 8'  L1 x 8'  L1 x 8'   Hip flex slides *        Hip abd slides *                                                        Neuro Re-Ed        Glute sets *        Quad sets        Hip abd iso     10" x 10    Supine SLR *  2 x 10  x 15   2 x 10  2 x 10   S/L hip abd *   x 15   x 15  2 x 10   S/L clamshells   2 x 10   x 15  2 x 10   Forward step ups  1R x 20  1R x 15 with SPC  1R x 15  1R x 15  1R x 20   Lat step ups  1R x 20  0R x 15  1R x 15  1R x 15  1R x 20   Sidestepping  x 2 laps  x 2 laps   x 2 laps  x 2 laps   Sit to stand slow * x 15 no foam  x 15 chair with foam   x 15 chair with foam  x 15 chair with foam   SLS  10" x 3  NV  10" x 3 B  deferred    U/L leg press  L: 55 lbs 2 x 10    R: 35 lbs 2 x 10   L: 55 lbs 2 x 10    R: 35 lbs 2 x 10  L: 55 lbs 2 x 10    R: 35 lbs 2 x 10  L: 55 lbs 2 x 10    R: 35 lbs 2 x 10                   Re-evaluation    CM     Ther Act/Gait                           Modalities         CP prn

## 2023-02-02 ENCOUNTER — OFFICE VISIT (OUTPATIENT)
Dept: PHYSICAL THERAPY | Facility: REHABILITATION | Age: 68
End: 2023-02-02

## 2023-02-02 DIAGNOSIS — M25.551 RIGHT HIP PAIN: ICD-10-CM

## 2023-02-02 DIAGNOSIS — M16.11 PRIMARY OSTEOARTHRITIS OF RIGHT HIP: Primary | ICD-10-CM

## 2023-02-02 DIAGNOSIS — M87.051 AVASCULAR NECROSIS OF HIP, RIGHT (HCC): ICD-10-CM

## 2023-02-02 NOTE — PROGRESS NOTES
Daily Note     Today's date: 2023  Patient name: Johnnie Rosenbaum  : 1955  MRN: [id number]  Referring provider: Rupinder Raza  Dx:   Encounter Diagnosis     ICD-10-CM    1  Primary osteoarthritis of right hip  M16 11       2  Avascular necrosis of hip, right (HCC)  M87 051       3  Right hip pain  M25 551           Start Time: 1030  Stop Time: 1115  Total time in clinic (min): 45 minutes    Subjective: Patient felt fine after her last visit  She states she is able to walk without the cane at times and other times she needs it      Objective: See treatment diary below      Assessment: Reinforced importance of using cane to maintain normalized gait pattern until hip abduction strength improves  Increased resistance with unilateral leg press however reduced repetitions due to fatigue  Hip abduction and flexion strength continue to improve with interventions  Patient demonstrated fatigue post treatment, exhibited good technique with therapeutic exercises and would benefit from continued PT      Plan: Continue per plan of care        Diagnosis: s/p R NILTON 22   Precautions: anterior approach, HTN, DM, h/o cancer   Primary impairments: R hip AROM deficits, R hip strength deficits in all major muscle groups, and gait dysfunction   *asterisks by exercise = given for HEP       Manuals        R hip PROM    D/C     R ITB stretching  8'    8'  8'                           There Ex        Rec bike  L1 x 8'  L1 x 8'  L1 x 8'  L1 x 8'  L1 x 8'   Hip flex slides *        Hip abd slides *                                                        Neuro Re-Ed        Glute sets *        Quad sets        Hip abd iso     10" x 10    Supine SLR *  2 x 10  1" 2 x 10   2 x 10  2 x 10   S/L hip abd *   1" 2 x 10   x 15  2 x 10   S/L clamshells   2 x 10   x 15  2 x 10   Forward step ups  1R x 20  1R x 20  1R x 15  1R x 15  1R x 20   Lat step ups  1R x 20  1R x 20  1R x 15  1R x 15  1R x 20 Sidestepping  x 2 laps  x 2 laps   x 2 laps  x 2 laps   Sit to stand slow *  x 15 no foam  x 15    x 15 chair with foam  x 15 chair with foam   SLS  10" x 3  15" x 3  10" x 3 B  deferred    U/L leg press  L: 55 lbs 2 x 10    R: 35 lbs 2 x 10  L: 55 lbs 2 x 10    R: 45 lbs x 10  L: 55 lbs 2 x 10    R: 35 lbs 2 x 10  L: 55 lbs 2 x 10    R: 35 lbs 2 x 10  L: 55 lbs 2 x 10    R: 35 lbs 2 x 10                                   Re-evaluation    CM     Ther Act/Gait                           Modalities         CP prn

## 2023-02-06 ENCOUNTER — VBI (OUTPATIENT)
Dept: ADMINISTRATIVE | Facility: OTHER | Age: 68
End: 2023-02-06

## 2023-02-07 ENCOUNTER — OFFICE VISIT (OUTPATIENT)
Dept: PHYSICAL THERAPY | Facility: REHABILITATION | Age: 68
End: 2023-02-07

## 2023-02-07 DIAGNOSIS — M87.051 AVASCULAR NECROSIS OF HIP, RIGHT (HCC): ICD-10-CM

## 2023-02-07 DIAGNOSIS — M25.551 RIGHT HIP PAIN: ICD-10-CM

## 2023-02-07 DIAGNOSIS — M16.11 PRIMARY OSTEOARTHRITIS OF RIGHT HIP: Primary | ICD-10-CM

## 2023-02-07 NOTE — PROGRESS NOTES
Daily Note     Today's date: 2023  Patient name: Renata Figueroa  : 1955  MRN: [id number]  Referring provider: Damien Loera  Dx:   Encounter Diagnosis     ICD-10-CM    1  Primary osteoarthritis of right hip  M16 11       2  Avascular necrosis of hip, right (HCC)  M87 051       3  Right hip pain  M25 551           Start Time: 1030  Stop Time: 1115  Total time in clinic (min): 45 minutes    Subjective: Patient has been feeling well since last visit and she presents without SPC  She still has some apprehension with walking at night, negotiating curbs, and uneven surfaces      Objective: See treatment diary below      Assessment: Patient able to navigate stairs with reciprocal pattern and unilateral handrail support  Patient completed unilateral leg press with increased repetitions involved side compared to last visit  Full PROM in all planes during manual therapy  Gluteus medius fatigue following sidelying hip abduction  Patient demonstrated fatigue post treatment, exhibited good technique with therapeutic exercises and would benefit from continued PT      Plan: Continue per plan of care        Diagnosis: s/p R NILTON 22   Precautions: anterior approach, HTN, DM, h/o cancer   Primary impairments: R hip AROM deficits, R hip strength deficits in all major muscle groups, and gait dysfunction   *asterisks by exercise = given for HEP       Manuals        R hip PROM    8'     R ITB stretching  8'    8'  8'                           There Ex        Rec bike  L1 x 8'  L1 x 8'  L1 x 8'  L1 x 8'  L1 x 8'   Hip flex slides *        Hip abd slides *                                                        Neuro Re-Ed        Glute sets *        Quad sets        Hip abd iso     10" x 10    Supine SLR *  2 x 10  1" 2 x 10  1" 2 x 10  2 x 10  2 x 10   S/L hip abd *   1" 2 x 10  1" 2 x 10  x 15  2 x 10   S/L clamshells   2 x 10  1" 2 x 10  x 15  2 x 10   Forward step ups  1R x 20  1R x 20  1R x 20  1R x 15  1R x 20   Lat step ups  1R x 20  1R x 20  1R x 20  1R x 15  1R x 20   Sidestepping  x 2 laps  x 2 laps  x 2 laps  x 2 laps  x 2 laps   Sit to stand slow *  x 15 no foam  x 15   2 x 10  x 15 chair with foam  x 15 chair with foam   SLS  10" x 3  15" x 3  15" x 3  deferred    U/L leg press  L: 55 lbs 2 x 10    R: 35 lbs 2 x 10  L: 55 lbs 2 x 10    R: 45 lbs x 10  L: 55 lbs 2 x 10    R: 45 lbs 2 x 10  L: 55 lbs 2 x 10    R: 35 lbs 2 x 10  L: 55 lbs 2 x 10    R: 35 lbs 2 x 10                                   Re-evaluation        Ther Act/Gait                           Modalities         CP prn

## 2023-02-08 ENCOUNTER — VBI (OUTPATIENT)
Dept: ADMINISTRATIVE | Facility: OTHER | Age: 68
End: 2023-02-08

## 2023-02-09 ENCOUNTER — OFFICE VISIT (OUTPATIENT)
Dept: PHYSICAL THERAPY | Facility: REHABILITATION | Age: 68
End: 2023-02-09

## 2023-02-09 DIAGNOSIS — M87.051 AVASCULAR NECROSIS OF HIP, RIGHT (HCC): ICD-10-CM

## 2023-02-09 DIAGNOSIS — M25.551 RIGHT HIP PAIN: ICD-10-CM

## 2023-02-09 DIAGNOSIS — M16.11 PRIMARY OSTEOARTHRITIS OF RIGHT HIP: Primary | ICD-10-CM

## 2023-02-09 NOTE — PROGRESS NOTES
Daily Note     Today's date: 2023  Patient name: Cinthya Kraft  : 1955  MRN: [id number]  Referring provider: Pedro Dela Cruz  Dx:   Encounter Diagnosis     ICD-10-CM    1  Primary osteoarthritis of right hip  M16 11       2  Avascular necrosis of hip, right (HCC)  M87 051       3  Right hip pain  M25 551           Start Time: 1030  Stop Time: 1115  Total time in clinic (min): 45 minutes    Subjective: Patient slept poorly and is fatigued as a result  Her hip has been feeling fine since last visit  She is not limiting her activity due to her hip and feels she will be ready to transition to HEP next week      Objective: See treatment diary below      Assessment: Gradually improving control with forward and lateral step ups however minor R lateral trunk lean remains  Fingertip support required during single leg stance  Patient able to complete supine and sidelying straight leg raises with increased hold time  Patient demonstrated fatigue post treatment, exhibited good technique with therapeutic exercises and would benefit from continued PT      Plan: Continue per plan of care        Diagnosis: s/p R NILTON 22   Precautions: anterior approach, HTN, DM, h/o cancer   Primary impairments: R hip AROM deficits, R hip strength deficits in all major muscle groups, and gait dysfunction   *asterisks by exercise = given for HEP       Manuals        R hip PROM    8'     R ITB stretching  8'     8'                           There Ex        Rec bike  L1 x 8'  L1 x 8'  L1 x 8'  L2 x 8'  L1 x 8'   Hip flex slides *        Hip abd slides *                                                        Neuro Re-Ed        Glute sets *        Quad sets        Hip abd iso        Supine SLR *  2 x 10  1" 2 x 10  1" 2 x 10  3" 2 x 10  2 x 10   S/L hip abd *   1" 2 x 10  1" 2 x 10  3" 2 x 10  2 x 10   S/L clamshells   2 x 10  1" 2 x 10  3" 2 x 10  2 x 10   Forward step ups  1R x 20  1R x 20  1R x 20 1R x 20  1R x 20   Lat step ups  1R x 20  1R x 20  1R x 20  1R x 20  1R x 20   Sidestepping  x 2 laps  x 2 laps  x 2 laps  x 3 laps  x 2 laps   Sit to stand slow *  x 15 no foam  x 15   2 x 10  2 x 10  x 15 chair with foam   SLS  10" x 3  15" x 3  15" x 3  15" x 3    U/L leg press  L: 55 lbs 2 x 10    R: 35 lbs 2 x 10  L: 55 lbs 2 x 10    R: 45 lbs x 10  L: 55 lbs 2 x 10    R: 45 lbs 2 x 10  L: 55 lbs 2 x 10    R: 45 lbs 2 x 10  L: 55 lbs 2 x 10    R: 35 lbs 2 x 10                                   Re-evaluation        Ther Act/Gait                          Modalities        CP prn

## 2023-02-14 ENCOUNTER — OFFICE VISIT (OUTPATIENT)
Dept: PHYSICAL THERAPY | Facility: REHABILITATION | Age: 68
End: 2023-02-14

## 2023-02-14 DIAGNOSIS — M16.11 PRIMARY OSTEOARTHRITIS OF RIGHT HIP: Primary | ICD-10-CM

## 2023-02-14 DIAGNOSIS — M25.551 RIGHT HIP PAIN: ICD-10-CM

## 2023-02-14 DIAGNOSIS — M87.051 AVASCULAR NECROSIS OF HIP, RIGHT (HCC): ICD-10-CM

## 2023-02-14 NOTE — PROGRESS NOTES
Daily Note     Today's date: 2023  Patient name: Kathryn Prader  : 1955  MRN: [id number]  Referring provider: Charles Vidales  Dx:   Encounter Diagnosis     ICD-10-CM    1  Primary osteoarthritis of right hip  M16 11       2  Avascular necrosis of hip, right (HCC)  M87 051       3  Right hip pain  M25 551           Start Time: 1030  Stop Time: 1115  Total time in clinic (min): 45 minutes    Subjective: Patient reports she feels ready to discharge this week  She reports compliance with HEP      Objective: See treatment diary below      Assessment: Persistent slight Trendelenburg stance noted involved side during gait however hip abduction strength continues to improve with treatment and time  Patient demonstrated fatigue post treatment, exhibited good technique with therapeutic exercises and would benefit from continued PT  Plan to reassess next visit and discharge to independent HEP      Plan: Continue per plan of care        Diagnosis: s/p R NILTON 22   Precautions: anterior approach, HTN, DM, h/o cancer   Primary impairments: R hip AROM deficits, R hip strength deficits in all major muscle groups, and gait dysfunction   *asterisks by exercise = given for HEP       Manuals        R hip PROM    8'     R ITB stretching  8'                               There Ex        Rec bike  L1 x 8'  L1 x 8'  L1 x 8'  L2 x 8'  L2 x 8'   Hip flex slides *        Hip abd slides *                                                        Neuro Re-Ed        Glute sets *        Quad sets        Hip abd iso        Supine SLR *  2 x 10  1" 2 x 10  1" 2 x 10  3" 2 x 10  3" 2 x 10   S/L hip abd *   1" 2 x 10  1" 2 x 10  3" 2 x 10  3" 2 x 10   S/L clamshells   2 x 10  1" 2 x 10  3" 2 x 10  3" 2 x 10   Forward step ups  1R x 20  1R x 20  1R x 20  1R x 20  2R x 20   Lat step ups  1R x 20  1R x 20  1R x 20  1R x 20  2R x 15   Sidestepping  x 2 laps  x 2 laps  x 2 laps  x 3 laps  x 3 laps   Sit to stand slow *  x 15 no foam  x 15   2 x 10  2 x 10  2 x 10   SLS  10" x 3  15" x 3  15" x 3  15" x 3  15" x 3   U/L leg press  L: 55 lbs 2 x 10    R: 35 lbs 2 x 10  L: 55 lbs 2 x 10    R: 45 lbs x 10  L: 55 lbs 2 x 10    R: 45 lbs 2 x 10  L: 55 lbs 2 x 10    R: 45 lbs 2 x 10  L: 55 lbs 2 x 10    R: 45 lbs 2 x 10                                   Re-evaluation        Ther Act/Gait                          Modalities        CP prn

## 2023-02-16 ENCOUNTER — EVALUATION (OUTPATIENT)
Dept: PHYSICAL THERAPY | Facility: REHABILITATION | Age: 68
End: 2023-02-16

## 2023-02-16 DIAGNOSIS — M16.11 PRIMARY OSTEOARTHRITIS OF RIGHT HIP: Primary | ICD-10-CM

## 2023-02-16 DIAGNOSIS — M87.051 AVASCULAR NECROSIS OF HIP, RIGHT (HCC): ICD-10-CM

## 2023-02-16 DIAGNOSIS — M25.551 RIGHT HIP PAIN: ICD-10-CM

## 2023-02-16 NOTE — PROGRESS NOTES
PT Discharge    Today's date: 2023  Patient name: Lew Corral  : 1955  MRN: [id number]  Referring provider: Olga Crain  Dx:   Encounter Diagnosis     ICD-10-CM    1  Primary osteoarthritis of right hip  M16 11       2  Avascular necrosis of hip, right (HCC)  M87 051       3  Right hip pain  M25 551           Start Time: 1030  Stop Time: 1102  Total time in clinic (min): 32 minutes    Assessment  Assessment details: Patient is a 79 y o  female presenting s/p R NILTON with date of surgery 22  Patient has been compliant with physical therapy and has achieved functional goals at this time  Patient exhibits functional improvements including home and community mobility, sit to stand transfers, car transfers, and return to functional baseline  Patient is discharged to independent [facility], educated patient regarding discharge plan, and answered all patient questions to satisfaction  Good prognosis             Prognosis: good    Goals  Impairment Goals: 4-6 weeks  - Patient to decrease pain to 2/10 - MET  - Patient to improve hip PROM to [facility] - MET  - Patient to increase knee strength to 4/5 throughout - MET  - Patient to increase hip strength to 4/5 throughout - MOSTLY MET    Functional Goals: by discharge  - Patient to discharge to independent [facility] - MET  - Patient to return to prior level of function - MET  - Patient to improve hip AROM to equal to uninvolved side - MET  - Patient to ambulate in home and community without increased pain or difficulty - MET  - Patient to ascend and descend stairs without increased pain or difficulty - MET  - Patient to complete sit to stand transfers without increased pain or difficulty - MET      Plan  Plan details: Discharge to independent [facility]  Treatment plan discussed with: patient        Subjective Evaluation    History of Present Illness  Mechanism of injury: HISTORY OF PRESENT ILLNESS: Patient presents s/p R NILTON via anterior approach 22  Patient was discharged home same day  Patient is ambulating without AD most of the time however continues to use [facility] for the first few steps after laying down for a while  No remaining limitations with daily activities     PRIOR TREATMENT: preoperative PT  AGGRAVATING FACTORS: none  EASING FACTORS: ice  WORK:   IMAGING: preoperative x-rays  FUNCTIONAL LIMITATIONS: none  IMPROVEMENTS: ambulating without AD, sleeping, bed mobility, tub transfers, car transfers, sit to stand transfers, reciprocal stair navigation, community mobility  SUBJECTIVE FUNCTIONAL LEVEL: 90%  PATIENT GOAL: walk without an AD - MET    Pain  Current pain ratin  At best pain ratin  At worst pain ratin  Location: R hip          Objective     Neurological Testing     Sensation     Lumbar   Left   Intact: light touch    Right   Intact: light touch    Active Range of Motion   Left Hip   Flexion: 99 degrees   External rotation (90/90): 38 degrees   Internal rotation (90/90): 30 degrees     Right Hip   Flexion: 110 degrees   External rotation (90/90): 40 degrees   Internal rotation (90/90): 31 degrees     Passive Range of Motion     Right Hip   Normal passive range of motion    Strength/Myotome Testing     Left Hip   Planes of Motion   Flexion: 4+  External rotation: 4+  Internal rotation: 4+    Right Hip   Planes of Motion   Flexion: 4  Extension: 4  Abduction: 4-  External rotation: 3+  Internal rotation: 4+    Left Knee   Flexion: 4+  Extension: 4+    Right Knee   Flexion: 4+  Extension: 4+    Left Ankle/Foot   Dorsiflexion: 5    Right Ankle/Foot   Dorsiflexion: 5    Ambulation     Comments   Ambulating without AD with mild Trendelenburg stance      Flowsheet Rows    Flowsheet Row Most Recent Value   PT/OT G-Codes    Current Score 72   Projected Score 63              Diagnosis: s/p R NILTON 22   Precautions: anterior approach, HTN, DM, h/o cancer   Primary impairments: R hip AROM deficits, R hip strength deficits in all major muscle groups, and gait dysfunction   *asterisks by exercise = given for HEP    2/16 2/2 2/7 2/9 2/14   Manuals        R hip PROM    8'     R ITB stretching                                There Ex        Rec bike  L2 x 8'  L1 x 8'  L1 x 8'  L2 x 8'  L2 x 8'   Hip flex slides *        Hip abd slides *                                                        Neuro Re-Ed        Glute sets *        Quad sets        Hip abd iso        Supine SLR *   1" 2 x 10  1" 2 x 10  3" 2 x 10  3" 2 x 10   S/L hip abd *   1" 2 x 10  1" 2 x 10  3" 2 x 10  3" 2 x 10   S/L clamshells   2 x 10  1" 2 x 10  3" 2 x 10  3" 2 x 10   Forward step ups   1R x 20  1R x 20  1R x 20  2R x 20   Lat step ups   1R x 20  1R x 20  1R x 20  2R x 15   Sidestepping *   x 2 laps  x 2 laps  x 3 laps  x 3 laps   Sit to stand slow *   x 15   2 x 10  2 x 10  2 x 10   SLS   15" x 3  15" x 3  15" x 3  15" x 3   U/L leg press   L: 55 lbs 2 x 10    R: 45 lbs x 10  L: 55 lbs 2 x 10    R: 45 lbs 2 x 10  L: 55 lbs 2 x 10    R: 45 lbs 2 x 10  L: 55 lbs 2 x 10    R: 45 lbs 2 x 10                                   Re-evaluation  CM       Ther Act/Gait                          Modalities        CP prn

## 2023-02-17 ENCOUNTER — VBI (OUTPATIENT)
Dept: ADMINISTRATIVE | Facility: OTHER | Age: 68
End: 2023-02-17

## 2023-02-24 ENCOUNTER — OFFICE VISIT (OUTPATIENT)
Dept: OBGYN CLINIC | Facility: MEDICAL CENTER | Age: 68
End: 2023-02-24

## 2023-02-24 VITALS
HEART RATE: 76 BPM | SYSTOLIC BLOOD PRESSURE: 104 MMHG | DIASTOLIC BLOOD PRESSURE: 65 MMHG | HEIGHT: 67 IN | WEIGHT: 188.4 LBS | BODY MASS INDEX: 29.57 KG/M2

## 2023-02-24 DIAGNOSIS — Z96.641 STATUS POST TOTAL HIP REPLACEMENT, RIGHT: Primary | ICD-10-CM

## 2023-02-24 NOTE — PROGRESS NOTES
Assessment/Plan:  1  Status post total hip replacement, right      No orders of the defined types were placed in this encounter  · Patient is doing very well 10 weeks status post right anterior NILTON on 12/12/2022  · Transition to home exercises  · Pain control prn- OTC pain meds  · Patient should call ahead for abx prior to dental appts  Return in about 9 months (around 11/24/2023) for Right total hip  I answered all of the patient's questions during the visit and provided education of the patient's condition during the visit  The patient verbalized understanding of the information given and agrees with the plan  This note was dictated using Job[employer] software  It may contain errors including improperly dictated words  Please contact physician directly for any questions  Subjective   Chief Complaint:   Chief Complaint   Patient presents with   • Right Hip - Post-op, Follow-up       Zeb Aj is a 79 y o  female who presents for 4 week follow up s/p right NILTON  Patient is 11 weeks status post right anterior total hip arthroplasty performed on 12/12/2022  She reports have discomfort and stiffness when sitting long period, a first thing in the morning  It loosens up and she's fine  She feels no need to take any pain mediation for the hip  She used the Oxycodone 2 day after surgery and that's it  She is still trying to reciprocate steps quickly, with a normal gait  Patient was discharged from PT  Denies leg length discrepancy  Overall patient is very happy with her NILTON  Review of Systems  ROS:    See HPI for musculoskeletal review     All other systems reviewed are negative     History:  Past Medical History:   Diagnosis Date   • Diabetes mellitus ([facility] [address] )     NIDDM   • Diverticulosis     hx colon resection   • GERD (gastroesophageal reflux disease)    • Hyperlipidemia    • Hypertension    • Macular degeneration    • Malignant neoplasm of skin     skin cancer right upper arm in past   • OA (osteoarthritis)     right knee     Past Surgical History:   Procedure Laterality Date   • CATARACT EXTRACTION Bilateral    • CHOLANGIOGRAM N/A 7/2/2018    Procedure: CHOLANGIOGRAM;  Surgeon: Claudia Kothari MD;  Location: [facility];  Service: General   • CHOLECYSTECTOMY LAPAROSCOPIC N/A 7/2/2018    Procedure: CHOLECYSTECTOMY LAPAROSCOPIC W/IOC;  Surgeon: Claudia Kothari MD;  Location: [facility];  Service: General   • COLON SURGERY      resection large intestine (diverticulitis)   • COLONOSCOPY     • KY ARTHRP ACETBLR/PROX FEM PROSTC AGRFT/ALGRFT Right 12/12/2022    Procedure: ARTHROPLASTY HIP TOTAL ANTERIOR;  Surgeon: Nicolas May DO;  Location:  [facility];  Service: Orthopedics   • KY ARTHRP KNE CONDYLE&PLATU MEDIAL&LAT COMPARTMENTS Right 3/19/2021    Procedure: ARTHROPLASTY KNEE TOTAL;  Surgeon: Korey Blake MD;  Location: [facility];  Service: Orthopedics   • KY LAP RPR HRNA XCPT INCAL/INGUN NCRC8/STRANGULATED N/A 5/30/2019    Procedure: REPAIR HERNIA INCISIONAL LAPAROSCOPIC W/ ROBOTICS WITH MESH PLACEMENT;  Surgeon: Claudia Kothari MD;  Location: [facility];  Service: General   • TONSILLECTOMY  child     Social History   Social History     Substance and Sexual Activity   Alcohol Use Never    Comment: beer/ liquor, once a month     Social History     Substance and Sexual Activity   Drug Use Never     Social History     Tobacco Use   Smoking Status Never   Smokeless Tobacco Never     Family History:   Family History   Problem Relation Age of Onset   • Alzheimer's disease Mother    • Anxiety disorder Mother    • Diabetes Mother    • Cancer Father    • Diabetes Sister    • Hypertension Sister    • Stroke Sister        Current Outpatient Medications on File Prior to Visit   Medication Sig Dispense Refill   • fexofenadine (ALLEGRA) 180 MG tablet Take 180 mg by mouth daily as needed      • fluticasone (FLONASE) 50 mcg/act nasal spray 1 spray into each nostril 2 (two) times a day as needed      • lisinopril (ZESTRIL) 10 mg tablet TAKE 1 TABLET DAILY 90 tablet 1   • metFORMIN (GLUCOPHAGE) 500 mg tablet TAKE 1 TABLET DAILY WITH   BREAKFAST 90 tablet 1   • Multiple Vitamins-Minerals (PRESERVISION AREDS PO) Take by mouth     • pantoprazole (PROTONIX) 20 mg tablet TAKE 1 TABLET DAILY 90 tablet 1   • pravastatin (PRAVACHOL) 20 mg tablet TAKE 1 TABLET DAILY 90 tablet 1     No current facility-administered medications on file prior to visit       No Known Allergies     Objective     /65   Pulse 76   Ht 5' 7" (1 702 m)   Wt 85 5 kg (188 lb 6 4 oz)   BMI 29 51 kg/m²      PE:  AAOx 3  WDWN  Hearing intact, no drainage from eyes  no audible wheezing  no abdominal distension  LE compartments soft, AT/GS intact    Ortho Exam:  right hip:   INC: healed, No erythema  No pain with ROM  Hip flexion 5/5  Abduction 5/5  Adduction 5/5

## 2023-03-20 ENCOUNTER — TELEPHONE (OUTPATIENT)
Dept: OBGYN CLINIC | Facility: HOSPITAL | Age: 68
End: 2023-03-20

## 2023-03-20 DIAGNOSIS — Z96.641 STATUS POST TOTAL HIP REPLACEMENT, RIGHT: Primary | ICD-10-CM

## 2023-03-20 RX ORDER — AMOXICILLIN 500 MG/1
2000 CAPSULE ORAL ONCE
Qty: 4 CAPSULE | Refills: 2 | Status: SHIPPED | OUTPATIENT
Start: 2023-03-20 | End: 2023-03-20

## 2023-03-20 NOTE — TELEPHONE ENCOUNTER
Caller: Patient  Doctor: DR George Andrews    Reason for call: Patient had NILTON 12/12/2022      Patient asked if she needs pre dental prophylaxis    If yes, patient will need antibiotics sent into [facility]     Call back#: [phone]

## 2023-03-23 ENCOUNTER — HOSPITAL ENCOUNTER (OUTPATIENT)
Dept: BONE DENSITY | Facility: MEDICAL CENTER | Age: 68
Discharge: HOME | End: 2023-03-23

## 2023-03-23 DIAGNOSIS — Z78.0 ASYMPTOMATIC POSTMENOPAUSAL STATE: ICD-10-CM

## 2023-03-28 DIAGNOSIS — E11.9 TYPE 2 DIABETES MELLITUS WITHOUT COMPLICATION, WITHOUT LONG-TERM CURRENT USE OF INSULIN (HCC): ICD-10-CM

## 2023-03-28 DIAGNOSIS — M85.89 OSTEOPENIA OF MULTIPLE SITES: Primary | ICD-10-CM

## 2023-03-28 DIAGNOSIS — E78.2 MIXED HYPERLIPIDEMIA: ICD-10-CM

## 2023-03-28 DIAGNOSIS — K21.9 GASTROESOPHAGEAL REFLUX DISEASE WITHOUT ESOPHAGITIS: ICD-10-CM

## 2023-03-28 DIAGNOSIS — I10 HYPERTENSION, UNSPECIFIED TYPE: ICD-10-CM

## 2023-03-28 RX ORDER — LISINOPRIL 10 MG/1
TABLET ORAL
Qty: 90 TABLET | Refills: 1 | Status: SHIPPED | OUTPATIENT
Start: 2023-03-28

## 2023-03-28 RX ORDER — PANTOPRAZOLE SODIUM 20 MG/1
TABLET, DELAYED RELEASE ORAL
Qty: 90 TABLET | Refills: 1 | Status: SHIPPED | OUTPATIENT
Start: 2023-03-28

## 2023-03-28 RX ORDER — PRAVASTATIN SODIUM 20 MG
TABLET ORAL
Qty: 90 TABLET | Refills: 1 | Status: SHIPPED | OUTPATIENT
Start: 2023-03-28

## 2023-04-14 PROBLEM — N81.2 CYSTOCELE WITH SECOND DEGREE UTERINE PROLAPSE: Status: ACTIVE | Noted: 2023-03-28

## 2023-04-14 PROBLEM — B34.9 VIRAL INFECTION: Status: RESOLVED | Noted: 2023-01-30 | Resolved: 2023-04-14

## 2023-04-14 PROBLEM — Z87.19 H/O VENTRAL HERNIA REPAIR: Status: ACTIVE | Noted: 2023-03-28

## 2023-04-14 PROBLEM — Z98.890 H/O VENTRAL HERNIA REPAIR: Status: ACTIVE | Noted: 2023-03-28

## 2023-04-14 PROBLEM — Z90.49 HISTORY OF COLECTOMY: Status: ACTIVE | Noted: 2023-03-28

## 2023-04-14 PROBLEM — R33.9 INCOMPLETE EMPTYING OF BLADDER: Status: ACTIVE | Noted: 2023-03-28

## 2023-05-30 LAB
CREAT ?TM UR-SCNC: 277.1 UMOL/L
EXT ALBUMIN URINE RANDOM: 9.1
HBA1C MFR BLD HPLC: 6.1 %
MICROALBUMIN/CREAT UR: 32.8 MG/G{CREAT}

## 2023-06-01 ENCOUNTER — OFFICE VISIT (OUTPATIENT)
Dept: FAMILY MEDICINE CLINIC | Facility: CLINIC | Age: 68
End: 2023-06-01

## 2023-06-01 VITALS
WEIGHT: 188 LBS | HEIGHT: 67 IN | BODY MASS INDEX: 29.51 KG/M2 | HEART RATE: 64 BPM | SYSTOLIC BLOOD PRESSURE: 110 MMHG | DIASTOLIC BLOOD PRESSURE: 72 MMHG | RESPIRATION RATE: 18 BRPM | OXYGEN SATURATION: 96 %

## 2023-06-01 DIAGNOSIS — R80.9 TYPE 2 DIABETES MELLITUS WITH MICROALBUMINURIA, WITHOUT LONG-TERM CURRENT USE OF INSULIN (HCC): Primary | ICD-10-CM

## 2023-06-01 DIAGNOSIS — Z96.641 STATUS POST TOTAL HIP REPLACEMENT, RIGHT: ICD-10-CM

## 2023-06-01 DIAGNOSIS — E11.29 TYPE 2 DIABETES MELLITUS WITH MICROALBUMINURIA, WITHOUT LONG-TERM CURRENT USE OF INSULIN (HCC): Primary | ICD-10-CM

## 2023-06-01 DIAGNOSIS — M16.11 PRIMARY OSTEOARTHRITIS OF RIGHT HIP: ICD-10-CM

## 2023-06-01 DIAGNOSIS — Z00.00 HEALTHCARE MAINTENANCE: ICD-10-CM

## 2023-06-01 DIAGNOSIS — I10 ESSENTIAL HYPERTENSION: ICD-10-CM

## 2023-06-01 DIAGNOSIS — E78.2 MIXED HYPERLIPIDEMIA: ICD-10-CM

## 2023-06-01 DIAGNOSIS — K21.9 GERD WITHOUT ESOPHAGITIS: ICD-10-CM

## 2023-06-01 DIAGNOSIS — M85.89 OSTEOPENIA OF MULTIPLE SITES: ICD-10-CM

## 2023-06-01 DIAGNOSIS — D03.9 MELANOMA IN SITU, UNSPECIFIED SITE (HCC): ICD-10-CM

## 2023-06-01 DIAGNOSIS — M89.9 BONE DISORDER: ICD-10-CM

## 2023-06-01 DIAGNOSIS — R80.9 MICROALBUMINURIA: ICD-10-CM

## 2023-06-01 PROBLEM — Z01.818 PRE-OP EXAM: Status: RESOLVED | Noted: 2019-01-14 | Resolved: 2023-06-01

## 2023-06-01 PROBLEM — Z90.722 S/P TAH-BSO (TOTAL ABDOMINAL HYSTERECTOMY AND BILATERAL SALPINGO-OOPHORECTOMY): Status: ACTIVE | Noted: 2023-06-01

## 2023-06-01 PROBLEM — Z90.710 S/P TAH-BSO (TOTAL ABDOMINAL HYSTERECTOMY AND BILATERAL SALPINGO-OOPHORECTOMY): Status: ACTIVE | Noted: 2023-06-01

## 2023-06-01 PROBLEM — R33.9 INCOMPLETE EMPTYING OF BLADDER: Status: RESOLVED | Noted: 2023-03-28 | Resolved: 2023-06-01

## 2023-06-01 PROBLEM — L23.9 ALLERGIC DERMATITIS: Status: RESOLVED | Noted: 2018-10-03 | Resolved: 2023-06-01

## 2023-06-01 PROBLEM — N81.2 CYSTOCELE WITH SECOND DEGREE UTERINE PROLAPSE: Status: RESOLVED | Noted: 2023-03-28 | Resolved: 2023-06-01

## 2023-06-01 PROBLEM — Z12.11 SCREENING FOR COLON CANCER: Status: ACTIVE | Noted: 2023-06-01

## 2023-06-01 PROBLEM — Z90.49 HISTORY OF COLECTOMY: Status: RESOLVED | Noted: 2023-03-28 | Resolved: 2023-06-01

## 2023-06-01 PROBLEM — M17.11 ARTHRITIS OF RIGHT KNEE: Status: RESOLVED | Noted: 2020-05-20 | Resolved: 2023-06-01

## 2023-06-01 PROBLEM — Z90.79 S/P TAH-BSO (TOTAL ABDOMINAL HYSTERECTOMY AND BILATERAL SALPINGO-OOPHORECTOMY): Status: ACTIVE | Noted: 2023-06-01

## 2023-06-01 RX ORDER — ASPIRIN 81 MG/1
81 TABLET, CHEWABLE ORAL DAILY
COMMUNITY

## 2023-06-01 NOTE — PATIENT INSTRUCTIONS
Continue present therapy  Diet exercise weight loss recommended  Follow with all specialist further instructions  Return in 6 months for office visit and blood work sooner if needed  Medicare Preventive Visit Patient Instructions  Thank you for completing your Welcome to Medicare Visit or Medicare Annual Wellness Visit today  Your next wellness visit will be due in one year (6/1/2024)  The screening/preventive services that you may require over the next 5-10 years are detailed below  Some tests may not apply to you based off risk factors and/or age  Screening tests ordered at today's visit but not completed yet may show as past due  Also, please note that scanned in results may not display below  Preventive Screenings:  Service Recommendations Previous Testing/Comments   Colorectal Cancer Screening  * Colonoscopy    * Fecal Occult Blood Test (FOBT)/Fecal Immunochemical Test (FIT)  * Fecal DNA/Cologuard Test  * Flexible Sigmoidoscopy Age: 39-70 years old   Colonoscopy: every 10 years (may be performed more frequently if at higher risk)  OR  FOBT/FIT: every 1 year  OR  Cologuard: every 3 years  OR  Sigmoidoscopy: every 5 years  Screening may be recommended earlier than age 39 if at higher risk for colorectal cancer  Also, an individualized decision between you and your healthcare provider will decide whether screening between the ages of 74-80 would be appropriate  Colonoscopy: 12/10/2018  FOBT/FIT: Not on file  Cologuard: Not on file  Sigmoidoscopy: Not on file    Screening Current     Breast Cancer Screening Age: 36 years old  Frequency: every 1-2 years  Not required if history of left and right mastectomy Mammogram: 08/18/2021    Screening Current   Cervical Cancer Screening Between the ages of 21-29, pap smear recommended once every 3 years  Between the ages of 33-67, can perform pap smear with HPV co-testing every 5 years     Recommendations may differ for women with a history of total hysterectomy, cervical cancer, or abnormal pap smears in past  Pap Smear: Not on file    Screening Not Indicated   Hepatitis C Screening Once for adults born between 1945 and 1965  More frequently in patients at high risk for Hepatitis C Hep C Antibody: 11/19/2022    Screening Current   Diabetes Screening 1-2 times per year if you're at risk for diabetes or have pre-diabetes Fasting glucose: 136 mg/dL (11/19/2022)  A1C: 6 1 % (5/30/2023)  Screening Not Indicated  History Diabetes   Cholesterol Screening Once every 5 years if you don't have a lipid disorder  May order more often based on risk factors  Lipid panel: 11/24/2018    Screening Not Indicated  History Lipid Disorder     Other Preventive Screenings Covered by Medicare:  Abdominal Aortic Aneurysm (AAA) Screening: covered once if your at risk  You're considered to be at risk if you have a family history of AAA  Lung Cancer Screening: covers low dose CT scan once per year if you meet all of the following conditions: (1) Age 50-69; (2) No signs or symptoms of lung cancer; (3) Current smoker or have quit smoking within the last 15 years; (4) You have a tobacco smoking history of at least 20 pack years (packs per day multiplied by number of years you smoked); (5) You get a written order from a healthcare provider  Glaucoma Screening: covered annually if you're considered high risk: (1) You have diabetes OR (2) Family history of glaucoma OR (3)  aged 48 and older OR (3)  American aged 72 and older  Osteoporosis Screening: covered every 2 years if you meet one of the following conditions: (1) You're estrogen deficient and at risk for osteoporosis based off medical history and other findings; (2) Have a vertebral abnormality; (3) On glucocorticoid therapy for more than 3 months; (4) Have primary hyperparathyroidism; (5) On osteoporosis medications and need to assess response to drug therapy  Last bone density test (DXA Scan): 03/24/2023    HIV Screening: covered annually if you're between the age of 12-76  Also covered annually if you are younger than 13 and older than 72 with risk factors for HIV infection  For pregnant patients, it is covered up to 3 times per pregnancy  Immunizations:  Immunization Recommendations   Influenza Vaccine Annual influenza vaccination during flu season is recommended for all persons aged >= 6 months who do not have contraindications   Pneumococcal Vaccine   * Pneumococcal conjugate vaccine = PCV13 (Prevnar 13), PCV15 (Vaxneuvance), PCV20 (Prevnar 20)  * Pneumococcal polysaccharide vaccine = PPSV23 (Pneumovax) Adults 25-60 years old: 1-3 doses may be recommended based on certain risk factors  Adults 72 years old: 1-2 doses may be recommended based off what pneumonia vaccine you previously received   Hepatitis B Vaccine 3 dose series if at intermediate or high risk (ex: diabetes, end stage renal disease, liver disease)   Tetanus (Td) Vaccine - COST NOT COVERED BY MEDICARE PART B Following completion of primary series, a booster dose should be given every 10 years to maintain immunity against tetanus  Td may also be given as tetanus wound prophylaxis  Tdap Vaccine - COST NOT COVERED BY MEDICARE PART B Recommended at least once for all adults  For pregnant patients, recommended with each pregnancy  Shingles Vaccine (Shingrix) - COST NOT COVERED BY MEDICARE PART B  2 shot series recommended in those aged 48 and above     Health Maintenance Due:      Topic Date Due    Breast Cancer Screening: Mammogram  08/18/2022    Colorectal Cancer Screening  12/10/2028    Hepatitis C Screening  Completed     Immunizations Due:      Topic Date Due    COVID-19 Vaccine (5 - Moderna series) 08/06/2021    Pneumococcal Vaccine: 65+ Years (3 - PPSV23 if available, else PCV20) 08/14/2022    Influenza Vaccine (Season Ended) 09/01/2023     Advance Directives   What are advance directives?   Advance directives are legal documents that state your wishes and plans for medical care  These plans are made ahead of time in case you lose your ability to make decisions for yourself  Advance directives can apply to any medical decision, such as the treatments you want, and if you want to donate organs  What are the types of advance directives? There are many types of advance directives, and each state has rules about how to use them  You may choose a combination of any of the following:  Living will: This is a written record of the treatment you want  You can also choose which treatments you do not want, which to limit, and which to stop at a certain time  This includes surgery, medicine, IV fluid, and tube feedings  Durable power of  for healthcare [facility]): This is a written record that states who you want to make healthcare choices for you when you are unable to make them for yourself  This person, called a proxy, is usually a family member or a friend  You may choose more than 1 proxy  Do not resuscitate (DNR) order:  A DNR order is used in case your heart stops beating or you stop breathing  It is a request not to have certain forms of treatment, such as CPR  A DNR order may be included in other types of advance directives  Medical directive: This covers the care that you want if you are in a coma, near death, or unable to make decisions for yourself  You can list the treatments you want for each condition  Treatment may include pain medicine, surgery, blood transfusions, dialysis, IV or tube feedings, and a ventilator (breathing machine)  Values history: This document has questions about your views, beliefs, and how you feel and think about life  This information can help others choose the care that you would choose  Why are advance directives important? An advance directive helps you control your care  Although spoken wishes may be used, it is better to have your wishes written down  Spoken wishes can be misunderstood, or not followed   Treatments may be given even if you do not want them  An advance directive may make it easier for your family to make difficult choices about your care  Weight Management   Why it is important to manage your weight:  Being overweight increases your risk of health conditions such as heart disease, high blood pressure, type 2 diabetes, and certain types of cancer  It can also increase your risk for osteoarthritis, sleep apnea, and other respiratory problems  Aim for a slow, steady weight loss  Even a small amount of weight loss can lower your risk of health problems  How to lose weight safely:  A safe and healthy way to lose weight is to eat fewer calories and get regular exercise  You can lose up about 1 pound a week by decreasing the number of calories you eat by 500 calories each day  Healthy meal plan for weight management:  A healthy meal plan includes a variety of foods, contains fewer calories, and helps you stay healthy  A healthy meal plan includes the following:  Eat whole-grain foods more often  A healthy meal plan should contain fiber  Fiber is the part of grains, fruits, and vegetables that is not broken down by your body  Whole-grain foods are healthy and provide extra fiber in your diet  Some examples of whole-grain foods are whole-wheat breads and pastas, oatmeal, brown rice, and bulgur  Eat a variety of vegetables every day  Include dark, leafy greens such as spinach, kale, charlie greens, and mustard greens  Eat yellow and orange vegetables such as carrots, sweet potatoes, and winter squash  Eat a variety of fruits every day  Choose fresh or canned fruit (canned in its own juice or light syrup) instead of juice  Fruit juice has very little or no fiber  Eat low-fat dairy foods  Drink fat-free (skim) milk or 1% milk  Eat fat-free yogurt and low-fat cottage cheese  Try low-fat cheeses such as mozzarella and other reduced-fat cheeses  Choose meat and other protein foods that are low in fat    Choose beans or other legumes such as split peas or lentils  Choose fish, skinless poultry (chicken or turkey), or lean cuts of red meat (beef or pork)  Before you cook meat or poultry, cut off any visible fat  Use less fat and oil  Try baking foods instead of frying them  Add less fat, such as margarine, sour cream, regular salad dressing and mayonnaise to foods  Eat fewer high-fat foods  Some examples of high-fat foods include french fries, doughnuts, ice cream, and cakes  Eat fewer sweets  Limit foods and drinks that are high in sugar  This includes candy, cookies, regular soda, and sweetened drinks  Exercise:  Exercise at least 30 minutes per day on most days of the week  Some examples of exercise include walking, biking, dancing, and swimming  You can also fit in more physical activity by taking the stairs instead of the elevator or parking farther away from stores  Ask your healthcare provider about the best exercise plan for you  © Copyright [employer] 2018 Information is for End User's use only and may not be sold, redistributed or otherwise used for commercial purposes   All illustrations and images included in CareNotes® are the copyrighted property of [employer]  or [address] [place] [address]

## 2023-06-01 NOTE — ASSESSMENT & PLAN NOTE
Lab Results   Component Value Date    HGBA1C 6 1 (H) 05/30/2023   Well-controlled, continue present therapy patient refuses home glucose monitoring    Foot exam completed

## 2023-06-01 NOTE — PROGRESS NOTES
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 follows with orthopedics  8  Healthcare maintenance, Medicare AW completed  9  Return in 6 months for office visit and blood work sooner if needed        Problem List Items Addressed This Visit        Digestive    GERD without esophagitis     Stable continue Protonix         Relevant Orders    Albumin / creatinine urine ratio    Comprehensive metabolic panel    Hemoglobin A1C    Lipid Panel with Direct LDL reflex    TSH, 3rd generation with Free T4 reflex    Vitamin D 25 hydroxy    UA (URINE) with reflex to Scope       Endocrine    Type 2 diabetes mellitus with microalbuminuria, without long-term current use of insulin ([facility]) - Primary       Lab Results   Component Value Date    HGBA1C 6 1 (H) 05/30/2023   Well-controlled, continue present therapy patient refuses home glucose monitoring    Foot exam completed         Relevant Orders    Albumin / creatinine urine ratio    Comprehensive metabolic panel    Hemoglobin A1C    Lipid Panel with Direct LDL reflex    TSH, 3rd generation with Free T4 reflex    Vitamin D 25 hydroxy    UA (URINE) with reflex to Scope       Cardiovascular and Mediastinum    Essential hypertension     Stable continue present therapy         Relevant Orders    Albumin / creatinine urine ratio    Comprehensive metabolic panel    Hemoglobin A1C    Lipid Panel with Direct LDL reflex    TSH, 3rd generation with Free T4 reflex    Vitamin D 25 hydroxy    UA (URINE) with reflex to Scope       Musculoskeletal and Integument    Osteopenia of multiple sites     Scan 3/23 Relevant Orders    Albumin / creatinine urine ratio    Comprehensive metabolic panel    Hemoglobin A1C    Lipid Panel with Direct LDL reflex    TSH, 3rd generation with Free T4 reflex    Vitamin D 25 hydroxy    UA (URINE) with reflex to Scope    RESOLVED: Primary osteoarthritis of right hip     Follow-up with orthopedics         Relevant Orders    Albumin / creatinine urine ratio    Comprehensive metabolic panel    Hemoglobin A1C    Lipid Panel with Direct LDL reflex    TSH, 3rd generation with Free T4 reflex    Vitamin D 25 hydroxy    UA (URINE) with reflex to Scope       Other    Mixed hyperlipidemia     Stable continue pravastatin         Relevant Orders    Albumin / creatinine urine ratio    Comprehensive metabolic panel    Hemoglobin A1C    Lipid Panel with Direct LDL reflex    TSH, 3rd generation with Free T4 reflex    Vitamin D 25 hydroxy    UA (URINE) with reflex to Scope    Melanoma in situ ([facility] [address] )     Follows with otology on a yearly basis         Relevant Orders    Albumin / creatinine urine ratio    Comprehensive metabolic panel    Hemoglobin A1C    Lipid Panel with Direct LDL reflex    TSH, 3rd generation with Free T4 reflex    Vitamin D 25 hydroxy    UA (URINE) with reflex to Scope    Healthcare maintenance     Medicare AWV completed         Status post total hip replacement, right    Microalbuminuria     ACE therapy, lisinopril         Relevant Orders    Albumin / creatinine urine ratio    Comprehensive metabolic panel    Hemoglobin A1C    Lipid Panel with Direct LDL reflex    TSH, 3rd generation with Free T4 reflex    Vitamin D 25 hydroxy    UA (URINE) with reflex to Scope   Other Visit Diagnoses     Bone disorder        Relevant Orders    Vitamin D 25 hydroxy    UA (URINE) with reflex to Scope          Depression Screening and Follow-up Plan: Patient was screened for depression during today's encounter  They screened negative with a PHQ-2 score of 0        Preventive health issues were discussed with patient, and age appropriate screening tests were ordered as noted in patient's After Visit Summary  Personalized health advice and appropriate referrals for health education or preventive services given if needed, as noted in patient's After Visit Summary  History of Present Illness:     Patient presents for a Medicare Wellness Visit    Patient doing well without immediate complaints or concerns at present time  Patient gained 2 pounds since last office visit  Patient refuses home blood sugar     Patient Care Team:  Sherwin Mendez DO as PCP - General (Family Medicine)  Keisha Servin PA-C as PCP - PCP-[facility]  RAJ Almeida PA-C Philip Slice, RN as Nurse Navigator (Orthopedics)     Review of Systems:     Review of Systems   Constitutional: Negative  HENT: Negative  Eyes: Negative  Respiratory: Negative  Cardiovascular: Negative  Gastrointestinal: Negative  Endocrine:        HPI   Genitourinary: Negative  Musculoskeletal: Negative  Skin: Negative  Allergic/Immunologic: Negative  Neurological: Negative  Hematological: Negative  Psychiatric/Behavioral: Negative           Problem List:     Patient Active Problem List   Diagnosis   • Allergic rhinitis   • Type 2 diabetes mellitus with microalbuminuria, without long-term current use of insulin (HCC)   • GERD without esophagitis   • Mixed hyperlipidemia   • Essential hypertension   • [facility] ([facility] )   • Osteoarthritis of right knee   • Healthcare maintenance   • Status post total hip replacement, right   • Osteopenia of multiple sites   • H/O ventral hernia repair   • S/P JORDEN-BSO (total abdominal hysterectomy and bilateral salpingo-oophorectomy)   • Microalbuminuria   • Screening for colon cancer      Past Medical and Surgical History:     Past Medical History:   Diagnosis Date   • Diabetes mellitus ([facility] [address] )     NIDDM   • Diverticulosis     hx colon resection • GERD (gastroesophageal reflux disease)    • Hyperlipidemia    • Hypertension    • Macular degeneration    • Malignant neoplasm of skin     skin cancer right upper arm in past   • OA (osteoarthritis)     right knee     Past Surgical History:   Procedure Laterality Date   • CATARACT EXTRACTION Bilateral    • CHOLANGIOGRAM N/A 07/02/2018    Procedure: CHOLANGIOGRAM;  Surgeon: Brayan Xavier MD;  [facility];  Service: General   • CHOLECYSTECTOMY LAPAROSCOPIC N/A 07/02/2018    Procedure: CHOLECYSTECTOMY LAPAROSCOPIC W/IOC;  Surgeon: Brayan Xavier MD;  [facility];  Service: General   • COLON SURGERY      resection large intestine (diverticulitis)   • COLONOSCOPY     • MA ARTHRP ACETBLR/PROX FEM PROSTC AGRFT/ALGRFT Right 12/12/2022    Procedure: ARTHROPLASTY HIP TOTAL ANTERIOR;  Surgeon: Phoebe Flood DO;  Location: [address] [place] [address] MAIN OR;  Service: Orthopedics   • MA ARTHRP KNE CONDYLE&PLATU MEDIAL&LAT COMPARTMENTS Right 03/19/2021    Procedure: ARTHROPLASTY KNEE TOTAL;  Surgeon: Tona Ludwig MD;  Location: [facility];  Service: Orthopedics   • MA LAP RPR HRNA XCPT INCAL/INGUN NCRC8/STRANGULATED N/A 05/30/2019    Procedure: REPAIR HERNIA INCISIONAL LAPAROSCOPIC W/ ROBOTICS WITH MESH PLACEMENT;  Surgeon: Brayan Xavier MD;  Location: [facility];  Service: General   • TONSILLECTOMY  child   • TOTAL ABDOMINAL HYSTERECTOMY W/ BILATERAL SALPINGOOPHORECTOMY Bilateral 04/20/2023    [facility]      Family History:     Family History   Problem Relation Age of Onset   • Alzheimer's disease Mother    • Anxiety disorder Mother    • Diabetes Mother    • Cancer Father    • Diabetes Sister    • Hypertension Sister    • Stroke Sister       Social History:     Social History     Socioeconomic History   • Marital status: /Civil Union     Spouse name: None   • Number of children: None   • Years of education: None   • Highest education level: None   Occupational History   • Occupation: employed Tobacco Use   • Smoking status: Never   • Smokeless tobacco: Never   Vaping Use   • Vaping Use: Never used   Substance and Sexual Activity   • Alcohol use: Never     Comment: beer/ liquor, once a month   • Drug use: Never   • Sexual activity: None   Other Topics Concern   • None   Social History Narrative   • None     Social Determinants of Health     Financial Resource Strain: Low Risk  (6/1/2023)    Overall Financial Resource Strain (CARDIA)    • Difficulty of Paying Living Expenses: Not hard at all   Food Insecurity: Not on file   Transportation Needs: No Transportation Needs (6/1/2023)    PRAPARE - Transportation    • Lack of Transportation (Medical): No    • Lack of Transportation (Non-Medical): No   Physical Activity: Not on file   Stress: Not on file   Social Connections: Not on file   Intimate Partner Violence: Not on file   Housing Stability: Not on file      Medications and Allergies:     Current Outpatient Medications   Medication Sig Dispense Refill   • aspirin 81 mg chewable tablet Chew 81 mg daily     • fexofenadine (ALLEGRA) 180 MG tablet Take 180 mg by mouth daily as needed      • fluticasone (FLONASE) 50 mcg/act nasal spray 1 spray into each nostril 2 (two) times a day as needed      • lisinopril (ZESTRIL) 10 mg tablet TAKE 1 TABLET DAILY 90 tablet 1   • metFORMIN (GLUCOPHAGE) 500 mg tablet TAKE 1 TABLET DAILY WITH   BREAKFAST 90 tablet 1   • Multiple Vitamins-Minerals (PRESERVISION AREDS PO) Take by mouth     • pantoprazole (PROTONIX) 20 mg tablet TAKE 1 TABLET DAILY 90 tablet 1   • pravastatin (PRAVACHOL) 20 mg tablet TAKE 1 TABLET DAILY 90 tablet 1     No current facility-administered medications for this visit       No Known Allergies   Immunizations:     Immunization History   Administered Date(s) Administered   • COVID-19 MODERNA VACC 0 5 ML IM 05/14/2021, 05/14/2021, 06/11/2021, 06/11/2021   • INFLUENZA 11/23/2015, 09/26/2016, 09/18/2018, 11/03/2020   • Influenza Quadrivalent Preservative Free 3 years and older IM 09/25/2014, 11/23/2015, 09/26/2016   • Influenza, recombinant, quadrivalent,injectable, preservative free 09/18/2018   • Influenza, seasonal, injectable 12/20/2011   • Pneumococcal Conjugate 13-Valent 03/02/2021, 03/02/2021   • Pneumococcal Polysaccharide PPV23 08/14/2017   • Zoster 06/30/2016      Health Maintenance:         Topic Date Due   • Breast Cancer Screening: Mammogram  08/18/2022   • Colorectal Cancer Screening  12/10/2028   • Hepatitis C Screening  Completed         Topic Date Due   • COVID-19 Vaccine (5 - Moderna series) 08/06/2021   • Pneumococcal Vaccine: 65+ Years (3 - PPSV23 if available, else PCV20) 08/14/2022   • Influenza Vaccine (Season Ended) 09/01/2023      Medicare Screening Tests and Risk Assessments:         Health Risk Assessment:   Patient rates overall health as excellent  Patient feels that their physical health rating is much better  Patient is satisfied with their life  Eyesight was rated as same  Hearing was rated as same  Patient feels that their emotional and mental health rating is same  Patients states they are never, rarely angry  Patient states they are sometimes unusually tired/fatigued  Pain experienced in the last 7 days has been some  Patient's pain rating has been 5/10  Patient states that she has experienced no weight loss or gain in last 6 months  Depression Screening:   PHQ-2 Score: 0      Fall Risk Screening: In the past year, patient has experienced: no history of falling in past year      Urinary Incontinence Screening:   Patient has not leaked urine accidently in the last six months  Home Safety:  Patient does not have trouble with stairs inside or outside of their home  Patient has working smoke alarms and has working carbon monoxide detector  Home safety hazards include: none  Nutrition:   Current diet is Regular  Medications:   Patient is currently taking over-the-counter supplements   OTC medications include: see medication list  Patient is able to manage medications  Activities of Daily Living (ADLs)/Instrumental Activities of Daily Living (IADLs):   Walk and transfer into and out of bed and chair?: Yes  Dress and groom yourself?: Yes    Bathe or shower yourself?: Yes    Feed yourself? Yes  Do your laundry/housekeeping?: Yes  Manage your money, pay your bills and track your expenses?: Yes  Make your own meals?: Yes    Do your own shopping?: Yes    Previous Hospitalizations:   Any hospitalizations or ED visits within the last 12 months?: No      Advance Care Planning:   Living will: Yes    Durable POA for healthcare: No    Advanced directive: Yes    Advanced directive counseling given: Yes    Five wishes given: No    Patient declined ACP directive: Yes    End of Life Decisions reviewed with patient: Yes    Provider agrees with end of life decisions: Yes      Cognitive Screening:   Provider or family/friend/caregiver concerned regarding cognition?: No    PREVENTIVE SCREENINGS      Cardiovascular Screening:    General: Screening Not Indicated and History Lipid Disorder      Diabetes Screening:     General: Screening Not Indicated and History Diabetes      Colorectal Cancer Screening:     General: Screening Current      Breast Cancer Screening:     General: Screening Current      Cervical Cancer Screening:    General: Screening Not Indicated      Osteoporosis Screening:    General: Screening Current      Abdominal Aortic Aneurysm (AAA) Screening:        General: Screening Not Indicated      Lung Cancer Screening:     General: Screening Not Indicated      Hepatitis C Screening:    General: Screening Current    Screening, Brief Intervention, and Referral to Treatment (SBIRT)    Screening  Typical number of drinks in a day: 0  Typical number of drinks in a week: 0  Interpretation: Low risk drinking behavior  No results found       Physical Exam:     /72 (BP Location: Right arm, Patient Position: Sitting, Cuff Size: "Standard)   Pulse 64   Resp 18   Ht 5' 7\" (1 702 m)   Wt 85 3 kg (188 lb)   SpO2 96%   BMI 29 44 kg/m²     Physical Exam  Vitals and nursing note reviewed  Constitutional:       Appearance: Normal appearance  HENT:      Head: Normocephalic and atraumatic  Mouth/Throat:      Mouth: Mucous membranes are moist    Eyes:      General: No scleral icterus  Neck:      Vascular: No carotid bruit  Cardiovascular:      Rate and Rhythm: Normal rate and regular rhythm  Pulses: Normal pulses  no weak pulses          Dorsalis pedis pulses are 2+ on the right side and 2+ on the left side  Posterior tibial pulses are 2+ on the right side and 2+ on the left side  Heart sounds: Normal heart sounds  Pulmonary:      Effort: Pulmonary effort is normal       Breath sounds: Normal breath sounds  Abdominal:      General: Bowel sounds are normal       Tenderness: There is no abdominal tenderness  Musculoskeletal:      Cervical back: Neck supple  Right lower leg: No edema  Left lower leg: No edema  Feet:    Feet:      Right foot:      Skin integrity: Dry skin present  No ulcer, skin breakdown, erythema, warmth or callus  Left foot:      Skin integrity: Callus and dry skin present  No ulcer, skin breakdown, erythema or warmth  Skin:     General: Skin is warm and dry  Neurological:      General: No focal deficit present  Mental Status: She is alert  Psychiatric:         Mood and Affect: Mood normal           Audi Long DO       Patient's shoes and socks removed  Right Foot/Ankle   Right Foot Inspection  Skin Exam: skin normal, skin intact and dry skin  No warmth, no callus, no erythema, no maceration, no abnormal color, no pre-ulcer, no ulcer and no callus  Toe Exam: ROM and strength within normal limits   No swelling, no tenderness, erythema and  no right toe deformity    Sensory   Vibration: intact  Proprioception: intact  Monofilament testing: " intact    Vascular  Capillary refills: < 3 seconds  The right DP pulse is 2+  The right PT pulse is 2+  Left Foot/Ankle  Left Foot Inspection  Skin Exam: skin normal, skin intact, dry skin and callus  No warmth, no erythema, no maceration, normal color, no pre-ulcer and no ulcer  Toe Exam: ROM and strength within normal limits  No swelling, no tenderness, no erythema and no left toe deformity  Sensory   Vibration: intact  Proprioception: intact  Monofilament testing: intact    Vascular  Capillary refills: < 3 seconds  The left DP pulse is 2+  The left PT pulse is 2+       Assign Risk Category  No deformity present  No loss of protective sensation  No weak pulses  Risk: 0

## 2023-06-02 ENCOUNTER — TELEPHONE (OUTPATIENT)
Dept: ADMINISTRATIVE | Facility: OTHER | Age: 68
End: 2023-06-02

## 2023-06-02 NOTE — TELEPHONE ENCOUNTER
Upon review of the In Basket request we were able to locate, review, and update the patient chart as requested for Mammogram     Any additional questions or concerns should be emailed to the Practice Liaisons via the appropriate education email address, please do not reply via In Basket      Thank you  Karolina Tsang MA

## 2023-06-02 NOTE — TELEPHONE ENCOUNTER
----- Message from Melanie Salazar sent at 6/1/2023  4:19 PM EDT -----  Regarding: care gap request  06/01/23 4:19 PM    Hello, our patient attached above has had Mammogram completed/performed  Please assist in updating the patient chart by pulling the Care Everywhere (CE) document   The date of service is 5/9/23    Thank you,  Jo Meneses  [facility] PRIMARY CARE

## 2023-07-12 ENCOUNTER — APPOINTMENT (OUTPATIENT)
Dept: LAB | Facility: CLINIC | Age: 68
End: 2023-07-12
Payer: MEDICARE

## 2023-07-12 ENCOUNTER — OFFICE VISIT (OUTPATIENT)
Dept: FAMILY MEDICINE CLINIC | Facility: CLINIC | Age: 68
End: 2023-07-12
Payer: MEDICARE

## 2023-07-12 VITALS
TEMPERATURE: 97.1 F | HEIGHT: 67 IN | SYSTOLIC BLOOD PRESSURE: 122 MMHG | WEIGHT: 189 LBS | BODY MASS INDEX: 29.66 KG/M2 | HEART RATE: 76 BPM | DIASTOLIC BLOOD PRESSURE: 70 MMHG

## 2023-07-12 DIAGNOSIS — R80.9 TYPE 2 DIABETES MELLITUS WITH MICROALBUMINURIA, WITHOUT LONG-TERM CURRENT USE OF INSULIN (HCC): ICD-10-CM

## 2023-07-12 DIAGNOSIS — E11.29 TYPE 2 DIABETES MELLITUS WITH MICROALBUMINURIA, WITHOUT LONG-TERM CURRENT USE OF INSULIN (HCC): ICD-10-CM

## 2023-07-12 DIAGNOSIS — L29.9 PRURITUS: ICD-10-CM

## 2023-07-12 DIAGNOSIS — L23.9 ALLERGIC DERMATITIS: Primary | ICD-10-CM

## 2023-07-12 DIAGNOSIS — I10 ESSENTIAL HYPERTENSION: ICD-10-CM

## 2023-07-12 DIAGNOSIS — J30.9 ALLERGIC RHINITIS, UNSPECIFIED SEASONALITY, UNSPECIFIED TRIGGER: ICD-10-CM

## 2023-07-12 DIAGNOSIS — L23.9 ALLERGIC DERMATITIS: ICD-10-CM

## 2023-07-12 LAB — B BURGDOR IGG+IGM SER QL IA: NEGATIVE

## 2023-07-12 PROCEDURE — 99214 OFFICE O/P EST MOD 30 MIN: CPT | Performed by: FAMILY MEDICINE

## 2023-07-12 PROCEDURE — 86618 LYME DISEASE ANTIBODY: CPT

## 2023-07-12 PROCEDURE — 36415 COLL VENOUS BLD VENIPUNCTURE: CPT

## 2023-07-12 RX ORDER — PREDNISONE 20 MG/1
TABLET ORAL
Qty: 20 TABLET | Refills: 0 | Status: SHIPPED | OUTPATIENT
Start: 2023-07-12 | End: 2023-07-22

## 2023-07-12 NOTE — PATIENT INSTRUCTIONS
Pleat Lyme test at lab today  Finish prednisone as follows, prednisone 20 mg tablets, 3 tablets once daily food for 3 days, 2 tablets once daily with food for 3 days, 1 tab daily food for 4 days  Continue Allegra daily  May use Benadryl at night for itching.   Do not drive or operate machinery this will cause drowsiness  May use Aveeno bath as needed  Return in 1 week if no improvement 2 weeks if not fully resolved

## 2023-07-12 NOTE — PROGRESS NOTES
Name: Eder Miller      : 1955      MRN: [id number]  Encounter Provider: Leti Yoon DO  Encounter Date: 2023   Encounter department: [facility] PRIMARY CARE    Assessment & Plan     1. Dermatitis favor allergic  2. Pruritus  Prednisone as ordered  Leg in the morning  Benadryl at night drowsiness discussed  May use Aveeno bath  We will check Lyme titer  3. Type 2 diabetes, prednisone can temporarily increase blood sugars  4. Pure allergic rhinitis, continue Allegra/Flonase  5. Hypertension, stable continue present therapy #6. Return next week if not improved and in 2 weeks if still with symptoms      1. Allergic dermatitis  -     Lyme Total AB W Reflex to IGM/IGG; Future; Expected date: 2023  -     predniSONE 20 mg tablet; Take 3 tablets daily for 3 days, 2 tablets daily for 3 days, 1 tablet daily for 4 days. Take with food    2. Pruritus  -     predniSONE 20 mg tablet; Take 3 tablets daily for 3 days, 2 tablets daily for 3 days, 1 tablet daily for 4 days. Take with food    3. Allergic rhinitis, unspecified seasonality, unspecified trigger  Assessment & Plan:  Continue with Allegra and Flonase      4. Type 2 diabetes mellitus with microalbuminuria, without long-term current use of insulin ([facility])  Assessment & Plan:  Prednisone can temporarily increase blood sugars  Lab Results   Component Value Date    HGBA1C 6.1 (H) 2023         5. Essential hypertension  Assessment & Plan:  Able continue present therapy             Subjective      The past week patient has had an itchy rash started left inner medial thigh and then went to right inner medial thigh and now right arm. Patient using hydrocortisone cream with very limited relief. Patient without any new medications or toiletries. No known bug bites or insect bites or tick bites    Review of Systems   Constitutional: Negative. HENT: Negative. Eyes: Negative. Respiratory: Negative.     Cardiovascular: Negative. Gastrointestinal: Negative. Endocrine: Negative. Genitourinary: Negative. Musculoskeletal: Negative. Skin:        HPI   Allergic/Immunologic: Negative. Neurological: Negative. Hematological: Negative. Psychiatric/Behavioral: Negative. Current Outpatient Medications on File Prior to Visit   Medication Sig   • aspirin 81 mg chewable tablet Chew 81 mg daily   • fexofenadine (ALLEGRA) 180 MG tablet Take 180 mg by mouth daily as needed    • fluticasone (FLONASE) 50 mcg/act nasal spray 1 spray into each nostril 2 (two) times a day as needed    • lisinopril (ZESTRIL) 10 mg tablet TAKE 1 TABLET DAILY   • metFORMIN (GLUCOPHAGE) 500 mg tablet TAKE 1 TABLET DAILY WITH   BREAKFAST   • Multiple Vitamins-Minerals (PRESERVISION AREDS PO) Take by mouth   • pantoprazole (PROTONIX) 20 mg tablet TAKE 1 TABLET DAILY   • pravastatin (PRAVACHOL) 20 mg tablet TAKE 1 TABLET DAILY       Objective     /70   Pulse 76   Temp (!) 97.1 °F (36.2 °C)   Ht 5' 7" (1.702 m)   Wt 85.7 kg (189 lb)   BMI 29.60 kg/m²     Physical Exam  Vitals and nursing note reviewed. Constitutional:       Appearance: Normal appearance. HENT:      Head: Normocephalic and atraumatic. Mouth/Throat:      Mouth: Mucous membranes are moist.   Cardiovascular:      Rate and Rhythm: Normal rate and regular rhythm. Heart sounds: Normal heart sounds. Pulmonary:      Effort: Pulmonary effort is normal.      Breath sounds: Normal breath sounds. Musculoskeletal:      Cervical back: No rigidity. Right lower leg: No edema. Left lower leg: No edema. Skin:     General: Skin is warm and dry. Findings: Rash present. Comments: Erythematous macular papular rash approximately 3 inch x 6 inch bilateral medial thighs and right arm. Negative ECM   Neurological:      General: No focal deficit present. Mental Status: She is alert.    Psychiatric:         Mood and Affect: Mood normal.       Mariella Van Ethan, DO

## 2023-07-12 NOTE — ASSESSMENT & PLAN NOTE
Prednisone can temporarily increase blood sugars  Lab Results   Component Value Date    HGBA1C 6.1 (H) 05/30/2023

## 2023-07-31 PROBLEM — Z12.11 SCREENING FOR COLON CANCER: Status: RESOLVED | Noted: 2023-06-01 | Resolved: 2023-07-31

## 2023-07-31 PROBLEM — Z00.00 HEALTHCARE MAINTENANCE: Status: RESOLVED | Noted: 2022-04-14 | Resolved: 2023-07-31

## 2023-09-12 ENCOUNTER — TELEPHONE (OUTPATIENT)
Age: 68
End: 2023-09-12

## 2023-09-12 LAB
LEFT EYE DIABETIC RETINOPATHY: NORMAL
RIGHT EYE DIABETIC RETINOPATHY: NORMAL

## 2023-09-12 NOTE — TELEPHONE ENCOUNTER
Caller: patient    Doctor: padma    Reason for call: patient has a dental appt on 9/26 and is calling for the antibiotic to be sent to the [facility] in [place]    Call back#: n/a

## 2023-09-13 DIAGNOSIS — Z96.641 HISTORY OF TOTAL HIP REPLACEMENT, RIGHT: Primary | ICD-10-CM

## 2023-09-13 RX ORDER — AMOXICILLIN 500 MG/1
2000 CAPSULE ORAL ONCE
Qty: 4 CAPSULE | Refills: 2 | Status: SHIPPED | OUTPATIENT
Start: 2023-09-13 | End: 2023-09-13

## 2023-09-18 DIAGNOSIS — E78.2 MIXED HYPERLIPIDEMIA: ICD-10-CM

## 2023-09-18 DIAGNOSIS — E11.9 TYPE 2 DIABETES MELLITUS WITHOUT COMPLICATION, WITHOUT LONG-TERM CURRENT USE OF INSULIN (HCC): ICD-10-CM

## 2023-09-18 DIAGNOSIS — I10 HYPERTENSION, UNSPECIFIED TYPE: ICD-10-CM

## 2023-09-18 DIAGNOSIS — K21.9 GASTROESOPHAGEAL REFLUX DISEASE WITHOUT ESOPHAGITIS: ICD-10-CM

## 2023-09-19 RX ORDER — PANTOPRAZOLE SODIUM 20 MG/1
TABLET, DELAYED RELEASE ORAL
Qty: 90 TABLET | Refills: 1 | Status: SHIPPED | OUTPATIENT
Start: 2023-09-19

## 2023-09-19 RX ORDER — LISINOPRIL 10 MG/1
TABLET ORAL
Qty: 90 TABLET | Refills: 1 | Status: SHIPPED | OUTPATIENT
Start: 2023-09-19

## 2023-09-19 RX ORDER — PRAVASTATIN SODIUM 20 MG
TABLET ORAL
Qty: 90 TABLET | Refills: 1 | Status: SHIPPED | OUTPATIENT
Start: 2023-09-19

## 2023-10-02 ENCOUNTER — OFFICE VISIT (OUTPATIENT)
Dept: FAMILY MEDICINE CLINIC | Facility: CLINIC | Age: 68
End: 2023-10-02
Payer: MEDICARE

## 2023-10-02 VITALS
OXYGEN SATURATION: 97 % | TEMPERATURE: 98.2 F | HEART RATE: 65 BPM | WEIGHT: 194 LBS | HEIGHT: 67 IN | SYSTOLIC BLOOD PRESSURE: 120 MMHG | DIASTOLIC BLOOD PRESSURE: 70 MMHG | BODY MASS INDEX: 30.45 KG/M2

## 2023-10-02 DIAGNOSIS — H10.33 ACUTE CONJUNCTIVITIS OF BOTH EYES, UNSPECIFIED ACUTE CONJUNCTIVITIS TYPE: ICD-10-CM

## 2023-10-02 DIAGNOSIS — J06.9 UPPER RESPIRATORY TRACT INFECTION, UNSPECIFIED TYPE: Primary | ICD-10-CM

## 2023-10-02 PROCEDURE — 99213 OFFICE O/P EST LOW 20 MIN: CPT | Performed by: FAMILY MEDICINE

## 2023-10-02 RX ORDER — TOBRAMYCIN 3 MG/ML
1 SOLUTION/ DROPS OPHTHALMIC
Qty: 5 ML | Refills: 1 | Status: SHIPPED | OUTPATIENT
Start: 2023-10-02

## 2023-10-02 RX ORDER — DEXTROMETHORPHAN HYDROBROMIDE AND PROMETHAZINE HYDROCHLORIDE 15; 6.25 MG/5ML; MG/5ML
5 SYRUP ORAL 4 TIMES DAILY PRN
Qty: 120 ML | Refills: 0 | Status: SHIPPED | OUTPATIENT
Start: 2023-10-02

## 2023-10-02 NOTE — PROGRESS NOTES
Name: Candie Smith      : 1955      MRN: [id number]  Encounter Provider: Kedar Parikh MD  Encounter Date: 10/2/2023   Encounter department: [facility]    Assessment & Plan     1. Upper respiratory tract infection, unspecified type  -     promethazine-dextromethorphan (PHENERGAN-DM) 6.25-15 mg/5 mL oral syrup; Take 5 mL by mouth 4 (four) times a day as needed for cough    2. Acute conjunctivitis of both eyes, unspecified acute conjunctivitis type  -     tobramycin (TOBREX) 0.3 % SOLN; Administer 1 drop to both eyes every 4 (four) hours while awake           Subjective      Here  For  Cough, occ productive, no ear  Pain , no sore throat, cough keeping up at night, used  Left  Over  Cough  Med, mild relief, also  Using allegra and  Flonase, some burning  r eye  With drainage    Review of Systems   Constitutional: Negative for activity change, appetite change and fatigue. HENT: Positive for congestion and postnasal drip. Negative for rhinorrhea, sinus pressure, sinus pain and sore throat. Eyes: Positive for discharge and redness. Respiratory: Positive for cough. Negative for wheezing. Cardiovascular: Negative for chest pain. Neurological: Negative for headaches.        Current Outpatient Medications on File Prior to Visit   Medication Sig   • aspirin 81 mg chewable tablet Chew 81 mg daily   • fexofenadine (ALLEGRA) 180 MG tablet Take 180 mg by mouth daily as needed    • fluticasone (FLONASE) 50 mcg/act nasal spray 1 spray into each nostril 2 (two) times a day as needed    • lisinopril (ZESTRIL) 10 mg tablet TAKE 1 TABLET DAILY   • metFORMIN (GLUCOPHAGE) 500 mg tablet TAKE 1 TABLET DAILY WITH   BREAKFAST   • Multiple Vitamins-Minerals (PRESERVISION AREDS PO) Take by mouth   • pantoprazole (PROTONIX) 20 mg tablet TAKE 1 TABLET DAILY   • pravastatin (PRAVACHOL) 20 mg tablet TAKE 1 TABLET DAILY       Objective     /70 (BP Location: Left arm, Patient Position: Sitting, Cuff Size: Standard)   Pulse 65   Temp 98.2 °F (36.8 °C) (Oral)   Ht 5' 7" (1.702 m)   Wt 88 kg (194 lb)   SpO2 97%   BMI 30.38 kg/m²     Physical Exam  Vitals reviewed. Constitutional:       Appearance: Normal appearance. She is obese. HENT:      Right Ear: Tympanic membrane normal.      Left Ear: Tympanic membrane normal.      Nose: No congestion or rhinorrhea. Eyes:      General:         Right eye: Discharge present. Left eye: Discharge present. Pulmonary:      Effort: Pulmonary effort is normal.      Breath sounds: Normal breath sounds. Neurological:      Mental Status: She is alert.        Vinay Reynolds MD

## 2023-11-13 ENCOUNTER — VBI (OUTPATIENT)
Dept: ADMINISTRATIVE | Facility: OTHER | Age: 68
End: 2023-11-13

## 2023-12-13 ENCOUNTER — APPOINTMENT (OUTPATIENT)
Dept: RADIOLOGY | Facility: MEDICAL CENTER | Age: 68
End: 2023-12-13
Payer: MEDICARE

## 2023-12-13 ENCOUNTER — OFFICE VISIT (OUTPATIENT)
Dept: OBGYN CLINIC | Facility: MEDICAL CENTER | Age: 68
End: 2023-12-13
Payer: MEDICARE

## 2023-12-13 ENCOUNTER — OFFICE VISIT (OUTPATIENT)
Dept: FAMILY MEDICINE CLINIC | Facility: CLINIC | Age: 68
End: 2023-12-13
Payer: MEDICARE

## 2023-12-13 VITALS
WEIGHT: 194.6 LBS | SYSTOLIC BLOOD PRESSURE: 151 MMHG | BODY MASS INDEX: 30.54 KG/M2 | DIASTOLIC BLOOD PRESSURE: 77 MMHG | HEART RATE: 89 BPM | HEIGHT: 67 IN

## 2023-12-13 VITALS
SYSTOLIC BLOOD PRESSURE: 112 MMHG | HEART RATE: 77 BPM | DIASTOLIC BLOOD PRESSURE: 74 MMHG | BODY MASS INDEX: 30.61 KG/M2 | HEIGHT: 67 IN | OXYGEN SATURATION: 96 % | WEIGHT: 195 LBS | TEMPERATURE: 97.6 F

## 2023-12-13 DIAGNOSIS — R09.81 HEAD CONGESTION: Primary | ICD-10-CM

## 2023-12-13 DIAGNOSIS — J01.00 ACUTE MAXILLARY SINUSITIS, RECURRENCE NOT SPECIFIED: ICD-10-CM

## 2023-12-13 DIAGNOSIS — Z96.641 STATUS POST TOTAL HIP REPLACEMENT, RIGHT: Primary | ICD-10-CM

## 2023-12-13 DIAGNOSIS — J18.9 ATYPICAL PNEUMONIA: ICD-10-CM

## 2023-12-13 DIAGNOSIS — Z96.641 STATUS POST TOTAL HIP REPLACEMENT, RIGHT: ICD-10-CM

## 2023-12-13 DIAGNOSIS — I10 ESSENTIAL HYPERTENSION: ICD-10-CM

## 2023-12-13 DIAGNOSIS — R05.3 CHRONIC COUGH: ICD-10-CM

## 2023-12-13 DIAGNOSIS — J30.9 ALLERGIC RHINITIS, UNSPECIFIED SEASONALITY, UNSPECIFIED TRIGGER: ICD-10-CM

## 2023-12-13 LAB
SARS-COV-2 AG UPPER RESP QL IA: NEGATIVE
VALID CONTROL: NORMAL

## 2023-12-13 PROCEDURE — 73502 X-RAY EXAM HIP UNI 2-3 VIEWS: CPT

## 2023-12-13 PROCEDURE — 99213 OFFICE O/P EST LOW 20 MIN: CPT | Performed by: ORTHOPAEDIC SURGERY

## 2023-12-13 PROCEDURE — 71046 X-RAY EXAM CHEST 2 VIEWS: CPT

## 2023-12-13 PROCEDURE — 99214 OFFICE O/P EST MOD 30 MIN: CPT | Performed by: FAMILY MEDICINE

## 2023-12-13 PROCEDURE — 87811 SARS-COV-2 COVID19 W/OPTIC: CPT | Performed by: FAMILY MEDICINE

## 2023-12-13 RX ORDER — BENZONATATE 200 MG/1
200 CAPSULE ORAL 3 TIMES DAILY PRN
Qty: 30 CAPSULE | Refills: 1 | Status: SHIPPED | OUTPATIENT
Start: 2023-12-13 | End: 2023-12-23

## 2023-12-13 RX ORDER — PREDNISONE 20 MG/1
TABLET ORAL
Qty: 20 TABLET | Refills: 0 | Status: SHIPPED | OUTPATIENT
Start: 2023-12-13 | End: 2023-12-23

## 2023-12-13 RX ORDER — CEPHALEXIN 500 MG/1
500 CAPSULE ORAL EVERY 8 HOURS SCHEDULED
Qty: 30 CAPSULE | Refills: 0 | Status: SHIPPED | OUTPATIENT
Start: 2023-12-13 | End: 2023-12-23

## 2023-12-13 NOTE — PROGRESS NOTES
Assessment/Plan     1. Status post total hip replacement, right      Orders Placed This Encounter   Procedures    XR hip/pelv 2-3 vws right if performed    Ambulatory Referral to Physical Therapy     Patient is 1 year s/p right anterior total hip arthroplasty,DOS 12/12/22   Will consider PT to work on confidence with stairs  Can call for abx prior to dental appt if needed      Return for appt in 2-3 years or sooner if needed. I answered all of the patient's questions during the visit and provided education of the patient's condition during the visit. The patient verbalized understanding of the information given and agrees with the plan. This note was dictated using [employer] software. It may contain errors including improperly dictated words. Please contact physician directly for any questions. Subjective   Chief Complaint:   Chief Complaint   Patient presents with    Right Hip - Follow-up       HPI:  Naida Leyva is a 79 y.o. female who presents for follow up for 1 year s/p right anterior total hip arthroplasty, DOS 12/12/22. She is doing well. She is happy with her hip. She denies any pain or discomfort. She feels her leg lengths are equal. She denies numbness or tingling. Review of Systems  See HPI for musculoskeletal review.    All other systems reviewed are negative     History:  Past Medical History:   Diagnosis Date    Diabetes mellitus ([address])     NIDDM    Diverticulosis     hx colon resection    GERD (gastroesophageal reflux disease)     Hyperlipidemia     Hypertension     Macular degeneration     Malignant neoplasm of skin     skin cancer right upper arm in past    OA (osteoarthritis)     right knee     Past Surgical History:   Procedure Laterality Date    CATARACT EXTRACTION Bilateral     CHOLANGIOGRAM N/A 07/02/2018    Procedure: CHOLANGIOGRAM;  Surgeon: Claudia Sheldon MD;  Location: [facility];  Service: General    CHOLECYSTECTOMY LAPAROSCOPIC N/A 07/02/2018    Procedure: CHOLECYSTECTOMY LAPAROSCOPIC W/IOC;  Surgeon: Michelle Parada MD;  Location: [facility];  Service: General    COLON SURGERY      resection large intestine (diverticulitis)    COLONOSCOPY      NV ARTHRP ACETBLR/PROX FEM PROSTC AGRFT/ALGRFT Right 12/12/2022    Procedure: ARTHROPLASTY HIP TOTAL ANTERIOR;  Surgeon: Beatrice Drummond DO;  Location: [address] [place] [address] MAIN OR;  Service: Orthopedics    NV ARTHRP KNE CONDYLE&PLATU MEDIAL&LAT COMPARTMENTS Right 03/19/2021    Procedure: ARTHROPLASTY KNEE TOTAL;  Surgeon: Billy Villanueva MD;  Location: [facility];  Service: Orthopedics    NV LAP RPR HRNA XCPT INCAL/INGUN NCRC8/STRANGULATED N/A 05/30/2019    Procedure: REPAIR HERNIA INCISIONAL LAPAROSCOPIC W/ ROBOTICS WITH MESH PLACEMENT;  Surgeon: Michelle Parada MD;  Location: [facility];  Service: General    TONSILLECTOMY  child    TOTAL ABDOMINAL HYSTERECTOMY W/ BILATERAL SALPINGOOPHORECTOMY Bilateral 04/20/2023    [facility]     Social History   Social History     Substance and Sexual Activity   Alcohol Use Never    Comment: beer/ liquor, once a month     Social History     Substance and Sexual Activity   Drug Use Never     Social History     Tobacco Use   Smoking Status Never   Smokeless Tobacco Never     Family History:   Family History   Problem Relation Age of Onset    Alzheimer's disease Mother     Anxiety disorder Mother     Diabetes Mother     Cancer Father     Diabetes Sister     Hypertension Sister     Stroke Sister        Current Outpatient Medications on File Prior to Visit   Medication Sig Dispense Refill    aspirin 81 mg chewable tablet Chew 81 mg daily      fexofenadine (ALLEGRA) 180 MG tablet Take 180 mg by mouth daily as needed       fluticasone (FLONASE) 50 mcg/act nasal spray 1 spray into each nostril 2 (two) times a day as needed       lisinopril (ZESTRIL) 10 mg tablet TAKE 1 TABLET DAILY 90 tablet 1    metFORMIN (GLUCOPHAGE) 500 mg tablet TAKE 1 TABLET DAILY WITH   BREAKFAST 90 tablet 1    Multiple Vitamins-Minerals (PRESERVISION AREDS PO) Take by mouth      pantoprazole (PROTONIX) 20 mg tablet TAKE 1 TABLET DAILY 90 tablet 1    pravastatin (PRAVACHOL) 20 mg tablet TAKE 1 TABLET DAILY 90 tablet 1    [DISCONTINUED] promethazine-dextromethorphan (PHENERGAN-DM) 6.25-15 mg/5 mL oral syrup Take 5 mL by mouth 4 (four) times a day as needed for cough 120 mL 0    [DISCONTINUED] tobramycin (TOBREX) 0.3 % SOLN Administer 1 drop to both eyes every 4 (four) hours while awake 5 mL 1     No current facility-administered medications on file prior to visit.      No Known Allergies     Objective     /77   Pulse 89   Ht 5' 7" (1.702 m)   Wt 88.3 kg (194 lb 9.6 oz)   BMI 30.48 kg/m²      PE:  AAOx 3  WDWN  Hearing intact, no drainage from eyes  no audible wheezing  no abdominal distension  LE compartments soft, skin intact    right hip:   No dislocation/deformity  ROM: no pain with ROM  Anterior scar, no erythema, no swelling    AT/GS intact    Imaging Studies: I have personally reviewed pertinent films in PACS  XR right hip:  s/p R NILTON, adequate alignment of implants, no acute changes      Scribe Attestation      I,:  Kira Wynn am acting as a scribe while in the presence of the attending physician.:       I,:  Otis Brooke DO personally performed the services described in this documentation    as scribed in my presence.:

## 2023-12-13 NOTE — PATIENT INSTRUCTIONS
Complete chest x-ray  Finish Keflex/cephalexin 500 mg 3 times daily  Use Tessalon as needed for cough  Finish prednisone 20 mg as follows, 3 tablets once daily food for 3 days, 2 tablets once daily food for 3 days, 1 tablet daily food for 4 days  Return in 1 week if no improvement in 2 weeks if not fully resolved

## 2023-12-13 NOTE — ASSESSMENT & PLAN NOTE
Lab Results   Component Value Date    HGBA1C 6.1 (H) 05/30/2023   Prednisone can temporarily increase blood sugars

## 2023-12-13 NOTE — PROGRESS NOTES
Name: Halle Small      : 1955      MRN: [id number]  Encounter Provider: Nino Lovell DO  Encounter Date: 2023   Encounter department: [place] [address]     1. Head congestion #2. Chronic cough  Rapid COVID-negative  Tessalon as ordered  3. Acute maxillary sinusitis #4. Atypical pneumonia  Chest x-ray is ordered  Keflex as ordered 5. Allergic rhinitis  Continue Allegra and Flonase  Tapering dose of prednisone as ordered  5. Hypertension, stable continue present therapy  6. Return in 1 week if still with symptoms      1. Head congestion  -     POCT Rapid Covid Ag  -     predniSONE 20 mg tablet; Take 3 tablets daily for 3 days, 2 tablets daily for 3 days, 1 tablet daily for 4 days. Take with food    2. Chronic cough  -     POCT Rapid Covid Ag  -     benzonatate (TESSALON) 200 MG capsule; Take 1 capsule (200 mg total) by mouth 3 (three) times a day as needed for cough for up to 10 days  -     predniSONE 20 mg tablet; Take 3 tablets daily for 3 days, 2 tablets daily for 3 days, 1 tablet daily for 4 days. Take with food  -     XR chest pa & lateral; Future; Expected date: 2023    3. Acute maxillary sinusitis, recurrence not specified  -     cephalexin (KEFLEX) 500 mg capsule; Take 1 capsule (500 mg total) by mouth every 8 (eight) hours for 10 days  -     predniSONE 20 mg tablet; Take 3 tablets daily for 3 days, 2 tablets daily for 3 days, 1 tablet daily for 4 days. Take with food    4. Atypical pneumonia  -     cephalexin (KEFLEX) 500 mg capsule; Take 1 capsule (500 mg total) by mouth every 8 (eight) hours for 10 days  -     predniSONE 20 mg tablet; Take 3 tablets daily for 3 days, 2 tablets daily for 3 days, 1 tablet daily for 4 days. Take with food  -     XR chest pa & lateral; Future; Expected date: 2023    5. Essential hypertension  Assessment & Plan:  Stable continue present therapy      6.  Allergic rhinitis, unspecified seasonality, unspecified trigger  Assessment & Plan:  Continue Allegra and Flonase daily    Orders:  -     predniSONE 20 mg tablet; Take 3 tablets daily for 3 days, 2 tablets daily for 3 days, 1 tablet daily for 4 days. Take with food      Depression Screening and Follow-up Plan: Patient was screened for depression during today's encounter. They screened negative with a PHQ-2 score of 0. Subjective      Patient has had a cough off and on since October over the past couple days she has had increasing sinus pain and pressure postnasal drip no fever chills chest pain shortness of breath patient did not check for COVID. Review of Systems   Constitutional:  Negative for chills and fever. HENT:          HPI   Eyes: Negative. Respiratory:          HPI   Cardiovascular:  Negative for chest pain. Gastrointestinal: Negative. Endocrine: Negative. Genitourinary: Negative. Musculoskeletal: Negative. Skin: Negative. Allergic/Immunologic: Positive for environmental allergies. Neurological: Negative. Hematological: Negative. Psychiatric/Behavioral: Negative.          Current Outpatient Medications on File Prior to Visit   Medication Sig   • aspirin 81 mg chewable tablet Chew 81 mg daily   • fexofenadine (ALLEGRA) 180 MG tablet Take 180 mg by mouth daily as needed    • fluticasone (FLONASE) 50 mcg/act nasal spray 1 spray into each nostril 2 (two) times a day as needed    • lisinopril (ZESTRIL) 10 mg tablet TAKE 1 TABLET DAILY   • metFORMIN (GLUCOPHAGE) 500 mg tablet TAKE 1 TABLET DAILY WITH   BREAKFAST   • Multiple Vitamins-Minerals (PRESERVISION AREDS PO) Take by mouth   • pantoprazole (PROTONIX) 20 mg tablet TAKE 1 TABLET DAILY   • pravastatin (PRAVACHOL) 20 mg tablet TAKE 1 TABLET DAILY   • [DISCONTINUED] promethazine-dextromethorphan (PHENERGAN-DM) 6.25-15 mg/5 mL oral syrup Take 5 mL by mouth 4 (four) times a day as needed for cough   • [DISCONTINUED] tobramycin (TOBREX) 0.3 % SOLN Administer 1 drop to both eyes every 4 (four) hours while awake       Objective     /74 (BP Location: Right arm, Patient Position: Sitting, Cuff Size: Standard)   Pulse 77   Temp 97.6 °F (36.4 °C) (Tympanic)   Ht 5' 7" (1.702 m)   Wt 88.5 kg (195 lb)   SpO2 96%   BMI 30.54 kg/m²     Physical Exam  Vitals and nursing note reviewed. Constitutional:       Appearance: Normal appearance. HENT:      Head: Normocephalic and atraumatic. Right Ear: Tympanic membrane normal.      Left Ear: Tympanic membrane normal.      Nose:      Comments: Positive allergic turbinate positive right more so than left maxillary sinus tenderness to percussion     Mouth/Throat:      Comments: Off-white postnasal drip minimal pharyngeal injection negative exudate  Eyes:      General: No scleral icterus. Neck:      Comments: Positive shotty cervical anterior adenopathy  Cardiovascular:      Rate and Rhythm: Normal rate and regular rhythm. Heart sounds: Normal heart sounds. Pulmonary:      Effort: Pulmonary effort is normal. No respiratory distress. Breath sounds: No stridor. Rhonchi present. No wheezing or rales. Comments: Right basilar rhonchi  Chest:      Chest wall: No tenderness. Musculoskeletal:      Cervical back: Neck supple. No tenderness. Right lower leg: No edema. Left lower leg: No edema. Lymphadenopathy:      Cervical: Cervical adenopathy present. Skin:     General: Skin is warm and dry. Neurological:      General: No focal deficit present. Mental Status: She is alert.    Psychiatric:         Mood and Affect: Mood normal.       Audi Long DO

## 2024-01-10 LAB
CREAT ?TM UR-SCNC: 153 UMOL/L
EXT ALBUMIN URINE RANDOM: 2.2
HBA1C MFR BLD HPLC: 7.8 %
MICROALBUMIN/CREAT UR: 14.4 MG/G{CREAT}

## 2024-01-12 ENCOUNTER — VBI (OUTPATIENT)
Dept: ADMINISTRATIVE | Facility: OTHER | Age: 69
End: 2024-01-12

## 2024-01-16 ENCOUNTER — OFFICE VISIT (OUTPATIENT)
Dept: FAMILY MEDICINE CLINIC | Facility: CLINIC | Age: 69
End: 2024-01-16
Payer: MEDICARE

## 2024-01-16 VITALS
OXYGEN SATURATION: 96 % | BODY MASS INDEX: 30.45 KG/M2 | HEIGHT: 67 IN | DIASTOLIC BLOOD PRESSURE: 70 MMHG | SYSTOLIC BLOOD PRESSURE: 118 MMHG | HEART RATE: 70 BPM | RESPIRATION RATE: 18 BRPM | WEIGHT: 194 LBS

## 2024-01-16 DIAGNOSIS — D03.9 MELANOMA IN SITU, UNSPECIFIED SITE (HCC): ICD-10-CM

## 2024-01-16 DIAGNOSIS — J30.9 ALLERGIC RHINITIS, UNSPECIFIED SEASONALITY, UNSPECIFIED TRIGGER: ICD-10-CM

## 2024-01-16 DIAGNOSIS — Z23 NEED FOR COVID-19 VACCINE: ICD-10-CM

## 2024-01-16 DIAGNOSIS — E78.2 MIXED HYPERLIPIDEMIA: ICD-10-CM

## 2024-01-16 DIAGNOSIS — K21.9 GERD WITHOUT ESOPHAGITIS: ICD-10-CM

## 2024-01-16 DIAGNOSIS — I10 ESSENTIAL HYPERTENSION: ICD-10-CM

## 2024-01-16 DIAGNOSIS — R80.9 TYPE 2 DIABETES MELLITUS WITH MICROALBUMINURIA, WITHOUT LONG-TERM CURRENT USE OF INSULIN: Primary | ICD-10-CM

## 2024-01-16 DIAGNOSIS — E11.29 TYPE 2 DIABETES MELLITUS WITH MICROALBUMINURIA, WITHOUT LONG-TERM CURRENT USE OF INSULIN: Primary | ICD-10-CM

## 2024-01-16 DIAGNOSIS — R80.9 MICROALBUMINURIA: ICD-10-CM

## 2024-01-16 DIAGNOSIS — Z23 NEEDS FLU SHOT: ICD-10-CM

## 2024-01-16 DIAGNOSIS — M17.11 PRIMARY OSTEOARTHRITIS OF RIGHT KNEE: ICD-10-CM

## 2024-01-16 DIAGNOSIS — Z00.00 HEALTHCARE MAINTENANCE: ICD-10-CM

## 2024-01-16 DIAGNOSIS — M85.89 OSTEOPENIA OF MULTIPLE SITES: ICD-10-CM

## 2024-01-16 DIAGNOSIS — E11.9 TYPE 2 DIABETES MELLITUS WITHOUT COMPLICATION, WITHOUT LONG-TERM CURRENT USE OF INSULIN (HCC): ICD-10-CM

## 2024-01-16 PROCEDURE — 99214 OFFICE O/P EST MOD 30 MIN: CPT | Performed by: FAMILY MEDICINE

## 2024-01-16 PROCEDURE — 91320 SARSCV2 VAC 30MCG TRS-SUC IM: CPT | Performed by: FAMILY MEDICINE

## 2024-01-16 PROCEDURE — 90480 ADMN SARSCOV2 VAC 1/ONLY CMP: CPT | Performed by: FAMILY MEDICINE

## 2024-01-16 PROCEDURE — G0008 ADMIN INFLUENZA VIRUS VAC: HCPCS | Performed by: FAMILY MEDICINE

## 2024-01-16 PROCEDURE — 90662 IIV NO PRSV INCREASED AG IM: CPT | Performed by: FAMILY MEDICINE

## 2024-01-16 NOTE — ASSESSMENT & PLAN NOTE
Lab Results   Component Value Date    HGBA1C 7.8 (H) 01/10/2024   Refuses home blood sugars will increase metformin from 500 daily to twice daily check hemoglobin A1c in 3 months

## 2024-01-16 NOTE — PATIENT INSTRUCTIONS
Increase metformin from once a day to twice a day with breakfast and supper  Completed hemoglobin A1c blood work in 3 months  Low sugar low carbohydrate low-fat diet recommended  Return in 6 months for office visit, blood work, and AWV

## 2024-01-16 NOTE — PROGRESS NOTES
Name: Shannon Singh      : 1955      MRN: [id number]  Encounter Provider: Audi Long DO  Encounter Date: 2024   Encounter department: [facility]    Assessment & Plan     1.  Type 2 diabetes with renal manifestation, not at goal hemoglobin A1c is risen from 6.1-7.8.  Will increase metformin from 500 daily to twice daily.  Patient refuses home blood glucose monitoring.  Will check hemoglobin A1c in 3 months.  Will obtain patient's diabetic eye exam from her ophthalmologist #2.  Microalbuminuria, stable continue lisinopril  3.  Hypertension, stable continue lisinopril  4.  Pure allergic rhinitis, stable continue present therapy  5.  DJD currently asymptomatic  6.  But osteopenia up-to-date with DEXA scan  7.  Healthcare maintenance, influenza and COVID vaccines given  8.  Melanoma in situ, follows with dermatology yearly basis  9.  Hyperlipidemia, stable continue present therapy #10.  Return in 6 months for office visit, blood work, and AWV        1. Type 2 diabetes mellitus with microalbuminuria, without long-term current use of insulin   Assessment & Plan:    Lab Results   Component Value Date    HGBA1C 7.8 (H) 01/10/2024   Refuses home blood sugars will increase metformin from 500 daily to twice daily check hemoglobin A1c in 3 months    Orders:  -     metFORMIN (GLUCOPHAGE) 500 mg tablet; Take 1 tablet (500 mg total) by mouth 2 (two) times a day with meals  -     Albumin / creatinine urine ratio; Future; Expected date: 2024  -     Comprehensive metabolic panel; Future; Expected date: 2024  -     Hemoglobin A1C; Future; Expected date: 2024  -     CBC and Platelet; Future; Expected date: 2024  -     Lipid Panel with Direct LDL reflex; Future; Expected date: 2024  -     TSH, 3rd generation with Free T4 reflex; Future; Expected date: 2024    2. Type 2 diabetes mellitus without complication, without long-term current use of insulin  (HCC)  -     Hemoglobin A1C; Future; Expected date: 04/15/2024    3. Essential hypertension  Assessment & Plan:  Stable continue lisinopril 10 mg daily    Orders:  -     Albumin / creatinine urine ratio; Future; Expected date: 07/14/2024  -     Comprehensive metabolic panel; Future; Expected date: 07/14/2024  -     Hemoglobin A1C; Future; Expected date: 07/14/2024  -     CBC and Platelet; Future; Expected date: 07/14/2024  -     Lipid Panel with Direct LDL reflex; Future; Expected date: 07/14/2024  -     TSH, 3rd generation with Free T4 reflex; Future; Expected date: 07/14/2024    4. Osteopenia of multiple sites  Assessment & Plan:  Up-to-date with DEXA    Orders:  -     Albumin / creatinine urine ratio; Future; Expected date: 07/14/2024  -     Comprehensive metabolic panel; Future; Expected date: 07/14/2024  -     Hemoglobin A1C; Future; Expected date: 07/14/2024  -     CBC and Platelet; Future; Expected date: 07/14/2024  -     Lipid Panel with Direct LDL reflex; Future; Expected date: 07/14/2024  -     TSH, 3rd generation with Free T4 reflex; Future; Expected date: 07/14/2024    5. Microalbuminuria  Assessment & Plan:  Controlled on ACE    Orders:  -     Albumin / creatinine urine ratio; Future; Expected date: 07/14/2024  -     Comprehensive metabolic panel; Future; Expected date: 07/14/2024  -     Hemoglobin A1C; Future; Expected date: 07/14/2024  -     CBC and Platelet; Future; Expected date: 07/14/2024  -     Lipid Panel with Direct LDL reflex; Future; Expected date: 07/14/2024  -     TSH, 3rd generation with Free T4 reflex; Future; Expected date: 07/14/2024    6. Mixed hyperlipidemia  Assessment & Plan:  Stable on pravastatin    Orders:  -     Albumin / creatinine urine ratio; Future; Expected date: 07/14/2024  -     Comprehensive metabolic panel; Future; Expected date: 07/14/2024  -     Hemoglobin A1C; Future; Expected date: 07/14/2024  -     CBC and Platelet; Future; Expected date: 07/14/2024  -     Lipid  Panel with Direct LDL reflex; Future; Expected date: 07/14/2024  -     TSH, 3rd generation with Free T4 reflex; Future; Expected date: 07/14/2024    7. Primary osteoarthritis of right knee  Assessment & Plan:  Asymptomatic at the present time    Orders:  -     Albumin / creatinine urine ratio; Future; Expected date: 07/14/2024  -     Comprehensive metabolic panel; Future; Expected date: 07/14/2024  -     Hemoglobin A1C; Future; Expected date: 07/14/2024  -     CBC and Platelet; Future; Expected date: 07/14/2024  -     Lipid Panel with Direct LDL reflex; Future; Expected date: 07/14/2024  -     TSH, 3rd generation with Free T4 reflex; Future; Expected date: 07/14/2024    8. GERD without esophagitis  Assessment & Plan:  Stable on Protonix    Orders:  -     Albumin / creatinine urine ratio; Future; Expected date: 07/14/2024  -     Comprehensive metabolic panel; Future; Expected date: 07/14/2024  -     Hemoglobin A1C; Future; Expected date: 07/14/2024  -     CBC and Platelet; Future; Expected date: 07/14/2024  -     Lipid Panel with Direct LDL reflex; Future; Expected date: 07/14/2024  -     TSH, 3rd generation with Free T4 reflex; Future; Expected date: 07/14/2024    9. Allergic rhinitis, unspecified seasonality, unspecified trigger  Assessment & Plan:  Stable on Allegra and Flonase    Orders:  -     Albumin / creatinine urine ratio; Future; Expected date: 07/14/2024  -     Comprehensive metabolic panel; Future; Expected date: 07/14/2024  -     Hemoglobin A1C; Future; Expected date: 07/14/2024  -     CBC and Platelet; Future; Expected date: 07/14/2024  -     Lipid Panel with Direct LDL reflex; Future; Expected date: 07/14/2024  -     TSH, 3rd generation with Free T4 reflex; Future; Expected date: 07/14/2024    10. Melanoma in situ, unspecified site (HCC)  Assessment & Plan:  Patient sees dermatology on a yearly basis    Orders:  -     Albumin / creatinine urine ratio; Future; Expected date: 07/14/2024  -      Comprehensive metabolic panel; Future; Expected date: 07/14/2024  -     Hemoglobin A1C; Future; Expected date: 07/14/2024  -     CBC and Platelet; Future; Expected date: 07/14/2024  -     Lipid Panel with Direct LDL reflex; Future; Expected date: 07/14/2024  -     TSH, 3rd generation with Free T4 reflex; Future; Expected date: 07/14/2024    11. Healthcare maintenance  Assessment & Plan:  Influenza and COVID vaccines given    Orders:  -     Albumin / creatinine urine ratio; Future; Expected date: 07/14/2024  -     Comprehensive metabolic panel; Future; Expected date: 07/14/2024  -     Hemoglobin A1C; Future; Expected date: 07/14/2024  -     CBC and Platelet; Future; Expected date: 07/14/2024  -     Lipid Panel with Direct LDL reflex; Future; Expected date: 07/14/2024  -     TSH, 3rd generation with Free T4 reflex; Future; Expected date: 07/14/2024    12. Need for COVID-19 vaccine  -     COVID-19 Pfizer mRNA vaccine 12 yr and older (GREY cap)    13. Needs flu shot  -     influenza vaccine, high-dose, PF 0.7 mL (FLUZONE HIGH-DOSE)        Depression Screening and Follow-up Plan: Patient was screened for depression during today's encounter. They screened negative with a PHQ-2 score of 0.        Subjective      Patient doing well without immediate complaints concerns at the present time.  Patient does admit to not watching her diet over the holidays.  Patient has refused home glucose monitoring.  Blood work was discussed with the patient      Review of Systems   Constitutional: Negative.    HENT: Negative.     Eyes: Negative.    Respiratory: Negative.     Cardiovascular: Negative.    Gastrointestinal: Negative.    Endocrine:        HPI   Genitourinary: Negative.    Musculoskeletal: Negative.    Skin: Negative.    Allergic/Immunologic: Negative.    Neurological: Negative.    Hematological: Negative.    Psychiatric/Behavioral: Negative.         Current Outpatient Medications on File Prior to Visit   Medication Sig   •  "aspirin 81 mg chewable tablet Chew 81 mg daily   • fexofenadine (ALLEGRA) 180 MG tablet Take 180 mg by mouth daily as needed    • fluticasone (FLONASE) 50 mcg/act nasal spray 1 spray into each nostril 2 (two) times a day as needed    • lisinopril (ZESTRIL) 10 mg tablet TAKE 1 TABLET DAILY   • Multiple Vitamins-Minerals (PRESERVISION AREDS PO) Take by mouth   • pantoprazole (PROTONIX) 20 mg tablet TAKE 1 TABLET DAILY   • pravastatin (PRAVACHOL) 20 mg tablet TAKE 1 TABLET DAILY   • [DISCONTINUED] metFORMIN (GLUCOPHAGE) 500 mg tablet TAKE 1 TABLET DAILY WITH   BREAKFAST       Objective     /70 (BP Location: Right arm, Patient Position: Sitting, Cuff Size: Large)   Pulse 70   Resp 18   Ht 5' 7\" (1.702 m)   Wt 88 kg (194 lb)   SpO2 96%   BMI 30.38 kg/m²     Physical Exam  Vitals and nursing note reviewed.   Constitutional:       Appearance: Normal appearance.   HENT:      Head: Normocephalic and atraumatic.      Right Ear: Tympanic membrane normal.      Left Ear: Tympanic membrane normal.      Nose: Nose normal.      Mouth/Throat:      Mouth: Mucous membranes are moist.      Pharynx: Oropharynx is clear. No oropharyngeal exudate or posterior oropharyngeal erythema.   Eyes:      General: No scleral icterus.  Neck:      Vascular: No carotid bruit.   Cardiovascular:      Rate and Rhythm: Normal rate and regular rhythm.      Heart sounds: Normal heart sounds.   Pulmonary:      Effort: Pulmonary effort is normal.      Breath sounds: Normal breath sounds.   Abdominal:      General: Bowel sounds are normal.      Palpations: Abdomen is soft.      Tenderness: There is no abdominal tenderness.   Musculoskeletal:      Cervical back: Neck supple.      Right lower leg: No edema.      Left lower leg: No edema.   Skin:     General: Skin is warm and dry.   Neurological:      General: No focal deficit present.      Mental Status: She is alert.   Psychiatric:         Mood and Affect: Mood normal.       Audi Long, " DO

## 2024-02-21 PROBLEM — Z00.00 HEALTHCARE MAINTENANCE: Status: RESOLVED | Noted: 2022-04-14 | Resolved: 2024-02-21

## 2024-03-05 NOTE — NURSING NOTE
IV removed prior to discharge  Pt verbalized understanding understanding of avs  Pt provided with rx for lab work and [facility] [place]   Pt left via wheelchair to family car  No

## 2024-03-15 DIAGNOSIS — I10 HYPERTENSION, UNSPECIFIED TYPE: ICD-10-CM

## 2024-03-15 DIAGNOSIS — E78.2 MIXED HYPERLIPIDEMIA: ICD-10-CM

## 2024-03-15 DIAGNOSIS — K21.9 GASTROESOPHAGEAL REFLUX DISEASE WITHOUT ESOPHAGITIS: ICD-10-CM

## 2024-03-15 RX ORDER — PANTOPRAZOLE SODIUM 20 MG/1
TABLET, DELAYED RELEASE ORAL
Qty: 90 TABLET | Refills: 1 | Status: SHIPPED | OUTPATIENT
Start: 2024-03-15

## 2024-03-15 RX ORDER — PRAVASTATIN SODIUM 20 MG
TABLET ORAL
Qty: 90 TABLET | Refills: 1 | Status: SHIPPED | OUTPATIENT
Start: 2024-03-15

## 2024-03-15 RX ORDER — LISINOPRIL 10 MG/1
TABLET ORAL
Qty: 90 TABLET | Refills: 1 | Status: SHIPPED | OUTPATIENT
Start: 2024-03-15

## 2024-03-19 ENCOUNTER — HOSPITAL ENCOUNTER (OUTPATIENT)
Dept: ULTRASOUND IMAGING | Facility: HOSPITAL | Age: 69
Discharge: HOME | End: 2024-03-19
Payer: MEDICARE

## 2024-03-19 DIAGNOSIS — D48.5 NEOPLASM OF UNCERTAIN BEHAVIOR OF SKIN: ICD-10-CM

## 2024-03-19 DIAGNOSIS — Z85.820 PERSONAL HISTORY OF MALIGNANT MELANOMA: ICD-10-CM

## 2024-03-19 PROCEDURE — 76536 US EXAM OF HEAD AND NECK: CPT

## 2024-03-26 LAB
LEFT EYE DIABETIC RETINOPATHY: NORMAL
RIGHT EYE DIABETIC RETINOPATHY: NORMAL

## 2024-04-19 LAB
EST. AVERAGE GLUCOSE BLD GHB EST-MCNC: 146 MG/DL
HBA1C MFR BLD: 6.7 %

## 2024-07-19 LAB
ALBUMIN SERPL-MCNC: 4.3 G/DL (ref 3.5–5.7)
ALBUMIN/CREAT UR: 6.5
ALP SERPL-CCNC: 57 U/L (ref 35–120)
ALT SERPL-CCNC: 18 U/L
ANION GAP SERPL CALCULATED.3IONS-SCNC: 10 MMOL/L (ref 3–11)
AST SERPL-CCNC: 16 U/L
BILIRUB SERPL-MCNC: 1.1 MG/DL (ref 0.2–1)
BUN SERPL-MCNC: 18 MG/DL (ref 7–25)
CALCIUM SERPL-MCNC: 9.7 MG/DL (ref 8.5–10.1)
CHLORIDE SERPL-SCNC: 101 MMOL/L (ref 100–109)
CHOLEST SERPL-MCNC: 181 MG/DL
CHOLEST/HDLC SERPL: 4.9 {RATIO}
CO2 SERPL-SCNC: 26 MMOL/L (ref 21–31)
CREAT SERPL-MCNC: 0.65 MG/DL (ref 0.4–1.1)
CREAT UR-MCNC: 183.8 MG/DL (ref 50–200)
CYTOLOGY CMNT CVX/VAG CYTO-IMP: ABNORMAL
EST. AVERAGE GLUCOSE BLD GHB EST-MCNC: 143 MG/DL
GFR/BSA.PRED SERPLBLD CYS-BASED-ARV: 95 ML/MIN/{1.73_M2}
GLUCOSE SERPL-MCNC: 127 MG/DL (ref 65–99)
HBA1C MFR BLD: 6.6 %
HDLC SERPL-MCNC: 37 MG/DL (ref 23–92)
LDLC SERPL CALC-MCNC: 65 MG/DL
MICROALBUMIN UR-MCNC: 1.2 MG/DL
NONHDLC SERPL-MCNC: 144 MG/DL
POTASSIUM SERPL-SCNC: 4.4 MMOL/L (ref 3.5–5.2)
PROT SERPL-MCNC: 6.5 G/DL (ref 6.3–8.3)
SODIUM SERPL-SCNC: 137 MMOL/L (ref 135–145)
TRIGL SERPL-MCNC: 394 MG/DL
TSH SERPL-ACNC: 5.02 UIU/ML (ref 0.45–5.33)

## 2024-07-24 ENCOUNTER — OFFICE VISIT (OUTPATIENT)
Dept: FAMILY MEDICINE CLINIC | Facility: CLINIC | Age: 69
End: 2024-07-24
Payer: MEDICARE

## 2024-07-24 VITALS
OXYGEN SATURATION: 97 % | HEART RATE: 68 BPM | HEIGHT: 67 IN | WEIGHT: 191 LBS | BODY MASS INDEX: 29.98 KG/M2 | SYSTOLIC BLOOD PRESSURE: 120 MMHG | DIASTOLIC BLOOD PRESSURE: 82 MMHG

## 2024-07-24 DIAGNOSIS — M89.9 BONE DISORDER: ICD-10-CM

## 2024-07-24 DIAGNOSIS — I10 ESSENTIAL HYPERTENSION: ICD-10-CM

## 2024-07-24 DIAGNOSIS — K21.9 GERD WITHOUT ESOPHAGITIS: ICD-10-CM

## 2024-07-24 DIAGNOSIS — K63.5 POLYP OF COLON, UNSPECIFIED PART OF COLON, UNSPECIFIED TYPE: ICD-10-CM

## 2024-07-24 DIAGNOSIS — M85.89 OSTEOPENIA OF MULTIPLE SITES: ICD-10-CM

## 2024-07-24 DIAGNOSIS — Z12.31 SCREENING MAMMOGRAM FOR BREAST CANCER: ICD-10-CM

## 2024-07-24 DIAGNOSIS — R19.4 CHANGE IN STOOL HABITS: ICD-10-CM

## 2024-07-24 DIAGNOSIS — R80.9 TYPE 2 DIABETES MELLITUS WITH MICROALBUMINURIA, WITHOUT LONG-TERM CURRENT USE OF INSULIN (HCC): Primary | ICD-10-CM

## 2024-07-24 DIAGNOSIS — R80.9 MICROALBUMINURIA: ICD-10-CM

## 2024-07-24 DIAGNOSIS — E78.2 MIXED HYPERLIPIDEMIA: ICD-10-CM

## 2024-07-24 DIAGNOSIS — E11.29 TYPE 2 DIABETES MELLITUS WITH MICROALBUMINURIA, WITHOUT LONG-TERM CURRENT USE OF INSULIN (HCC): Primary | ICD-10-CM

## 2024-07-24 DIAGNOSIS — F41.9 ANXIETY: ICD-10-CM

## 2024-07-24 DIAGNOSIS — D03.9 MELANOMA IN SITU, UNSPECIFIED SITE (HCC): ICD-10-CM

## 2024-07-24 DIAGNOSIS — Z00.00 HEALTHCARE MAINTENANCE: ICD-10-CM

## 2024-07-24 PROBLEM — Z98.890 H/O VENTRAL HERNIA REPAIR: Status: RESOLVED | Noted: 2023-03-28 | Resolved: 2024-07-24

## 2024-07-24 PROBLEM — Z87.19 H/O VENTRAL HERNIA REPAIR: Status: RESOLVED | Noted: 2023-03-28 | Resolved: 2024-07-24

## 2024-07-24 PROCEDURE — G0439 PPPS, SUBSEQ VISIT: HCPCS | Performed by: FAMILY MEDICINE

## 2024-07-24 PROCEDURE — 99214 OFFICE O/P EST MOD 30 MIN: CPT | Performed by: FAMILY MEDICINE

## 2024-07-24 RX ORDER — ROSUVASTATIN CALCIUM 20 MG/1
20 TABLET, COATED ORAL DAILY
Qty: 100 TABLET | Refills: 3 | Status: SHIPPED | OUTPATIENT
Start: 2024-07-24

## 2024-07-24 RX ORDER — BUSPIRONE HYDROCHLORIDE 5 MG/1
5 TABLET ORAL 2 TIMES DAILY
Qty: 60 TABLET | Refills: 5 | Status: SHIPPED | OUTPATIENT
Start: 2024-07-24

## 2024-07-24 NOTE — ASSESSMENT & PLAN NOTE
Lab Results   Component Value Date    HGBA1C 6.6 (H) 07/19/2024   Continues to refuse home blood sugars hemoglobin A1c shows well-controlled diabetes, foot exam was completed

## 2024-07-24 NOTE — PROGRESS NOTES
Ambulatory Visit  Name: Shannon Singh      : 1955      MRN: [id number]  Encounter Provider: Audi Long DO  Encounter Date: 2024   Encounter department: [facility]IMARY [facility]ARE    1.  Type 2 diabetes with renal manifestations, stable on metformin foot exam was completed  2.  Microalbuminuria, stable  3.  Hypertension, stable continue present therapy  4.  GERD, stable continue present therapy  5.  Melanoma in situ follows with dermatology #6.  Hyperlipidemia triglycerides almost 400 discontinue pravastatin 20 changed to rosuvastatin 20 mg daily  7.  Osteopenia up-to-date with DEXA  8.  Anxiety, BuSpar 5 mg twice daily was ordered patient to return in 3 to 4 weeks if still with symptoms and benign.  Colon polyp  10.  Change in bowel movement  Refer back to patient's colorectal specialist, Dr. Dodson  11.  Healthcare maintenance Medicare AWV completed #12.  Return in 6 months for office visit and blood work sooner if needed      Assessment & Plan   1. Type 2 diabetes mellitus with microalbuminuria, without long-term current use of insulin (HCC)  Assessment & Plan:    Lab Results   Component Value Date    HGBA1C 6.6 (H) 2024   Continues to refuse home blood sugars hemoglobin A1c shows well-controlled diabetes, foot exam was completed  Orders:  -     rosuvastatin (CRESTOR) 20 MG tablet; Take 1 tablet (20 mg total) by mouth daily  2. Screening mammogram for breast cancer  -     Mammo screening bilateral w 3d & cad; Future  3. Essential hypertension  Assessment & Plan:  Able continue present therapy  4. GERD without esophagitis  Assessment & Plan:  Stable on Protonix  5. Melanoma in situ, unspecified site (HCC)  Assessment & Plan:  Patient sees dermatology on yearly basis  6. Microalbuminuria  Assessment & Plan:  Stable on ACE  7. Mixed hyperlipidemia  Assessment & Plan:  Triglycerides almost 400 LDLs are at goal will change from pravastatin 20 to rosuvastatin 20  Orders:  -      rosuvastatin (CRESTOR) 20 MG tablet; Take 1 tablet (20 mg total) by mouth daily  8. Osteopenia of multiple sites  Assessment & Plan:  Up-to-date with DEXA scan  9. Anxiety  Assessment & Plan:  BuSpar 5 mg twice daily was started return in few weeks if still with symptoms  Orders:  -     busPIRone (BUSPAR) 5 mg tablet; Take 1 tablet (5 mg total) by mouth 2 (two) times a day  10. Polyp of colon, unspecified part of colon, unspecified type  Assessment & Plan:  Refer back to colorectal specialist today  Orders:  -     Ambulatory Referral to Colorectal Surgery; Future  11. Change in stool habits  -     Ambulatory Referral to Colorectal Surgery; Future  12. Healthcare maintenance      Depression Screening and Follow-up Plan: Patient was screened for depression during today's encounter. They screened negative with a PHQ-2 score of 0.      Preventive health issues were discussed with patient, and age appropriate screening tests were ordered as noted in patient's After Visit Summary. Personalized health advice and appropriate referrals for health education or preventive services given if needed, as noted in patient's After Visit Summary.    History of Present Illness     Patient has refused home blood sugar monitoring.  Diabetes is in good control.  Patient is complaining of some anxiety.  Patient also states she has a change in stool habits she does not have bowel movements for 3 to 4 days then a day of multiple bowel movements and this has been for the past few months.  Patient did have a colonoscopy 2018 and did have a polyp has not seen colorectal specialist since       Patient Care Team:  Audi Long DO as PCP - General (Family Medicine)  Esmer Javier PA-C as PCP - PCP-[facility]  RAJ Aldridge PA-C    Review of Systems   Constitutional: Negative.    HENT: Negative.     Eyes: Negative.    Respiratory: Negative.     Cardiovascular: Negative.     Gastrointestinal:         HPI   Endocrine:        HPI   Genitourinary: Negative.    Musculoskeletal: Negative.    Skin: Negative.    Allergic/Immunologic: Negative.    Neurological: Negative.    Hematological: Negative.    Psychiatric/Behavioral:          HPI     Medical History Reviewed by provider this encounter:  Tobacco  Allergies  Meds  Problems  Med Hx  Surg Hx  Fam Hx       Annual Wellness Visit Questionnaire   Shannon is here for her Subsequent Wellness visit.     Health Risk Assessment:   Patient rates overall health as very good. Patient feels that their physical health rating is slightly better. Patient is satisfied with their life. Eyesight was rated as same. Hearing was rated as same. Patient feels that their emotional and mental health rating is same. Patients states they are never, rarely angry. Patient states they are never, rarely unusually tired/fatigued. Pain experienced in the last 7 days has been none. Patient states that she has experienced no weight loss or gain in last 6 months.     Depression Screening:   PHQ-2 Score: 0      Fall Risk Screening:   In the past year, patient has experienced: no history of falling in past year      Urinary Incontinence Screening:   Patient has not leaked urine accidently in the last six months.     Home Safety:  Patient has trouble with stairs inside or outside of their home. Patient has working smoke alarms and has working carbon monoxide detector. Home safety hazards include: none.     Nutrition:   Current diet is Regular.     Medications:   Patient is currently taking over-the-counter supplements. OTC medications include: see medication list. Patient is able to manage medications.     Activities of Daily Living (ADLs)/Instrumental Activities of Daily Living (IADLs):   Walk and transfer into and out of bed and chair?: Yes  Dress and groom yourself?: Yes    Bathe or shower yourself?: Yes    Feed yourself? Yes  Do your laundry/housekeeping?:  Yes  Manage your money, pay your bills and track your expenses?: Yes  Make your own meals?: Yes    Do your own shopping?: Yes    Previous Hospitalizations:   Any hospitalizations or ED visits within the last 12 months?: No    How many hospitalizations have you had in the last year?: 1-2    Advance Care Planning:   Living will: Yes    Durable POA for healthcare: No    Advanced directive: Yes    Advanced directive counseling given: Yes    Five wishes given: No    Patient declined ACP directive: No    End of Life Decisions reviewed with patient: Yes    Provider agrees with end of life decisions: Yes      Cognitive Screening:   Provider or family/friend/caregiver concerned regarding cognition?: No    PREVENTIVE SCREENINGS      Cardiovascular Screening:    General: Screening Not Indicated and History Lipid Disorder      Diabetes Screening:     General: Screening Not Indicated and History Diabetes      Colorectal Cancer Screening:     General: Screening Current      Breast Cancer Screening:     General: Screening Current      Cervical Cancer Screening:    General: Screening Not Indicated      Osteoporosis Screening:    General: Screening Current      Abdominal Aortic Aneurysm (AAA) Screening:        General: Screening Not Indicated      Lung Cancer Screening:     General: Screening Not Indicated      Hepatitis C Screening:    General: Screening Current    Screening, Brief Intervention, and Referral to Treatment (SBIRT)    Screening  Typical number of drinks in a day: 0  Typical number of drinks in a week: 0  Interpretation: Low risk drinking behavior.    Single Item Drug Screening:  How often have you used an illegal drug (including marijuana) or a prescription medication for non-medical reasons in the past year? never    Single Item Drug Screen Score: 0  Interpretation: Negative screen for possible drug use disorder  Diabetic Foot Exam    Patient's shoes and socks removed.    Right Foot/Ankle   Right Foot  "Inspection  Skin Exam: skin normal and skin intact. No dry skin, no warmth, no callus, no erythema, no maceration, no abnormal color, no pre-ulcer, no ulcer and no callus.     Toe Exam: ROM and strength within normal limits.     Sensory   Vibration: intact  Proprioception: intact  Monofilament testing: intact    Vascular  Capillary refills: < 3 seconds  The right DP pulse is 2+. The right PT pulse is 2+.     Left Foot/Ankle  Left Foot Inspection  Skin Exam: skin normal and skin intact. No dry skin, no warmth, no erythema, no maceration, normal color, no pre-ulcer, no ulcer and no callus.     Toe Exam: ROM and strength within normal limits.     Sensory   Vibration: intact  Proprioception: intact  Monofilament testing: intact    Vascular  Capillary refills: < 3 seconds  The left DP pulse is 2+. The left PT pulse is 2+.     Assign Risk Category  No deformity present  No loss of protective sensation  No weak pulses  Risk: 0    Social Determinants of Health     Financial Resource Strain: Low Risk  (6/1/2023)    Overall Financial Resource Strain (CARDIA)    • Difficulty of Paying Living Expenses: Not hard at all   Food Insecurity: No Food Insecurity (7/24/2024)    Hunger Vital Sign    • Worried About Running Out of Food in the Last Year: Never true    • Ran Out of Food in the Last Year: Never true   Transportation Needs: No Transportation Needs (7/24/2024)    PRAPARE - Transportation    • Lack of Transportation (Medical): No    • Lack of Transportation (Non-Medical): No   Housing Stability: Low Risk  (7/24/2024)    Housing Stability Vital Sign    • Unable to Pay for Housing in the Last Year: No    • Number of Times Moved in the Last Year: 0    • Homeless in the Last Year: No   Utilities: Not At Risk (7/24/2024)    [facility] Utilities    • Threatened with loss of utilities: No     No results found.    Objective     /82 (BP Location: Left arm, Patient Position: Sitting, Cuff Size: Standard)   Pulse 68   Ht 5' 7\" (1.702 m) "   Wt 86.6 kg (191 lb)   SpO2 97%   BMI 29.91 kg/m²         Physical Exam  Cardiovascular:      Pulses: no weak pulses.           Dorsalis pedis pulses are 2+ on the right side and 2+ on the left side.        Posterior tibial pulses are 2+ on the right side and 2+ on the left side.   Feet:      Right foot:      Skin integrity: No ulcer, skin breakdown, erythema, warmth, callus or dry skin.      Left foot:      Skin integrity: No ulcer, skin breakdown, erythema, warmth, callus or dry skin.

## 2024-07-24 NOTE — PATIENT INSTRUCTIONS
Start buspirone 5 mg twice daily for anxiety  Follow with colorectal specialist,  Acknowledging, for evaluation of your change in bowel habits as well as history of colon polyp  I recommend checking blood sugars at home if you change your mind please let me know I can arrange this  Discontinue pravastatin 20 mg daily  Start rosuvastatin 20 mg daily I have sent this prescription to your mail order  Return in 6 months for office visit and blood work sooner if needed

## 2024-08-17 DIAGNOSIS — I10 HYPERTENSION, UNSPECIFIED TYPE: ICD-10-CM

## 2024-08-17 DIAGNOSIS — K21.9 GASTROESOPHAGEAL REFLUX DISEASE WITHOUT ESOPHAGITIS: ICD-10-CM

## 2024-08-17 RX ORDER — LISINOPRIL 10 MG/1
TABLET ORAL
Qty: 90 TABLET | Refills: 1 | Status: SHIPPED | OUTPATIENT
Start: 2024-08-17

## 2024-08-17 RX ORDER — PANTOPRAZOLE SODIUM 20 MG/1
TABLET, DELAYED RELEASE ORAL
Qty: 90 TABLET | Refills: 1 | Status: SHIPPED | OUTPATIENT
Start: 2024-08-17

## 2024-10-02 DIAGNOSIS — K63.5 POLYP OF COLON, UNSPECIFIED PART OF COLON, UNSPECIFIED TYPE: Primary | ICD-10-CM

## 2024-10-14 ENCOUNTER — TELEPHONE (OUTPATIENT)
Age: 69
End: 2024-10-14

## 2024-10-14 DIAGNOSIS — Z96.641 HISTORY OF TOTAL HIP REPLACEMENT, RIGHT: Primary | ICD-10-CM

## 2024-10-14 RX ORDER — AMOXICILLIN 500 MG/1
2000 CAPSULE ORAL ONCE
Qty: 4 CAPSULE | Refills: 2 | Status: SHIPPED | OUTPATIENT
Start: 2024-10-14 | End: 2024-10-14

## 2024-10-14 NOTE — TELEPHONE ENCOUNTER
Ok great thanks Pablo. Since patient is a few years out from surgery, she can decide what she would like to do at this point. The research literature does not have one single rec either. The safest thing to protect her hardware is to take the antibiotic for life. She can decide moving forward. Thanks!

## 2024-10-14 NOTE — TELEPHONE ENCOUNTER
Caller: patient     Doctor: padma     Reason for call: Patient had a hip replacement done in 2022 and she would like to know if she still needs pre med for the dentist     Call back#: [phone]

## 2024-10-14 NOTE — TELEPHONE ENCOUNTER
Called pt and informed her that prophylactic antibiotic still recommended for pt and that script was sent to pharmacy, per provider. Pt did ask how long  the antibiotics would be needed for her post operatively and I was unaware of the surgeons recommendations with regards to the medication. Informed her that we would reach out when we knew the recommendation.

## 2024-10-14 NOTE — TELEPHONE ENCOUNTER
Carlos, would you please call patient and let her know I sent a script for amoxicillin to take one hour prior to dental appt. Thanks!

## 2025-01-27 ENCOUNTER — RESULTS FOLLOW-UP (OUTPATIENT)
Dept: FAMILY MEDICINE CLINIC | Facility: CLINIC | Age: 70
End: 2025-01-27

## 2025-01-27 ENCOUNTER — TELEPHONE (OUTPATIENT)
Age: 70
End: 2025-01-27

## 2025-01-27 ENCOUNTER — OFFICE VISIT (OUTPATIENT)
Dept: FAMILY MEDICINE CLINIC | Facility: CLINIC | Age: 70
End: 2025-01-27
Payer: COMMERCIAL

## 2025-01-27 ENCOUNTER — HOSPITAL ENCOUNTER (OUTPATIENT)
Dept: ULTRASOUND IMAGING | Facility: HOSPITAL | Age: 70
Discharge: HOME | End: 2025-01-27
Payer: COMMERCIAL

## 2025-01-27 VITALS
WEIGHT: 189 LBS | HEART RATE: 64 BPM | DIASTOLIC BLOOD PRESSURE: 64 MMHG | SYSTOLIC BLOOD PRESSURE: 128 MMHG | OXYGEN SATURATION: 97 % | BODY MASS INDEX: 29.66 KG/M2 | HEIGHT: 67 IN

## 2025-01-27 DIAGNOSIS — R30.0 DYSURIA: ICD-10-CM

## 2025-01-27 DIAGNOSIS — N20.0 MULTIPLE RENAL CALCULI: Primary | ICD-10-CM

## 2025-01-27 DIAGNOSIS — R30.0 DYSURIA: Primary | ICD-10-CM

## 2025-01-27 DIAGNOSIS — R31.9 HEMATURIA, UNSPECIFIED TYPE: ICD-10-CM

## 2025-01-27 DIAGNOSIS — N39.0 URINARY TRACT INFECTION WITHOUT HEMATURIA, SITE UNSPECIFIED: ICD-10-CM

## 2025-01-27 DIAGNOSIS — E11.29 TYPE 2 DIABETES MELLITUS WITH MICROALBUMINURIA, WITHOUT LONG-TERM CURRENT USE OF INSULIN (HCC): ICD-10-CM

## 2025-01-27 DIAGNOSIS — I10 ESSENTIAL HYPERTENSION: ICD-10-CM

## 2025-01-27 DIAGNOSIS — R80.9 TYPE 2 DIABETES MELLITUS WITH MICROALBUMINURIA, WITHOUT LONG-TERM CURRENT USE OF INSULIN (HCC): ICD-10-CM

## 2025-01-27 DIAGNOSIS — D03.9 MELANOMA IN SITU, UNSPECIFIED SITE (HCC): ICD-10-CM

## 2025-01-27 LAB
SL AMB  POCT GLUCOSE, UA: NEGATIVE
SL AMB LEUKOCYTE ESTERASE,UA: ABNORMAL
SL AMB POCT BILIRUBIN,UA: NEGATIVE
SL AMB POCT BLOOD,UA: ABNORMAL
SL AMB POCT CLARITY,UA: CLEAR
SL AMB POCT COLOR,UA: YELLOW
SL AMB POCT KETONES,UA: NEGATIVE
SL AMB POCT NITRITE,UA: NEGATIVE
SL AMB POCT PH,UA: 7.5
SL AMB POCT SPECIFIC GRAVITY,UA: 1.01
SL AMB POCT URINE PROTEIN: NEGATIVE
SL AMB POCT UROBILINOGEN: 0.2

## 2025-01-27 PROCEDURE — 87186 SC STD MICRODIL/AGAR DIL: CPT | Performed by: FAMILY MEDICINE

## 2025-01-27 PROCEDURE — 76775 US EXAM ABDO BACK WALL LIM: CPT

## 2025-01-27 PROCEDURE — 99214 OFFICE O/P EST MOD 30 MIN: CPT | Performed by: FAMILY MEDICINE

## 2025-01-27 PROCEDURE — 87077 CULTURE AEROBIC IDENTIFY: CPT | Performed by: FAMILY MEDICINE

## 2025-01-27 PROCEDURE — G2211 COMPLEX E/M VISIT ADD ON: HCPCS | Performed by: FAMILY MEDICINE

## 2025-01-27 PROCEDURE — 81002 URINALYSIS NONAUTO W/O SCOPE: CPT | Performed by: FAMILY MEDICINE

## 2025-01-27 PROCEDURE — 87086 URINE CULTURE/COLONY COUNT: CPT | Performed by: FAMILY MEDICINE

## 2025-01-27 RX ORDER — PHENAZOPYRIDINE HYDROCHLORIDE 200 MG/1
200 TABLET, FILM COATED ORAL
Qty: 9 TABLET | Refills: 0 | Status: SHIPPED | OUTPATIENT
Start: 2025-01-27 | End: 2025-01-30

## 2025-01-27 NOTE — PROGRESS NOTES
Name: Shannon Singh      : 1955      MRN: [id number]  Encounter Provider: Audi Long DO  Encounter Date: 2025   Encounter department: [facility] PRIMARY [facility]ARE  1.  Dysuria  UA done in office  Pyridium ordered urinary discoloration discussed  2.  UTI  Culture sent  Cephalexin ordered #3.  Hematuria  Check renal ultrasound  Repeat UA with culture and sensitivity 2 weeks  4.  Hypertension, stable continue present therapy #5.  Melanoma follows with dermatology annually [facility] #6 type 2 diabetes, no urine glucose seen as blood work already ordered #7 return at scheduled appointment sooner if needed  :  Assessment & Plan  Dysuria  UA was dip culture was sent  Pyridium was ordered urinary discoloration discussed  Orders:  •  POCT urine dip  •  US kidney and bladder; Future  •  phenazopyridine (PYRIDIUM) 200 mg tablet; Take 1 tablet (200 mg total) by mouth 3 (three) times a day with meals for 3 days  •  UA (URINE) with reflex to Scope; Future  •  Urine culture; Future    Urinary tract infection without hematuria, site unspecified  Flex ordered  Culture sent  Orders:  •  Urine culture; Future  •  cephalexin (KEFLEX) 250 mg capsule; Take 1 capsule (250 mg total) by mouth every 8 (eight) hours for 5 days  •  UA (URINE) with reflex to Scope; Future  •  Urine culture; Future    Hematuria, unspecified type  Check ultrasound kidney and bladder  Orders:  •  US kidney and bladder; Future  •  UA (URINE) with reflex to Scope; Future  •  Urine culture; Future    Essential hypertension  Stable continue present therapy       Melanoma in situ, unspecified site (HCC)  Follows with dermatology on a yearly basis       Type 2 diabetes mellitus with microalbuminuria, without long-term current use of insulin (HCC)    Lab Results   Component Value Date    HGBA1C 6.6 (H) 2024   Urine shows no glucose patient has upcoming appointment for blood work              History of Present Illness   For the past 3 days  "patient is having some dysuria denies gross hematuria.  Some pelvic discomfort with urination.  Negative fever chills acute abdominal pain or back pain.  No medication has been taken      Review of Systems   Constitutional:         HPI   HENT: Negative.     Eyes: Negative.    Respiratory: Negative.     Cardiovascular: Negative.    Gastrointestinal:  Negative for abdominal pain.   Endocrine: Negative.    Genitourinary:         HPI   Musculoskeletal:  Negative for back pain.   Skin: Negative.    Allergic/Immunologic: Negative.    Neurological: Negative.    Hematological: Negative.    Psychiatric/Behavioral: Negative.         Objective   /64 (BP Location: Right arm, Patient Position: Sitting, Cuff Size: Large)   Pulse 64   Ht 5' 7\" (1.702 m)   Wt 85.7 kg (189 lb)   SpO2 97%   BMI 29.60 kg/m²      Physical Exam  Vitals and nursing note reviewed.   Constitutional:       General: She is not in acute distress.     Appearance: Normal appearance. She is not ill-appearing, toxic-appearing or diaphoretic.   HENT:      Head: Normocephalic and atraumatic.      Mouth/Throat:      Mouth: Mucous membranes are moist.   Eyes:      General: No scleral icterus.  Cardiovascular:      Rate and Rhythm: Normal rate and regular rhythm.      Heart sounds: Normal heart sounds.   Pulmonary:      Effort: Pulmonary effort is normal.      Breath sounds: Normal breath sounds.   Abdominal:      General: Bowel sounds are normal. There is no distension.      Palpations: Abdomen is soft. There is no mass.      Tenderness: There is no abdominal tenderness. There is no right CVA tenderness, left CVA tenderness, guarding or rebound.      Hernia: No hernia is present.   Musculoskeletal:      Cervical back: Neck supple. No rigidity.      Right lower leg: No edema.      Left lower leg: No edema.   Skin:     General: Skin is warm and dry.   Neurological:      General: No focal deficit present.      Mental Status: She is alert.   Psychiatric:       "   Mood and Affect: Mood normal.

## 2025-01-27 NOTE — ASSESSMENT & PLAN NOTE
Lab Results   Component Value Date    HGBA1C 6.6 (H) 07/19/2024   Urine shows no glucose patient has upcoming appointment for blood work

## 2025-01-27 NOTE — TELEPHONE ENCOUNTER
New Patient    What is the reason for the patient’s appointment?:patient called stating she was referred to see urology for kidney stones.  She stated she had u/s done today and her pcp referred her . It shows non obstructing kidney stones on both kidneys.  Measuring 4mm and 6 mm.     What office location does the patient prefer?:[place]     Does patient have Imaging/Lab Results:    Have patient records been requested?:  If No, are the records showing in Epic:       INSURANCE:   Do we accept the patient's insurance or is the patient Self-Pay?:    Insurance Provider:medicare / [employer]  Plan Type/Number:   Member ID#:       HISTORY:   Has the patient had any previous Urologist(s)?no :    Was the patient seen in the ED?:    Has the patient had any outside testing done?:    Does the patient have a personal history of cancer?:

## 2025-01-27 NOTE — PATIENT INSTRUCTIONS
Remain well-hydrated drinking 10 to 12 glasses of water or liquid daily  Complete ultrasound of kidneys for possible stone  Finish cephalexin to 50 mg 3 times a day for 5 days  Use Pyridium 3 times a day for next 3 days for bladder spasms and discomfort.  This will turn your urine orange is red  Return at scheduled appointment sooner if needed

## 2025-01-30 LAB
25(OH)D3+25(OH)D2 SERPL-MCNC: 41 NG/ML (ref 30–100)
ALBUMIN SERPL-MCNC: 4.6 G/DL (ref 3.5–5.7)
ALBUMIN/CREAT UR: 7.7
ALP SERPL-CCNC: 58 U/L (ref 35–120)
ALT SERPL-CCNC: 14 U/L
ANION GAP SERPL CALCULATED.3IONS-SCNC: 9 MMOL/L (ref 3–11)
AST SERPL-CCNC: 17 U/L
BACTERIA UR CULT: ABNORMAL
BACTERIA URNS QL MICRO: ABNORMAL
BILIRUB SERPL-MCNC: 1.5 MG/DL (ref 0.2–1)
BUN SERPL-MCNC: 17 MG/DL (ref 7–25)
CALCIUM SERPL-MCNC: 10 MG/DL (ref 8.5–10.5)
CAOX CRY #/AREA URNS HPF: PRESENT /[HPF]
CHLORIDE SERPL-SCNC: 101 MMOL/L (ref 100–109)
CHOLEST SERPL-MCNC: 119 MG/DL
CHOLEST/HDLC SERPL: 3 {RATIO}
CO2 SERPL-SCNC: 29 MMOL/L (ref 21–31)
CREAT SERPL-MCNC: 0.83 MG/DL (ref 0.4–1.1)
CREAT UR-MCNC: 156 MG/DL (ref 50–200)
CYTOLOGY CMNT CVX/VAG CYTO-IMP: ABNORMAL
ERYTHROCYTE [DISTWIDTH] IN BLOOD BY AUTOMATED COUNT: 13.4 % (ref 12–16)
EST. AVERAGE GLUCOSE BLD GHB EST-MCNC: 157 MG/DL
GFR/BSA.PRED SERPLBLD CYS-BASED-ARV: 76 ML/MIN/{1.73_M2}
GLUCOSE SERPL-MCNC: 150 MG/DL (ref 65–99)
GLUCOSE UR QL STRIP: NEGATIVE MG/DL
HBA1C MFR BLD: 7.1 %
HCT VFR BLD AUTO: 40.7 % (ref 35–43)
HDLC SERPL-MCNC: 40 MG/DL (ref 23–92)
HGB BLD-MCNC: 13.8 G/DL (ref 11.5–14.5)
HGB UR QL STRIP: NEGATIVE MG/DL
HYALINE CASTS URNS QL MICRO: ABNORMAL /LPF (ref 0–2)
KETONES UR QL STRIP: NEGATIVE MG/DL
LDLC SERPL CALC-MCNC: 18 MG/DL
LEUKOCYTE ESTERASE UR QL STRIP: 25 /UL
MCH RBC QN AUTO: 29.5 PG (ref 26–34)
MCHC RBC AUTO-ENTMCNC: 33.8 G/DL (ref 32–37)
MCV RBC AUTO: 87 FL (ref 80–100)
MICROALBUMIN UR-MCNC: 1.2 MG/DL
MUCOUS THREADS URNS QL MICRO: ABNORMAL
NITRITE UR QL STRIP: POSITIVE
NONHDLC SERPL-MCNC: 79 MG/DL
PH UR: 6 [PH] (ref 4.5–8)
PLATELET # BLD AUTO: 235 THOU/CMM (ref 140–350)
PMV BLD REES-ECKER: 8.8 FL (ref 7.5–11.3)
POTASSIUM SERPL-SCNC: 4.8 MMOL/L (ref 3.5–5.2)
PROT 24H UR-MRATE: NEGATIVE MG/DL
PROT SERPL-MCNC: 7 G/DL (ref 6.3–8.3)
RBC # BLD AUTO: 4.66 MILL/CMM (ref 3.7–4.7)
RBC #/AREA URNS HPF: ABNORMAL /HPF (ref 0–2)
RENAL EPI CELLS #/AREA URNS HPF: ABNORMAL /HPF (ref 0–1)
SL AMB POCT URINE COMMENT: ABNORMAL
SODIUM SERPL-SCNC: 139 MMOL/L (ref 135–145)
SP GR UR: 1.01 (ref 1–1.03)
SQUAMOUS #/AREA URNS HPF: >10 /LPF (ref 0–5)
TRIGL SERPL-MCNC: 303 MG/DL
TSH SERPL-ACNC: 2.81 UIU/ML (ref 0.45–5.33)
WBC # BLD AUTO: 5.7 THOU/CMM (ref 4–10)
WBC #/AREA URNS HPF: ABNORMAL /HPF (ref 0–5)

## 2025-01-31 LAB — LEGIONELLA SPEC CULT: NORMAL

## 2025-02-04 ENCOUNTER — OFFICE VISIT (OUTPATIENT)
Dept: FAMILY MEDICINE CLINIC | Facility: CLINIC | Age: 70
End: 2025-02-04
Payer: COMMERCIAL

## 2025-02-04 VITALS
WEIGHT: 190.2 LBS | HEIGHT: 67 IN | OXYGEN SATURATION: 96 % | HEART RATE: 65 BPM | DIASTOLIC BLOOD PRESSURE: 76 MMHG | BODY MASS INDEX: 29.85 KG/M2 | SYSTOLIC BLOOD PRESSURE: 124 MMHG

## 2025-02-04 DIAGNOSIS — K63.5 POLYP OF COLON, UNSPECIFIED PART OF COLON, UNSPECIFIED TYPE: ICD-10-CM

## 2025-02-04 DIAGNOSIS — M17.11 PRIMARY OSTEOARTHRITIS OF RIGHT KNEE: ICD-10-CM

## 2025-02-04 DIAGNOSIS — R80.9 TYPE 2 DIABETES MELLITUS WITH MICROALBUMINURIA, WITHOUT LONG-TERM CURRENT USE OF INSULIN (HCC): ICD-10-CM

## 2025-02-04 DIAGNOSIS — E11.29 TYPE 2 DIABETES MELLITUS WITH MICROALBUMINURIA, WITHOUT LONG-TERM CURRENT USE OF INSULIN (HCC): ICD-10-CM

## 2025-02-04 DIAGNOSIS — Z96.641 STATUS POST TOTAL HIP REPLACEMENT, RIGHT: ICD-10-CM

## 2025-02-04 DIAGNOSIS — E78.2 MIXED HYPERLIPIDEMIA: ICD-10-CM

## 2025-02-04 DIAGNOSIS — K21.9 GERD WITHOUT ESOPHAGITIS: ICD-10-CM

## 2025-02-04 DIAGNOSIS — F41.9 ANXIETY: ICD-10-CM

## 2025-02-04 DIAGNOSIS — D03.9 MELANOMA IN SITU, UNSPECIFIED SITE (HCC): ICD-10-CM

## 2025-02-04 DIAGNOSIS — M85.89 OSTEOPENIA OF MULTIPLE SITES: ICD-10-CM

## 2025-02-04 DIAGNOSIS — R80.9 MICROALBUMINURIA: ICD-10-CM

## 2025-02-04 DIAGNOSIS — E11.65 UNCONTROLLED TYPE 2 DIABETES MELLITUS WITH HYPERGLYCEMIA (HCC): ICD-10-CM

## 2025-02-04 DIAGNOSIS — I10 ESSENTIAL HYPERTENSION: Primary | ICD-10-CM

## 2025-02-04 PROCEDURE — G2211 COMPLEX E/M VISIT ADD ON: HCPCS | Performed by: FAMILY MEDICINE

## 2025-02-04 PROCEDURE — 99214 OFFICE O/P EST MOD 30 MIN: CPT | Performed by: FAMILY MEDICINE

## 2025-02-04 RX ORDER — BUSPIRONE HYDROCHLORIDE 5 MG/1
5 TABLET ORAL 2 TIMES DAILY
Qty: 180 TABLET | Refills: 3 | Status: SHIPPED | OUTPATIENT
Start: 2025-02-04

## 2025-02-04 NOTE — ASSESSMENT & PLAN NOTE
Stable on no medication at present  Orders:  •  Albumin / creatinine urine ratio; Future  •  Comprehensive metabolic panel; Future  •  Hemoglobin A1C; Future  •  CBC and Platelet; Future  •  Lipid Panel with Direct LDL reflex; Future  •  TSH, 3rd generation with Free T4 reflex; Future

## 2025-02-04 NOTE — ASSESSMENT & PLAN NOTE
Stable on Protonix  Orders:  •  Albumin / creatinine urine ratio; Future  •  Comprehensive metabolic panel; Future  •  Hemoglobin A1C; Future  •  CBC and Platelet; Future  •  Lipid Panel with Direct LDL reflex; Future  •  TSH, 3rd generation with Free T4 reflex; Future

## 2025-02-04 NOTE — ASSESSMENT & PLAN NOTE
Stable follows with dermatology  Orders:  •  Albumin / creatinine urine ratio; Future  •  Comprehensive metabolic panel; Future  •  Hemoglobin A1C; Future  •  CBC and Platelet; Future  •  Lipid Panel with Direct LDL reflex; Future  •  TSH, 3rd generation with Free T4 reflex; Future

## 2025-02-04 NOTE — ASSESSMENT & PLAN NOTE
Able continue Crestor 20 mg daily  Orders:  •  Albumin / creatinine urine ratio; Future  •  Comprehensive metabolic panel; Future  •  Hemoglobin A1C; Future  •  CBC and Platelet; Future  •  Lipid Panel with Direct LDL reflex; Future  •  TSH, 3rd generation with Free T4 reflex; Future

## 2025-02-04 NOTE — ASSESSMENT & PLAN NOTE
Stable follows with orthopedics  Orders:  •  Albumin / creatinine urine ratio; Future  •  Comprehensive metabolic panel; Future  •  Hemoglobin A1C; Future  •  CBC and Platelet; Future  •  Lipid Panel with Direct LDL reflex; Future  •  TSH, 3rd generation with Free T4 reflex; Future

## 2025-02-04 NOTE — ASSESSMENT & PLAN NOTE
Lab Results   Component Value Date    HGBA1C 7.1 (H) 01/30/2025   Currently controlled with hemoglobin A1c 7.1 will increase metformin from 500 to thousand twice a day.  Patient adamantly refused to check blood sugars at home.  Will check hemoglobin A1c in 3 months  Orders:  •  metFORMIN (GLUCOPHAGE) 1000 MG tablet; Take 1 tablet (1,000 mg total) by mouth 2 (two) times a day with meals  •  Albumin / creatinine urine ratio; Future  •  Comprehensive metabolic panel; Future  •  Hemoglobin A1C; Future  •  CBC and Platelet; Future  •  Lipid Panel with Direct LDL reflex; Future  •  TSH, 3rd generation with Free T4 reflex; Future

## 2025-02-04 NOTE — LETTER
Date: 2/4/2025    To whom it may concern:     This is to certify that Shannon Singh has been under my care for the following diagnosis: Osteoarthritis, hip replacement, melanoma, uncontrolled type 2 diabetes, and anxiety        I feel that Shannon Singh is unable to serve on Jury Duty at this time for the above mentioned medical reasons.                  Sincerely,  Audi Long, DO

## 2025-02-04 NOTE — ASSESSMENT & PLAN NOTE
DEXA scan ordered  Orders:  •  DXA bone density spine hip and pelvis; Future  •  Albumin / creatinine urine ratio; Future  •  Comprehensive metabolic panel; Future  •  Hemoglobin A1C; Future  •  CBC and Platelet; Future  •  Lipid Panel with Direct LDL reflex; Future  •  TSH, 3rd generation with Free T4 reflex; Future

## 2025-02-04 NOTE — ASSESSMENT & PLAN NOTE
Continue present therapy  Orders:  •  Albumin / creatinine urine ratio; Future  •  Comprehensive metabolic panel; Future  •  Hemoglobin A1C; Future  •  CBC and Platelet; Future  •  Lipid Panel with Direct LDL reflex; Future  •  TSH, 3rd generation with Free T4 reflex; Future

## 2025-02-04 NOTE — ASSESSMENT & PLAN NOTE
Stable, BuSpar reordered  Orders:  •  busPIRone (BUSPAR) 5 mg tablet; Take 1 tablet (5 mg total) by mouth 2 (two) times a day  •  Albumin / creatinine urine ratio; Future  •  Comprehensive metabolic panel; Future  •  Hemoglobin A1C; Future  •  CBC and Platelet; Future  •  Lipid Panel with Direct LDL reflex; Future  •  TSH, 3rd generation with Free T4 reflex; Future

## 2025-02-04 NOTE — PROGRESS NOTES
Name: Shannon Singh      : 1955      MRN: [id number]  Encounter Provider: Audi Long DO  Encounter Date: 2025   Encounter department: [facility] PRIMARY CARE  Metformin increased to thousand twice a day  Check hemoglobin A1c 3 months  Return in 6 months for office visit blood work and AWV  :  Assessment & Plan  Anxiety  Stable, BuSpar reordered  Orders:  •  busPIRone (BUSPAR) 5 mg tablet; Take 1 tablet (5 mg total) by mouth 2 (two) times a day  •  Albumin / creatinine urine ratio; Future  •  Comprehensive metabolic panel; Future  •  Hemoglobin A1C; Future  •  CBC and Platelet; Future  •  Lipid Panel with Direct LDL reflex; Future  •  TSH, 3rd generation with Free T4 reflex; Future    Essential hypertension  Continue present therapy  Orders:  •  Albumin / creatinine urine ratio; Future  •  Comprehensive metabolic panel; Future  •  Hemoglobin A1C; Future  •  CBC and Platelet; Future  •  Lipid Panel with Direct LDL reflex; Future  •  TSH, 3rd generation with Free T4 reflex; Future    Polyp of colon, unspecified part of colon, unspecified type  Up-to-date with colonoscopy  Orders:  •  Albumin / creatinine urine ratio; Future  •  Comprehensive metabolic panel; Future  •  Hemoglobin A1C; Future  •  CBC and Platelet; Future  •  Lipid Panel with Direct LDL reflex; Future  •  TSH, 3rd generation with Free T4 reflex; Future    GERD without esophagitis  Stable on Protonix  Orders:  •  Albumin / creatinine urine ratio; Future  •  Comprehensive metabolic panel; Future  •  Hemoglobin A1C; Future  •  CBC and Platelet; Future  •  Lipid Panel with Direct LDL reflex; Future  •  TSH, 3rd generation with Free T4 reflex; Future    Type 2 diabetes mellitus with microalbuminuria, without long-term current use of insulin ([facility])    Lab Results   Component Value Date    HGBA1C 7.1 (H) 2025   Currently controlled with hemoglobin A1c 7.1 will increase metformin from 500 to thousand twice a day.  Patient  adamantly refused to check blood sugars at home.  Will check hemoglobin A1c in 3 months  Orders:  •  metFORMIN (GLUCOPHAGE) 1000 MG tablet; Take 1 tablet (1,000 mg total) by mouth 2 (two) times a day with meals  •  Albumin / creatinine urine ratio; Future  •  Comprehensive metabolic panel; Future  •  Hemoglobin A1C; Future  •  CBC and Platelet; Future  •  Lipid Panel with Direct LDL reflex; Future  •  TSH, 3rd generation with Free T4 reflex; Future    Primary osteoarthritis of right knee  Stable on no medication at present  Orders:  •  Albumin / creatinine urine ratio; Future  •  Comprehensive metabolic panel; Future  •  Hemoglobin A1C; Future  •  CBC and Platelet; Future  •  Lipid Panel with Direct LDL reflex; Future  •  TSH, 3rd generation with Free T4 reflex; Future    Osteopenia of multiple sites  DEXA scan ordered  Orders:  •  DXA bone density spine hip and pelvis; Future  •  Albumin / creatinine urine ratio; Future  •  Comprehensive metabolic panel; Future  •  Hemoglobin A1C; Future  •  CBC and Platelet; Future  •  Lipid Panel with Direct LDL reflex; Future  •  TSH, 3rd generation with Free T4 reflex; Future    Status post total hip replacement, right  Stable follows with orthopedics  Orders:  •  Albumin / creatinine urine ratio; Future  •  Comprehensive metabolic panel; Future  •  Hemoglobin A1C; Future  •  CBC and Platelet; Future  •  Lipid Panel with Direct LDL reflex; Future  •  TSH, 3rd generation with Free T4 reflex; Future    Melanoma in situ, unspecified site (HCC)  Stable follows with dermatology  Orders:  •  Albumin / creatinine urine ratio; Future  •  Comprehensive metabolic panel; Future  •  Hemoglobin A1C; Future  •  CBC and Platelet; Future  •  Lipid Panel with Direct LDL reflex; Future  •  TSH, 3rd generation with Free T4 reflex; Future    Microalbuminuria  Able continue ACE  Orders:  •  Albumin / creatinine urine ratio; Future  •  Comprehensive metabolic panel; Future  •  Hemoglobin A1C;  "Future  •  CBC and Platelet; Future  •  Lipid Panel with Direct LDL reflex; Future  •  TSH, 3rd generation with Free T4 reflex; Future    Mixed hyperlipidemia  Able continue Crestor 20 mg daily  Orders:  •  Albumin / creatinine urine ratio; Future  •  Comprehensive metabolic panel; Future  •  Hemoglobin A1C; Future  •  CBC and Platelet; Future  •  Lipid Panel with Direct LDL reflex; Future  •  TSH, 3rd generation with Free T4 reflex; Future    Uncontrolled type 2 diabetes mellitus with hyperglycemia (HCC)  Check hemoglobin A1c 3 months.  Patient adamantly refuses to check blood glucose at home  Lab Results   Component Value Date    HGBA1C 7.1 (H) 01/30/2025       Orders:  •  Albumin / creatinine urine ratio; Future  •  Comprehensive metabolic panel; Future  •  Hemoglobin A1C; Future  •  CBC and Platelet; Future  •  Lipid Panel with Direct LDL reflex; Future  •  TSH, 3rd generation with Free T4 reflex; Future  •  Hemoglobin A1C; Future           History of Present Illness   Patient doing well without any current medical complaints or concerns.  Blood work was discussed.  Patient requests letter for jury duty.  Blood work reviewed    Review of Systems   Constitutional: Negative.    HENT: Negative.     Eyes: Negative.    Respiratory: Negative.     Cardiovascular: Negative.    Gastrointestinal: Negative.    Endocrine: Negative.    Genitourinary: Negative.    Musculoskeletal: Negative.    Skin: Negative.    Allergic/Immunologic: Negative.    Neurological: Negative.    Hematological: Negative.    Psychiatric/Behavioral: Negative.         Objective   /76 (BP Location: Right arm, Patient Position: Sitting, Cuff Size: Standard)   Pulse 65   Ht 5' 7\" (1.702 m)   Wt 86.3 kg (190 lb 3.2 oz)   SpO2 96%   BMI 29.79 kg/m²      Physical Exam  Vitals and nursing note reviewed.   Constitutional:       Appearance: Normal appearance.   HENT:      Head: Normocephalic and atraumatic.      Mouth/Throat:      Mouth: Mucous " membranes are moist.   Eyes:      General: No scleral icterus.  Neck:      Vascular: No carotid bruit.   Cardiovascular:      Rate and Rhythm: Normal rate and regular rhythm.      Heart sounds: Normal heart sounds.   Pulmonary:      Effort: Pulmonary effort is normal.      Breath sounds: Normal breath sounds.   Abdominal:      General: Bowel sounds are normal.      Palpations: Abdomen is soft.      Tenderness: There is no abdominal tenderness.   Musculoskeletal:      Cervical back: Neck supple.      Right lower leg: No edema.      Left lower leg: No edema.   Skin:     General: Skin is warm and dry.   Neurological:      General: No focal deficit present.      Mental Status: She is alert.   Psychiatric:         Mood and Affect: Mood normal.

## 2025-02-04 NOTE — ASSESSMENT & PLAN NOTE
Able continue ACE  Orders:  •  Albumin / creatinine urine ratio; Future  •  Comprehensive metabolic panel; Future  •  Hemoglobin A1C; Future  •  CBC and Platelet; Future  •  Lipid Panel with Direct LDL reflex; Future  •  TSH, 3rd generation with Free T4 reflex; Future

## 2025-02-04 NOTE — PATIENT INSTRUCTIONS
Crease metformin from 500 to 1000 mg twice daily  Check hemoglobin A1c 3 months  Return in 6 months for office visit, blood work, and AWV

## 2025-02-13 NOTE — PROGRESS NOTES
2/14/2025    No chief complaint on file.    Assessment and Plan    69 y.o. female managed by New patient     1. Multiple renal calculi  Assessment & Plan:  Renal US 1/2025-Several small nonobstructing renal calyceal stones bilaterally. No hydronephrosis.   Reviewed Imaging today at visit   Reviewed ESWL- would need KUB- deferred-  interested in monitoring at this time   Discussed dietray recommendations along with adequate hydration of at least 2.5 L daily for stone prevention   Dietary recommendations provided in after visit summary   Plan to start with dietary modification   Denies flank pain and gross hematuria   Reviewed ED precautions   Follow up in 1 year with  US and KUB prior  Orders:  -     Ambulatory Referral to Urology  -     US kidney and bladder; Future; Expected date: 02/14/2026  -     XR abdomen 1 view kub; Future; Expected date: 02/14/2026    History of Present Illness  Shannon Singh is a 69 y.o. female here for evaluation of nephrolithiasis.  Patient was referred by PCP.  Patient denies any flank pain or gross hematuria.  She denies ever having surgical intervention in the past for kidney stones.  She is comfortable with monitoring her stones and is not interested in surgical intervention at this time.  She would like to start with dietary modifications.  She is comfortable with following up in 1 year to reevaluate her stone burden.    Review of Systems   Constitutional:  Negative for chills and fever.   HENT:  Negative for ear pain and sore throat.    Eyes:  Negative for pain and visual disturbance.   Respiratory:  Negative for cough and shortness of breath.    Cardiovascular:  Negative for chest pain and palpitations.   Gastrointestinal:  Negative for abdominal pain and vomiting.   Genitourinary:  Negative for decreased urine volume, difficulty urinating, dysuria, flank pain, frequency, hematuria and urgency.   Musculoskeletal:  Negative for arthralgias and back pain.   Skin:  Negative for  "color change and rash.   Neurological:  Negative for seizures and syncope.   All other systems reviewed and are negative.               Vitals  Vitals:    02/14/25 0946   BP: 122/74   BP Location: Left arm   Patient Position: Sitting   Cuff Size: Standard   Pulse: 71   SpO2: 96%   Weight: 86.2 kg (190 lb)   Height: 5' 7\" (1.702 m)       Physical Exam  Constitutional:       Appearance: Normal appearance.   HENT:      Head: Normocephalic and atraumatic.   Pulmonary:      Effort: Pulmonary effort is normal.   Musculoskeletal:         General: Normal range of motion.      Cervical back: Normal range of motion.   Neurological:      General: No focal deficit present.      Mental Status: She is alert and oriented to person, place, and time. Mental status is at baseline.   Psychiatric:         Mood and Affect: Mood normal.         Behavior: Behavior normal.         Thought Content: Thought content normal.           Past History  Past Medical History:   Diagnosis Date   • Diabetes mellitus (HCC)     NIDDM   • Diverticulosis     hx colon resection   • GERD (gastroesophageal reflux disease)    • Hyperlipidemia    • Hypertension    • Macular degeneration    • Malignant neoplasm of skin     skin cancer right upper arm in past   • OA (osteoarthritis)     right knee     Social History     Socioeconomic History   • Marital status: /Civil Union     Spouse name: None   • Number of children: None   • Years of education: None   • Highest education level: None   Occupational History   • Occupation: employed   Tobacco Use   • Smoking status: Never   • Smokeless tobacco: Never   Vaping Use   • Vaping status: Never Used   Substance and Sexual Activity   • Alcohol use: Never     Comment: beer/ liquor, once a month   • Drug use: Never   • Sexual activity: None   Other Topics Concern   • None   Social History Narrative   • None     Social Drivers of Health     Financial Resource Strain: Low Risk  (6/1/2023)    Overall Financial " "Resource Strain (CARDIA)    • Difficulty of Paying Living Expenses: Not hard at all   Food Insecurity: No Food Insecurity (7/24/2024)    Nursing - Inadequate Food Risk Classification    • Worried About Running Out of Food in the Last Year: Never true    • Ran Out of Food in the Last Year: Never true    • Ran Out of Food in the Last Year: Not on file   Transportation Needs: No Transportation Needs (7/24/2024)    PRAPARE - Transportation    • Lack of Transportation (Medical): No    • Lack of Transportation (Non-Medical): No   Physical Activity: Not on file   Stress: Not on file   Social Connections: Not on file   Intimate Partner Violence: Not At Risk (4/20/2023)    [facility]    Humiliation, Afraid, Rape, and Kick questionnaire    • Fear of Current or Ex-Partner: No    • Emotionally Abused: No    • Physically Abused: No    • Sexually Abused: No   Housing Stability: Low Risk  (7/24/2024)    Housing Stability Vital Sign    • Unable to Pay for Housing in the Last Year: No    • Number of Times Moved in the Last Year: 0    • Homeless in the Last Year: No     Social History     Tobacco Use   Smoking Status Never   Smokeless Tobacco Never     Family History   Problem Relation Age of Onset   • Alzheimer's disease Mother    • Anxiety disorder Mother    • Diabetes Mother    • Cancer Father    • Diabetes Sister    • Hypertension Sister    • Stroke Sister        The following portions of the patient's history were reviewed and updated as appropriate: allergies, current medications, past medical history, past social history, past surgical history and problem list.    Results  No results found for this or any previous visit (from the past hour).]  No results found for: \"PSA\"  Lab Results   Component Value Date    GLUCOSE 110 11/02/2015    CALCIUM 10.0 01/30/2025     11/02/2015    K 4.8 01/30/2025    CO2 29 01/30/2025     01/30/2025    BUN 17 01/30/2025    " CREATININE 0.83 01/30/2025     Lab Results   Component Value Date    WBC 5.7 01/30/2025    HGB 13.8 01/30/2025    HCT 40.7 01/30/2025    MCV 87 01/30/2025     01/30/2025

## 2025-02-14 ENCOUNTER — CONSULT (OUTPATIENT)
Dept: UROLOGY | Facility: MEDICAL CENTER | Age: 70
End: 2025-02-14
Payer: COMMERCIAL

## 2025-02-14 VITALS
OXYGEN SATURATION: 96 % | HEIGHT: 67 IN | WEIGHT: 190 LBS | DIASTOLIC BLOOD PRESSURE: 74 MMHG | SYSTOLIC BLOOD PRESSURE: 122 MMHG | BODY MASS INDEX: 29.82 KG/M2 | HEART RATE: 71 BPM

## 2025-02-14 DIAGNOSIS — N20.0 MULTIPLE RENAL CALCULI: ICD-10-CM

## 2025-02-14 PROCEDURE — 99203 OFFICE O/P NEW LOW 30 MIN: CPT

## 2025-02-14 NOTE — ASSESSMENT & PLAN NOTE
Renal US 1/2025-Several small nonobstructing renal calyceal stones bilaterally. No hydronephrosis.   Reviewed Imaging today at visit   Reviewed ESWL- would need KUB- deferred-  interested in monitoring at this time   Discussed dietray recommendations along with adequate hydration of at least 2.5 L daily for stone prevention   Dietary recommendations provided in after visit summary   Plan to start with dietary modification   Denies flank pain and gross hematuria   Reviewed ED precautions   Follow up in 1 year with  US and KUB prior

## 2025-02-18 LAB
LEFT EYE DIABETIC RETINOPATHY: NORMAL
RIGHT EYE DIABETIC RETINOPATHY: NORMAL

## 2025-03-06 DIAGNOSIS — I10 HYPERTENSION, UNSPECIFIED TYPE: ICD-10-CM

## 2025-03-06 DIAGNOSIS — K21.9 GASTROESOPHAGEAL REFLUX DISEASE WITHOUT ESOPHAGITIS: ICD-10-CM

## 2025-03-06 RX ORDER — PANTOPRAZOLE SODIUM 20 MG/1
20 TABLET, DELAYED RELEASE ORAL DAILY
Qty: 90 TABLET | Refills: 1 | Status: SHIPPED | OUTPATIENT
Start: 2025-03-06

## 2025-03-06 RX ORDER — LISINOPRIL 10 MG/1
10 TABLET ORAL DAILY
Qty: 90 TABLET | Refills: 1 | Status: SHIPPED | OUTPATIENT
Start: 2025-03-06

## 2025-05-09 NOTE — TELEPHONE ENCOUNTER
KB, Can you address this?
Pt called to inform Adriana Reyes that she did get her labs done on Saturday at [facility]  She is requesting a 3 month supply of her medications that she previously requested, instead of a 1 month supply 
Rx sent 
[phone]

## 2025-07-29 DIAGNOSIS — F41.9 ANXIETY: Primary | ICD-10-CM

## 2025-07-30 RX ORDER — BUSPIRONE HYDROCHLORIDE 5 MG/1
5 TABLET ORAL 2 TIMES DAILY
Qty: 30 TABLET | Refills: 0 | Status: SHIPPED | OUTPATIENT
Start: 2025-07-30

## (undated) DEVICE — BIPOLAR SEALER 23-301-1 AQM MBS: Brand: AQUAMANTYS™

## (undated) DEVICE — SUT VICRYL 2-0 CT-1 36 IN J945H

## (undated) DEVICE — 3M™ IOBAN™ 2 ANTIMICROBIAL INCISE DRAPE 6648EZ: Brand: IOBAN™ 2

## (undated) DEVICE — 450 ML BOTTLE OF 0.05% CHLORHEXIDINE GLUCONATE IN 99.95% STERILE WATER FOR IRRIGATION, USP AND APPLICATOR.: Brand: IRRISEPT ANTIMICROBIAL WOUND LAVAGE

## (undated) DEVICE — PMI DISPOSABLE PUNCTURE CLOSURE DEVICE / SUTURE GRASPER: Brand: PMI

## (undated) DEVICE — EMERALD TOP SHEET DRAPE: Brand: CONVERTORS

## (undated) DEVICE — GLOVE SRG BIOGEL ORTHOPEDIC 6.5

## (undated) DEVICE — DRAPE SHEET THREE QUARTER

## (undated) DEVICE — Device: Brand: MEDEX

## (undated) DEVICE — ENDOPATH XCEL BLADELESS TROCARS WITH STABILITY SLEEVES: Brand: ENDOPATH XCEL

## (undated) DEVICE — SUT MONOCRYL 4-0 PS-2 27 IN Y426H

## (undated) DEVICE — SMARTGOWN SURGICAL GOWN, 3XL, LONG: Brand: CONVERTORS

## (undated) DEVICE — POSITIONER HANA TABLE PACK

## (undated) DEVICE — ASTOUND STANDARD SURGICAL GOWN, XL: Brand: CONVERTORS

## (undated) DEVICE — 2108 SERIES SAGITTAL BLADE, GROUND (20.5 X 1.27 X 85.0MM)

## (undated) DEVICE — 3M™ TEGADERM™ TRANSPARENT FILM DRESSING FRAME STYLE, 1627, 4 IN X 10 IN (10 CM X 25 CM), 20/CT 4CT/CASE: Brand: 3M™ TEGADERM™

## (undated) DEVICE — STERILE POLYISOPRENE POWDER-FREE SURGICAL GLOVES WITH EMOLLIENT COATING: Brand: PROTEXIS

## (undated) DEVICE — GLOVE INDICATOR PI UNDERGLOVE SZ 6.5 BLUE

## (undated) DEVICE — INTENDED FOR TISSUE SEPARATION, AND OTHER PROCEDURES THAT REQUIRE A SHARP SURGICAL BLADE TO PUNCTURE OR CUT.: Brand: BARD-PARKER ® CARBON RIB-BACK BLADES

## (undated) DEVICE — DRESSING MEPILEX AG BORDER POST-OP 4 X 8 IN

## (undated) DEVICE — ABSORBABLE WOUND CLOSURE DEVICE: Brand: V-LOC 90

## (undated) DEVICE — SUT VICRYL 1 CTX 36 IN J977H

## (undated) DEVICE — GLOVE INDICATOR PI UNDERGLOVE SZ 8 BLUE

## (undated) DEVICE — GLOVE SRG BIOGEL 7

## (undated) DEVICE — SUT ETHIBOND 5 V-40 30 IN MB46G

## (undated) DEVICE — STERILE POLYISOPRENE POWDER-FREE SURGICAL GLOVES: Brand: PROTEXIS

## (undated) DEVICE — SPONGE 4 X 4 XRAY 16 PLY STRL LF RFD

## (undated) DEVICE — FRAZIER SUCTION INSTRUMENT 18 FR W/OBTURATOR, NO CONTROL VENT: Brand: FRAZIER

## (undated) DEVICE — SUT VICRYL 3-0 SH 27 IN J416H

## (undated) DEVICE — COBAN 6 IN STERILE

## (undated) DEVICE — TIP COVER ACCESSORY

## (undated) DEVICE — THE SIMPULSE SOLO SYSTEM WITH ULTREX RETRACTABLE SPLASH SHIELD TIP: Brand: SIMPULSE SOLO

## (undated) DEVICE — SYRINGE 30ML LL

## (undated) DEVICE — SURGICAL GOWN, XL SMARTSLEEVE: Brand: CONVERTORS

## (undated) DEVICE — SCD SEQUENTIAL COMPRESSION COMFORT SLEEVE MEDIUM KNEE LENGTH: Brand: KENDALL SCD

## (undated) DEVICE — ELECTRODE BLADE E-Z CLEAN 6.5IN -0014

## (undated) DEVICE — LUBRICANT INST ELECTROLUBE ANTISTK WO PAD

## (undated) DEVICE — ANTIBACTERIAL UNDYED BRAIDED (POLYGLACTIN 910), SYNTHETIC ABSORBABLE SUTURE: Brand: COATED VICRYL

## (undated) DEVICE — HOOD: Brand: FLYTE, SURGICOOL

## (undated) DEVICE — SUT VLOC 90 3-0 V-20 9IN VLOCM0644

## (undated) DEVICE — CHLORAPREP HI-LITE 26ML ORANGE

## (undated) DEVICE — MEGA SUTURECUT ND: Brand: ENDOWRIST

## (undated) DEVICE — TUBING SMOKE EVAC W/FILTRATION DEVICE PLUMEPORT ACTIV

## (undated) DEVICE — CAPIT HIP COP - ACTIS ONLY

## (undated) DEVICE — COOL TEMP PAD

## (undated) DEVICE — 3M™ DURAPORE™ SURGICAL TAPE 1538-3, 3 INCH X 10 YARD (7,5CM X 9,1M), 4 ROLLS/BOX: Brand: 3M™ DURAPORE™

## (undated) DEVICE — ENDOPATH 5MM CURVED SCISSORS WITH MONOPOLAR CAUTERY: Brand: ENDOPATH

## (undated) DEVICE — NEPTUNE E-SEP SMOKE EVACUATION PENCIL, COATED, 70MM BLADE, PUSH BUTTON SWITCH: Brand: NEPTUNE E-SEP

## (undated) DEVICE — SUT VICRYL PLUS 1 CTX 36 IN VCP371H

## (undated) DEVICE — 3000CC GUARDIAN II: Brand: GUARDIAN

## (undated) DEVICE — 4-PORT MANIFOLD: Brand: NEPTUNE 2

## (undated) DEVICE — Device

## (undated) DEVICE — ADHESIVE SKIN CLOSR DERMABOND PRINEO

## (undated) DEVICE — COLUMN DRAPE

## (undated) DEVICE — INTENDED FOR TISSUE SEPARATION, AND OTHER PROCEDURES THAT REQUIRE A SHARP SURGICAL BLADE TO PUNCTURE OR CUT.: Brand: BARD-PARKER SAFETY BLADES SIZE 11, STERILE

## (undated) DEVICE — PDS II VLT 0 107CM AG ST3: Brand: ENDOLOOP

## (undated) DEVICE — HOOD: Brand: T7PLUS

## (undated) DEVICE — GAUZE SPONGES,USP TYPE VII GAUZE, 12 PLY: Brand: CURITY

## (undated) DEVICE — IMPERVIOUS STOCKINETTE: Brand: DEROYAL

## (undated) DEVICE — GLOVE SRG BIOGEL ECLIPSE 6.5

## (undated) DEVICE — SKIN MARKER DUAL TIP WITH RULER CAP, FLEXIBLE RULER AND LABELS: Brand: DEVON

## (undated) DEVICE — [HIGH FLOW INSUFFLATOR,  DO NOT USE IF PACKAGE IS DAMAGED,  KEEP DRY,  KEEP AWAY FROM SUNLIGHT,  PROTECT FROM HEAT AND RADIOACTIVE SOURCES.]: Brand: PNEUMOSURE

## (undated) DEVICE — NEEDLE 18 G X 1 1/2

## (undated) DEVICE — 3M™ STERI-DRAPE™ U-DRAPE 1015: Brand: STERI-DRAPE™

## (undated) DEVICE — SPONGE LAP 18 X 18 IN STRL RFD

## (undated) DEVICE — CANNULA SEAL

## (undated) DEVICE — SUT STRATAFIX SPIRAL PDS PLUS 1 CTX 18 IN SXPP1A400

## (undated) DEVICE — STAPLER CANNULA SEAL: Brand: ENDOWRIST

## (undated) DEVICE — SYRINGE 20ML LL

## (undated) DEVICE — BLADELESS OBTURATOR: Brand: WECK VISTA

## (undated) DEVICE — ARTHROSCOPY FLOOR MAT

## (undated) DEVICE — GLOVE PI ULTRA TOUCH SZ 6

## (undated) DEVICE — DRAPE C-ARM X-RAY

## (undated) DEVICE — ADHESIVE SKN CLSR HISTOACRYL FLEX 0.5ML LF

## (undated) DEVICE — CAPIT KNEE ATTUNE RP HYBRID/P

## (undated) DEVICE — WEBRIL 6 IN UNSTERILE

## (undated) DEVICE — PLUMEPEN PRO 10FT

## (undated) DEVICE — TAUT CATH INTRODUCER 4.5 FR

## (undated) DEVICE — DRESSING MEPILEX AG BORDER 4 X 8 IN

## (undated) DEVICE — PADDING CAST 6IN COTTON STRL

## (undated) DEVICE — MONOPOLAR CURVED SCISSORS: Brand: ENDOWRIST

## (undated) DEVICE — TABLE COVER: Brand: CONVERTORS

## (undated) DEVICE — CEMENT MIXING BOWL W/SPATULA

## (undated) DEVICE — CADIERE FORCEPS: Brand: ENDOWRIST

## (undated) DEVICE — GLOVE SRG BIOGEL 8

## (undated) DEVICE — ALLENTOWN LAP CHOLE APP PACK: Brand: CARDINAL HEALTH

## (undated) DEVICE — STOPCOCK 3-WAY

## (undated) DEVICE — GLOVE INDICATOR PI UNDERGLOVE SZ 7 BLUE

## (undated) DEVICE — ADHESIVE SKIN CLSR DERMABOND NX

## (undated) DEVICE — BLUE HEAT SCOPE WARMER

## (undated) DEVICE — IRRIG ENDO FLO TUBING

## (undated) DEVICE — ENDOPATH PNEUMONEEDLE INSUFFLATION NEEDLES WITH LUER LOCK CONNECTORS 120MM: Brand: ENDOPATH

## (undated) DEVICE — ENDOPOUCH RETRIEVER SPECIMEN RETRIEVAL BAGS: Brand: ENDOPOUCH RETRIEVER

## (undated) DEVICE — TAUT INTRODUCER KIT

## (undated) DEVICE — SUT VICRYL 0 UR-6 27 IN J603H

## (undated) DEVICE — ELECTRODE LAP J HOOK SPLIT STEM E-Z CLEAN 33CM -0021S

## (undated) DEVICE — SAW BLADE RECIPROCATING 179

## (undated) DEVICE — SURGICAL CLIPPER BLADE GENERAL USE

## (undated) DEVICE — ACE WRAP 6 IN UNSTERILE

## (undated) DEVICE — ADHESIVE SKIN HIGH VISCOSITY EXOFIN 1ML

## (undated) DEVICE — BETHLEHEM TOTAL HIP, KIT: Brand: CARDINAL HEALTH

## (undated) DEVICE — GLOVE SRG BIOGEL ECLIPSE 7.5

## (undated) DEVICE — CUFF TOURNIQUET 30 X 4 IN QUICK CONNECT DISP 1BLA

## (undated) DEVICE — SAW BLADE OSCILLATING BRAZOL 167

## (undated) DEVICE — ENDOPATH XCEL UNIVERSAL TROCAR STABLILITY SLEEVES: Brand: ENDOPATH XCEL

## (undated) DEVICE — BETHLEHEM UNIV TOTAL KNEE, KIT: Brand: CARDINAL HEALTH

## (undated) DEVICE — GLOVE SRG BIOGEL 6.5

## (undated) DEVICE — LIGAMAX 5 MM ENDOSCOPIC MULTIPLE CLIP APPLIER: Brand: LIGAMAX

## (undated) DEVICE — DRAPE SHEET X-LG

## (undated) DEVICE — ARM DRAPE

## (undated) DEVICE — DISPOSABLE OR TOWEL: Brand: CARDINAL HEALTH

## (undated) DEVICE — SUT MONOCRYL 3-0 PS-2 27 IN Y427H

## (undated) DEVICE — TRAY FOLEY 16FR URIMETER SURESTEP

## (undated) DEVICE — PREP SURGICAL PURPREP 26ML

## (undated) DEVICE — GLOVE SRG BIOGEL PI ORTHOPEDIC 7